# Patient Record
Sex: FEMALE | Race: WHITE | NOT HISPANIC OR LATINO | Employment: UNEMPLOYED | URBAN - METROPOLITAN AREA
[De-identification: names, ages, dates, MRNs, and addresses within clinical notes are randomized per-mention and may not be internally consistent; named-entity substitution may affect disease eponyms.]

---

## 2017-01-18 ENCOUNTER — MYC MEDICAL ADVICE (OUTPATIENT)
Dept: PEDIATRICS | Facility: CLINIC | Age: 52
End: 2017-01-18

## 2017-04-28 ENCOUNTER — OFFICE VISIT (OUTPATIENT)
Dept: PEDIATRICS | Facility: CLINIC | Age: 52
End: 2017-04-28
Payer: COMMERCIAL

## 2017-04-28 VITALS
HEART RATE: 90 BPM | BODY MASS INDEX: 40.84 KG/M2 | WEIGHT: 245.1 LBS | OXYGEN SATURATION: 98 % | SYSTOLIC BLOOD PRESSURE: 138 MMHG | HEIGHT: 65 IN | TEMPERATURE: 97.4 F | DIASTOLIC BLOOD PRESSURE: 80 MMHG

## 2017-04-28 DIAGNOSIS — M54.41 ACUTE RIGHT-SIDED LOW BACK PAIN WITH RIGHT-SIDED SCIATICA: Primary | ICD-10-CM

## 2017-04-28 PROCEDURE — 99213 OFFICE O/P EST LOW 20 MIN: CPT | Performed by: PHYSICIAN ASSISTANT

## 2017-04-28 RX ORDER — HYDROCODONE BITARTRATE AND ACETAMINOPHEN 5; 325 MG/1; MG/1
1 TABLET ORAL EVERY 6 HOURS PRN
Qty: 15 TABLET | Refills: 0 | Status: SHIPPED | OUTPATIENT
Start: 2017-04-28 | End: 2017-06-19

## 2017-04-28 NOTE — PATIENT INSTRUCTIONS
Low Back Pain            What is low back pain?   Low back pain is pain and stiffness in the lower back. It is one of the most common reasons people miss work.   How does it occur?   Your lower back is called your lumbar spine. It is made up of 5 bones called lumbar vertebrae. In between the vertebrae are shock absorbers called disks. Back pain can occur from an injury to the vertebrae or when a disk bulges or herniates.   Low back pain is usually caused when a ligament or muscle holding a vertebra in its proper position is strained. Vertebrae are bones that make up the spinal column through which the spinal cord passes. When these muscles or ligaments become weak or strained, the spine loses its stability, resulting in pain.   Low back pain can occur if your job involves lifting and carrying heavy objects, or if you spend a lot of time sitting or standing in one position or bending over. It can be caused by a fall or by unusually strenuous exercise. It can be brought on by the tension and stress that cause headaches in some people. It can even be brought on by violent sneezing or coughing.   People who are overweight may have low back pain because of the added stress on their back.   Back pain may occur when the muscles, joints, bones, and connective tissues of the back become inflamed as a result of an infection or an immune system problem. Arthritic disorders as well as some congenital and degenerative conditions may cause back pain.   Back pain accompanied by loss of bladder or bowel control, trouble moving your legs, or numbness or tingling in your arms or legs requires immediate medical treatment.   What are the symptoms?   Symptoms include:   pain in the back or legs   stiffness, spasm, or limited motion   The pain may be constant or may happen only in certain positions. It may get worse when you cough, sneeze, bend, twist, or strain during a bowel movement. The pain may be in only one spot or may  spread to other areas, most commonly down the buttocks and into the back of the thigh.   A low back strain typically does not produce pain past the knee into the calf or foot. Tingling or numbness in the calf or foot may indicate a herniated disk or pinched nerve.   Be sure to see your healthcare provider if:   You have weakness in your leg, especially if you cannot lift your foot, because this may be a sign of nerve damage.   You have new bowel or bladder problems as well as back pain, which may be a sign of severe injury to your spinal cord.   You have pain that gets worse despite treatment.   How is it diagnosed?   Your healthcare provider will review your medical history and examine you. You may have X-rays, an MRI, CT scan, or a bone scan.   How is it treated?   To treat this condition:   Put an ice pack, gel pack, or package of frozen vegetables, wrapped in a cloth on the area every 3 to 4 hours, for up to 20 minutes at a time for the first 2 or 3 days.   Use a heating pad or hot water bottle. Don't let the heating pad get too hot, and don't fall asleep with it. You could get a burn.   Rest in bed on a firm mattress. Often it helps to lie on your back with your knees raised on a pillow. However, some people prefer to lie on their side with their knees bent. It's best to try to stay active, so try not to rest in bed longer than 1 to 2 days.   Take muscle relaxants as recommended by your healthcare provider.   Take an anti-inflammatory such as ibuprofen, or other medicine as directed by your provider. Nonsteroidal anti-inflammatory medicines (NSAIDs) may cause stomach bleeding and other problems. These risks increase with age. Read the label and take as directed. Unless recommended by your healthcare provider, do not take for more than 10 days.   Get a back massage by a trained person.   Wear a belt or corset to support your back.   Do the exercises recommended by your provider. Your provider may also prescribe  physical therapy.   Talk with a counselor, if your back pain is related to tension caused by emotional problems.   When the pain is gone, ask your healthcare provider about starting an exercise program such as the following:   Exercise moderately every day, using stretching and warm-up exercises suggested by your provider or physical therapist.   Exercise vigorously for about 30 minutes 3 times a week by walking, swimming, using a stationary bicycle, or doing low-impact aerobics.   Exercising regularly will not only help your back, it will also help keep you healthier overall.   How long will the effects last?   The effects of back pain last as long as the cause exists or until your body recovers from the strain, usually a day or two but sometimes weeks.   How can I take care of myself?   In addition to the treatment described above, keep in mind these suggestions:   Practice good posture. Stand with your head up, shoulders straight, chest forward, weight balanced evenly on both feet, and pelvis tucked in.   Lose weight if you are overweight   Keep your core muscles strong. These are your abdominal and back muscles.   Sleep without a pillow under your head.   Pain is the best way to  the pace you should set in increasing your activity and exercise. Minor discomfort, stiffness, soreness, and mild aches need not interfere with activity. However, limit your activities temporarily if:   Your symptoms return.   The pain increases when you are more active.   The pain increases within 24 hours after a new or higher level of activity.   When can I return to my normal activities?   Everyone recovers from an injury at a different rate. Return to your activities depends on how soon your back recovers, not by how many days or weeks it has been since your injury has occurred. In general, the longer you have symptoms before you start treatment, the longer it will take to get better. The goal is to return to your normal  activities as soon as is safely possible. If you return too soon you may worsen your injury.   It is important that you have fully recovered from your low back pain before you return to any strenuous activity. You must be able to have the same range of motion that you had before your injury. You must be able to walk and twist without pain.   What can I do to help prevent low back pain?   You can reduce the strain on your back by doing the following:   Don't push with your arms when you move a heavy object. Turn around and push backwards so the strain is taken by your legs.   Whenever you sit, sit in a straight-backed chair and hold your spine against the back of the chair.   Bend your knees and hips and keep your back straight when you lift a heavy object.   Avoid lifting heavy objects higher than your waist.   Hold packages you carry close to your body, with your arms bent.   Use a footrest for one foot when you stand or sit in one spot for a long time. This keeps your back straight.   Bend your knees when you bend over.   Sit close to the pedals when you drive and use your seat belt and a hard backrest or pillow.   Lie on your side with your knees bent when you sleep or rest. It may help to put a pillow between your knees.   Put a pillow under your knees when you sleep on your back.   Raise the foot of the bed 8 inches to discourage sleeping on your stomach unless you have other problems that require that you keep your head elevated.   To rest your back, hold each of these positions for 5?minutes or longer:   Lie on your back, bend your knees, and put pillows under your knees.   Lie on your back on the floor with a pillow under your neck. Bend your knees to a 90-degree angle, and put your lower legs and feet on a chair.   Lie on your back, bend your knees, and bring one knee up to your chest and hold it there. Repeat with the other knee, then bring both knees to your chest. When holding your knee to your chest,  grab your thigh rather than your lower leg to avoid over flexing your knee.     Published by [employer].  This content is reviewed periodically and is subject to change as new health information becomes available. The information is intended to inform and educate and is not a replacement for medical evaluation, advice, diagnosis or treatment by a healthcare professional.   Developed by Mayra Conroy RN, [place], and [employer].   ? 2010 [facility] and/or its affiliates. All Rights Reserved.

## 2017-04-28 NOTE — MR AVS SNAPSHOT
After Visit Summary   4/28/2017    Racquel Nunn    MRN: [id number]           Patient Information     Date Of Birth          1965        Visit Information        Provider Department      4/28/2017 3:30 PM Jong Hilliard PA-C [facility]        Today's Diagnoses     Acute right-sided low back pain with right-sided sciatica    -  1      Care Instructions             Low Back Pain            What is low back pain?   Low back pain is pain and stiffness in the lower back. It is one of the most common reasons people miss work.   How does it occur?   Your lower back is called your lumbar spine. It is made up of 5 bones called lumbar vertebrae. In between the vertebrae are shock absorbers called disks. Back pain can occur from an injury to the vertebrae or when a disk bulges or herniates.   Low back pain is usually caused when a ligament or muscle holding a vertebra in its proper position is strained. Vertebrae are bones that make up the spinal column through which the spinal cord passes. When these muscles or ligaments become weak or strained, the spine loses its stability, resulting in pain.   Low back pain can occur if your job involves lifting and carrying heavy objects, or if you spend a lot of time sitting or standing in one position or bending over. It can be caused by a fall or by unusually strenuous exercise. It can be brought on by the tension and stress that cause headaches in some people. It can even be brought on by violent sneezing or coughing.   People who are overweight may have low back pain because of the added stress on their back.   Back pain may occur when the muscles, joints, bones, and connective tissues of the back become inflamed as a result of an infection or an immune system problem. Arthritic disorders as well as some congenital and degenerative conditions may cause back pain.   Back pain accompanied by loss of bladder or bowel control, trouble  moving your legs, or numbness or tingling in your arms or legs requires immediate medical treatment.   What are the symptoms?   Symptoms include:   pain in the back or legs   stiffness, spasm, or limited motion   The pain may be constant or may happen only in certain positions. It may get worse when you cough, sneeze, bend, twist, or strain during a bowel movement. The pain may be in only one spot or may spread to other areas, most commonly down the buttocks and into the back of the thigh.   A low back strain typically does not produce pain past the knee into the calf or foot. Tingling or numbness in the calf or foot may indicate a herniated disk or pinched nerve.   Be sure to see your healthcare provider if:   You have weakness in your leg, especially if you cannot lift your foot, because this may be a sign of nerve damage.   You have new bowel or bladder problems as well as back pain, which may be a sign of severe injury to your spinal cord.   You have pain that gets worse despite treatment.   How is it diagnosed?   Your healthcare provider will review your medical history and examine you. You may have X-rays, an MRI, CT scan, or a bone scan.   How is it treated?   To treat this condition:   Put an ice pack, gel pack, or package of frozen vegetables, wrapped in a cloth on the area every 3 to 4 hours, for up to 20 minutes at a time for the first 2 or 3 days.   Use a heating pad or hot water bottle. Don't let the heating pad get too hot, and don't fall asleep with it. You could get a burn.   Rest in bed on a firm mattress. Often it helps to lie on your back with your knees raised on a pillow. However, some people prefer to lie on their side with their knees bent. It's best to try to stay active, so try not to rest in bed longer than 1 to 2 days.   Take muscle relaxants as recommended by your healthcare provider.   Take an anti-inflammatory such as ibuprofen, or other medicine as directed by your provider.  Nonsteroidal anti-inflammatory medicines (NSAIDs) may cause stomach bleeding and other problems. These risks increase with age. Read the label and take as directed. Unless recommended by your healthcare provider, do not take for more than 10 days.   Get a back massage by a trained person.   Wear a belt or corset to support your back.   Do the exercises recommended by your provider. Your provider may also prescribe physical therapy.   Talk with a counselor, if your back pain is related to tension caused by emotional problems.   When the pain is gone, ask your healthcare provider about starting an exercise program such as the following:   Exercise moderately every day, using stretching and warm-up exercises suggested by your provider or physical therapist.   Exercise vigorously for about 30 minutes 3 times a week by walking, swimming, using a stationary bicycle, or doing low-impact aerobics.   Exercising regularly will not only help your back, it will also help keep you healthier overall.   How long will the effects last?   The effects of back pain last as long as the cause exists or until your body recovers from the strain, usually a day or two but sometimes weeks.   How can I take care of myself?   In addition to the treatment described above, keep in mind these suggestions:   Practice good posture. Stand with your head up, shoulders straight, chest forward, weight balanced evenly on both feet, and pelvis tucked in.   Lose weight if you are overweight   Keep your core muscles strong. These are your abdominal and back muscles.   Sleep without a pillow under your head.   Pain is the best way to  the pace you should set in increasing your activity and exercise. Minor discomfort, stiffness, soreness, and mild aches need not interfere with activity. However, limit your activities temporarily if:   Your symptoms return.   The pain increases when you are more active.   The pain increases within 24 hours after a new or  higher level of activity.   When can I return to my normal activities?   Everyone recovers from an injury at a different rate. Return to your activities depends on how soon your back recovers, not by how many days or weeks it has been since your injury has occurred. In general, the longer you have symptoms before you start treatment, the longer it will take to get better. The goal is to return to your normal activities as soon as is safely possible. If you return too soon you may worsen your injury.   It is important that you have fully recovered from your low back pain before you return to any strenuous activity. You must be able to have the same range of motion that you had before your injury. You must be able to walk and twist without pain.   What can I do to help prevent low back pain?   You can reduce the strain on your back by doing the following:   Don't push with your arms when you move a heavy object. Turn around and push backwards so the strain is taken by your legs.   Whenever you sit, sit in a straight-backed chair and hold your spine against the back of the chair.   Bend your knees and hips and keep your back straight when you lift a heavy object.   Avoid lifting heavy objects higher than your waist.   Hold packages you carry close to your body, with your arms bent.   Use a footrest for one foot when you stand or sit in one spot for a long time. This keeps your back straight.   Bend your knees when you bend over.   Sit close to the pedals when you drive and use your seat belt and a hard backrest or pillow.   Lie on your side with your knees bent when you sleep or rest. It may help to put a pillow between your knees.   Put a pillow under your knees when you sleep on your back.   Raise the foot of the bed 8 inches to discourage sleeping on your stomach unless you have other problems that require that you keep your head elevated.   To rest your back, hold each of these positions for 5?minutes or longer:    Lie on your back, bend your knees, and put pillows under your knees.   Lie on your back on the floor with a pillow under your neck. Bend your knees to a 90-degree angle, and put your lower legs and feet on a chair.   Lie on your back, bend your knees, and bring one knee up to your chest and hold it there. Repeat with the other knee, then bring both knees to your chest. When holding your knee to your chest, grab your thigh rather than your lower leg to avoid over flexing your knee.     Published by [employer].  This content is reviewed periodically and is subject to change as new health information becomes available. The information is intended to inform and educate and is not a replacement for medical evaluation, advice, diagnosis or treatment by a healthcare professional.   Developed by Mayra Conroy RN, [place], and [employer].   ? 2010 [facility] and/or its affiliates. All Rights Reserved.                 Follow-ups after your visit        Who to contact     If you have questions or need follow up information about today's clinic visit or your schedule please contact [facility] directly at [phone].  Normal or non-critical lab and imaging results will be communicated to you by [employer], letter or phone within 4 business days after the clinic has received the results. If you do not hear from us within 7 days, please contact the clinic through [employer] or phone. If you have a critical or abnormal lab result, we will notify you by phone as soon as possible.  Submit refill requests through [employer] or call your pharmacy and they will forward the refill request to us. Please allow 3 business days for your refill to be completed.          Additional Information About Your Visit        [employer] Information     [employer] gives you secure access to your electronic health record. If you see a primary care provider, you can also send messages to your care team and make appointments. If you have questions,  "please call your primary care clinic.  If you do not have a primary care provider, please call [phone] and they will assist you.        Care EveryWhere ID     This is your Care EveryWhere ID. This could be used by other organizations to access your [place] medical records  [id number]        Your Vitals Were     Pulse Temperature Height Pulse Oximetry BMI (Body Mass Index)       90 97.4  F (36.3  C) (Oral) 5' 4.5\" (1.638 m) 98% 41.42 kg/m2        Blood Pressure from Last 3 Encounters:   04/28/17 138/80   10/24/16 122/80   09/23/15 132/82    Weight from Last 3 Encounters:   04/28/17 245 lb 1.6 oz (111.2 kg)   10/24/16 232 lb (105.2 kg)   09/23/15 219 lb 12.8 oz (99.7 kg)              Today, you had the following     No orders found for display         Today's Medication Changes          These changes are accurate as of: 4/28/17  3:51 PM.  If you have any questions, ask your nurse or doctor.               Start taking these medicines.        Dose/Directions    HYDROcodone-acetaminophen 5-325 MG per tablet   Commonly known as:  NORCO   Used for:  Acute right-sided low back pain with right-sided sciatica   Started by:  Jong Hilliard PA-C        Dose:  1 tablet   Take 1 tablet by mouth every 6 hours as needed for moderate to severe pain   Quantity:  15 tablet   Refills:  0            Where to get your medicines      Some of these will need a paper prescription and others can be bought over the counter.  Ask your nurse if you have questions.     Bring a paper prescription for each of these medications     HYDROcodone-acetaminophen 5-325 MG per tablet                Primary Care Provider Office Phone # Fax #    Nancy Small [phone]       [facility] [address] [place] [address]        Thank you!     Thank you for choosing [facility]  for your care. Our goal is always to provide you with excellent care. Hearing back from our patients is " one way we can continue to improve our services. Please take a few minutes to complete the written survey that you may receive in the mail after your visit with us. Thank you!             Your Updated Medication List - Protect others around you: Learn how to safely use, store and throw away your medicines at www.disposemymeds.org.          This list is accurate as of: 4/28/17  3:51 PM.  Always use your most recent med list.                   Brand Name Dispense Instructions for use    blood glucose monitoring test strip    no brand specified    1 Box    by In Vitro route daily.       citalopram 20 MG tablet    celeXA    90 tablet    Take 1 tablet (20 mg) by mouth daily       HYDROcodone-acetaminophen 5-325 MG per tablet    NORCO    15 tablet    Take 1 tablet by mouth every 6 hours as needed for moderate to severe pain       lisinopril 40 MG tablet    PRINIVIL/ZESTRIL    90 tablet    Take 1 tablet (40 mg) by mouth daily       LORazepam 0.5 MG tablet    ATIVAN    30 tablet    Take 1-2 tablets (0.5-1 mg) by mouth every 8 hours as needed for anxiety       Multi-vitamin Tabs tablet   Generic drug:  multivitamin, therapeutic with minerals          norgestim-eth estrad triphasic 0.18/0.215/0.25 MG-35 MCG per tablet    ORTHO TRI-CYCLEN, TRI-SPRINTEC    84 tablet    Take 1 tablet by mouth daily

## 2017-04-28 NOTE — PROGRESS NOTES
"  SUBJECTIVE:                                                    Racquel Nunn is a 51 year old female who presents to clinic today for the following health issues:    Back Pain      Duration: this morning at 10:30        Specific cause: none    Description:   Location of pain: low back right  Character of pain: stabbing, cramping and constant  Pain radiation:radiates into the right buttocks, radiates into the right leg and radiates into the right foot  New numbness or weakness in legs, not attributed to pain:  no     Intensity: Currently 8/10, At its worst 10+/10    History:   Pain interferes with job: No  History of back problems: no prior back problems  Any previous MRI or X-rays: None  Sees a specialist for back pain:  No  Therapies tried without relief: cold, heat and rest    Alleviating factors:   Improved by: none      Precipitating factors:  Worsened by: Lying Flat    Functional and Psychosocial Screen ([place] [facility] Back):      Not performed today   Accompanying Signs & Symptoms:  Risk of Fracture:  None  Risk of Cauda Equina:  None  Risk of Infection:  None  Risk of Cancer:  None  Risk of Ankylosing Spondylitis:  Onset at age <35, male, AND morning back stiffness. no     Works at [employer] in [place]     ROS:  ROS otherwise negative    OBJECTIVE:                                                    /80  Pulse 90  Temp 97.4  F (36.3  C) (Oral)  Ht 5' 4.5\" (1.638 m)  Wt 245 lb 1.6 oz (111.2 kg)  SpO2 98%  BMI 41.42 kg/m2  Body mass index is 41.42 kg/(m^2).   GENERAL: alert, no distress  Lumber/Thoracic Spine Exam: Tender:  right para lumbar muscles and right mid buttock  Range of Motion:  lumbar flexion  decreased, lumbar extension  decreased, left lateral lumbar bending  full, right lateral lumbar bending  decreased, left lateral lumbar rotation  Decreased, bilateral lumbar rotation  full  Strength: full  Special tests:  negative straight leg raises    Diagnostic test results:  No " results found for this or any previous visit (from the past 24 hour(s)).     ASSESSMENT/PLAN:                                                    (M54.41) Acute right-sided low back pain with right-sided sciatica  (primary encounter diagnosis)  Comment: begin heat/ice, modification of activities, ibuprofen four times daily and norco PRN. Work excuse given. Follow up in 5 days.   Signs for emergent evaluation discussed with patient.  Plan: HYDROcodone-acetaminophen (NORCO) 5-325 MG per         tablet          See Patient Instructions    Jong Hilliard PA-C  [facility]

## 2017-04-28 NOTE — NURSING NOTE
"Chief Complaint   Patient presents with     Back Pain     this morning        Initial /80  Pulse 90  Temp 97.4  F (36.3  C) (Oral)  Ht 5' 4.5\" (1.638 m)  Wt 245 lb 1.6 oz (111.2 kg)  SpO2 98%  BMI 41.42 kg/m2 Estimated body mass index is 41.42 kg/(m^2) as calculated from the following:    Height as of this encounter: 5' 4.5\" (1.638 m).    Weight as of this encounter: 245 lb 1.6 oz (111.2 kg).  Medication Reconciliation: complete   Jessica Palomo MA// April 28, 2017 3:27 PM          "

## 2017-04-28 NOTE — LETTER
[address]  [address]  [facility] [address]  Phone: [phone]  Fax: [phone]    04/28/17    Racquel Nunn  [address]  [place] [facility] [address]      To whom it may concern:     Racquel Nunn was seen on 4/28/17.  Please excuse her through 5/2 due to injury.      Sincerely,        Jong Hilliard PA-C

## 2017-05-08 ENCOUNTER — MYC MEDICAL ADVICE (OUTPATIENT)
Dept: PEDIATRICS | Facility: CLINIC | Age: 52
End: 2017-05-08

## 2017-05-08 DIAGNOSIS — M54.41 ACUTE RIGHT-SIDED LOW BACK PAIN WITH RIGHT-SIDED SCIATICA: ICD-10-CM

## 2017-05-08 NOTE — TELEPHONE ENCOUNTER
See [employer] message below. PT next step? Ok for Hydrocodone refill?    Refill request for: HYDROCODONE-APAP 5/325 MG    Last rx written: 4/28/17 # 15    **[place]  reviewed- last filled: unable to view  Narc agreement: none  Last OV with [facility] 4/28/17  for acute right side back pain with sciatica    Unable to fill per standing order routed to Dr. Smlal for approval.    Unsure, sent [employer] message.     Elmira Bruno RN

## 2017-05-09 RX ORDER — METHYLPREDNISOLONE 4 MG
TABLET, DOSE PACK ORAL
Qty: 21 TABLET | Refills: 0 | Status: SHIPPED | OUTPATIENT
Start: 2017-05-09 | End: 2017-06-19

## 2017-05-09 RX ORDER — HYDROCODONE BITARTRATE AND ACETAMINOPHEN 5; 325 MG/1; MG/1
1 TABLET ORAL EVERY 6 HOURS PRN
Qty: 15 TABLET | Refills: 0 | Status: CANCELLED | OUTPATIENT
Start: 2017-05-09

## 2017-05-09 NOTE — TELEPHONE ENCOUNTER
I recommend a steroid burst and physical therapy - orders placed.   If these are not helpful, can refill a small supply of hydrocodone, but would try them first.

## 2017-06-19 ENCOUNTER — OFFICE VISIT (OUTPATIENT)
Dept: PEDIATRICS | Facility: CLINIC | Age: 52
End: 2017-06-19
Payer: COMMERCIAL

## 2017-06-19 VITALS
DIASTOLIC BLOOD PRESSURE: 78 MMHG | SYSTOLIC BLOOD PRESSURE: 124 MMHG | HEART RATE: 84 BPM | BODY MASS INDEX: 38.36 KG/M2 | TEMPERATURE: 98.7 F | OXYGEN SATURATION: 100 % | WEIGHT: 227 LBS

## 2017-06-19 DIAGNOSIS — J01.90 ACUTE SINUSITIS WITH SYMPTOMS > 10 DAYS: Primary | ICD-10-CM

## 2017-06-19 PROCEDURE — 99213 OFFICE O/P EST LOW 20 MIN: CPT | Performed by: PHYSICIAN ASSISTANT

## 2017-06-19 RX ORDER — AMOXICILLIN 875 MG
875 TABLET ORAL 2 TIMES DAILY
Qty: 20 TABLET | Refills: 0 | Status: SHIPPED | OUTPATIENT
Start: 2017-06-19 | End: 2018-01-10

## 2017-06-19 NOTE — MR AVS SNAPSHOT
After Visit Summary   6/19/2017    Racquel Nunn    MRN: [id number]           Patient Information     Date Of Birth          1965        Visit Information        Provider Department      6/19/2017 3:30 PM Jong Hilliard PA-C [facility]        Today's Diagnoses     Acute sinusitis with symptoms > 10 days    -  1       Follow-ups after your visit        Who to contact     If you have questions or need follow up information about today's clinic visit or your schedule please contact [facility] directly at [phone].  Normal or non-critical lab and imaging results will be communicated to you by [employer], letter or phone within 4 business days after the clinic has received the results. If you do not hear from us within 7 days, please contact the clinic through [employer] or phone. If you have a critical or abnormal lab result, we will notify you by phone as soon as possible.  Submit refill requests through [employer] or call your pharmacy and they will forward the refill request to us. Please allow 3 business days for your refill to be completed.          Additional Information About Your Visit        MyChart Information     [employer] gives you secure access to your electronic health record. If you see a primary care provider, you can also send messages to your care team and make appointments. If you have questions, please call your primary care clinic.  If you do not have a primary care provider, please call [phone] and they will assist you.        Care EveryWhere ID     This is your Care EveryWhere ID. This could be used by other organizations to access your [facility] medical records  [id number]        Your Vitals Were     Pulse Temperature Pulse Oximetry BMI (Body Mass Index)          84 98.7  F (37.1  C) (Oral) 100% 38.36 kg/m2         Blood Pressure from Last 3 Encounters:   06/19/17 124/78   04/28/17 138/80   10/24/16 122/80    Weight from Last 3  Encounters:   06/19/17 227 lb (103 kg)   04/28/17 245 lb 1.6 oz (111.2 kg)   10/24/16 232 lb (105.2 kg)              Today, you had the following     No orders found for display         Today's Medication Changes          These changes are accurate as of: 6/19/17  3:43 PM.  If you have any questions, ask your nurse or doctor.               Start taking these medicines.        Dose/Directions    amoxicillin 875 MG tablet   Commonly known as:  AMOXIL   Used for:  Acute sinusitis with symptoms > 10 days   Started by:  Jong Hilliard PA-C        Dose:  875 mg   Take 1 tablet (875 mg) by mouth 2 times daily   Quantity:  20 tablet   Refills:  0         Stop taking these medicines if you haven't already. Please contact your care team if you have questions.     HYDROcodone-acetaminophen 5-325 MG per tablet   Commonly known as:  NORCO   Stopped by:  Jong Hilliard PA-C           methylPREDNISolone 4 MG tablet   Commonly known as:  MEDROL DOSEPAK   Stopped by:  Jong Hilliard PA-C                Where to get your medicines      These medications were sent to [address] [address]    Hours:  Tech issues with their phone system Phone:  [phone]     amoxicillin 875 MG tablet                Primary Care Provider Office Phone # Fax #    Nancy Small [phone]       [address] [place] [address]        Thank you!     Thank you for choosing [facility]  for your care. Our goal is always to provide you with excellent care. Hearing back from our patients is one way we can continue to improve our services. Please take a few minutes to complete the written survey that you may receive in the mail after your visit with us. Thank you!             Your Updated Medication List - Protect others around you: Learn how to safely use, store and throw  away your medicines at www.disposemymeds.org.          This list is accurate as of: 6/19/17  3:43 PM.  Always use your most recent med list.                   Brand Name Dispense Instructions for use    amoxicillin 875 MG tablet    AMOXIL    20 tablet    Take 1 tablet (875 mg) by mouth 2 times daily       blood glucose monitoring test strip    no brand specified    1 Box    by In Vitro route daily.       citalopram 20 MG tablet    celeXA    90 tablet    Take 1 tablet (20 mg) by mouth daily       lisinopril 40 MG tablet    PRINIVIL/ZESTRIL    90 tablet    Take 1 tablet (40 mg) by mouth daily       LORazepam 0.5 MG tablet    ATIVAN    30 tablet    Take 1-2 tablets (0.5-1 mg) by mouth every 8 hours as needed for anxiety       Multi-vitamin Tabs tablet   Generic drug:  multivitamin, therapeutic with minerals          norgestim-eth estrad triphasic 0.18/0.215/0.25 MG-35 MCG per tablet    ORTHO TRI-CYCLEN, TRI-SPRINTEC    84 tablet    Take 1 tablet by mouth daily

## 2017-06-19 NOTE — PROGRESS NOTES
SUBJECTIVE:                                                    Racquel Nunn is a 52 year old female who presents to clinic today for the following health issues:    Acute Illness   Acute illness concerns: cough  Onset: 2 weeks    Fever: YES    Chills/Sweats: YES    Headache (location?): YES    Sinus Pressure:no    Conjunctivitis:  no    Ear Pain: no    Rhinorrhea: YES    Congestion: YES    Sore Throat: no     Cough: YES    Wheeze: YES    No sob    No history of asthma, allergies.     Nonsmoker    Decreased Appetite: no    Nausea: no    Vomiting: no    Diarrhea:  no    Dysuria/Freq.: no    Fatigue/Achiness: YES    Sick/Strep Exposure: no     Therapies Tried and outcome:   Work: [employer]  ROS:  ROS otherwise negative    OBJECTIVE:                                                    /78 (Cuff Size: Adult Large)  Pulse 84  Temp 98.7  F (37.1  C) (Oral)  Wt 227 lb (103 kg)  SpO2 100%  BMI 38.36 kg/m2  Body mass index is 38.36 kg/(m^2).   GENERAL: alert, no distress  HENT: ear canals- normal; TMs- normal; Nose- normal; Mouth- no ulcers, no lesions; max sinus tenderness  NECK: no tenderness, no adenopathy  RESP: lungs clear to auscultation - no rales, no rhonchi, no wheezes  CV: regular rates and rhythm, normal S1 S2, no S3 or S4 and no murmur, no click or rub    Diagnostic test results:  No results found for this or any previous visit (from the past 24 hour(s)).     ASSESSMENT/PLAN:                                                    (J01.90) Acute sinusitis with symptoms > 10 days  (primary encounter diagnosis)  Comment: begin antibiotics.   Plan: amoxicillin (AMOXIL) 875 MG tablet          See Patient Instructions    Jong Hilliard PA-C  [facility]

## 2017-09-09 ENCOUNTER — HEALTH MAINTENANCE LETTER (OUTPATIENT)
Age: 52
End: 2017-09-09

## 2017-11-01 ENCOUNTER — HOSPITAL ENCOUNTER (OUTPATIENT)
Dept: MAMMOGRAPHY | Facility: CLINIC | Age: 52
Discharge: HOME | End: 2017-11-01
Attending: PEDIATRICS | Admitting: PEDIATRICS
Payer: COMMERCIAL

## 2017-11-01 DIAGNOSIS — Z12.31 ENCOUNTER FOR SCREENING MAMMOGRAM FOR HIGH-RISK PATIENT: ICD-10-CM

## 2017-11-01 PROCEDURE — G0202 SCR MAMMO BI INCL CAD: HCPCS

## 2017-11-09 DIAGNOSIS — Z30.09 BIRTH CONTROL COUNSELING: ICD-10-CM

## 2017-11-10 RX ORDER — NORGESTIMATE AND ETHINYL ESTRADIOL 7DAYSX3 28
1 KIT ORAL DAILY
Qty: 84 TABLET | Refills: 0 | Status: SHIPPED | OUTPATIENT
Start: 2017-11-10 | End: 2018-01-11

## 2017-11-10 NOTE — TELEPHONE ENCOUNTER
Medication is being filled for 1 time refill only due to:  due for annual physical, pap, labs.   Please call her to help schedule this.     Requested Prescriptions   Pending Prescriptions Disp Refills     TRI-ESTARYLLA 0.18/0.215/0.25 MG-35 MCG per tablet [Pharmacy Med Name: TRI-ESTARYLLA TABLET] 84 tablet 3     Sig: TAKE 1 TABLET BY MOUTH DAILY    Contraceptives Protocol Passed    11/9/2017 12:57 AM       Passed - Patient is not a current smoker if age is 35 or older       Passed - Recent or future visit with authorizing provider's specialty    Patient had office visit in the last year or has a visit in the next 30 days with authorizing provider.  See chart review.          Passed - No active pregnancy on record       Passed - No positive pregnancy test in past 12 months        Crystal Crockett, RN  Triage Nurse

## 2017-11-11 DIAGNOSIS — I10 BENIGN ESSENTIAL HYPERTENSION: ICD-10-CM

## 2017-11-11 DIAGNOSIS — F32.5 MAJOR DEPRESSION IN COMPLETE REMISSION (H): ICD-10-CM

## 2017-11-11 DIAGNOSIS — F41.9 ANXIETY: ICD-10-CM

## 2017-11-11 NOTE — LETTER
[address]  [address]  [facility] [address]  Phone: [phone]  Fax: [phone]    11/14/17    Racquel ROLLINS [place]  [address] [facility] [address]      To whom it may concern:     We recently received a call from your pharmacy requesting a refill of your medication.    A review of your chart indicates that an appointment is required with your provider for an Annual Physical. Your last physical was on 10/24/2016. Please call the clinic at [phone] to schedule your appointment.    We have authorized one refill of your medication to allow time for you to schedule your appointment.    Taking care of your health is important to us, and ongoing visits with your provider are vital to your care.  We look forward to seeing you in the near future.          Sincerely,      Nancy Small MD/ Care Team

## 2017-11-14 RX ORDER — CITALOPRAM HYDROBROMIDE 20 MG/1
TABLET ORAL
Qty: 30 TABLET | Refills: 0 | Status: SHIPPED | OUTPATIENT
Start: 2017-11-14 | End: 2018-01-11

## 2017-11-14 RX ORDER — LISINOPRIL 40 MG/1
TABLET ORAL
Qty: 30 TABLET | Refills: 0 | Status: SHIPPED | OUTPATIENT
Start: 2017-11-14 | End: 2018-01-11

## 2017-11-14 NOTE — TELEPHONE ENCOUNTER
Medication is being filled for 1 time refill only due to:  Patient needs to be seen because due for annual physical.     Letter Sent.     Prescription approved per [facility] Refill Protocol.    Anjana TAVAREZ RN, BSN, PHN  [place] [facility] RN

## 2017-12-20 ENCOUNTER — TELEPHONE (OUTPATIENT)
Dept: PEDIATRICS | Facility: CLINIC | Age: 52
End: 2017-12-20

## 2017-12-20 NOTE — TELEPHONE ENCOUNTER
"12/20/2017      Patient Communication Preferences indicate  Do not contact  and/or communication by \"Phone\" is not preferred. Call not required per Outreach team.            Outreach ,  Ena Lockhart     "

## 2018-01-10 ENCOUNTER — OFFICE VISIT (OUTPATIENT)
Dept: PEDIATRICS | Facility: CLINIC | Age: 53
End: 2018-01-10
Payer: COMMERCIAL

## 2018-01-10 VITALS
BODY MASS INDEX: 40.35 KG/M2 | HEIGHT: 65 IN | SYSTOLIC BLOOD PRESSURE: 148 MMHG | TEMPERATURE: 98 F | DIASTOLIC BLOOD PRESSURE: 90 MMHG | OXYGEN SATURATION: 100 % | WEIGHT: 242.2 LBS | HEART RATE: 82 BPM

## 2018-01-10 DIAGNOSIS — J02.9 ACUTE PHARYNGITIS, UNSPECIFIED ETIOLOGY: ICD-10-CM

## 2018-01-10 DIAGNOSIS — H11.33 SUBCONJUNCTIVAL HEMORRHAGE, BILATERAL: Primary | ICD-10-CM

## 2018-01-10 LAB
DEPRECATED S PYO AG THROAT QL EIA: NORMAL
SPECIMEN SOURCE: NORMAL

## 2018-01-10 PROCEDURE — 87081 CULTURE SCREEN ONLY: CPT | Performed by: PHYSICIAN ASSISTANT

## 2018-01-10 PROCEDURE — 99213 OFFICE O/P EST LOW 20 MIN: CPT | Performed by: PHYSICIAN ASSISTANT

## 2018-01-10 PROCEDURE — 87880 STREP A ASSAY W/OPTIC: CPT | Performed by: PHYSICIAN ASSISTANT

## 2018-01-10 NOTE — MR AVS SNAPSHOT
After Visit Summary   1/10/2018    Racquel Nunn    MRN: [id number]           Patient Information     Date Of Birth          1965        Visit Information        Provider Department      1/10/2018 12:50 PM Jong Hilliard PA-C [facility]        Today's Diagnoses     Subconjunctival hemorrhage, bilateral    -  1    Acute pharyngitis, unspecified etiology           Follow-ups after your visit        Your next 10 appointments already scheduled     Jan 11, 2018  3:00 PM CST   PHYSICAL with Nancy Small MD   [facility] ([facility])    [address]  [address]  [facility] [id number] [phone]              Who to contact     If you have questions or need follow up information about today's clinic visit or your schedule please contact [facility] directly at [phone].  Normal or non-critical lab and imaging results will be communicated to you by MyChart, letter or phone within 4 business days after the clinic has received the results. If you do not hear from us within 7 days, please contact the clinic through MyChart or phone. If you have a critical or abnormal lab result, we will notify you by phone as soon as possible.  Submit refill requests through [employer] or call your pharmacy and they will forward the refill request to us. Please allow 3 business days for your refill to be completed.          Additional Information About Your Visit        MyChart Information     [employer] gives you secure access to your electronic health record. If you see a primary care provider, you can also send messages to your care team and make appointments. If you have questions, please call your primary care clinic.  If you do not have a primary care provider, please call [phone] and they will assist you.        Care EveryWhere ID     This is your Care EveryWhere ID. This could be used by other organizations to access your  "[facility] medical records  [id number]        Your Vitals Were     Pulse Temperature Height Pulse Oximetry BMI (Body Mass Index)       82 98  F (36.7  C) (Oral) 5' 4.5\" (1.638 m) 100% 40.93 kg/m2        Blood Pressure from Last 3 Encounters:   01/10/18 148/90   06/19/17 124/78   04/28/17 138/80    Weight from Last 3 Encounters:   01/10/18 242 lb 3.2 oz (109.9 kg)   06/19/17 227 lb (103 kg)   04/28/17 245 lb 1.6 oz (111.2 kg)              We Performed the Following     Beta strep group A culture     Strep, Rapid Screen        Primary Care Provider Office Phone # Fax #    Nancy Small [phone] [address] [place] [address]        Equal Access to Services     [facility]: Hadii aad guy hadasho Soolive, waaxda luqadaha, qaybta kaalmada adeegyada, porfirio ding haymora palmer . So [facility] [phone].    ATENCIÓN: Si habla español, tiene a saunders disposición servicios gratuitos de asistencia lingüística. Llame al [phone].    We comply with applicable federal civil rights laws and [place] laws. We do not discriminate on the basis of race, color, national origin, age, disability, sex, sexual orientation, or gender identity.            Thank you!     [facility] your care. Our goal is always to provide you with excellent care. Hearing back from our patients is one way we can continue to improve our services. Please take a few minutes to complete the written survey that you may receive in the mail after your visit with us. Thank you!             Your Updated Medication List - Protect others around you: Learn how to safely use, store and throw away your medicines at www.disposemymeds.org.          This list is accurate as of: 1/10/18  2:49 PM.  Always use your most recent med list.                   Brand Name Dispense Instructions for use Diagnosis    blood glucose monitoring test strip    no brand specified    1 Box    by " In Vitro route daily.    Impaired fasting glucose       citalopram 20 MG tablet    celeXA    30 tablet    TAKE 1 TABLET (20 MG) BY MOUTH DAILY    Major depression in complete remission (H), Anxiety       lisinopril 40 MG tablet    PRINIVIL/ZESTRIL    30 tablet    TAKE 1 TABLET BY MOUTH DAILY    Benign essential hypertension       LORazepam 0.5 MG tablet    ATIVAN    30 tablet    Take 1-2 tablets (0.5-1 mg) by mouth every 8 hours as needed for anxiety    Major depression in complete remission (H), Anxiety       Multi-vitamin Tabs tablet   Generic drug:  multivitamin, therapeutic with minerals       Obesity, unspecified       norgestim-eth estrad triphasic 0.18/0.215/0.25 MG-35 MCG per tablet    TRI-ESTARYLLA    84 tablet    Take 1 tablet by mouth daily Due for annual physical at this time. Please call to schedule.    Birth control counseling

## 2018-01-10 NOTE — NURSING NOTE
"Chief Complaint   Patient presents with     Cough       Initial /90 (BP Location: Right arm, Cuff Size: Adult Large)  Pulse 82  Temp 98  F (36.7  C) (Oral)  Ht 5' 4.5\" (1.638 m)  Wt 242 lb 3.2 oz (109.9 kg)  SpO2 100%  BMI 40.93 kg/m2 Estimated body mass index is 40.93 kg/(m^2) as calculated from the following:    Height as of this encounter: 5' 4.5\" (1.638 m).    Weight as of this encounter: 242 lb 3.2 oz (109.9 kg).  Medication Reconciliation: complete   Herbie Patel CMA    "

## 2018-01-10 NOTE — PROGRESS NOTES
"  SUBJECTIVE:   Racquel Nunn is a 52 year old female who presents to clinic today for the following health issues:    Acute Illness   Acute illness concerns: cough  Onset: 6 days    Fever: no    Chills/Sweats: no    Headache (location?): no    Sinus Pressure:YES- PND    Conjunctivitis:  Blood vessels    Ear Pain: no    Rhinorrhea: YES- clear    Congestion: YES- nasal    Sore Throat: no     Cough: YES-productive     Worse all the time    Wheeze: no    Decreased Appetite: no    Nausea: no    Vomiting: YES- on friday    Diarrhea:  no    Dysuria/Freq.: no    Fatigue/Achiness: YES- both    Sick/Strep Exposure: YES     Therapies Tried and outcome: tylenol, nyquil and dayquil  Flu shot.     ROS:  ROS otherwise negative    OBJECTIVE:                                                    /90 (BP Location: Right arm, Cuff Size: Adult Large)  Pulse 82  Temp 98  F (36.7  C) (Oral)  Ht 5' 4.5\" (1.638 m)  Wt 242 lb 3.2 oz (109.9 kg)  SpO2 100%  BMI 40.93 kg/m2  Body mass index is 40.93 kg/(m^2).   GENERAL: alert, no distress  Eyes:  conjunctiva/corneas- subconjunctival hemorrhage OU inferior  HENT: ear canals- normal; TMs- normal; Nose- normal; Mouth- erythema of the upper palate and posterior pharynx; no sinus tenderness   NECK: tonsillar LAD  RESP: lungs clear to auscultation - no rales, no rhonchi, no wheezes  CV: regular rates and rhythm, normal S1 S2, no S3 or S4 and no murmur, no click or rub    Diagnostic test results:  Results for orders placed or performed in visit on 01/10/18 (from the past 24 hour(s))   Strep, Rapid Screen   Result Value Ref Range    Specimen Description Throat     Rapid Strep A Screen       NEGATIVE: No Group A streptococcal antigen detected by immunoassay, await culture report.          ASSESSMENT/PLAN:                                                    (H11.33) Subconjunctival hemorrhage, bilateral  (primary encounter diagnosis)  Comment: will resorb on own.  Plan:     (J02.9) Acute " pharyngitis, unspecified etiology  Comment: TC pending.   Plan: Strep, Rapid Screen, Beta strep group A culture          See Patient Instructions    Jong Hilliard PA-C  [facility]

## 2018-01-11 ENCOUNTER — OFFICE VISIT (OUTPATIENT)
Dept: PEDIATRICS | Facility: CLINIC | Age: 53
End: 2018-01-11
Payer: COMMERCIAL

## 2018-01-11 VITALS
OXYGEN SATURATION: 97 % | WEIGHT: 241 LBS | TEMPERATURE: 99.6 F | DIASTOLIC BLOOD PRESSURE: 90 MMHG | SYSTOLIC BLOOD PRESSURE: 142 MMHG | HEIGHT: 64 IN | HEART RATE: 97 BPM | BODY MASS INDEX: 41.15 KG/M2

## 2018-01-11 DIAGNOSIS — I10 BENIGN ESSENTIAL HYPERTENSION: ICD-10-CM

## 2018-01-11 DIAGNOSIS — Z00.00 ROUTINE GENERAL MEDICAL EXAMINATION AT A HEALTH CARE FACILITY: Primary | ICD-10-CM

## 2018-01-11 DIAGNOSIS — D22.9 MULTIPLE PIGMENTED NEVI: ICD-10-CM

## 2018-01-11 DIAGNOSIS — F32.5 MAJOR DEPRESSION IN COMPLETE REMISSION (H): ICD-10-CM

## 2018-01-11 DIAGNOSIS — Z30.09 BIRTH CONTROL COUNSELING: ICD-10-CM

## 2018-01-11 DIAGNOSIS — E66.01 MORBID OBESITY (H): ICD-10-CM

## 2018-01-11 DIAGNOSIS — Z23 NEED FOR PROPHYLACTIC VACCINATION AGAINST STREPTOCOCCUS PNEUMONIAE (PNEUMOCOCCUS): ICD-10-CM

## 2018-01-11 DIAGNOSIS — F41.9 ANXIETY: ICD-10-CM

## 2018-01-11 DIAGNOSIS — J01.00 ACUTE NON-RECURRENT MAXILLARY SINUSITIS: ICD-10-CM

## 2018-01-11 DIAGNOSIS — Z12.11 SCREEN FOR COLON CANCER: ICD-10-CM

## 2018-01-11 DIAGNOSIS — Z12.4 SCREENING FOR MALIGNANT NEOPLASM OF CERVIX: ICD-10-CM

## 2018-01-11 LAB
BACTERIA SPEC CULT: NORMAL
SPECIMEN SOURCE: NORMAL

## 2018-01-11 PROCEDURE — 90471 IMMUNIZATION ADMIN: CPT | Performed by: PEDIATRICS

## 2018-01-11 PROCEDURE — 90732 PPSV23 VACC 2 YRS+ SUBQ/IM: CPT | Performed by: PEDIATRICS

## 2018-01-11 PROCEDURE — 99213 OFFICE O/P EST LOW 20 MIN: CPT | Mod: 25 | Performed by: PEDIATRICS

## 2018-01-11 PROCEDURE — 99396 PREV VISIT EST AGE 40-64: CPT | Mod: 25 | Performed by: PEDIATRICS

## 2018-01-11 RX ORDER — LISINOPRIL 40 MG/1
40 TABLET ORAL DAILY
Qty: 90 TABLET | Refills: 3 | Status: SHIPPED | OUTPATIENT
Start: 2018-01-11 | End: 2019-01-14

## 2018-01-11 RX ORDER — NORGESTIMATE AND ETHINYL ESTRADIOL 7DAYSX3 28
1 KIT ORAL DAILY
Qty: 84 TABLET | Refills: 3 | Status: SHIPPED | OUTPATIENT
Start: 2018-01-11 | End: 2018-12-17

## 2018-01-11 RX ORDER — CITALOPRAM HYDROBROMIDE 20 MG/1
20 TABLET ORAL DAILY
Qty: 90 TABLET | Refills: 3 | Status: SHIPPED | OUTPATIENT
Start: 2018-01-11 | End: 2019-01-14

## 2018-01-11 ASSESSMENT — ANXIETY QUESTIONNAIRES
2. NOT BEING ABLE TO STOP OR CONTROL WORRYING: SEVERAL DAYS
5. BEING SO RESTLESS THAT IT IS HARD TO SIT STILL: NOT AT ALL
GAD7 TOTAL SCORE: 2
IF YOU CHECKED OFF ANY PROBLEMS ON THIS QUESTIONNAIRE, HOW DIFFICULT HAVE THESE PROBLEMS MADE IT FOR YOU TO DO YOUR WORK, TAKE CARE OF THINGS AT HOME, OR GET ALONG WITH OTHER PEOPLE: SOMEWHAT DIFFICULT
7. FEELING AFRAID AS IF SOMETHING AWFUL MIGHT HAPPEN: NOT AT ALL
1. FEELING NERVOUS, ANXIOUS, OR ON EDGE: NOT AT ALL
6. BECOMING EASILY ANNOYED OR IRRITABLE: NOT AT ALL
3. WORRYING TOO MUCH ABOUT DIFFERENT THINGS: SEVERAL DAYS

## 2018-01-11 ASSESSMENT — PATIENT HEALTH QUESTIONNAIRE - PHQ9
5. POOR APPETITE OR OVEREATING: NOT AT ALL
SUM OF ALL RESPONSES TO PHQ QUESTIONS 1-9: 1

## 2018-01-11 NOTE — PROGRESS NOTES
SUBJECTIVE:   CC: Racquel Nunn is an 52 year old woman who presents for preventive health visit.     Physical   Annual:     Getting at least 3 servings of Calcium per day::  Yes    Bi-annual eye exam::  Yes    Dental care twice a year::  NO    Sleep apnea or symptoms of sleep apnea::  Daytime drowsiness    Diet::  Regular (no restrictions) and Gluten-free/reduced    Taking medications regularly::  Yes    Medication side effects::  None    Additional concerns today::  No              Today's PHQ-2 Score:   PHQ-2 ( 1999 [employer]) 1/11/2018   Q1: Little interest or pleasure in doing things 1   Q2: Feeling down, depressed or hopeless 1   PHQ-2 Score 2   Q1: Little interest or pleasure in doing things Several days   Q2: Feeling down, depressed or hopeless Several days   PHQ-2 Score 2       Abuse: Current or Past(Physical, Sexual or Emotional)- No  Do you feel safe in your environment - Yes    Social History   Substance Use Topics     Smoking status: Never Smoker     Smokeless tobacco: Never Used     Alcohol use Yes      Comment: 1 drink per week or less     Alcohol Use 1/11/2018   If you drink alcohol, do you typically have greater than 3 drinks per day OR greater than 7 drinks per week?   No   No flowsheet data found.      Reviewed orders with patient.  Reviewed health maintenance and updated orders accordingly - Yes    New concern - follow up acute care visit yesterday -   Constant pain and pressure in sinuses - just started with headaches today - symptoms worsening in the last 2 days and now present for 7 days.   Cough has been severe - has subconjunctival hemorrhages from coughing.  Not able to cough up phlegm, lots of PND.   Ears feel clogged.  No fevers.  OTC medications not helping.    Depression/anxiety - well controlled on current medication without side effects.      HTN - no new cardiac symptoms.  When blood pressure checked at home, is in the desired range - has been high the last 2 measurements here  "- patient attributes this to acute illness.      Obesity - working on healthy eating and exercise.    Doing well on current birth control, light and regular periods.  No side effects noted.  Bp usually controlled.  Nonsmoker.      Patient over age 50, mutual decision to screen reflected in health maintenance.      Pertinent mammograms are reviewed under the imaging tab.  History of abnormal Pap smear: NO - age 30- 65 PAP every 3 years recommended    Reviewed and updated as needed this visit by clinical staffTobacco  Allergies  Med Hx  Surg Hx  Fam Hx  Soc Hx        Reviewed and updated as needed this visit by Provider            Review of Systems   ROS: 10 point ROS neg other than the symptoms noted above in the HPI.       OBJECTIVE:   /90 (BP Location: Right arm, Patient Position: Right side, Cuff Size: Adult Large)  Pulse 97  Temp 99.6  F (37.6  C) (Tympanic)  Ht 5' 4.45\" (1.637 m)  Wt 241 lb (109.3 kg)  SpO2 97%  BMI 40.79 kg/m2  Physical Exam  GENERAL: healthy, alert and no distress  EYES: bilateral subconjunctival hemorrhages inferior to iris, PERRL and conjunctivae and sclerae normal  HENT: ear canals and TM's with clear effusion, nasal congestion present, maxillary sinuses mildly tender to palpation, erythematous posterior OP without exudate  NECK: no adenopathy, no asymmetry, masses, or scars and thyroid normal to palpation  RESP: lungs clear to auscultation - no rales, rhonchi or wheezes  BREAST: normal without masses, tenderness or nipple discharge and no palpable axillary masses or adenopathy  CV: regular rate and rhythm, normal S1 S2, no S3 or S4, no murmur, click or rub, no peripheral edema and peripheral pulses strong  ABDOMEN: soft, nontender, no hepatosplenomegaly, no masses and bowel sounds normal  MS: no gross musculoskeletal defects noted, no edema  SKIN: fair skin, sun exposed areas with numerous pigmented nevi, freckles  NEURO: Normal strength and tone, mentation intact and " speech normal  PSYCH: mentation appears normal, affect normal/bright    ASSESSMENT/PLAN:       ICD-10-CM    1. Routine general medical examination at a health care facility Z00.00 Lipid panel reflex to direct LDL Fasting     Albumin Random Urine Quantitative with Creat Ratio     Comprehensive metabolic panel     TSH with free T4 reflex   2. Major depression in complete remission (H) F32.5 citalopram (CELEXA) 20 MG tablet  Well controlled, continue current medications     3. Anxiety F41.9 citalopram (CELEXA) 20 MG tablet  Well controlled, continue current medications     4. Morbid obesity (H) E66.01 Lipid panel reflex to direct LDL Fasting     Comprehensive metabolic panel     TSH with free T4 reflex  Discussed lifestyle changes   5. Benign essential hypertension I10 Lipid panel reflex to direct LDL Fasting     Albumin Random Urine Quantitative with Creat Ratio     lisinopril (PRINIVIL/ZESTRIL) 40 MG tablet     Comprehensive metabolic panel    Not well controlled today - attributed to illness.  Patient to return for pharmacy or nurse only bp check in 3-4 weeks.  If still elevated, will add another antihypertensive agent.     6. Birth control counseling Z30.09 norgestim-eth estrad triphasic (TRI-ESTARYLLA) 0.18/0.215/0.25 MG-35 MCG per tablet  Discussed risks/benefits - continue this year   7. Acute non-recurrent maxillary sinusitis J01.00 amoxicillin-clavulanate (AUGMENTIN) 875-125 MG per tablet  Start treatment for sinusitis - worsening over last 2 days - to alert me if not improving   8. Multiple pigmented nevi D22.9 DERMATOLOGY REFERRAL - recommended full skin exam   9. Screen for colon cancer Z12.11 GASTROENTEROLOGY ADULT REF PROCEDURE ONLY   10. Screening for malignant neoplasm of cervix Z12.4 Deferred today as patient's menses is heavy - she will return for screening in 2 weeks   11. Need for prophylactic vaccination against Streptococcus pneumoniae (pneumococcus) Z23 Pneumococcal vaccine 23 valent PPSV23   "(Pneumovax) [83987]     ADMIN: Vaccine, Initial (24600)       COUNSELING:  Special attention given to:        Regular exercise       Healthy diet/nutrition       Vision screening       Immunizations    Vaccinated for: Pneumococcal           Contraception       Family planning       Colon cancer screening       (Saira)menopause management           reports that she has never smoked. She has never used smokeless tobacco.    Estimated body mass index is 40.79 kg/(m^2) as calculated from the following:    Height as of this encounter: 5' 4.45\" (1.637 m).    Weight as of this encounter: 241 lb (109.3 kg).   Weight management plan: Discussed healthy diet and exercise guidelines and patient will follow up in 12 months in clinic to re-evaluate.    Counseling Resources:  ATP IV Guidelines  Pooled Cohorts Equation Calculator  Breast Cancer Risk Calculator  FRAX Risk Assessment  ICSI Preventive Guidelines  Dietary Guidelines for Americans, 2010  USDA's MyPlate  ASA Prophylaxis  Lung CA Screening    Nancy Small MD  [facility] [place]  "

## 2018-01-11 NOTE — NURSING NOTE
"Chief Complaint   Patient presents with     Physical       Initial /90 (BP Location: Right arm, Patient Position: Right side, Cuff Size: Adult Large)  Pulse 97  Temp 99.6  F (37.6  C) (Tympanic)  Ht 5' 4.45\" (1.637 m)  Wt 241 lb (109.3 kg)  SpO2 97%  BMI 40.79 kg/m2 Estimated body mass index is 40.79 kg/(m^2) as calculated from the following:    Height as of this encounter: 5' 4.45\" (1.637 m).    Weight as of this encounter: 241 lb (109.3 kg).  Medication Reconciliation: complete  "

## 2018-01-11 NOTE — LETTER
My Depression Action Plan  Name: Racquel Nunn   Date of Birth 1965  Date: 1/11/2018    My doctor: Nancy Small   My clinic: [address]  [address]  [facility] [address] [phone]          GREEN    ZONE   Good Control    What it looks like:     Things are going generally well. You have normal up s and down s. You may even feel depressed from time to time, but bad moods usually last less than a day.   What you need to do:  1. Continue to care for yourself (see self care plan)  2. Check your depression survival kit and update it as needed  3. Follow your physician s recommendations including any medication.  4. Do not stop taking medication unless you consult with your physician first.           YELLOW         ZONE Getting Worse    What it looks like:     Depression is starting to interfere with your life.     It may be hard to get out of bed; you may be starting to isolate yourself from others.    Symptoms of depression are starting to last most all day and this has happened for several days.     You may have suicidal thoughts but they are not constant.   What you need to do:     1. Call your care team, your response to treatment will improve if you keep your care team informed of your progress. Yellow periods are signs an adjustment may need to be made.     2. Continue your self-care, even if you have to fake it!    3. Talk to someone in your support network    4. Open up your depression survival kit           RED    ZONE Medical Alert - Get Help    What it looks like:     Depression is seriously interfering with your life.     You may experience these or other symptoms: You can t get out of bed most days, can t work or engage in other necessary activities, you have trouble taking care of basic hygiene, or basic responsibilities, thoughts of suicide or death that will not go away, self-injurious behavior.     What you need to do:  1. Call  your care team and request a same-day appointment. If they are not available (weekends or after hours) call your local crisis line, emergency room or 911.      Electronically signed by: Monik Taylor, January 11, 2018    Depression Self Care Plan / Survival Kit    Self-Care for Depression  Here s the deal. Your body and mind are really not as separate as most people think.  What you do and think affects how you feel and how you feel influences what you do and think. This means if you do things that people who feel good do, it will help you feel better.  Sometimes this is all it takes.  There is also a place for medication and therapy depending on how severe your depression is, so be sure to consult with your medical provider and/ or Behavioral Health Consultant if your symptoms are worsening or not improving.     In order to better manage my stress, I will:    Exercise  Get some form of exercise, every day. This will help reduce pain and release endorphins, the  feel good  chemicals in your brain. This is almost as good as taking antidepressants!  This is not the same as joining a gym and then never going! (they count on that by the way ) It can be as simple as just going for a walk or doing some gardening, anything that will get you moving.      Hygiene   Maintain good hygiene (Get out of bed in the morning, Make your bed, Brush your teeth, Take a shower, and Get dressed like you were going to work, even if you are unemployed).  If your clothes don't fit try to get ones that do.    Diet  I will strive to eat foods that are good for me, drink plenty of water, and avoid excessive sugar, caffeine, alcohol, and other mood-altering substances.  Some foods that are helpful in depression are: complex carbohydrates, B vitamins, flaxseed, fish or fish oil, fresh fruits and vegetables.    Psychotherapy  I agree to participate in Individual Therapy (if recommended).    Medication  If prescribed medications, I  agree to take them.  Missing doses can result in serious side effects.  I understand that drinking alcohol, or other illicit drug use, may cause potential side effects.  I will not stop my medication abruptly without first discussing it with my provider.    Staying Connected With Others  I will stay in touch with my friends, family members, and my primary care provider/team.    Use your imagination  Be creative.  We all have a creative side; it doesn t matter if it s oil painting, sand castles, or mud pies! This will also kick up the endorphins.    Witness Beauty  (AKA stop and smell the roses) Take a look outside, even in mid-winter. Notice colors, textures. Watch the squirrels and birds.     Service to others  Be of service to others.  There is always someone else in need.  By helping others we can  get out of ourselves  and remember the really important things.  This also provides opportunities for practicing all the other parts of the program.    Humor  Laugh and be silly!  Adjust your TV habits for less news and crime-drama and more comedy.    Control your stress  Try breathing deep, massage therapy, biofeedback, and meditation. Find time to relax each day.     My support system    Clinic Contact:  Phone number:    Contact 1:  Phone number:    Contact 2:  Phone number:    Adventism/:  Phone number:    Therapist:  Phone number:    Local crisis center:    Phone number:    Other community support:  Phone number:

## 2018-01-11 NOTE — PATIENT INSTRUCTIONS
I love your plan to start using your elliptical machine!    Come back to see me on the 25th at 8:40 - we'll do your pap and plan for labs    Pneumonia shot today    Thanks for getting your flu shot!    Medications sent to pharmacy.    Start augmentin for your sinus infection - take 1 pill twice daily for 10 days - let me know if not getting better.  Keep up your neti pot!        Preventive Health Recommendations  Female Ages 50 - 64    Yearly exam: See your health care provider every year in order to  o Review health changes.   o Discuss preventive care.    o Review your medicines if your doctor has prescribed any.      Get a Pap test every three years (unless you have an abnormal result and your provider advises testing more often).    If you get Pap tests with HPV test, you only need to test every 5 years, unless you have an abnormal result.     You do not need a Pap test if your uterus was removed (hysterectomy) and you have not had cancer.    You should be tested each year for STDs (sexually transmitted diseases) if you're at risk.     Have a mammogram every 1 to 2 years.    Have a colonoscopy at age 50, or have a yearly FIT test (stool test). These exams screen for colon cancer.      Have a cholesterol test every 5 years, or more often if advised.    Have a diabetes test (fasting glucose) every three years. If you are at risk for diabetes, you should have this test more often.     If you are at risk for osteoporosis (brittle bone disease), think about having a bone density scan (DEXA).    Shots: Get a flu shot each year. Get a tetanus shot every 10 years.    Nutrition:     Eat at least 5 servings of fruits and vegetables each day.    Eat whole-grain bread, whole-wheat pasta and brown rice instead of white grains and rice.    Talk to your provider about Calcium and Vitamin D.     Lifestyle    Exercise at least 150 minutes a week (30 minutes a day, 5 days a week). This will help you control your weight and  prevent disease.    Limit alcohol to one drink per day.    No smoking.     Wear sunscreen to prevent skin cancer.     See your dentist every six months for an exam and cleaning.    See your eye doctor every 1 to 2 years.

## 2018-01-12 ASSESSMENT — ANXIETY QUESTIONNAIRES: GAD7 TOTAL SCORE: 2

## 2018-01-29 ENCOUNTER — RADIANT APPOINTMENT (OUTPATIENT)
Dept: GENERAL RADIOLOGY | Facility: CLINIC | Age: 53
End: 2018-01-29
Attending: PHYSICIAN ASSISTANT
Payer: COMMERCIAL

## 2018-01-29 ENCOUNTER — OFFICE VISIT (OUTPATIENT)
Dept: PEDIATRICS | Facility: CLINIC | Age: 53
End: 2018-01-29
Payer: COMMERCIAL

## 2018-01-29 VITALS
HEIGHT: 65 IN | BODY MASS INDEX: 40.37 KG/M2 | SYSTOLIC BLOOD PRESSURE: 156 MMHG | HEART RATE: 100 BPM | TEMPERATURE: 98.3 F | DIASTOLIC BLOOD PRESSURE: 96 MMHG | WEIGHT: 242.3 LBS | OXYGEN SATURATION: 100 %

## 2018-01-29 DIAGNOSIS — R05.9 COUGH: ICD-10-CM

## 2018-01-29 DIAGNOSIS — J01.90 ACUTE SINUSITIS WITH SYMPTOMS > 10 DAYS: Primary | ICD-10-CM

## 2018-01-29 PROCEDURE — 71046 X-RAY EXAM CHEST 2 VIEWS: CPT

## 2018-01-29 PROCEDURE — 99214 OFFICE O/P EST MOD 30 MIN: CPT | Performed by: PHYSICIAN ASSISTANT

## 2018-01-29 RX ORDER — BENZONATATE 100 MG/1
100 CAPSULE ORAL 3 TIMES DAILY PRN
Qty: 20 CAPSULE | Refills: 0 | Status: SHIPPED | OUTPATIENT
Start: 2018-01-29 | End: 2018-03-20

## 2018-01-29 RX ORDER — CEFDINIR 300 MG/1
300 CAPSULE ORAL 2 TIMES DAILY
Qty: 20 CAPSULE | Refills: 0 | Status: SHIPPED | OUTPATIENT
Start: 2018-01-29 | End: 2018-03-20

## 2018-01-29 NOTE — MR AVS SNAPSHOT
After Visit Summary   1/29/2018    Racquel Nunn    MRN: [id number]           Patient Information     Date Of Birth          1965        Visit Information        Provider Department      1/29/2018 3:10 PM Jong Hilliard PA-C [facility]        Today's Diagnoses     Acute sinusitis with symptoms > 10 days    -  1    Cough          Care Instructions    Begin antibiotics   Increase fluid intake  Resting            Follow-ups after your visit        Your next 10 appointments already scheduled     Feb 01, 2018  8:00 AM CST   SHORT with Nancy Small MD   [facility] ([facility])    [address]  [address]  [facility] [id number] [phone]              Who to contact     If you have questions or need follow up information about today's clinic visit or your schedule please contact [facility] directly at [phone].  Normal or non-critical lab and imaging results will be communicated to you by MyChart, letter or phone within 4 business days after the clinic has received the results. If you do not hear from us within 7 days, please contact the clinic through MyChart or phone. If you have a critical or abnormal lab result, we will notify you by phone as soon as possible.  Submit refill requests through [employer] or call your pharmacy and they will forward the refill request to us. Please allow 3 business days for your refill to be completed.          Additional Information About Your Visit        MyChart Information     [employer] gives you secure access to your electronic health record. If you see a primary care provider, you can also send messages to your care team and make appointments. If you have questions, please call your primary care clinic.  If you do not have a primary care provider, please call [phone] and they will assist you.        Care EveryWhere ID     This is your Care EveryWhere ID. This could be  "used by other organizations to access your [place] medical records  [id number]        Your Vitals Were     Pulse Temperature Height Pulse Oximetry BMI (Body Mass Index)       100 98.3  F (36.8  C) (Oral) 5' 4.5\" (1.638 m) 100% 40.95 kg/m2        Blood Pressure from Last 3 Encounters:   01/29/18 (!) 156/96   01/11/18 142/90   01/10/18 148/90    Weight from Last 3 Encounters:   01/29/18 242 lb 4.8 oz (109.9 kg)   01/11/18 241 lb (109.3 kg)   01/10/18 242 lb 3.2 oz (109.9 kg)                 Today's Medication Changes          These changes are accurate as of 1/29/18  4:31 PM.  If you have any questions, ask your nurse or doctor.               Start taking these medicines.        Dose/Directions    benzonatate 100 MG capsule   Commonly known as:  TESSALON PERLES   Used for:  Cough   Started by:  Jong Hilliard PA-C        Dose:  100 mg   Take 1 capsule (100 mg) by mouth 3 times daily as needed for cough   Quantity:  20 capsule   Refills:  0       cefdinir 300 MG capsule   Commonly known as:  OMNICEF   Used for:  Acute sinusitis with symptoms > 10 days   Started by:  Jong Hilliard PA-C        Dose:  300 mg   Take 1 capsule (300 mg) by mouth 2 times daily   Quantity:  20 capsule   Refills:  0            Where to get your medicines      These medications were sent to [address] [address] [place] [id number] [facility] [address]    Hours:  Tech issues with their phone system Phone:  [phone]     benzonatate 100 MG capsule    cefdinir 300 MG capsule                Primary Care Provider Office Phone # Fax #    Nancy Small [phone] [address] [place] [address]        Equal Access to Services     [facility] LEANNA AH: Jennifer mejia Soolive, waaxda luqadaha, qaybta kaaloliver lal, porfirio stanley. [facility] [phone].    ATENCIÓN: Si parminder funez, tiene a saunders disposición " servicios gratuitos de asistencia lingüística. Jose robles [phone].    We comply with applicable federal civil rights laws and [place] laws. We do not discriminate on the basis of race, color, national origin, age, disability, sex, sexual orientation, or gender identity.            Thank you!     Thank you for choosing [facility]  for your care. Our goal is always to provide you with excellent care. Hearing back from our patients is one way we can continue to improve our services. Please take a few minutes to complete the written survey that you may receive in the mail after your visit with us. Thank you!             Your Updated Medication List - Protect others around you: Learn how to safely use, store and throw away your medicines at www.disposemymeds.org.          This list is accurate as of 1/29/18  4:31 PM.  Always use your most recent med list.                   Brand Name Dispense Instructions for use Diagnosis    benzonatate 100 MG capsule    TESSALON PERLES    20 capsule    Take 1 capsule (100 mg) by mouth 3 times daily as needed for cough    Cough       blood glucose monitoring test strip    no brand specified    1 Box    by In Vitro route daily.    Impaired fasting glucose       cefdinir 300 MG capsule    OMNICEF    20 capsule    Take 1 capsule (300 mg) by mouth 2 times daily    Acute sinusitis with symptoms > 10 days       citalopram 20 MG tablet    celeXA    90 tablet    Take 1 tablet (20 mg) by mouth daily    Major depression in complete remission (H), Anxiety       lisinopril 40 MG tablet    PRINIVIL/ZESTRIL    90 tablet    Take 1 tablet (40 mg) by mouth daily    Benign essential hypertension       Multi-vitamin Tabs tablet   Generic drug:  multivitamin, therapeutic with minerals       Obesity, unspecified       norgestim-eth estrad triphasic 0.18/0.215/0.25 MG-35 MCG per tablet    TRI-ESTARYLLA    84 tablet    Take 1 tablet by mouth daily Due for annual physical at this time. Please  call to schedule.    Birth control counseling

## 2018-01-29 NOTE — NURSING NOTE
"Chief Complaint   Patient presents with     Cough       Initial BP (!) 156/96 (BP Location: Right arm, Cuff Size: Adult Large)  Pulse 100  Temp 98.3  F (36.8  C) (Oral)  Ht 5' 4.5\" (1.638 m)  Wt 242 lb 4.8 oz (109.9 kg)  SpO2 100%  BMI 40.95 kg/m2 Estimated body mass index is 40.95 kg/(m^2) as calculated from the following:    Height as of this encounter: 5' 4.5\" (1.638 m).    Weight as of this encounter: 242 lb 4.8 oz (109.9 kg).  Medication Reconciliation: complete   Herbie Patel CMA      "

## 2018-01-29 NOTE — PROGRESS NOTES
"  SUBJECTIVE:   Racquel Nunn is a 52 year old female who presents to clinic today for the following health issues:    No history of asthma, allergies.   Nonsmoker.   Acute Illness   Acute illness concerns: cough  Onset: 1 month    Fever: no    Chills/Sweats: YES- both    Headache (location?): YES- occ    Sinus Pressure:YES- post-nasal drainage    Conjunctivitis:  no    Ear Pain: no    Rhinorrhea: YES- clear    Congestion: YES- nasal    Sore Throat: no     Cough: YES-non-productive    Cough present at all times    Not waking patient up at night    Wheeze: YES- sometimes    No chest tightness or pain    Decreased Appetite: no    Nausea: no    Vomiting: no    Diarrhea:  YES- slight    Dysuria/Freq.: no    Fatigue/Achiness: YES- extreme    Sick/Strep Exposure: YES- work     Therapies Tried and outcome: mucinex, nyquil, dayquil, cough drops    Work: [employer]  Finished a course of augmentin with no improvement in symptoms     ROS:  ROS otherwise negative    OBJECTIVE:                                                    BP (!) 156/96 (BP Location: Right arm, Cuff Size: Adult Large)  Pulse 100  Temp 98.3  F (36.8  C) (Oral)  Ht 5' 4.5\" (1.638 m)  Wt 242 lb 4.8 oz (109.9 kg)  SpO2 100%  BMI 40.95 kg/m2  Body mass index is 40.95 kg/(m^2).   GENERAL: alert, no distress  HENT: ear canals- normal; TMs- normal; Nose- normal; Mouth- no ulcers, no lesions; max sinus tenderness  NECK: no tenderness, no adenopathy  RESP: lungs clear to auscultation - no rales, no rhonchi, no wheezes  CV: regular rates and rhythm, normal S1 S2, no S3 or S4 and no murmur, no click or rub    Diagnostic test results:  CXR appears NEGATIVE.   No results found for this or any previous visit (from the past 24 hour(s)).     ASSESSMENT/PLAN:                                                    (J01.90) Acute sinusitis with symptoms > 10 days  (primary encounter diagnosis)  Comment: begin antibiotics as directed. Rest with work excuse given. "   Plan: cefdinir (OMNICEF) 300 MG capsule            (R05) Cough  Comment: tessalon pearles as needed.   Plan: XR Chest 2 Views, benzonatate (TESSALON PERLES)        100 MG capsule            See Patient Instructions    Jong Hilliard PA-C  [facility]

## 2018-01-29 NOTE — LETTER
January 29, 2018      Racquel Nunn  [address]  [place] [facility] [address]        To Whom It May Concern:    Racquel Nunn  was seen on 1/29/18.  Please excuse her through 1/31/18 due to illness.        Sincerely,        Jong Hilliard PA-C

## 2018-02-01 ENCOUNTER — OFFICE VISIT (OUTPATIENT)
Dept: PEDIATRICS | Facility: CLINIC | Age: 53
End: 2018-02-01
Payer: COMMERCIAL

## 2018-02-01 VITALS
WEIGHT: 242 LBS | DIASTOLIC BLOOD PRESSURE: 80 MMHG | TEMPERATURE: 98.9 F | SYSTOLIC BLOOD PRESSURE: 118 MMHG | HEART RATE: 88 BPM | HEIGHT: 65 IN | BODY MASS INDEX: 40.32 KG/M2 | OXYGEN SATURATION: 98 %

## 2018-02-01 DIAGNOSIS — I10 BENIGN ESSENTIAL HYPERTENSION: ICD-10-CM

## 2018-02-01 DIAGNOSIS — Z12.4 SCREENING FOR MALIGNANT NEOPLASM OF CERVIX: ICD-10-CM

## 2018-02-01 DIAGNOSIS — I10 ESSENTIAL HYPERTENSION: Primary | ICD-10-CM

## 2018-02-01 DIAGNOSIS — E66.01 MORBID OBESITY (H): ICD-10-CM

## 2018-02-01 DIAGNOSIS — Z12.11 SPECIAL SCREENING FOR MALIGNANT NEOPLASMS, COLON: ICD-10-CM

## 2018-02-01 DIAGNOSIS — Z00.00 ROUTINE GENERAL MEDICAL EXAMINATION AT A HEALTH CARE FACILITY: ICD-10-CM

## 2018-02-01 DIAGNOSIS — J01.00 ACUTE NON-RECURRENT MAXILLARY SINUSITIS: ICD-10-CM

## 2018-02-01 LAB
ALBUMIN SERPL-MCNC: 3.3 G/DL (ref 3.4–5)
ALP SERPL-CCNC: 65 U/L (ref 40–150)
ALT SERPL W P-5'-P-CCNC: 22 U/L (ref 0–50)
ANION GAP SERPL CALCULATED.3IONS-SCNC: 7 MMOL/L (ref 3–14)
AST SERPL W P-5'-P-CCNC: 17 U/L (ref 0–45)
BILIRUB SERPL-MCNC: 0.2 MG/DL (ref 0.2–1.3)
BUN SERPL-MCNC: 12 MG/DL (ref 7–30)
CALCIUM SERPL-MCNC: 8.6 MG/DL (ref 8.5–10.1)
CHLORIDE SERPL-SCNC: 106 MMOL/L (ref 94–109)
CHOLEST SERPL-MCNC: 192 MG/DL
CO2 SERPL-SCNC: 27 MMOL/L (ref 20–32)
CREAT SERPL-MCNC: 0.91 MG/DL (ref 0.52–1.04)
CREAT UR-MCNC: 251 MG/DL
GFR SERPL CREATININE-BSD FRML MDRD: 65 ML/MIN/1.7M2
GLUCOSE SERPL-MCNC: 111 MG/DL (ref 70–99)
HDLC SERPL-MCNC: 61 MG/DL
LDLC SERPL CALC-MCNC: 86 MG/DL
MICROALBUMIN UR-MCNC: 16 MG/L
MICROALBUMIN/CREAT UR: 6.29 MG/G CR (ref 0–25)
NONHDLC SERPL-MCNC: 131 MG/DL
POTASSIUM SERPL-SCNC: 4.4 MMOL/L (ref 3.4–5.3)
PROT SERPL-MCNC: 7.1 G/DL (ref 6.8–8.8)
SODIUM SERPL-SCNC: 140 MMOL/L (ref 133–144)
TRIGL SERPL-MCNC: 224 MG/DL
TSH SERPL DL<=0.005 MIU/L-ACNC: 3.49 MU/L (ref 0.4–4)

## 2018-02-01 PROCEDURE — 82043 UR ALBUMIN QUANTITATIVE: CPT | Performed by: PEDIATRICS

## 2018-02-01 PROCEDURE — 80053 COMPREHEN METABOLIC PANEL: CPT | Performed by: PEDIATRICS

## 2018-02-01 PROCEDURE — 84443 ASSAY THYROID STIM HORMONE: CPT | Performed by: PEDIATRICS

## 2018-02-01 PROCEDURE — 80061 LIPID PANEL: CPT | Performed by: PEDIATRICS

## 2018-02-01 PROCEDURE — 99214 OFFICE O/P EST MOD 30 MIN: CPT | Performed by: PEDIATRICS

## 2018-02-01 PROCEDURE — 87624 HPV HI-RISK TYP POOLED RSLT: CPT | Performed by: PEDIATRICS

## 2018-02-01 PROCEDURE — 36415 COLL VENOUS BLD VENIPUNCTURE: CPT | Performed by: PEDIATRICS

## 2018-02-01 PROCEDURE — G0145 SCR C/V CYTO,THINLAYER,RESCR: HCPCS | Performed by: PEDIATRICS

## 2018-02-01 NOTE — LETTER
[address]  [address]  [facility] [address] [phone]          February 1, 2018    RE:  Racquel Nunn                                                                                                                                                       [address] [facility] [address]            To whom it may concern:    Racquel Nunn is under my professional care for an acute illness.   She cannot return to work until Saturday, 2/3/18 due to this illness.  She was evaluated in clinic again today.    Sincerely,        Nancy Small MD

## 2018-02-01 NOTE — PROGRESS NOTES
"  SUBJECTIVE:   Racquel Nunn is a 52 year old female who presents to clinic today for the following health issues:      Pap Smear-        HTN -  Blood pressures have been elevated the last few readings - due for recheck today.   Improvement noted.  Patient relates recent high blood pressures to her acute illness.    Sinusitis and cough - follow up from visit earlier this week - started on omnicef 1/29 - starting to feel better, still very run-down and fatigued.  Was home from work yesterday, doesn't feel that she can go today.   Sinus pain/pressure/cough still problematic, but better than 2 days ago.    Due for pap smear and labs    Problem list and histories reviewed & adjusted, as indicated.  Additional history: as documented      Reviewed and updated as needed this visit by clinical staff       Reviewed and updated as needed this visit by Provider         ROS:  Constitutional, HEENT, cardiovascular, pulmonary, gi and gu systems are negative, except as otherwise noted.    OBJECTIVE:     /80 (BP Location: Right arm, Patient Position: Right side, Cuff Size: Adult Large)  Pulse 88  Temp 98.9  F (37.2  C) (Tympanic)  Ht 5' 4.5\" (1.638 m)  Wt 242 lb (109.8 kg)  LMP 01/11/2018 (Approximate)  SpO2 98%  BMI 40.9 kg/m2  Body mass index is 40.9 kg/(m^2).  GENERAL: alert and fatigued  EYES: Eyes grossly normal to inspection, PERRL and conjunctivae and sclerae normal  HENT: normal cephalic/atraumatic, ear canals and TM's normal, nasal mucosa edematous , oropharynx clear, oral mucous membranes moist and sinuses: maxillary tenderness on both sides  NECK: no adenopathy, no asymmetry, masses, or scars and thyroid normal to palpation  RESP: lungs clear to auscultation - no rales, rhonchi or wheezes  CV: regular rate and rhythm, normal S1 S2, no S3 or S4, no murmur, click or rub, no peripheral edema and peripheral pulses strong   (female): normal female external genitalia, normal urethral meatus, vaginal " mucosa, normal cervix without masses or discharge    Diagnostic Test Results:  pending    ASSESSMENT/PLAN:         ICD-10-CM    1. Essential hypertension I10 Improved blood pressure today as she is recovering from illness - continue current medications and follow.  Repeat labs today.   2. Routine general medical examination at a health care facility Z00.00 Lipid panel reflex to direct LDL Fasting     Albumin Random Urine Quantitative with Creat Ratio     Comprehensive metabolic panel     TSH with free T4 reflex    Having fasting labs today - ordered at physical last week.   3. Morbid obesity (H) E66.01 Lipid panel reflex to direct LDL Fasting     Comprehensive metabolic panel     TSH with free T4 reflex   4. Benign essential hypertension I10 Lipid panel reflex to direct LDL Fasting     Albumin Random Urine Quantitative with Creat Ratio     Comprehensive metabolic panel   5. Special screening for malignant neoplasms, colon Z12.11 GASTROENTEROLOGY ADULT REF PROCEDURE ONLY   6. Screening for malignant neoplasm of cervix Z12.4 Pap imaged thin layer screen with HPV - recommended age 30 - 65 years (select HPV order below)     HPV High Risk Types DNA Cervical   7.      Acute non-recurrent maxillary sinusitis - J01.00 - continue antibiotics - note provided excusing patient from work today and tomorrow    See Patient Instructions    Nancy Small MD  [facility]

## 2018-02-01 NOTE — NURSING NOTE
"Chief Complaint   Patient presents with     Gyn Exam       Initial /80 (BP Location: Right arm, Patient Position: Right side, Cuff Size: Adult Large)  Pulse 88  Temp 98.9  F (37.2  C) (Tympanic)  Ht 5' 4.5\" (1.638 m)  Wt 242 lb (109.8 kg)  LMP 01/11/2018 (Approximate)  SpO2 98%  BMI 40.9 kg/m2 Estimated body mass index is 40.9 kg/(m^2) as calculated from the following:    Height as of this encounter: 5' 4.5\" (1.638 m).    Weight as of this encounter: 242 lb (109.8 kg).  Medication Reconciliation: complete  "

## 2018-02-01 NOTE — MR AVS SNAPSHOT
After Visit Summary   2/1/2018    Racquel Nunn    MRN: [id number]           Patient Information     Date Of Birth          1965        Visit Information        Provider Department      2/1/2018 8:00 AM Nancy Small MD [facility]        Today's Diagnoses     Screening for malignant neoplasm of cervix    -  1    Essential hypertension        Routine general medical examination at a health care facility        Morbid obesity (H)        Benign essential hypertension          Care Instructions    Labs today  Pap today    Stay out of work today and tomorrow          Follow-ups after your visit        Who to contact     If you have questions or need follow up information about today's clinic visit or your schedule please contact [facility] directly at [phone].  Normal or non-critical lab and imaging results will be communicated to you by MyChart, letter or phone within 4 business days after the clinic has received the results. If you do not hear from us within 7 days, please contact the clinic through [employer] or phone. If you have a critical or abnormal lab result, we will notify you by phone as soon as possible.  Submit refill requests through [employer] or call your pharmacy and they will forward the refill request to us. Please allow 3 business days for your refill to be completed.          Additional Information About Your Visit        MyChart Information     [employer] gives you secure access to your electronic health record. If you see a primary care provider, you can also send messages to your care team and make appointments. If you have questions, please call your primary care clinic.  If you do not have a primary care provider, please call [phone] and they will assist you.        Care EveryWhere ID     This is your Care EveryWhere ID. This could be used by other organizations to access your [facility] medical records  [id number]        Your Vitals Were  "    Pulse Temperature Height Last Period Pulse Oximetry BMI (Body Mass Index)    88 98.9  F (37.2  C) (Tympanic) 5' 4.5\" (1.638 m) 01/11/2018 (Approximate) 98% 40.9 kg/m2       Blood Pressure from Last 3 Encounters:   02/01/18 118/80   01/29/18 (!) 156/96   01/11/18 142/90    Weight from Last 3 Encounters:   02/01/18 242 lb (109.8 kg)   01/29/18 242 lb 4.8 oz (109.9 kg)   01/11/18 241 lb (109.3 kg)              We Performed the Following     Albumin Random Urine Quantitative with Creat Ratio     Comprehensive metabolic panel     HPV High Risk Types DNA Cervical     Lipid panel reflex to direct LDL Fasting     Pap imaged thin layer screen with HPV - recommended age 30 - 65 years (select HPV order below)     TSH with free T4 reflex        Primary Care Provider Office Phone # Fax #    Nancy Small [phone] [address] [place] [address]        Equal Access to Services     [facility]: Hadii chriss tovar hadasho Soomaali, waaxda luqadaha, qaybta kaalmada adecintiayablade, porfirio palmer . So [facility] [phone].    ATENCIÓN: Si habla español, tiene a saunders disposición servicios gratuitos de asistencia lingüística. Llame al [phone].    We comply with applicable federal civil rights laws and [place] laws. We do not discriminate on the basis of race, color, national origin, age, disability, sex, sexual orientation, or gender identity.            Thank you!     Thank you for choosing [facility]  for your care. Our goal is always to provide you with excellent care. Hearing back from our patients is one way we can continue to improve our services. Please take a few minutes to complete the written survey that you may receive in the mail after your visit with us. Thank you!             Your Updated Medication List - Protect others around you: Learn how to safely use, store and throw away your medicines at www.disposemymeds.org.          This list is " accurate as of 2/1/18  8:16 AM.  Always use your most recent med list.                   Brand Name Dispense Instructions for use Diagnosis    benzonatate 100 MG capsule    TESSALON PERLES    20 capsule    Take 1 capsule (100 mg) by mouth 3 times daily as needed for cough    Cough       blood glucose monitoring test strip    no brand specified    1 Box    by In Vitro route daily.    Impaired fasting glucose       cefdinir 300 MG capsule    OMNICEF    20 capsule    Take 1 capsule (300 mg) by mouth 2 times daily    Acute sinusitis with symptoms > 10 days       citalopram 20 MG tablet    celeXA    90 tablet    Take 1 tablet (20 mg) by mouth daily    Major depression in complete remission (H), Anxiety       lisinopril 40 MG tablet    PRINIVIL/ZESTRIL    90 tablet    Take 1 tablet (40 mg) by mouth daily    Benign essential hypertension       Multi-vitamin Tabs tablet   Generic drug:  multivitamin, therapeutic with minerals       Obesity, unspecified       norgestim-eth estrad triphasic 0.18/0.215/0.25 MG-35 MCG per tablet    TRI-ESTARYLLA    84 tablet    Take 1 tablet by mouth daily Due for annual physical at this time. Please call to schedule.    Birth control counseling

## 2018-02-04 LAB
COPATH REPORT: NORMAL
PAP: NORMAL

## 2018-02-06 LAB
FINAL DIAGNOSIS: NORMAL
HPV HR 12 DNA CVX QL NAA+PROBE: NEGATIVE
HPV16 DNA SPEC QL NAA+PROBE: NEGATIVE
HPV18 DNA SPEC QL NAA+PROBE: NEGATIVE
SPECIMEN DESCRIPTION: NORMAL
SPECIMEN SOURCE CVX/VAG CYTO: NORMAL

## 2018-02-28 ENCOUNTER — MYC MEDICAL ADVICE (OUTPATIENT)
Dept: PEDIATRICS | Facility: CLINIC | Age: 53
End: 2018-02-28

## 2018-02-28 DIAGNOSIS — E66.01 MORBID OBESITY, UNSPECIFIED OBESITY TYPE (H): Primary | ICD-10-CM

## 2018-02-28 NOTE — TELEPHONE ENCOUNTER
"Dr. Small, patient would like you thoughts on her weight and if she would need to start Contrave.     Wt Readings from Last 5 Encounters:   02/01/18 242 lb (109.8 kg)   01/29/18 242 lb 4.8 oz (109.9 kg)   01/11/18 241 lb (109.3 kg)   01/10/18 242 lb 3.2 oz (109.9 kg)   06/19/17 227 lb (103 kg)     Ht Readings from Last 2 Encounters:   02/01/18 5' 4.5\" (1.638 m)   01/29/18 5' 4.5\" (1.638 m)       "

## 2018-03-20 ENCOUNTER — OFFICE VISIT (OUTPATIENT)
Dept: ENDOCRINOLOGY | Facility: CLINIC | Age: 53
End: 2018-03-20
Payer: COMMERCIAL

## 2018-03-20 VITALS
OXYGEN SATURATION: 98 % | WEIGHT: 243.8 LBS | SYSTOLIC BLOOD PRESSURE: 126 MMHG | BODY MASS INDEX: 40.62 KG/M2 | DIASTOLIC BLOOD PRESSURE: 84 MMHG | TEMPERATURE: 97.8 F | HEIGHT: 65 IN | HEART RATE: 87 BPM

## 2018-03-20 DIAGNOSIS — I10 ESSENTIAL HYPERTENSION: ICD-10-CM

## 2018-03-20 DIAGNOSIS — E66.01 MORBID OBESITY, UNSPECIFIED OBESITY TYPE (H): Primary | ICD-10-CM

## 2018-03-20 LAB
HBA1C MFR BLD: 5.2 % (ref 4.3–6)
TSH SERPL DL<=0.005 MIU/L-ACNC: 1.94 MU/L (ref 0.4–4)

## 2018-03-20 PROCEDURE — 83036 HEMOGLOBIN GLYCOSYLATED A1C: CPT | Performed by: INTERNAL MEDICINE

## 2018-03-20 PROCEDURE — 36415 COLL VENOUS BLD VENIPUNCTURE: CPT | Performed by: INTERNAL MEDICINE

## 2018-03-20 PROCEDURE — 84443 ASSAY THYROID STIM HORMONE: CPT | Performed by: INTERNAL MEDICINE

## 2018-03-20 PROCEDURE — 99243 OFF/OP CNSLTJ NEW/EST LOW 30: CPT | Performed by: INTERNAL MEDICINE

## 2018-03-20 NOTE — LETTER
3/20/2018         RE: Racquel Nunn  [address]  [place] [facility] [address]        Dear Colleague,    [facility]. Please see a copy of my visit note below.    Name: Racquel Nunn  Seen in consultation with Nancy Small for obesity.  HPI:  Racquel Nunn is a 52 year old female who presents for the evaluation of obestiy.  Body mass index is 41.2 kg/(m^2).    Weight gain started: growing up normal wt. Wt gain started in teenage  Highest weight as an adult: 243 lbs  Lowest weight as an adult: 200 lbs ( 10 years back)- was more active    Diets tried:ww- lost 20 lbs and gained back    Gastric bypass:    had gastric bypass and gained some wt back.    Hypothyroidism: no    Use of Steroids:No  Family history of Obesity:No  Diet: currently pt is working on-- not much  Exercise:elliptical- once a week  Menses: regular- on OC pills  Diarrhea/Constipation:No  Changes in Hair or Skin:No  Diabetes:No  Sleep Apnea/Snores:+ snoring. no sleep study done  Hypertension: yes- no medication  Hyperlipidemia:No  PMH/PSH:  Past Medical History:   Diagnosis Date     HTN (hypertension)      Past Surgical History:   Procedure Laterality Date     NO HISTORY OF SURGERY       Family Hx:  Family History   Problem Relation Age of Onset     C.A.D. Father      CABG-age 60     CEREBROVASCULAR DISEASE Maternal Grandmother      Breast Cancer Paternal Aunt      age 40     CANCER Maternal Aunt      melanoma     DIABETES No family hx of      Hypertension No family hx of      Cancer - colorectal No family hx of      Thyroid Disease No family hx of               Social Hx:  Social History     Social History     Marital status:      Spouse name: N/A     Number of children: 0     Years of education: 12     Occupational History           [employer]     Social History Main Topics     Smoking status: Never Smoker     Smokeless  "tobacco: Never Used     Alcohol use Yes      Comment: 1 drink per week or less     Drug use: No     Sexual activity: No      Comment: never been sexually active, declining pap smears     Other Topics Concern     Parent/Sibling W/ Cabg, Mi Or Angioplasty Before 65f 55m? No     Social History Narrative    Wedding 7/18/15          MEDICATIONS:  has a current medication list which includes the following prescription(s): citalopram, lisinopril, norgestim-eth estrad triphasic, blood glucose monitoring, and multi-vitamin.    ROS     ROS: 10 point ROS neg other than the symptoms noted above in the HPI.    Physical Exam   VS: /84  Pulse 87  Temp 97.8  F (36.6  C) (Oral)  Ht 1.638 m (5' 4.5\")  Wt 110.6 kg (243 lb 12.8 oz)  SpO2 98%  BMI 41.2 kg/m2  GENERAL: AXOX3, NAD, well dressed, answering questions appropriately, appears stated age.  HEENT: No exopthalmous, no proptosis, EOMI, no lig lag, no retraction  NECK: Thyroid normal in size, non tender, no nodules were palpated.  CV: RRR, no rubs, gallops, no murmurs  LUNGS: CTAB, no wheezes, rales, or ronchi  ABDOMEN: +BS  EXTREMITIES: no edema, +pulses, no rashes, no lesions  NEUROLOGY: CN grossly intact, + DTR upper and lower extremity, no tremors  MSK: grossly intact  SKIN: no rashes, no lesions    LABS:  Last Basic Metabolic Panel:  Lab Results   Component Value Date     02/01/2018      Lab Results   Component Value Date    POTASSIUM 4.4 02/01/2018     Lab Results   Component Value Date    CHLORIDE 106 02/01/2018     Lab Results   Component Value Date    JEFFERSON 8.6 02/01/2018     Lab Results   Component Value Date    CO2 27 02/01/2018     Lab Results   Component Value Date    BUN 12 02/01/2018     Lab Results   Component Value Date    CR 0.91 02/01/2018     Lab Results   Component Value Date     02/01/2018       Lab Results   Component Value Date    TSH 3.49 02/01/2018       Lab Results   Component Value Date    A1C 5.2 10/24/2016    A1C 5.4 09/23/2015 "    A1C 5.5 07/15/2014    A1C 5.3 06/21/2011    A1C 5.5 04/23/2010         All pertinent notes, labs, and images personally reviewed by me.     A/P  Ms.Deborah AYO Nunn is a 52 year old here for the evaluation of obestiy:    1. Obesity-    Body mass index is 41.2 kg/(m^2).  Obesity is associated with a significant increase in mortality and risk of many disorders, including diabetes mellitus, hypertension, dyslipidemia, heart disease, stroke, sleep apnea, cancer, and many others. Conversely, weight loss is associated with a reduction in obesity-associated morbidity.  Endocrine evaluation of obesity includes Cushing's and thyroid dysfunction.  Will obtain TSH and freeT4 along with 24 hour urine collection.   Obesity complicated by hypertension.  She does snore but has not had sleep study done.  Will recommend sleep study  BMI more than 40.  I discussed bariatric surgery with her she will think about it.  Encouraged her to attend patient information seminar.  Plan to get labs as below  Follow-up in 4 weeks  The patient is advised to Make better food choices: reduce carbs, Reduce portion size, weight loss and exercise 3-4 times a week.    (Plan: Cortisol free urine, TSH with free T4 reflex,         HEALTH  REFERRAL, NUTRITION REFERRAL,         SLEEP EVALUATION & MANAGEMENT REFERRAL - ADULT        Hemoglobin A1c    Discussed:  I informed the pt that:  1.Weight loss medications can be taken to assist with weight reduction when combined with appropriate healthy lifestyle changes.  2.I discussed possible s/e, risks and benefits of weight loss medications.  3.All medications are FDA approved, however, some may be used ''off label'' for their weight loss benefitIs and some ''short term'' medications can be used for longer periods to achieve desired clinical outcomes.  4.All patients taking weight loss medications must be seen in clinic for refill authorization.  Risks of obestiy discussed. Encourage healthy diet. Limit  snacks, fluid calories, portions. Limit TV/computer time to one hour a day. Encourage physical activity. Recheck in six months or sooner with concerns.    Obesity is a biological preventable and treatable disease, which is associated with significantly increased risk of many acute and chronic health conditions. Obesity has now been recognized as a chronic disease by the American Medical Association.    A range of serious co-morbidities are associated with obesity including increased risk for hypertension, stroke, coronary artery disease, dyslipidemia, Type II diabetes, depression, sleep apnea, cancers of the colon, breast and endometrium, osteoarthritis and female infertility. Therefore, obesity is not just a problem; it s a disease that warrants serious evidence based treatements.    I explained to the patient relevant work up for the diagnosis and management of obesity and discussed treatment options. Body Mass Index (BMI) has been a standard means for calculating risk for overweight and obesity. The new American Association of Clinical Endocrinology (AACE) algorithm recommends lifestyle modifications as the initial phase of treatment for all patients with the BMI equal or greater than 25 kg/m2. Lifestyle modifications includes use of medical nutrition therapy, exercise, tobacco cessation, adequate quality and quantity of sleep, limited consumption of alcohol and reduced stress through implementation of a structured, multidisciplinary program.    In patients with complications associated with obesity, graded interventions are recommended including pharmacological therapy such as phentermine, orlistat, lorcaserin and phentermine/topiramate ER, contrave ( buproprion/naltreone) and the use of very low calorie meal replacement programs.    If medical intervention is insufficient, surgical therapy may be considered, especially in patients with BMI greater than or equal to 35 kg/m2 with multiple complications. Surgical  treatments include lap-band, gastric sleeve or gastric bypass surgery.      More than 50% of the time spent with Ms. Nunn on counseling / coordinating her care.  Total appointment time was 30 minutes.      Follow-up:  4 weeks    Vijaya Shepherd MD  Endocrinology   [facility]    CC: Nancy Small      Again, thank you for allowing me to participate in the care of your patient.        Sincerely,        Vijaya Shepherd MD

## 2018-03-20 NOTE — PROGRESS NOTES
Name: Racquel Nunn  Seen in consultation with Nancy Small for obesity.  HPI:  Racquel Nunn is a 52 year old female who presents for the evaluation of obestiy.  Body mass index is 41.2 kg/(m^2).    Weight gain started: growing up normal wt. Wt gain started in teenage  Highest weight as an adult: 243 lbs  Lowest weight as an adult: 200 lbs ( 10 years back)- was more active    Diets tried:ww- lost 20 lbs and gained back    Gastric bypass:    had gastric bypass and gained some wt back.    Hypothyroidism: no    Use of Steroids:No  Family history of Obesity:No  Diet: currently pt is working on-- not much  Exercise:elliptical- once a week  Menses: regular- on OC pills  Diarrhea/Constipation:No  Changes in Hair or Skin:No  Diabetes:No  Sleep Apnea/Snores:+ snoring. no sleep study done  Hypertension: yes- no medication  Hyperlipidemia:No  PMH/PSH:  Past Medical History:   Diagnosis Date     HTN (hypertension)      Past Surgical History:   Procedure Laterality Date     NO HISTORY OF SURGERY       Family Hx:  Family History   Problem Relation Age of Onset     C.A.D. Father      CABG-age 60     CEREBROVASCULAR DISEASE Maternal Grandmother      Breast Cancer Paternal Aunt      age 40     CANCER Maternal Aunt      melanoma     DIABETES No family hx of      Hypertension No family hx of      Cancer - colorectal No family hx of      Thyroid Disease No family hx of               Social Hx:  Social History     Social History     Marital status:      Spouse name: N/A     Number of children: 0     Years of education: 12     Occupational History           [employer]     Social History Main Topics     Smoking status: Never Smoker     Smokeless tobacco: Never Used     Alcohol use Yes      Comment: 1 drink per week or less     Drug use: No     Sexual activity: No      Comment: never been sexually active, declining pap smears     Other Topics Concern     Parent/Sibling W/ Cabg, Mi Or  "Angioplasty Before 65f 55m? No     Social History Narrative    Wedding 7/18/15          MEDICATIONS:  has a current medication list which includes the following prescription(s): citalopram, lisinopril, norgestim-eth estrad triphasic, blood glucose monitoring, and multi-vitamin.    ROS     ROS: 10 point ROS neg other than the symptoms noted above in the HPI.    Physical Exam   VS: /84  Pulse 87  Temp 97.8  F (36.6  C) (Oral)  Ht 1.638 m (5' 4.5\")  Wt 110.6 kg (243 lb 12.8 oz)  SpO2 98%  BMI 41.2 kg/m2  GENERAL: AXOX3, NAD, well dressed, answering questions appropriately, appears stated age.  HEENT: No exopthalmous, no proptosis, EOMI, no lig lag, no retraction  NECK: Thyroid normal in size, non tender, no nodules were palpated.  CV: RRR, no rubs, gallops, no murmurs  LUNGS: CTAB, no wheezes, rales, or ronchi  ABDOMEN: +BS  EXTREMITIES: no edema, +pulses, no rashes, no lesions  NEUROLOGY: CN grossly intact, + DTR upper and lower extremity, no tremors  MSK: grossly intact  SKIN: no rashes, no lesions    LABS:  Last Basic Metabolic Panel:  Lab Results   Component Value Date     02/01/2018      Lab Results   Component Value Date    POTASSIUM 4.4 02/01/2018     Lab Results   Component Value Date    CHLORIDE 106 02/01/2018     Lab Results   Component Value Date    JEFFERSON 8.6 02/01/2018     Lab Results   Component Value Date    CO2 27 02/01/2018     Lab Results   Component Value Date    BUN 12 02/01/2018     Lab Results   Component Value Date    CR 0.91 02/01/2018     Lab Results   Component Value Date     02/01/2018       Lab Results   Component Value Date    TSH 3.49 02/01/2018       Lab Results   Component Value Date    A1C 5.2 10/24/2016    A1C 5.4 09/23/2015    A1C 5.5 07/15/2014    A1C 5.3 06/21/2011    A1C 5.5 04/23/2010         All pertinent notes, labs, and images personally reviewed by me.     A/P  Ms.Deborah AYO Nunn is a 52 year old here for the evaluation of obestiy:    1. Obesity-  "   Body mass index is 41.2 kg/(m^2).  Obesity is associated with a significant increase in mortality and risk of many disorders, including diabetes mellitus, hypertension, dyslipidemia, heart disease, stroke, sleep apnea, cancer, and many others. Conversely, weight loss is associated with a reduction in obesity-associated morbidity.  Endocrine evaluation of obesity includes Cushing's and thyroid dysfunction.  Will obtain TSH and freeT4 along with 24 hour urine collection.   Obesity complicated by hypertension.  She does snore but has not had sleep study done.  Will recommend sleep study  BMI more than 40.  I discussed bariatric surgery with her she will think about it.  Encouraged her to attend patient information seminar.  Plan to get labs as below  Follow-up in 4 weeks  The patient is advised to Make better food choices: reduce carbs, Reduce portion size, weight loss and exercise 3-4 times a week.    (Plan: Cortisol free urine, TSH with free T4 reflex,         HEALTH  REFERRAL, NUTRITION REFERRAL,         SLEEP EVALUATION & MANAGEMENT REFERRAL - ADULT        Hemoglobin A1c    Discussed:  I informed the pt that:  1.Weight loss medications can be taken to assist with weight reduction when combined with appropriate healthy lifestyle changes.  2.I discussed possible s/e, risks and benefits of weight loss medications.  3.All medications are FDA approved, however, some may be used ''off label'' for their weight loss benefitIs and some ''short term'' medications can be used for longer periods to achieve desired clinical outcomes.  4.All patients taking weight loss medications must be seen in clinic for refill authorization.  Risks of obestiy discussed. Encourage healthy diet. Limit snacks, fluid calories, portions. Limit TV/computer time to one hour a day. Encourage physical activity. Recheck in six months or sooner with concerns.    Obesity is a biological preventable and treatable disease, which is associated with  significantly increased risk of many acute and chronic health conditions. Obesity has now been recognized as a chronic disease by the American Medical Association.    A range of serious co-morbidities are associated with obesity including increased risk for hypertension, stroke, coronary artery disease, dyslipidemia, Type II diabetes, depression, sleep apnea, cancers of the colon, breast and endometrium, osteoarthritis and female infertility. Therefore, obesity is not just a problem; it s a disease that warrants serious evidence based treatements.    I explained to the patient relevant work up for the diagnosis and management of obesity and discussed treatment options. Body Mass Index (BMI) has been a standard means for calculating risk for overweight and obesity. The new American Association of Clinical Endocrinology (AACE) algorithm recommends lifestyle modifications as the initial phase of treatment for all patients with the BMI equal or greater than 25 kg/m2. Lifestyle modifications includes use of medical nutrition therapy, exercise, tobacco cessation, adequate quality and quantity of sleep, limited consumption of alcohol and reduced stress through implementation of a structured, multidisciplinary program.    In patients with complications associated with obesity, graded interventions are recommended including pharmacological therapy such as phentermine, orlistat, lorcaserin and phentermine/topiramate ER, contrave ( buproprion/naltreone) and the use of very low calorie meal replacement programs.    If medical intervention is insufficient, surgical therapy may be considered, especially in patients with BMI greater than or equal to 35 kg/m2 with multiple complications. Surgical treatments include lap-band, gastric sleeve or gastric bypass surgery.      More than 50% of the time spent with Ms. Nunn on counseling / coordinating her care.  Total appointment time was 30 minutes.      Follow-up:  4  daljit Shepherd MD  Endocrinology   [facility]    CC: Nancy Small    Addendum to above note and clinic visit:    Labs reviewed.    See result note/telephone encounter.

## 2018-03-20 NOTE — PATIENT INSTRUCTIONS
[facility] & [place] locations   Dr Shepherd, Endocrinology Department      [facility]   [address] [address]  Appointment [phone]  Fax: [phone]   Monday and Tuesday         [facility]   [address] [place] [address]  Appointment [phone]  Fax: [phone]  Wednesday and Thursday           Follow up dietician, sleep study.  Labs today  Follow up in 4 weeks    24 Hour Urine Collection    - During a 24-hour urine collection, follow your usual diet and drink fluids as you ordinarily would.  - Avoid drinking alcohol before and during the urine collection.    - For a 24-hour urine collection, all of the urine that you pass over a 24-hour time period must be collected. You will receive a special container to collect the sample.    The following are directions for collecting a 24-hour urine sample while at home:    In the morning scheduled to begin the urine collection, urinate in the toilet and flush away the first urine you pass. Write down the date and time. That is the start date and time for the collection.    Collect all urine you pass, day and night, for 24 hours. Use the container given to you to collect the urine. Avoid using other containers. The urine sample must include the last urine that you pass 24 hours after starting the collection. Do not allow toilet paper, stool, or anything else to be added to the urine sample.    Write down the date and time that the last sample is collected.    Health :  A health  will reach out to you over the telephone within the next 3 business days to answer any questions you may have and set up your first coaching session. If they are not able to reach you please feel free to call back at the number left on your voicemail. Thank you and we are excited you are taking advantage of this wonderful opportunity to improve your health!    With this program, patients have an opportunity to work with a Health , over the phone at no cost for 6 coaching total sessions over a period of seven months.   [facility] use a non-judgmental collaborative approach when working with patients. Coaches support patients in gaining knowledge, skills, tools and confidence to become active participants in their health. Your  will work with you one-on-one to set small, achievable goals and provide accountability to help you meet your vision.   [facility] have had success supporting patients in the following areas:   * Chronic Disease Management   * Weight Management   * Nutrition   * Exercise   * Stress Management   * Other Health Related Goals

## 2018-03-20 NOTE — MR AVS SNAPSHOT
After Visit Summary   3/20/2018    Racquel Nunn    MRN: [id number]           Patient Information     Date Of Birth          1965        Visit Information        Provider Department      3/20/2018 1:00 PM Vijaya Shepherd MD [facility]        Today's Diagnoses     Morbid obesity, unspecified obesity type (H)    -  1    Essential hypertension          Care Instructions    [facility] [place] [facility] [place] [facility]   Dr Shepherd, Endocrinology Department      [facility]   [address] [address]  Appointment [phone]  Fax: [phone]   Monday and Tuesday         [place] [address] [place] [address]  Appointment [phone]  Fax: [phone]  Wednesday and Thursday           Follow up dietician, sleep study.  Labs today  Follow up in 4 weeks    24 Hour Urine Collection    - During a 24-hour urine collection, follow your usual diet and drink fluids as you ordinarily would.  - Avoid drinking alcohol before and during the urine collection.    - For a 24-hour urine collection, all of the urine that you pass over a 24-hour time period must be collected. You will receive a special container to collect the sample.    The following are directions for collecting a 24-hour urine sample while at home:    In the morning scheduled to begin the urine collection, urinate in the toilet and flush away the first urine you pass. Write down the date and time. That is the start date and time for the collection.    Collect all urine you pass, day and night, for 24 hours. Use the container given to you to collect the urine. Avoid using other containers. The urine sample must include the last urine that you pass 24 hours after starting the collection. Do not allow toilet paper, stool, or anything else to be added to the urine sample.    Write down the date and time that the last  sample is collected.    Health :  A health  will reach out to you over the telephone within the next 3 business days to answer any questions you may have and set up your first coaching session. If they are not able to reach you please feel free to call back at the number left on your voicemail. Thank you and we are excited you are taking advantage of this wonderful opportunity to improve your health!   With this program, patients have an opportunity to work with a Health , over the phone at no cost for 6 coaching total sessions over a period of seven months.   [facility] use a non-judgmental collaborative approach when working with patients. Coaches support patients in gaining knowledge, skills, tools and confidence to become active participants in their health. Your  will work with you one-on-one to set small, achievable goals and provide accountability to help you meet your vision.   [facility] have had success supporting patients in the following areas:   * Chronic Disease Management   * Weight Management   * Nutrition   * Exercise   * Stress Management   * Other Health Related Goals                Follow-ups after your visit        Additional Services     HEALTH  REFERRAL       Your provider has referred you to a Health .    A Health  will reach out to the patient within three days, if the following criteria has been met.     Clinic: [facility]    Reason for Referral: Obesity Body mass index is 41.2 kg/(m^2).    Patient does have knowledge of this service.    Patient Criteria: obesity Body mass index is 41.2 kg/(m^2).      Provider's Main Focus: Diet/Weight Loss            NUTRITION REFERRAL       Your provider has referred you to: [facility]: [facility] [phone]   [email or web address]    Please be aware that coverage of these services is subject to the  terms and limitations of your health insurance plan.  Call member services at your health plan with any benefit or coverage questions.      Please bring the following to your appointment:      >>   This referral request   >>   Any documents given to you for this referral  >>   Any specific questions you have about diet or food choices            SLEEP EVALUATION & MANAGEMENT REFERRAL - [facility]  [phone] (Age 18 and up)       Please be aware that coverage of these services is subject to the terms and limitations of your health insurance plan.  Call member services at your health plan with any benefit or coverage questions.      Please bring the following to your appointment:    >>   List of current medications   >>   This referral request   >>   Any documents/labs given to you for this referral                  Future tests that were ordered for you today     Open Future Orders        Priority Expected Expires Ordered    Cortisol free urine Routine  3/21/2019 3/20/2018    SLEEP EVALUATION & MANAGEMENT REFERRAL - [facility]  [phone] (Age 18 and up) Routine  3/20/2019 3/20/2018            Who to contact     If you have questions or need follow up information about today's clinic visit or your schedule please contact [facility] directly at [phone].  Normal or non-critical lab and imaging results will be communicated to you by MyChart, letter or phone within 4 business days after the clinic has received the results. If you do not hear from us within 7 days, please contact the clinic through MyChart or phone. If you have a critical or abnormal lab result, we will notify you by phone as soon as possible.  Submit refill requests through [employer] or call your pharmacy and they will forward the refill request to us. Please allow 3 business days for your refill to be completed.          Additional Information About Your Visit       "  MyChart Information     [employer] gives you secure access to your electronic health record. If you see a primary care provider, you can also send messages to your care team and make appointments. If you have questions, please call your primary care clinic.  If you do not have a primary care provider, please call [phone] and they will assist you.        Care EveryWhere ID     This is your Care EveryWhere ID. This could be used by other organizations to access your [place] medical records  [id number]        Your Vitals Were     Pulse Temperature Height Pulse Oximetry BMI (Body Mass Index)       87 97.8  F (36.6  C) (Oral) 1.638 m (5' 4.5\") 98% 41.2 kg/m2        Blood Pressure from Last 3 Encounters:   03/20/18 126/84   02/01/18 118/80   01/29/18 (!) 156/96    Weight from Last 3 Encounters:   03/20/18 110.6 kg (243 lb 12.8 oz)   02/01/18 109.8 kg (242 lb)   01/29/18 109.9 kg (242 lb 4.8 oz)              We Performed the Following     HEALTH  REFERRAL     Hemoglobin A1c     NUTRITION REFERRAL     TSH with free T4 reflex     WEIGHT MANAGEMENT INFO        Primary Care Provider Office Phone # Fax #    Nancy Small [phone] [address] [place] [address]        Equal Access to Services     [facility]: Hadii aad ku hadasho Soomaali, waaxda luqadaha, qaybta kaalmada adeegyada, porfirio stanley. So [facility] [phone].    ATENCIÓN: Si habla español, tiene a saunders disposición servicios gratuitos de asistencia lingüística. Llame al [phone].    We comply with applicable federal civil rights laws and [place] laws. We do not discriminate on the basis of race, color, national origin, age, disability, sex, sexual orientation, or gender identity.            Thank you!     Thank you for choosing [facility]  for your care. Our goal is always to provide you with excellent care. Hearing back from our patients is one way we can continue " to improve our services. Please take a few minutes to complete the written survey that you may receive in the mail after your visit with us. Thank you!             Your Updated Medication List - Protect others around you: Learn how to safely use, store and throw away your medicines at www.disposemymeds.org.          This list is accurate as of 3/20/18  1:30 PM.  Always use your most recent med list.                   Brand Name Dispense Instructions for use Diagnosis    blood glucose monitoring test strip    no brand specified    1 Box    by In Vitro route daily.    Impaired fasting glucose       citalopram 20 MG tablet    celeXA    90 tablet    Take 1 tablet (20 mg) by mouth daily    Major depression in complete remission (H), Anxiety       lisinopril 40 MG tablet    PRINIVIL/ZESTRIL    90 tablet    Take 1 tablet (40 mg) by mouth daily    Benign essential hypertension       Multi-vitamin Tabs tablet   Generic drug:  multivitamin, therapeutic with minerals       Obesity, unspecified       norgestim-eth estrad triphasic 0.18/0.215/0.25 MG-35 MCG per tablet    TRI-ESTARYLLA    84 tablet    Take 1 tablet by mouth daily Due for annual physical at this time. Please call to schedule.    Birth control counseling

## 2018-03-22 ENCOUNTER — TELEPHONE (OUTPATIENT)
Dept: NURSING | Facility: CLINIC | Age: 53
End: 2018-03-22

## 2018-03-22 NOTE — TELEPHONE ENCOUNTER
Received Health Coaching Referral-Outreached patient for first time and was able to connect. Unfortunately caught her at a bad time so she asked me to outreach again next Tuesday.  Will try her again then.    Darion Savage, Health    3/22/2018    1:43 PM

## 2018-03-27 ENCOUNTER — TELEPHONE (OUTPATIENT)
Dept: NURSING | Facility: CLINIC | Age: 53
End: 2018-03-27

## 2018-03-27 DIAGNOSIS — E66.01 MORBID OBESITY, UNSPECIFIED OBESITY TYPE (H): ICD-10-CM

## 2018-03-27 PROCEDURE — 99000 SPECIMEN HANDLING OFFICE-LAB: CPT | Performed by: INTERNAL MEDICINE

## 2018-03-27 PROCEDURE — 82530 CORTISOL FREE: CPT | Mod: 90 | Performed by: INTERNAL MEDICINE

## 2018-04-01 LAB
COLLECT DURATION TIME SPEC: 24 H
CORTIS F 24H UR HPLC-MCNC: 29.3 UG/L
CORTIS F 24H UR-MRATE: 17.6 UG/D
CORTIS F/CREAT 24H UR: 15.42 UG/G CRT
CREAT 24H UR-MCNC: 190 MG/DL
CREAT 24H UR-MRATE: 1140 MG/D (ref 700–1600)
IMP & REVIEW OF LAB RESULTS: NORMAL
SPECIMEN VOL ?TM UR: 600 ML

## 2018-04-04 ENCOUNTER — TELEPHONE (OUTPATIENT)
Dept: NURSING | Facility: CLINIC | Age: 53
End: 2018-04-04

## 2018-04-04 NOTE — TELEPHONE ENCOUNTER
Outreached patient for third and final time regarding Health Coaching, but wasn't able to connect. Left message for call back to reschedule.      Darion Savage, Health    4/4/2018    10:46 AM

## 2018-04-16 ENCOUNTER — OFFICE VISIT (OUTPATIENT)
Dept: SLEEP MEDICINE | Facility: CLINIC | Age: 53
End: 2018-04-16
Payer: COMMERCIAL

## 2018-04-16 VITALS
OXYGEN SATURATION: 99 % | HEART RATE: 96 BPM | DIASTOLIC BLOOD PRESSURE: 93 MMHG | SYSTOLIC BLOOD PRESSURE: 138 MMHG | WEIGHT: 242 LBS | HEIGHT: 65 IN | BODY MASS INDEX: 40.32 KG/M2

## 2018-04-16 DIAGNOSIS — E66.01 MORBID OBESITY WITH BMI OF 40.0-44.9, ADULT (H): ICD-10-CM

## 2018-04-16 DIAGNOSIS — G47.9 SLEEP DISTURBANCE: Primary | ICD-10-CM

## 2018-04-16 DIAGNOSIS — G25.81 RESTLESS LEGS SYNDROME (RLS): ICD-10-CM

## 2018-04-16 PROCEDURE — 99244 OFF/OP CNSLTJ NEW/EST MOD 40: CPT | Performed by: INTERNAL MEDICINE

## 2018-04-16 RX ORDER — ZOLPIDEM TARTRATE 5 MG/1
TABLET ORAL
Qty: 1 TABLET | Refills: 0 | Status: SHIPPED | OUTPATIENT
Start: 2018-04-16 | End: 2019-01-14

## 2018-04-16 NOTE — NURSING NOTE
"Chief Complaint   Patient presents with     Consult     Snores per , wakes self up, tired during the day,  No SS or cpap       Initial BP (!) 138/93  Pulse 96  Ht 1.651 m (5' 5\")  Wt 109.8 kg (242 lb)  SpO2 99%  BMI 40.27 kg/m2 Estimated body mass index is 40.27 kg/(m^2) as calculated from the following:    Height as of this encounter: 1.651 m (5' 5\").    Weight as of this encounter: 109.8 kg (242 lb).  Medication Reconciliation: complete       Neck 41cm  16in  Ess 13      Daria Bean LPN/LEXI  "

## 2018-04-16 NOTE — MR AVS SNAPSHOT
"              After Visit Summary   4/16/2018    Racquel Nunn    MRN: [id number]           Patient Information     Date Of Birth          1965        Visit Information        Provider Department      4/16/2018 3:00 PM Daniel Ingram MD [facility] [place]        Today's Diagnoses     Sleep disturbance    -  1    Morbid obesity with BMI of 40.0-44.9, adult (H)        Restless legs syndrome (RLS)          Care Instructions    MY TREATMENT INFORMATION FOR SLEEP DISTURBANCE-  Racquel Nunn    DOCTOR : Daniel Ingram MD  SLEEP CENTER :  [place]  MY CONTACT NUMBER:[phone]        If I haven't had a sleep study yet, what can I expect?  A personal story from [employer]  https://www.Re2you.com/watch?v=AxPLmlRpnCs      Suspected sleep apnea: Sleep study ordered.    Follow up in sleep clinic 1-2 weeks after sleep study to discuss results of sleep study and treatment options.    Patient was advised not to drive if drowsy or sleepy.    Frequently asked questions:  1. What is Obstructive Sleep Apnea (ERICA)? ERICA is the most common type of sleep apnea. Apnea literally means, \"without breath.\" It is characterized by repetitive pauses in breathing, despite continued effort to breathe, and is usually associated with a reduction in blood oxygen saturation. Apneas can last 10 to over 60 seconds. It is caused by narrowing or collapse of the upper airway as muscles relax during sleep. Severity of sleep apnea is determined by frequency of breathing events and their effect on your sleep and oxygen levels determined during sleep testing.   2. What are the consequences of ERICA? Symptoms include: daytime sleepiness- possibly increasing the risk of falling asleep while driving, unrefreshing/restless sleep, snoring, insomnia, waking frequently to urinate, waking with heartburn or reflux, reduced concentration and memory, and morning headaches. Other health consequences may include development of high " blood pressure and other cardiovascular disease in persons who are susceptible. Untreated ERICA  can contribute to heart disease, stroke and diabetes.   3. What are the treatment options? In most situations, sleep apnea is a lifelong disease that must be managed with daily therapy. Medications are not effective for sleep apnea and surgery is generally not performed until other therapies have been tried. Therapy is usually tailored to the individual patient based on many factors including your wishes as well as severity of sleep apnea and severity of obesity. Continuous Positive Airway (CPAP) is the most reliable treatment. An oral device to hold your jaw forward is usually the next most reliable option. Other options include postioning devices (to keep you off your back), weight loss, and surgery including a tongue pacing device. There is more detail about some of these options below.    Central sleep apnea is a disorder in which your breathing repeatedly stops and starts during sleep.  Central sleep apnea occurs because your brain doesn't send proper signals to the muscles that control your breathing. This condition is different from obstructive sleep apnea, in which you can't breathe normally because of upper airway obstruction. Central sleep apnea is less common than obstructive sleep apnea.  Central sleep apnea may occur as a result of other conditions, such as heart failure and stroke. Sleeping at a high altitude and pain medications also may cause central sleep apnea.        1. CPAP-  WHAT DOES IT DO AND HOW CAN I LEARN TO WEAR IT?                               BEFORE I START, CAN I WATCH A MOVIE TO GET A PLAN ON HOW TO USE CPAP?  https://www.DesRueda.com.com/watch?m=b1B80kp469Y      Continuous positive airway pressure, or CPAP, is the most effective treatment for obstructive sleep apnea. It works by blowing room air, through a mask, to hold your throat open. A decision to use CPAP is a major step forward in the  "pursuit of a healthier life. The successful use of CPAP will help you breathe easier, sleep better and live healthier. You can choose CPAP equipment from any durable medical equipment provider that meets your needs.  Using CPAP can be a positive experience if you keep these baltazar points in mind:  1. Commitment  CPAP is not a quick fix for your problem. It involves a long-term commitment to improve your sleep and your health.    2. Communication  Stay in close communication with both your sleep doctor and your CPAP supplier. Ask lots of questions and seek help when you need it.    3. Consistency  Use CPAP all night, every night and for every nap. You will receive the maximum health benefits from CPAP when you use it every time that you sleep. This will also make it easier for your body to adjust to the treatment.    4. Correction  The first machine and mask that you try may not be the best ones for you. Work with your sleep doctor and your CPAP supplier to make corrections to your equipment selection. Ask about trying a different type of machine or mask if you have ongoing problems. Make sure that your mask is a good fit and learn to use your equipment properly.    5. Challenge  Tell a family member or close friend to ask you each morning if you used your CPAP the previous night. Have someone to challenge you to give it your best effort.    6. Connection   Your adjustment to CPAP will be easier if you are able to connect with others who use the same treatment. Ask your sleep doctor if there is a support group in your area for people who have sleep apnea, or look for one on the Internet.  7. Comfort   Increase your level of comfort by using a saline spray, decongestant or heated humidifier if CPAP irritates your nose, mouth or throat. Use your unit's \"ramp\" setting to slowly get used to the air pressure level. There may be soft pads you can buy that will fit over your mask straps. Look on www.CPAP.com for accessories " that can help make CPAP use more comfortable.  8. Cleaning   Clean your mask, tubing and headgear on a regular basis. Put this time in your schedule so that you don't forget to do it. Check and replace the filters for your CPAP unit and humidifier.    9. Completion   Although you are never finished with CPAP therapy, you should reward yourself by celebrating the completion of your first month of treatment. Expect this first month to be your hardest period of adjustment. It will involve some trial and error as you find the machine, mask and pressure settings that are right for you.    10. Continuation  After your first month of treatment, continue to make a daily commitment to use your CPAP all night, every night and for every nap.    CPAP-Tips to starting with success:  Begin using your CPAP for short periods of time during the day while you watch TV or read.    Use CPAP every night and for every nap. Using it less often reduces the health benefits and makes it harder for your body to get used to it.    Make small adjustments to your mask, tubing, straps and headgear until you get the right fit. Tightening the mask may actually worsen the leak.  If it leaves significant marks on your face or irritates the bridge of your nose, it may not be the best mask for you.  Speak with the person who supplied the mask and consider trying other masks. Insurances will allow you to try different masks during the first month of starting CPAP.  Insurance also covers a new mask, hose and filter about every 6 months.    Use a saline nasal spray to ease mild nasal congestion. Neti-Pot or saline nasal rinses may also help. Nasal gel sprays can help reduce nasal dryness.  Biotene mouthwash can be helpful to protect your teeth if you experience frequent dry mouth.  Dry mouth may be a sign of air escaping out of your mouth or out of the mask in the case of a full face mask.  Speak with your provider if you expect that is the case.     Take  a nasal decongestant to relieve more severe nasal or sinus congestion.  Do not use Afrin (oxymetazoline) nasal spray more than 3 days in a row.  Speak with your sleep doctor if your nasal congestion is chronic.    Use a heated humidifier that fits your CPAP model to enhance your breathing comfort. Adjust the heat setting up if you get a dry nose or throat, down if you get condensation in the hose or mask.  Position the CPAP lower than you so that any condensation in the hose drains back into the machine rather than towards the mask.    Try a system that uses nasal pillows if traditional masks give you problems.    Clean your mask, tubing and headgear once a week. Make sure the equipment dries fully.    Regularly check and replace the filters for your CPAP unit and humidifier.    Work closely with your sleep provider and your CPAP supplier to make sure that you have the machine, mask and air pressure setting that works best for you. It is better to stop using it and call your provider to solve problems than to lay awake all night frustrated with the device.    BESIDES CPAP, WHAT OTHER THERAPIES ARE THERE?      Positioning Device  Positioning devices are generally used when sleep apnea is mild and only occurs on your back.This example shows a pillow that straps around the waist. It may be appropriate for those whose sleep study shows milder sleep apnea that occurs primarily when lying flat on one's back. Preliminary studies have shown benefit but effectiveness at home may need to be verified by a home sleep test. These devices are generally not covered by medical insurance.                      Oral Appliance  What is oral appliance therapy?  An oral appliance is a small acrylic device that fits over the upper and lower teeth or tongue (similar to an orthodontic retainer or a mouth guard). This device slightly advances the lower jaw or tongue, which moves the base of the tongue forward, opens the airway, improves  breathing and can effectively treat snoring and obstructive sleep apnea sleep apnea. The appliance is fabricated and customized by a qualified dentist with experience in treating snoring and sleep apnea. Oral appliances are usually well tolerated and have relatively high compliance by patients1, 2, 3.  When is an oral appliance indicated?  Oral appliance therapy is recommended as a first-line treatment for patients with primary snoring, mild sleep apnea, and for patients with moderate sleep apnea who prefer appliance therapy to use of CPAP4, 5. Severity of sleep apnea is determined by sleep testing and is based on the number of respiratory events per hour of sleep.   How successful is oral appliance therapy?  The success rate of oral appliance therapy in patients with mild sleep apnea is 75-80% while in patients with moderate sleep apnea it is 50-70%. The chance of success in patients with severe sleep apnea is 40-50%. The research also shows that oral appliances have a beneficial effect on the cardiovascular health of ERICA patients at the same magnitude as CPAP therapy7.  Oral appliances should be a second-line treatment in cases of severe sleep apnea, but if not completely successful then a combination therapy utilizing CPAP plus oral appliance therapy may be effective. Oral appliances tend to be effective in a broad range of patients although studies show that the patients who have the highest success are females, younger patients, those with milder disease, and less severe obesity. 3, 6.   The chances of success are lower in patients who have more severe ERICA, are older, and those who are morbidly obese.     Example of an oral appliance   Finding a dentist that practices dental sleep medicine  Specific training is available through the American Academy of Dental Sleep Medicine for dentists interested in working in the field of sleep. To find a dentist who is educated in the field of sleep and the use of oral  appliances, near you, visit the Web site of the American Academy of Dental Sleep Medicine; also see   http://www.accpstorage.org/newOrganization/patients/oralAppliances.pdf  To search for a dentist certified in these practices:  Http://aadsm.org/FindADentist.aspx?1  1. Sudarshan, et al. Objectively measured vs self-reported compliance during oral appliance therapy for sleep-disordered breathing. Chest 2013; 144(5): 8309-6870.  2. Margie et al. Objective measurement of compliance during oral appliance therapy for sleep-disordered breathing. Thorax 2013; 68(1): 91-96.  3. Geovany et al. Mandibular advancement devices in 620 men and women with ERICA and snoring: tolerability and predictors of treatment success. Chest 2004; 125: 9475-6118.  4. Rashmi et al. Oral appliances for snoring and ERICA: a review. Sleep 2006; 29: 244-262.  5. Wesly et al. Oral appliance treatment for ERICA: an update. J Clin Sleep Med 2014; 10(2): 215-227.  6. Cheri, et al. Predictors of OSAH treatment outcome. J Dent Res 2007; 86: 2514-1045.    Nasal Valves                 Nasal valves may not be effective if you have frequent nasal congestion or have difficulty breathing through your nose. They may be an option for mild apnea if other options are not well tolerated. The efficacy of these devices is generally less than CPAP or oral appliances.      Weight Loss:    Weight management is a personal decision.  If you are interested in exploring weight loss strategies, the following discussion covers the impact on weight loss on sleep apnea and the approaches that may be successful.    Weight loss decreases severity of sleep apnea in most people with obesity. For those with mild obesity who have developed snoring with weight gain, even 15-30 pound weight loss can improve and occasionally eliminate sleep apnea.  Structured and life-long dietary and health habits are necessary to lose weight and keep healthier weight levels.     Though  there may be significant health benefits from weight loss, long-term weight loss is very difficult to achieve- studies show success with dietary management in less than 10% of people. In addition, substantial weight loss may require years of dietary control and may be difficult if patients have severe obesity. In these cases, surgical management may be considered.  Finally, older individuals who have tolerated obesity without health complications may be less likely to benefit from weight loss strategies.    Your BMI is Body mass index is 40.27 kg/(m^2).  Body mass index (BMI) is one way to tell whether you are at a healthy weight, overweight, or obese. It measures your weight in relation to your height.  A BMI of 18.5 to 24.9 is in the healthy range. A person with a BMI of 25 to 29.9 is considered overweight, and someone with a BMI of 30 or greater is considered obese. More than two-thirds of American adults are considered overweight or obese.  Being overweight or obese increases the risk for further weight gain. Excess weight may lead to heart disease and diabetes.  Creating and following plans for healthy eating and physical activity may help you improve your health.  Weight control is part of healthy lifestyle and includes exercise, emotional health, and healthy eating habits. Careful eating habits lifelong are the mainstay of weight control. Though there are significant health benefits from weight loss, long-term weight loss with diet alone may be very difficult to achieve- studies show long-term success with dietary management in less than 10% of people. Attaining a healthy weight may be especially difficult to achieve in those with severe obesity. In some cases, medications, devices and surgical management might be considered.  What can you do?  If you are overweight or obese and are interested in methods for weight loss, you should discuss this with your provider.     Consider reducing daily calorie intake by  500 calories.     Keep a food journal.     Avoiding skipping meals, consider cutting portions instead.    Diet combined with exercise helps maintain muscle while optimizing fat loss. Strength training is particularly important for building and maintaining muscle mass. Exercise helps reduce stress, increase energy, and improves fitness. Increasing exercise without diet control, however, may not burn enough calories to loose weight.       Start walking three days a week 10-20 minutes at a time    Work towards walking thirty minutes five days a week     Eventually, increase the speed of your walking for 1-2 minutes at time    In addition, we recommend that you review healthy lifestyles and methods for weight loss available through the National Institutes of Health patient information sites:  http://win.niddk.nih.gov/publications/index.htm    And look into health and wellness programs that may be available through your health insurance provider, employer, local community center, or gabe club.    Weight management plan: Patient was referred to their PCP to discuss a diet and exercise plan.    Surgery:    Upper Airway Surgery for ERICA  Surgery for ERICA is a second-line treatment option in the management of sleep apnea.  Surgery should be considered for patients who are having a difficult time tolerating CPAP.    Surgery for ERICA is directed at areas that are responsible for narrowing or complete obstruction of the airway during sleep.  There are a wide range of procedures available to enlarge and/or stabilize the airway to prevent blockage of breathing in the three major areas where it can occur: the palate, tongue, and nasal regions.  Successful surgical treatment depends on the accurate identification of the factors responsible for obstructive sleep apnea in each person.  A personalized approach is required because there is no single treatment that works well for everyone.  Because of anatomic variation, consultation with  an examination by a sleep surgeon is a critical first step in determining what surgical options are best for each patient.  In some cases, examination during sedation may be recommended in order to guide the selection of procedures.  Patients will be counseled about risks and benefits as well as the typical recovery course after surgery. Surgery is typically not a cure for a person s ERICA.  However, surgery will often significantly improve one s ERICA severity (termed  success rate ).  Even in the absence of a cure, surgery will decrease the cardiovascular risk associated with OSA7; improve overall quality of life8 (sleepiness, functionality, sleep quality, etc).          Palate Procedures:  Patients with REICA often have narrowing of their airway in the region of their tonsils and uvula.  The goals of palate procedures are to widen the airway in this region as well as to help the tissues resist collapse.  Modern palate procedure techniques focus on tissue conservation and soft tissue rearrangement, rather than tissue removal.  Often the uvula is preserved in this procedure. Residual sleep apnea is common in patient after pharyngoplasty with an average reduction in sleep apnea events of 33%2.      Tongue Procedures:  While patients are awake, the muscles that surround the throat are active and keep this region open for breathing. These muscles relax during sleep, allowing the tongue and other structures to collapse and block breathing.  There are several different tongue procedures available.  Selection of a tongue base procedure depends on characteristics seen on physical exam.  Generally, procedures are aimed at removing bulky tissues in this area or preventing the back of the tongue from falling back during sleep.  Success rates for tongue surgery range from 50-62%3.    Hypoglossal Nerve Stimulation:  Hypoglossal nerve stimulation has recently received approval from the [place] Food and Drug Administration for  the treatment of obstructive sleep apnea.  This is based on research showing that the system was safe and effective in treating sleep apnea6.  Results showed that the median AHI score decreased 68%, from 29.3 to 9.0. This therapy uses an implant system that senses breathing patterns and delivers mild stimulation to airway muscles, which keeps the airway open during sleep.  The system consists of three fully implanted components: a small generator (similar in size to a pacemaker), a breathing sensor, and a stimulation lead.  Using a small handheld remote, a patient turns the therapy on before bed and off upon awakening.    Candidates for this device must be greater than 22 years of age, have moderate to severe ERICA (AHI between 20-65), BMI less than 32, have tried CPAP/oral appliance without success, and have appropriate upper airway anatomy (determined by a sleep endoscopy performed by Dr. Henry).    Hypoglossal Nerve Stimulation Pathway:    The sleep surgeon s office will work with the patient through the insurance prior-authorization process (including communications and appeals).    Nasal Procedures:  Nasal obstruction can interfere with nasal breathing during the day and night.  Studies have shown that relief of nasal obstruction can improve the ability of some patients to tolerate positive airway pressure therapy for obstructive sleep apnea1.  Treatment options include medications such as nasal saline, topical corticosteroid and antihistamine sprays, and oral medications such as antihistamines or decongestants. Non-surgical treatments can include external nasal dilators for selected patients. If these are not successful by themselves, surgery can improve the nasal airway either alone or in combination with these other options.      Combination Procedures:  Combination of surgical procedures and other treatments may be recommended, particularly if patients have more than one area of narrowing or persistent  positional disease.  The success rate of combination surgery ranges from 66-80%2,3.      1. Xochitl COYNE. The Role of the Nose in Snoring and Obstructive Sleep Apnoea: An Update.  Eur Arch Otorhinolaryngol. 2011; 268: 1365-73.  2.  Artemio SM; Adele JA; Domo JR; Pallanch JF; Christy MB; Juan SG; Roderick HENAO. Surgical modifications of the upper airway for obstructive sleep apnea in adults: a systematic review and meta-analysis. SLEEP 2010;33(10):2420-6855. Mino CRESPO. Hypopharyngeal surgery in obstructive sleep apnea: an evidence-based medicine review.  Arch Otolaryngol Head Neck Surg. 2006 Feb;132(2):206-13.  3. Burt YH1, Charlene Y, Dav LIZZIE. The efficacy of anatomically based multilevel surgery for obstructive sleep apnea. Otolaryngol Head Neck Surg. 2003 Oct;129(4):327-35.  4. Mino CRESPO, Goldberg A. Hypopharyngeal Surgery in Obstructive Sleep Apnea: An Evidence-Based Medicine Review. Arch Otolaryngol Head Neck Surg. 2006 Feb;132(2):206-13.  5. Cristobal PJ et al. Upper-Airway Stimulation for Obstructive Sleep Apnea.  N Engl J Med. 2014 Jan 9;370(2):139-49.  6. Anitha Y et al. Increased Incidence of Cardiovascular Disease in Middle-aged Men with Obstructive Sleep Apnea. Am J Respir Crit Care Med; 2002 166: 159-165  7. Aguilar EM et al. Studying Life Effects and Effectiveness of Palatopharyngoplasty (SLEEP) study: Subjective Outcomes of Isolated Uvulopalatopharyngoplasty. Otolaryngol Head Neck Surg. 2011; 144: 623-631.  8.   Your blood pressure was checked while you were in clinic today.  Please read the guidelines below about what these numbers mean and what you should do about them.  Your systolic blood pressure is the top number.  This is the pressure when the heart is pumping.  Your diastolic blood pressure is the bottom number.  This is the pressure in between beats.  If your systolic blood pressure is less than 120 and your diastolic blood pressure is less than 80, then your blood pressure is normal. There is  nothing more that you need to do about it  If your systolic blood pressure is 120-139 or your diastolic blood pressure is 80-89, your blood pressure may be higher than it should be.  You should have your blood pressure re-checked within a year by a primary care provider.  If your systolic blood pressure is 140 or greater or your diastolic blood pressure is 90 or greater, you may have high blood pressure.  High blood pressure is treatable, but if left untreated over time it can put you at risk for heart attack, stroke, or kidney failure.  You should have your blood pressure re-checked by a primary care provider within the next four weeks.      Restless Leg Syndrome:    Based on the information that you provided today you are likely to have restless leg syndrome that may be interfering with your sleep.  Treatment of restless leg syndrome usually depends on how much it is bothering you.  If it is a major problem then you might want to do something about it.    Here are some treatment options that you might want to consider:   Behavior Changes:   Reduce or eliminate caffeine and alcohol products   Increase the amount of stretching that you do, particularly before bedtime   Sometimes a leg message can be helpful   Some people find that a warm bath or shower in the evening is helpful    We recommend:  Ferritin level ordered    Low iron (below 50 ng/ml) can cause motor restlessness. Your iron [ferritin] level is in a low range that may be causing restlessness.     If ferritin is low, we recommend to follow up your primary care doctor to investigate the source of iron loss or bleeding, including the need for colonoscopy/upper endoscopy.                      Follow-ups after your visit        Future tests that were ordered for you today     Open Future Orders        Priority Expected Expires Ordered    Ferritin Routine  6/15/2018 4/16/2018    Comprehensive Sleep Study Routine  10/13/2018 4/16/2018            Who to contact   "   If you have questions or need follow up information about today's clinic visit or your schedule please contact [facility] directly at [phone].  Normal or non-critical lab and imaging results will be communicated to you by Papertonhart, letter or phone within 4 business days after the clinic has received the results. If you do not hear from us within 7 days, please contact the clinic through St[employer] or phone. If you have a critical or abnormal lab result, we will notify you by phone as soon as possible.  Submit refill requests through [employer] or call your pharmacy and they will forward the refill request to us. Please allow 3 business days for your refill to be completed.          Additional Information About Your Visit        [employer] Information     [employer] gives you secure access to your electronic health record. If you see a primary care provider, you can also send messages to your care team and make appointments. If you have questions, please call your primary care clinic.  If you do not have a primary care provider, please call [phone] and they will assist you.        Care EveryWhere ID     This is your Care EveryWhere ID. This could be used by other organizations to access your [facility] medical records  [id number]        Your Vitals Were     Pulse Height Pulse Oximetry BMI (Body Mass Index)          96 1.651 m (5' 5\") 99% 40.27 kg/m2         Blood Pressure from Last 3 Encounters:   04/16/18 (!) 138/93   03/20/18 126/84   02/01/18 118/80    Weight from Last 3 Encounters:   04/16/18 109.8 kg (242 lb)   03/20/18 110.6 kg (243 lb 12.8 oz)   02/01/18 109.8 kg (242 lb)                 Today's Medication Changes          These changes are accurate as of 4/16/18  3:47 PM.  If you have any questions, ask your nurse or doctor.               Start taking these medicines.        Dose/Directions    zolpidem 5 MG tablet   Commonly known as:  AMBIEN   Used for:  Sleep disturbance        Take " tablet by mouth 15 minutes prior to sleep, for Sleep Study   Quantity:  1 tablet   Refills:  0            Where to get your medicines      Some of these will need a paper prescription and others can be bought over the counter.  Ask your nurse if you have questions.     Bring a paper prescription for each of these medications     zolpidem 5 MG tablet                Primary Care Provider Office Phone # Fax #    Nancy Small [phone] [address] [place] [address]        Equal Access to Services     [facility] : Hadii aad ku hadasho Soomaali, waaxda luqadaha, qaybta kaalmada adeegyada, waxay idiin hayaan adeeg kharash lamanuela ah. So [facility] [phone].    ATENCIÓN: Si habla español, tiene a saunders disposición servicios gratuitos de asistencia lingüística. [facility] [phone].    We comply with applicable federal civil rights laws and [place] laws. We do not discriminate on the basis of race, color, national origin, age, disability, sex, sexual orientation, or gender identity.            Thank you!     Thank you for choosing [facility]  for your care. Our goal is always to provide you with excellent care. Hearing back from our patients is one way we can continue to improve our services. Please take a few minutes to complete the written survey that you may receive in the mail after your visit with us. Thank you!             Your Updated Medication List - Protect others around you: Learn how to safely use, store and throw away your medicines at www.disposemymeds.org.          This list is accurate as of 4/16/18  3:47 PM.  Always use your most recent med list.                   Brand Name Dispense Instructions for use Diagnosis    blood glucose monitoring test strip    no brand specified    1 Box    by In Vitro route daily.    Impaired fasting glucose       citalopram 20 MG tablet    celeXA    90 tablet    Take 1 tablet (20 mg) by mouth daily    Major  depression in complete remission (H), Anxiety       lisinopril 40 MG tablet    PRINIVIL/ZESTRIL    90 tablet    Take 1 tablet (40 mg) by mouth daily    Benign essential hypertension       Multi-vitamin Tabs tablet   Generic drug:  multivitamin, therapeutic with minerals       Obesity, unspecified       norgestim-eth estrad triphasic 0.18/0.215/0.25 MG-35 MCG per tablet    TRI-ESTARYLLA    84 tablet    Take 1 tablet by mouth daily Due for annual physical at this time. Please call to schedule.    Birth control counseling       zolpidem 5 MG tablet    AMBIEN    1 tablet    Take tablet by mouth 15 minutes prior to sleep, for Sleep Study    Sleep disturbance

## 2018-04-16 NOTE — PROGRESS NOTES
[facility]  Outpatient Sleep Medicine Consultation  April 16, 2018      Name: Racquel Nunn MRN# [id number]   Age: 52 year old YOB: 1965     Date of Consultation: April 16, 2018  Consultation is requested by: Vijaya Shepherd MD  [address] [place] [address]  Primary care provider: Nancy Small  Home clinic: [facility]        Reason for Sleep Consult:     Racquel Nunn is a 52 year old female nightly snoring, poor quality of sleep and excessive daytime sleepiness and tiredness and restless legs.         Assessment and Plan:     Summary Sleep Diagnoses/Recommendations:    1. Sleep Disturbance:  High suspicion of sleep disordered breathing based on patient's symptoms (snoring, excessive daytime sleepiness and tiredness), high BMI, neck circumference and oropharyngeal examination in setting of restless legs, possible PLM's and morbid obesity. Will schedule PSG with PAP titration in second half if patient meets criteria for ERICA in first half of PSG with TCM CO2. We also discussed the pathophysiology of sleep disordered breathing and the importance of treating it if S/he should have it. Patient is advised not to drive if he/she feels drowsy or sleepy. Ambien 5 mg x1 ordered.  Follow up after sleep study to discuss the result of sleep study and treatment options.    2. Obesity:  Counseled regarding weight loss through diet modification and increased physical activity. Patient was given instuctions of weight loss and advised to follow up her PCP and endocrinologist for further weight loss interventions.    3.  Restless Leg Syndrome:  Symptom severity: moderate  Serum ferritin level: ordered  If ferritin is low, we recommend to follow up your primary care doctor to investigate the source of iron loss or bleeding, including the need for colonoscopy/upper endoscopy.    Recommended behavior Changes:   Reduce or eliminate caffeine and alcohol  products   Increase the amount of stretching that you do, particularly before bedtime   Sometimes a leg message can be helpful   Some people find that a warm bath or shower in the evening is helpful      Orders Placed This Encounter   Procedures     HST-Home Sleep Apnea Test     Ferritin    Ambien 5 mg x1    Addendum: patient's insurance refused in-lab sleep study, will order HST order.    Summary Counseling:  See instructions    Counseling included a comprehensive review of diagnostic and therapeutic strategies as well as risks of inadequate therapy.  Educational materials provided in instructions.    All questions were answered.  The patient indicates understanding of the above issues and agrees with the plan set forth.           History of Present Illness:     Racquel Nunn is a 52 year old female with history of hypertension, depression, anxiety and obesity who presents to the [facility] in [place] with complains of sleep disturbance. I was asked to see Ms. Nunn regarding sleep apnea by Dr. Shepherd.   Patient complains loud snoring, excessive daytime sleepiness and tiredness, morning headache, acid reflux. She denies witnessed apnea and waking up gasping air. She denies difficulty falling and staying asleep. Patient has restless legs, leg kicking through the night,    Please see below for sleep ROS details.    PREVIOUS IN- LAB or HOME SLEEP STUDIES:  None     SLEEP-WAKE SCHEDULE:     Racquel Nunn     -Describes themself as a morning person;      -ON WEEKDAYS, goes to sleep at 8:30 AM during the week; awakens  4:30 AM with an alarm; falls asleep in 30 minutes; denies difficulty falling asleep.     -ON WEEKENDS, goes to sleep at 10:00 PM and wakes up at 10:00 AM without an alarm; falls asleep in 30 minutes.       -Awakens 1 times a night for 5 minutes before falling back to sleep; awakens to go to the bathroom and uncertain reasons.      -Total sleep time: 7-9 hours per  night.    -Naps 4 times/days per week for 120 minutes, feels refreshed after naps; takes some inadvertant naps.       BEDTIME ACTIVITIES AND SHIFT WORK:    Racquel Nunn    -does use electronics in bed and watch TV in bed and does not read in bed.     -does not do shift work.  She works day shifts.      Occupation: [employer]       SLEEP APNEA: [employer] score: 6       INSOMNIA:  Insomnia severity score: N/A       SLEEPINESS: [place] sleepiness scale (ESS):  13   Drowsy driving/near accidents: No          PHQ9: N/A    SLEEP COMPLAINTS:   Snoring- 7 days/week  Witness apnea: No  Gasping/Choking: No  Excessive daytime sleep: Yes  Toss/turn: Yes  Excessive tiredness/fatigue:  Yes  Morning headaches: Yes  Dry mouth/throat: Yes  Dyspnea: No  Coexisting Lung disease: No    Coexisting Heart disease: No    Does patient have a bed partner: Yes  Has bed partner been sleeping separately because of snoring:  No            RLS Screen: When you try to relax in the evening or sleep at  night, do you ever have unpleasant, restless feelings in your  legs that can be relieved by walking or movement? Yes    Periodic limb movement: Yes    Narcolepsy:       denies sudden urges of sleep attacks     denies cataplexy     denies sleep paralysis      denies hallucinations     Sleep Behaviors:     denies leg symptoms/movements     denies motor restlessness     denies night terrors     denies bruxism     denies automatic behaviors    Other subjective complaints:     denies anxiety or rumination      denies pain and discomfort at  night     denies waking up with heart pounding or racing     Yes GERD/heartburn         Parasomnia:   NREM - denies recurrent persistent confusional arousal, night eating, sleep walking or sleep terrors   REM  - denies dream enactment; no injuries.     Safety: None             Medications:     Current Outpatient Prescriptions   Medication Sig     citalopram (CELEXA) 20 MG tablet Take 1 tablet (20 mg) by  mouth daily     lisinopril (PRINIVIL/ZESTRIL) 40 MG tablet Take 1 tablet (40 mg) by mouth daily     norgestim-eth estrad triphasic (TRI-ESTARYLLA) 0.18/0.215/0.25 MG-35 MCG per tablet Take 1 tablet by mouth daily Due for annual physical at this time. Please call to schedule.     glucose blood VI test strips strip by In Vitro route daily.     MULTI-VITAMIN OR TABS      No current facility-administered medications for this visit.         Medication that can affect sleep: Celexa    Allergies   Allergen Reactions     No Known Allergies             Past Medical History:     Does not need 02 supplement at night     Past Medical History:   Diagnosis Date     HTN (hypertension)                Past Surgical History:    No previous upper airway surgery     Past Surgical History:   Procedure Laterality Date     NO HISTORY OF SURGERY              Social History:     Social History   Substance Use Topics     Smoking status: Never Smoker     Smokeless tobacco: Never Used     Alcohol use Yes      Comment: 1 drink per week or less         Chemical History:     Tobacco: No     Uses 1-3 sodas/week. Last caffeine intake is usually before 5 pm    EtOH: Yes  Recreational Drugs: No    Psych Hx:   Depression and anxiety    Current dangers to self or others: None           Family History:     Family History   Problem Relation Age of Onset     C.A.D. Father      CABG-age 60     CEREBROVASCULAR DISEASE Maternal Grandmother      Breast Cancer Paternal Aunt      age 40     CANCER Maternal Aunt      melanoma     DIABETES No family hx of      Hypertension No family hx of      Cancer - colorectal No family hx of      Thyroid Disease No family hx of         Sleep Family Hx:        RLS- No  ERICA - father  Insomnia - No  Parasomnia - No         Review of Systems:     A complete 10 point review of systems was negative other than HPI or as commented below:   Patient denies chest pain, dyspnea with activity and or rest,  abdominal pain, n&v, fever,  "chills, dysuria, leg pain or swelling. Patient is also denies ear pain, sore throat, postnasal drip, running nose, dry cough.  Patient has weight gain, night sweats, wheezing, headaches, weakness and numbness of ext, muscle pains.    Racquel Nunn has gained 10-15 pounds in the last 5 year.            Physical Examination:   BP (!) 138/93  Pulse 96  Ht 1.651 m (5' 5\")  Wt 109.8 kg (242 lb)  SpO2 99%  BMI 40.27 kg/m2     Neck Circumference: 41 cm   Constitutional: . Awake, alert, cooperative, in no apparent distress  Mood: euthymic; affect congruent with full range and intensity.  Attention/Concentration:  Normal   Eyes: Pupils round and reactive. No icterus.  ENT: Mallampati Class: IV.   Tonsillar Stage: 2  visible at pillars  Clear nasal passages. Enlarged inferior turbinates. No deviated septum.  Oropharynx: No high arched palate. No pharyngeal erythema or exudates, elongated uvula. No lateral narrowing  Tongue: relative macroglossia   Dentition: Good.  Dentures: None  Neck: Supple, no thyroid enlargement.   Cardiovascular: Regular S1 and S2, no murmurs or gallops.    Pulmonary:  Chest symmetric, lungs clear bilaterally and no crackles, wheezes or rales.  Abdomen: Soft, obese, non tender.  Extremities:  No pedal edema.  Muscle/joint: Strength and tone normal   Skin:  No rash or significant lesions examined areas of skin.   Neurologic: Alert, oriented x3, no focal neurological deficit.           Data: All pertinent previous laboratory data reviewed     No results found for: PH, PHARTERIAL, PO2, PE1AHPONZLC, SAT, PCO2, HCO3, BASEEXCESS, LAURA, BEB  Lab Results   Component Value Date    TSH 1.94 03/20/2018    TSH 3.49 02/01/2018     Lab Results   Component Value Date     (H) 02/01/2018     (H) 10/24/2016     Lab Results   Component Value Date    HGB 13.3 10/24/2016    HGB 13.4 09/23/2015     Lab Results   Component Value Date    BUN 12 02/01/2018    BUN 13 10/24/2016    CR 0.91 02/01/2018    CR " 0.94 10/24/2016     Lab Results   Component Value Date    CO2 27 02/01/2018    CO2 25 10/24/2016     No results found for: MAAME      Echocardiography: No    Chest x-ray: 1/29/2018 3:20 PM     FINDINGS:  The heart size is normal. No mediastinal pathology is seen.  The lungs are clear. The pulmonary vasculature is normal. No  pneumothorax or pleural effusion is seen.          IMPRESSION:  Unremarkable chest. I see no definite change since the  previous examination.     PFT: 8/11/2010  FVC  83%  FEV1 81%  FEV/FVC  96%            Copy to: Nancy Small  Copy to: Vijaya Ingram MD 4/16/2018   [facility]  [address], [place], [place] [id number] [phone] Clinic    Total time spent with patient: 44 minutes with this patient today in which 25 minutes was spent in counseling/coordination of care and going over planned testing and recommendations.

## 2018-04-16 NOTE — PATIENT INSTRUCTIONS
"MY TREATMENT INFORMATION FOR SLEEP DISTURBANCE-  Racquel Nunn    DOCTOR : Daniel Ingram MD  SLEEP CENTER :  [facility]  MY CONTACT NUMBER:[phone]        If I haven't had a sleep study yet, what can I expect?  A personal story from Zach  [email or web address]/watch?v=AxPLmlRpnCs      Suspected sleep apnea: Sleep study ordered.    Follow up in sleep clinic 1-2 weeks after sleep study to discuss results of sleep study and treatment options.    Patient was advised not to drive if drowsy or sleepy.    Frequently asked questions:  1. What is Obstructive Sleep Apnea (ERICA)? ERICA is the most common type of sleep apnea. Apnea literally means, \"without breath.\" It is characterized by repetitive pauses in breathing, despite continued effort to breathe, and is usually associated with a reduction in blood oxygen saturation. Apneas can last 10 to over 60 seconds. It is caused by narrowing or collapse of the upper airway as muscles relax during sleep. Severity of sleep apnea is determined by frequency of breathing events and their effect on your sleep and oxygen levels determined during sleep testing.   2. What are the consequences of ERICA? Symptoms include: daytime sleepiness- possibly increasing the risk of falling asleep while driving, unrefreshing/restless sleep, snoring, insomnia, waking frequently to urinate, waking with heartburn or reflux, reduced concentration and memory, and morning headaches. Other health consequences may include development of high blood pressure and other cardiovascular disease in persons who are susceptible. Untreated ERICA  can contribute to heart disease, stroke and diabetes.   3. What are the treatment options? In most situations, sleep apnea is a lifelong disease that must be managed with daily therapy. Medications are not effective for sleep apnea and surgery is generally not performed until other therapies have been tried. Therapy is usually tailored to the individual patient " based on many factors including your wishes as well as severity of sleep apnea and severity of obesity. Continuous Positive Airway (CPAP) is the most reliable treatment. An oral device to hold your jaw forward is usually the next most reliable option. Other options include postioning devices (to keep you off your back), weight loss, and surgery including a tongue pacing device. There is more detail about some of these options below.    Central sleep apnea is a disorder in which your breathing repeatedly stops and starts during sleep.  Central sleep apnea occurs because your brain doesn't send proper signals to the muscles that control your breathing. This condition is different from obstructive sleep apnea, in which you can't breathe normally because of upper airway obstruction. Central sleep apnea is less common than obstructive sleep apnea.  Central sleep apnea may occur as a result of other conditions, such as heart failure and stroke. Sleeping at a high altitude and pain medications also may cause central sleep apnea.        1. CPAP-  WHAT DOES IT DO AND HOW CAN I LEARN TO WEAR IT?                               BEFORE I START, CAN I WATCH A MOVIE TO GET A PLAN ON HOW TO USE CPAP?  https://www.[email or web address]/watch?z=c3B10nl401X      Continuous positive airway pressure, or CPAP, is the most effective treatment for obstructive sleep apnea. It works by blowing room air, through a mask, to hold your throat open. A decision to use CPAP is a major step forward in the pursuit of a healthier life. The successful use of CPAP will help you breathe easier, sleep better and live healthier. You can choose CPAP equipment from any durable medical equipment provider that meets your needs.  Using CPAP can be a positive experience if you keep these baltazar points in mind:  1. Commitment  CPAP is not a quick fix for your problem. It involves a long-term commitment to improve your sleep and your health.    2. Communication  Stay in close  "communication with both your sleep doctor and your CPAP supplier. Ask lots of questions and seek help when you need it.    3. Consistency  Use CPAP all night, every night and for every nap. You will receive the maximum health benefits from CPAP when you use it every time that you sleep. This will also make it easier for your body to adjust to the treatment.    4. Correction  The first machine and mask that you try may not be the best ones for you. Work with your sleep doctor and your CPAP supplier to make corrections to your equipment selection. Ask about trying a different type of machine or mask if you have ongoing problems. Make sure that your mask is a good fit and learn to use your equipment properly.    5. Challenge  Tell a family member or close friend to ask you each morning if you used your CPAP the previous night. Have someone to challenge you to give it your best effort.    6. Connection   Your adjustment to CPAP will be easier if you are able to connect with others who use the same treatment. Ask your sleep doctor if there is a support group in your area for people who have sleep apnea, or look for one on the Internet.  7. Comfort   Increase your level of comfort by using a saline spray, decongestant or heated humidifier if CPAP irritates your nose, mouth or throat. Use your unit's \"ramp\" setting to slowly get used to the air pressure level. There may be soft pads you can buy that will fit over your mask straps. Look on www.CPAP.com for accessories that can help make CPAP use more comfortable.  8. Cleaning   Clean your mask, tubing and headgear on a regular basis. Put this time in your schedule so that you don't forget to do it. Check and replace the filters for your CPAP unit and humidifier.    9. Completion   Although you are never finished with CPAP therapy, you should reward yourself by celebrating the completion of your first month of treatment. Expect this first month to be your hardest period of " adjustment. It will involve some trial and error as you find the machine, mask and pressure settings that are right for you.    10. Continuation  After your first month of treatment, continue to make a daily commitment to use your CPAP all night, every night and for every nap.    CPAP-Tips to starting with success:  Begin using your CPAP for short periods of time during the day while you watch TV or read.    Use CPAP every night and for every nap. Using it less often reduces the health benefits and makes it harder for your body to get used to it.    Make small adjustments to your mask, tubing, straps and headgear until you get the right fit. Tightening the mask may actually worsen the leak.  If it leaves significant marks on your face or irritates the bridge of your nose, it may not be the best mask for you.  Speak with the person who supplied the mask and consider trying other masks. Insurances will allow you to try different masks during the first month of starting CPAP.  Insurance also covers a new mask, hose and filter about every 6 months.    Use a saline nasal spray to ease mild nasal congestion. Neti-Pot or saline nasal rinses may also help. Nasal gel sprays can help reduce nasal dryness.  Biotene mouthwash can be helpful to protect your teeth if you experience frequent dry mouth.  Dry mouth may be a sign of air escaping out of your mouth or out of the mask in the case of a full face mask.  Speak with your provider if you expect that is the case.     Take a nasal decongestant to relieve more severe nasal or sinus congestion.  Do not use Afrin (oxymetazoline) nasal spray more than 3 days in a row.  Speak with your sleep doctor if your nasal congestion is chronic.    Use a heated humidifier that fits your CPAP model to enhance your breathing comfort. Adjust the heat setting up if you get a dry nose or throat, down if you get condensation in the hose or mask.  Position the CPAP lower than you so that any  condensation in the hose drains back into the machine rather than towards the mask.    Try a system that uses nasal pillows if traditional masks give you problems.    Clean your mask, tubing and headgear once a week. Make sure the equipment dries fully.    Regularly check and replace the filters for your CPAP unit and humidifier.    Work closely with your sleep provider and your CPAP supplier to make sure that you have the machine, mask and air pressure setting that works best for you. It is better to stop using it and call your provider to solve problems than to lay awake all night frustrated with the device.    BESIDES CPAP, WHAT OTHER THERAPIES ARE THERE?      Positioning Device  Positioning devices are generally used when sleep apnea is mild and only occurs on your back.This example shows a pillow that straps around the waist. It may be appropriate for those whose sleep study shows milder sleep apnea that occurs primarily when lying flat on one's back. Preliminary studies have shown benefit but effectiveness at home may need to be verified by a home sleep test. These devices are generally not covered by medical insurance.                      Oral Appliance  What is oral appliance therapy?  An oral appliance is a small acrylic device that fits over the upper and lower teeth or tongue (similar to an orthodontic retainer or a mouth guard). This device slightly advances the lower jaw or tongue, which moves the base of the tongue forward, opens the airway, improves breathing and can effectively treat snoring and obstructive sleep apnea sleep apnea. The appliance is fabricated and customized by a qualified dentist with experience in treating snoring and sleep apnea. Oral appliances are usually well tolerated and have relatively high compliance by patients1, 2, 3.  When is an oral appliance indicated?  Oral appliance therapy is recommended as a first-line treatment for patients with primary snoring, mild sleep apnea,  and for patients with moderate sleep apnea who prefer appliance therapy to use of CPAP4, 5. Severity of sleep apnea is determined by sleep testing and is based on the number of respiratory events per hour of sleep.   How successful is oral appliance therapy?  The success rate of oral appliance therapy in patients with mild sleep apnea is 75-80% while in patients with moderate sleep apnea it is 50-70%. The chance of success in patients with severe sleep apnea is 40-50%. The research also shows that oral appliances have a beneficial effect on the cardiovascular health of ERICA patients at the same magnitude as CPAP therapy7.  Oral appliances should be a second-line treatment in cases of severe sleep apnea, but if not completely successful then a combination therapy utilizing CPAP plus oral appliance therapy may be effective. Oral appliances tend to be effective in a broad range of patients although studies show that the patients who have the highest success are females, younger patients, those with milder disease, and less severe obesity. 3, 6.   The chances of success are lower in patients who have more severe ERICA, are older, and those who are morbidly obese.     Example of an oral appliance   Finding a dentist that practices dental sleep medicine  Specific training is available through the American Academy of Dental Sleep Medicine for dentists interested in working in the field of sleep. To find a dentist who is educated in the field of sleep and the use of oral appliances, near you, visit the Web site of the American Academy of Dental Sleep Medicine; also see   http://www.accpstorage.org/newOrganization/patients/oralAppliances.pdf  To search for a dentist certified in these practices:  Http://aadsm.org/FindADentist.aspx?1  1. Sudarshan et al. Objectively measured vs self-reported compliance during oral appliance therapy for sleep-disordered breathing. Chest 2013; 144(5): 3537-0152.  2. Margie et al. Objective  measurement of compliance during oral appliance therapy for sleep-disordered breathing. Thorax 2013; 68(1): 91-96.  3. Geovany et al. Mandibular advancement devices in 620 men and women with ERICA and snoring: tolerability and predictors of treatment success. Chest 2004; 125: 5636-8169.  4. Rashmi et al. Oral appliances for snoring and ERICA: a review. Sleep 2006; 29: 244-262.  5. Wesly et al. Oral appliance treatment for ERICA: an update. J Clin Sleep Med 2014; 10(2): 215-227.  6. Cheri et al. Predictors of OSAH treatment outcome. J Dent Res 2007; 86: 9713-1186.    Nasal Valves                 Nasal valves may not be effective if you have frequent nasal congestion or have difficulty breathing through your nose. They may be an option for mild apnea if other options are not well tolerated. The efficacy of these devices is generally less than CPAP or oral appliances.      Weight Loss:    Weight management is a personal decision.  If you are interested in exploring weight loss strategies, the following discussion covers the impact on weight loss on sleep apnea and the approaches that may be successful.    Weight loss decreases severity of sleep apnea in most people with obesity. For those with mild obesity who have developed snoring with weight gain, even 15-30 pound weight loss can improve and occasionally eliminate sleep apnea.  Structured and life-long dietary and health habits are necessary to lose weight and keep healthier weight levels.     Though there may be significant health benefits from weight loss, long-term weight loss is very difficult to achieve- studies show success with dietary management in less than 10% of people. In addition, substantial weight loss may require years of dietary control and may be difficult if patients have severe obesity. In these cases, surgical management may be considered.  Finally, older individuals who have tolerated obesity without health complications may be less  likely to benefit from weight loss strategies.    Your BMI is Body mass index is 40.27 kg/(m^2).  Body mass index (BMI) is one way to tell whether you are at a healthy weight, overweight, or obese. It measures your weight in relation to your height.  A BMI of 18.5 to 24.9 is in the healthy range. A person with a BMI of 25 to 29.9 is considered overweight, and someone with a BMI of 30 or greater is considered obese. More than two-thirds of American adults are considered overweight or obese.  Being overweight or obese increases the risk for further weight gain. Excess weight may lead to heart disease and diabetes.  Creating and following plans for healthy eating and physical activity may help you improve your health.  Weight control is part of healthy lifestyle and includes exercise, emotional health, and healthy eating habits. Careful eating habits lifelong are the mainstay of weight control. Though there are significant health benefits from weight loss, long-term weight loss with diet alone may be very difficult to achieve- studies show long-term success with dietary management in less than 10% of people. Attaining a healthy weight may be especially difficult to achieve in those with severe obesity. In some cases, medications, devices and surgical management might be considered.  What can you do?  If you are overweight or obese and are interested in methods for weight loss, you should discuss this with your provider.     Consider reducing daily calorie intake by 500 calories.     Keep a food journal.     Avoiding skipping meals, consider cutting portions instead.    Diet combined with exercise helps maintain muscle while optimizing fat loss. Strength training is particularly important for building and maintaining muscle mass. Exercise helps reduce stress, increase energy, and improves fitness. Increasing exercise without diet control, however, may not burn enough calories to loose weight.       Start walking three  days a week 10-20 minutes at a time    Work towards walking thirty minutes five days a week     Eventually, increase the speed of your walking for 1-2 minutes at time    In addition, we recommend that you review healthy lifestyles and methods for weight loss available through the National Institutes of Health patient information sites:  http://win.niddk.nih.gov/publications/index.htm    And look into health and wellness programs that may be available through your health insurance provider, employer, local community center, or gabe club.    Weight management plan: Patient was referred to their PCP to discuss a diet and exercise plan.    Surgery:    Upper Airway Surgery for ERICA  Surgery for ERICA is a second-line treatment option in the management of sleep apnea.  Surgery should be considered for patients who are having a difficult time tolerating CPAP.    Surgery for ERICA is directed at areas that are responsible for narrowing or complete obstruction of the airway during sleep.  There are a wide range of procedures available to enlarge and/or stabilize the airway to prevent blockage of breathing in the three major areas where it can occur: the palate, tongue, and nasal regions.  Successful surgical treatment depends on the accurate identification of the factors responsible for obstructive sleep apnea in each person.  A personalized approach is required because there is no single treatment that works well for everyone.  Because of anatomic variation, consultation with an examination by a sleep surgeon is a critical first step in determining what surgical options are best for each patient.  In some cases, examination during sedation may be recommended in order to guide the selection of procedures.  Patients will be counseled about risks and benefits as well as the typical recovery course after surgery. Surgery is typically not a cure for a person s ERICA.  However, surgery will often significantly improve one s ERICA  severity (termed  success rate ).  Even in the absence of a cure, surgery will decrease the cardiovascular risk associated with OSA7; improve overall quality of life8 (sleepiness, functionality, sleep quality, etc).          Palate Procedures:  Patients with ERICA often have narrowing of their airway in the region of their tonsils and uvula.  The goals of palate procedures are to widen the airway in this region as well as to help the tissues resist collapse.  Modern palate procedure techniques focus on tissue conservation and soft tissue rearrangement, rather than tissue removal.  Often the uvula is preserved in this procedure. Residual sleep apnea is common in patient after pharyngoplasty with an average reduction in sleep apnea events of 33%2.      Tongue Procedures:  While patients are awake, the muscles that surround the throat are active and keep this region open for breathing. These muscles relax during sleep, allowing the tongue and other structures to collapse and block breathing.  There are several different tongue procedures available.  Selection of a tongue base procedure depends on characteristics seen on physical exam.  Generally, procedures are aimed at removing bulky tissues in this area or preventing the back of the tongue from falling back during sleep.  Success rates for tongue surgery range from 50-62%3.    Hypoglossal Nerve Stimulation:  Hypoglossal nerve stimulation has recently received approval from the [place] Food and Drug Administration for the treatment of obstructive sleep apnea.  This is based on research showing that the system was safe and effective in treating sleep apnea6.  Results showed that the median AHI score decreased 68%, from 29.3 to 9.0. This therapy uses an implant system that senses breathing patterns and delivers mild stimulation to airway muscles, which keeps the airway open during sleep.  The system consists of three fully implanted components: a small generator  (similar in size to a pacemaker), a breathing sensor, and a stimulation lead.  Using a small handheld remote, a patient turns the therapy on before bed and off upon awakening.    Candidates for this device must be greater than 22 years of age, have moderate to severe ERICA (AHI between 20-65), BMI less than 32, have tried CPAP/oral appliance without success, and have appropriate upper airway anatomy (determined by a sleep endoscopy performed by Dr. Henry).    Hypoglossal Nerve Stimulation Pathway:    The sleep surgeon s office will work with the patient through the insurance prior-authorization process (including communications and appeals).    Nasal Procedures:  Nasal obstruction can interfere with nasal breathing during the day and night.  Studies have shown that relief of nasal obstruction can improve the ability of some patients to tolerate positive airway pressure therapy for obstructive sleep apnea1.  Treatment options include medications such as nasal saline, topical corticosteroid and antihistamine sprays, and oral medications such as antihistamines or decongestants. Non-surgical treatments can include external nasal dilators for selected patients. If these are not successful by themselves, surgery can improve the nasal airway either alone or in combination with these other options.      Combination Procedures:  Combination of surgical procedures and other treatments may be recommended, particularly if patients have more than one area of narrowing or persistent positional disease.  The success rate of combination surgery ranges from 66-80%2,3.      1. Xochitl COYNE. The Role of the Nose in Snoring and Obstructive Sleep Apnoea: An Update.  Eur Arch Otorhinolaryngol. 2011; 268: 1365-73.  2.  Artemio SM; Adele JA; Domo JR; Pallanch JF; Christy GARCIA; Juan MARRERO; Roderick HENAO. Surgical modifications of the upper airway for obstructive sleep apnea in adults: a systematic review and meta-analysis. SLEEP  2010;33(10):6493-9904. Mino CRESPO. Hypopharyngeal surgery in obstructive sleep apnea: an evidence-based medicine review.  Arch Otolaryngol Head Neck Surg. 2006 Feb;132(2):206-13.  3. Burt CROWLEY, Charlene Y, Dav LIZZIE. The efficacy of anatomically based multilevel surgery for obstructive sleep apnea. Otolaryngol Head Neck Surg. 2003 Oct;129(4):327-35.  4. Kezirian E, Goldberg A. Hypopharyngeal Surgery in Obstructive Sleep Apnea: An Evidence-Based Medicine Review. Arch Otolaryngol Head Neck Surg. 2006 Feb;132(2):206-13.  5. Cristobal RUIZ et al. Upper-Airway Stimulation for Obstructive Sleep Apnea.  N Engl J Med. 2014 Jan 9;370(2):139-49.  6. Anitha Y et al. Increased Incidence of Cardiovascular Disease in Middle-aged Men with Obstructive Sleep Apnea. Am J Respir Crit Care Med; 2002 166: 159-165  7. Jeff EM et al. Studying Life Effects and Effectiveness of Palatopharyngoplasty (SLEEP) study: Subjective Outcomes of Isolated Uvulopalatopharyngoplasty. Otolaryngol Head Neck Surg. 2011; 144: 623-631.  8.   Your blood pressure was checked while you were in clinic today.  Please read the guidelines below about what these numbers mean and what you should do about them.  Your systolic blood pressure is the top number.  This is the pressure when the heart is pumping.  Your diastolic blood pressure is the bottom number.  This is the pressure in between beats.  If your systolic blood pressure is less than 120 and your diastolic blood pressure is less than 80, then your blood pressure is normal. There is nothing more that you need to do about it  If your systolic blood pressure is 120-139 or your diastolic blood pressure is 80-89, your blood pressure may be higher than it should be.  You should have your blood pressure re-checked within a year by a primary care provider.  If your systolic blood pressure is 140 or greater or your diastolic blood pressure is 90 or greater, you may have high blood pressure.  High blood pressure is treatable,  but if left untreated over time it can put you at risk for heart attack, stroke, or kidney failure.  You should have your blood pressure re-checked by a primary care provider within the next four weeks.      Restless Leg Syndrome:    Based on the information that you provided today you are likely to have restless leg syndrome that may be interfering with your sleep.  Treatment of restless leg syndrome usually depends on how much it is bothering you.  If it is a major problem then you might want to do something about it.    Here are some treatment options that you might want to consider:   Behavior Changes:   Reduce or eliminate caffeine and alcohol products   Increase the amount of stretching that you do, particularly before bedtime   Sometimes a leg message can be helpful   Some people find that a warm bath or shower in the evening is helpful    We recommend:  Ferritin level ordered    Low iron (below 50 ng/ml) can cause motor restlessness. Your iron [ferritin] level is in a low range that may be causing restlessness.     If ferritin is low, we recommend to follow up your primary care doctor to investigate the source of iron loss or bleeding, including the need for colonoscopy/upper endoscopy.

## 2018-05-08 ENCOUNTER — MYC MEDICAL ADVICE (OUTPATIENT)
Dept: PEDIATRICS | Facility: CLINIC | Age: 53
End: 2018-05-08

## 2018-05-08 NOTE — TELEPHONE ENCOUNTER
This message was copied into preceding message.  Appointment recommended to evaluate symptoms.  ALDEN Corbin RN

## 2018-05-10 ENCOUNTER — OFFICE VISIT (OUTPATIENT)
Dept: PEDIATRICS | Facility: CLINIC | Age: 53
End: 2018-05-10
Payer: COMMERCIAL

## 2018-05-10 VITALS
DIASTOLIC BLOOD PRESSURE: 92 MMHG | OXYGEN SATURATION: 99 % | HEART RATE: 98 BPM | TEMPERATURE: 98 F | SYSTOLIC BLOOD PRESSURE: 148 MMHG | WEIGHT: 239 LBS | BODY MASS INDEX: 39.82 KG/M2 | HEIGHT: 65 IN

## 2018-05-10 DIAGNOSIS — J34.89 RHINORRHEA: ICD-10-CM

## 2018-05-10 DIAGNOSIS — R05.9 COUGH: Primary | ICD-10-CM

## 2018-05-10 PROCEDURE — 99213 OFFICE O/P EST LOW 20 MIN: CPT | Performed by: PEDIATRICS

## 2018-05-10 RX ORDER — ALBUTEROL SULFATE 90 UG/1
2 AEROSOL, METERED RESPIRATORY (INHALATION) EVERY 6 HOURS PRN
Qty: 1 INHALER | Refills: 1 | Status: SHIPPED | OUTPATIENT
Start: 2018-05-10 | End: 2019-01-14

## 2018-05-10 NOTE — PATIENT INSTRUCTIONS
Continue claritin. Continue netti pot.    Add over the counter flonase nasal spray - 2 puffs in each nostril every morning.    May use albuterol as needed for cough.    If blood pressures remain greater than 140/90 at home we need to see you back.    Follow up if not improving next week.

## 2018-05-10 NOTE — PROGRESS NOTES
"  SUBJECTIVE:   Racquel Nunn is a 52 year old female who presents to clinic today for the following health issues:      ALLERGIES      Duration: 1 week    Description:   Nasal congestion: YES Cough  Sneezing: YES  Red, itchy eyes: no     Accompanying signs and symptoms: ? Cold symptoms    History (similar episodes/allergy testing): Has had allergy testing done many years ago. Has cat allergy    Precipitating or alleviating factors: None    Therapies tried and outcome: Claritin - just starting to help.  Netti pot.    Sleeping well - cough does not keep her up at night    No fever/chills.     Problem list and histories reviewed & adjusted, as indicated.  Additional history: as documented    Reviewed and updated as needed this visit by clinical staff       Reviewed and updated as needed this visit by Provider         ROS:  Constitutional, HEENT, cardiovascular, pulmonary, gi and gu systems are negative, except as otherwise noted.    OBJECTIVE:     BP (!) 148/92 (BP Location: Right arm, Cuff Size: Adult Large)  Pulse 98  Temp 98  F (36.7  C) (Oral)  Ht 5' 5\" (1.651 m)  Wt 239 lb (108.4 kg)  SpO2 99%  BMI 39.77 kg/m2  Body mass index is 39.77 kg/(m^2).  GENERAL APPEARANCE: healthy, alert and no distress  EYES: Eyes grossly normal to inspection, PERRL and conjunctivae and sclerae normal  HENT: ear canals and TM's normal and nose and mouth without ulcers or lesions  NECK: no adenopathy, no asymmetry, masses, or scars and thyroid normal to palpation  RESP: lungs clear to auscultation - no rales, rhonchi or wheezes  CV: regular rates and rhythm, normal S1 S2, no S3 or S4 and no murmur, click or rub    Diagnostic Test Results:  none     ASSESSMENT/PLAN:       1. Cough  2/2 allergies - no wheezing on exam, but patient states albuterol has helped in the past - ok to try again.  Also recommend adding flonase nasal spray for cough 2/2 post nasal drip.  - albuterol (PROAIR HFA/PROVENTIL HFA/VENTOLIN HFA) 108 (90 Base) " MCG/ACT Inhaler; Inhale 2 puffs into the lungs every 6 hours as needed for shortness of breath / dyspnea or wheezing  Dispense: 1 Inhaler; Refill: 1    2. Rhinorrhea  As above, see PI    Hypertension - likely from acute illness - patient lives far away - will check at home - if >140/90 when well will make follow up with Dr. Small.       Patient Instructions   Continue claritin. Continue netti pot.    Add over the counter flonase nasal spray - 2 puffs in each nostril every morning.    May use albuterol as needed for cough.    If blood pressures remain greater than 140/90 at home we need to see you back.    Follow up if not improving next week.      Chloé Caraballo MD  [facility]

## 2018-05-10 NOTE — MR AVS SNAPSHOT
After Visit Summary   5/10/2018    Racquel Nunn    MRN: [id number]           Patient Information     Date Of Birth          1965        Visit Information        Provider Department      5/10/2018 11:40 AM Chloé [facility]        Today's Diagnoses     Cough    -  1    Rhinorrhea          Care Instructions    Continue claritin. Continue netti pot.    Add over the counter flonase nasal spray - 2 puffs in each nostril every morning.    May use albuterol as needed for cough.    If blood pressures remain greater than 140/90 at home we need to see you back.    Follow up if not improving next week.          Follow-ups after your visit        Your next 10 appointments already scheduled     Jun 04, 2018  8:30 PM CDT   Psg Split W/Tcm with BED 3 SH SLEEP   [facility] ([facility])    [address]  [facility] [id number] [phone]            Jun 12, 2018  2:00 PM CDT   Return Sleep Patient with Daniel Ingram MD   [facility] [place] [place] [facility]    [address]  [facility] [address] [phone]              Who to contact     If you have questions or need follow up information about today's clinic visit or your schedule please contact [facility] directly at [phone].  Normal or non-critical lab and imaging results will be communicated to you by MyChart, letter or phone within 4 business days after the clinic has received the results. If you do not hear from us within 7 days, please contact the clinic through MyChart or phone. If you have a critical or abnormal lab result, we will notify you by phone as soon as possible.  Submit refill requests through [employer] or call your pharmacy and they will forward the refill request to us. Please allow 3 business days for your refill to be completed.          Additional Information About  "Your Visit        MyChart Information     [employer] gives you secure access to your electronic health record. If you see a primary care provider, you can also send messages to your care team and make appointments. If you have questions, please call your primary care clinic.  If you do not have a primary care provider, please call [phone] and they will assist you.        Care EveryWhere ID     This is your Care EveryWhere ID. This could be used by other organizations to access your [place] medical records  [id number]        Your Vitals Were     Pulse Temperature Height Pulse Oximetry BMI (Body Mass Index)       98 98  F (36.7  C) (Oral) 5' 5\" (1.651 m) 99% 39.77 kg/m2        Blood Pressure from Last 3 Encounters:   05/10/18 (!) 148/92   04/16/18 (!) 138/93   03/20/18 126/84    Weight from Last 3 Encounters:   05/10/18 239 lb (108.4 kg)   04/16/18 242 lb (109.8 kg)   03/20/18 243 lb 12.8 oz (110.6 kg)              Today, you had the following     No orders found for display         Today's Medication Changes          These changes are accurate as of 5/10/18 12:16 PM.  If you have any questions, ask your nurse or doctor.               Start taking these medicines.        Dose/Directions    albuterol 108 (90 Base) MCG/ACT Inhaler   Commonly known as:  PROAIR HFA/PROVENTIL HFA/VENTOLIN HFA   Used for:  Cough   Started by:  Chloé Caraballo MD        Dose:  2 puff   Inhale 2 puffs into the lungs every 6 hours as needed for shortness of breath / dyspnea or wheezing   Quantity:  1 Inhaler   Refills:  1            Where to get your medicines      These medications were sent to [address] [address] [place] [id number] [facility] [address]    Hours:  Tech issues with their phone system Phone:  [phone]     albuterol 108 (90 Base) MCG/ACT Inhaler                Primary Care Provider Office Phone # Fax #    Nancy Small [phone] [address] [place] [address]        Equal Access to Services     [facility]: Hadii chriss ku haderico Sosaraali, waaxda luqadaha, qaybta kaalmablade lal, porfirio gonsalvesperistephanie stanley. So [facility] [phone].    ATENCIÓN: Si habla español, tiene a saunders disposición servicios gratuitos de asistencia lingüística. [facility] [place] [phone].    We comply with applicable federal civil rights laws and [place] laws. We do not discriminate on the basis of race, color, national origin, age, disability, sex, sexual orientation, or gender identity.            Thank you!     Thank you for choosing [facility] your care. Our goal is always to provide you with excellent care. Hearing back from our patients is one way we can continue to improve our services. Please take a few minutes to complete the written survey that you may receive in the mail after your visit with us. Thank you!             Your Updated Medication List - Protect others around you: Learn how to safely use, store and throw away your medicines at www.disposemymeds.org.          This list is accurate as of 5/10/18 12:16 PM.  Always use your most recent med list.                   Brand Name Dispense Instructions for use Diagnosis    albuterol 108 (90 Base) MCG/ACT Inhaler    PROAIR HFA/PROVENTIL HFA/VENTOLIN HFA    1 Inhaler    Inhale 2 puffs into the lungs every 6 hours as needed for shortness of breath / dyspnea or wheezing    Cough       blood glucose monitoring test strip    no brand specified    1 Box    by In Vitro route daily.    Impaired fasting glucose       citalopram 20 MG tablet    celeXA    90 tablet    Take 1 tablet (20 mg) by mouth daily    Major depression in complete remission (H), Anxiety       lisinopril 40 MG tablet    PRINIVIL/ZESTRIL    90 tablet    Take 1 tablet (40 mg) by mouth daily    Benign essential hypertension       Multi-vitamin Tabs tablet   Generic drug:  multivitamin, therapeutic with minerals        Obesity, unspecified       norgestim-eth estrad triphasic 0.18/0.215/0.25 MG-35 MCG per tablet    TRI-ESTARYLLA    84 tablet    Take 1 tablet by mouth daily Due for annual physical at this time. Please call to schedule.    Birth control counseling       zolpidem 5 MG tablet    AMBIEN    1 tablet    Take tablet by mouth 15 minutes prior to sleep, for Sleep Study    Sleep disturbance

## 2018-07-09 ENCOUNTER — OFFICE VISIT (OUTPATIENT)
Dept: SLEEP MEDICINE | Facility: CLINIC | Age: 53
End: 2018-07-09
Payer: COMMERCIAL

## 2018-07-09 DIAGNOSIS — G47.9 SLEEP DISTURBANCE: Primary | ICD-10-CM

## 2018-07-09 DIAGNOSIS — E66.01 MORBID OBESITY WITH BMI OF 40.0-44.9, ADULT (H): ICD-10-CM

## 2018-07-09 PROCEDURE — G0399 HOME SLEEP TEST/TYPE 3 PORTA: HCPCS | Performed by: INTERNAL MEDICINE

## 2018-07-09 NOTE — PROGRESS NOTES
Pt is completing a home sleep test. Pt was instructed on how to put on the Noxturnal T3 device and associated equipment before going to bed and given the opportunity to practice putting it on before leaving the sleep center. Pt was reminded to bring the home sleep test kit back to the center tomorrow, at agreed upon time for download and reporting. +

## 2018-07-09 NOTE — MR AVS SNAPSHOT
After Visit Summary   7/9/2018    Racquel Nunn    MRN: [id number]           Patient Information     Date Of Birth          1965        Visit Information        Provider Department      7/9/2018 3:30 PM [facility]        Today's Diagnoses     Sleep disturbance    -  1    Morbid obesity with BMI of 40.0-44.9, adult (H)           Follow-ups after your visit        Your next 10 appointments already scheduled     Jul 09, 2018  3:30 PM CDT   HST  with  SLEEP LAB   [facility] ([facility])    [address] [place] [address]   [phone]            Jul 10, 2018  9:30 AM CDT   HST Drop Off with  SLEEP DME   [facility] ([facility])    [address] [place] [address]   [phone]            Jul 17, 2018  2:30 PM CDT   Return Sleep Patient with Daniel Ingram MD   [facility] ([facility])    [address] [place] [address]   [phone]              Who to contact     If you have questions or need follow up information about today's clinic visit or your schedule please contact [facility] directly at [phone].  Normal or non-critical lab and imaging results will be communicated to you by MyChart, letter or phone within 4 business days after the clinic has received the results. If you do not hear from us within 7 days, please contact the clinic through [employer] or phone. If you have a critical or abnormal lab result, we will notify you by phone as soon as possible.  Submit refill requests through [employer] or call your pharmacy and they will forward the refill request to us. Please allow 3 business days for your refill to be completed.          Additional Information About Your Visit        [facility]  Information     [employer] gives you secure access to your electronic health record. If you see a primary care provider, you can also send messages to your care team and make appointments. If you have questions, please call your primary care clinic.  If you do not have a primary care provider, please call [phone] and they will assist you.        Care EveryWhere ID     This is your Care EveryWhere ID. This could be used by other organizations to access your [place] medical records  [id number]         Blood Pressure from Last 3 Encounters:   05/10/18 (!) 148/92   04/16/18 (!) 138/93   03/20/18 126/84    Weight from Last 3 Encounters:   05/10/18 108.4 kg (239 lb)   04/16/18 109.8 kg (242 lb)   03/20/18 110.6 kg (243 lb 12.8 oz)              Today, you had the following     No orders found for display       Primary Care Provider Office Phone # Fax #    Nancy Small [phone] [address] [place] [address]        Equal Access to Services     [facility]: Hadii aad ku hadasho Soomaali, waaxda luqadaha, qaybta kaalmada adeegyada, porfirio palmer . So [facility] [phone].    ATENCIÓN: Si habla español, tiene a saunders disposición servicios gratuitos de asistencia lingüística. Llame al [phone].    We comply with applicable federal civil rights laws and [place] laws. We do not discriminate on the basis of race, color, national origin, age, disability, sex, sexual orientation, or gender identity.            Thank you!     Thank you for choosing [facility]  for your care. Our goal is always to provide you with excellent care. Hearing back from our patients is one way we can continue to improve our services. Please take a few minutes to complete the written survey that you may receive in the mail after your visit with us. Thank you!             Your Updated Medication List - Protect others around you: Learn how to safely use,  store and throw away your medicines at www.disposemymeds.org.          This list is accurate as of 7/9/18  2:01 PM.  Always use your most recent med list.                   Brand Name Dispense Instructions for use Diagnosis    albuterol 108 (90 Base) MCG/ACT Inhaler    PROAIR HFA/PROVENTIL HFA/VENTOLIN HFA    1 Inhaler    Inhale 2 puffs into the lungs every 6 hours as needed for shortness of breath / dyspnea or wheezing    Cough       blood glucose monitoring test strip    no brand specified    1 Box    by In Vitro route daily.    Impaired fasting glucose       citalopram 20 MG tablet    celeXA    90 tablet    Take 1 tablet (20 mg) by mouth daily    Major depression in complete remission (H), Anxiety       lisinopril 40 MG tablet    PRINIVIL/ZESTRIL    90 tablet    Take 1 tablet (40 mg) by mouth daily    Benign essential hypertension       Multi-vitamin Tabs tablet   Generic drug:  multivitamin, therapeutic with minerals       Obesity, unspecified       norgestim-eth estrad triphasic 0.18/0.215/0.25 MG-35 MCG per tablet    TRI-ESTARYLLA    84 tablet    Take 1 tablet by mouth daily Due for annual physical at this time. Please call to schedule.    Birth control counseling       zolpidem 5 MG tablet    AMBIEN    1 tablet    Take tablet by mouth 15 minutes prior to sleep, for Sleep Study    Sleep disturbance

## 2018-07-10 ENCOUNTER — DOCUMENTATION ONLY (OUTPATIENT)
Dept: SLEEP MEDICINE | Facility: CLINIC | Age: 53
End: 2018-07-10
Payer: COMMERCIAL

## 2018-07-10 NOTE — PROCEDURES
"  [facility] Report  ===========================    Patient Information:  --------------------  Racquel Nunn  Patient ID:  [id number]   :  1965  Recording date:  2018       Indication of the sleep study: Racquel Nunn is a 52 year old female with history of hypertension, depression, anxiety and obesity who was seen at the [facility] in [place] with complains of loud snoring, excessive daytime sleepiness and tiredness, morning headache, acid reflux. She denies witnessed apnea and waking up gasping air. Ht 1.651 m (5' 5\")  Wt 109.8 kg (242 lb)  SpO2 99%  BMI 40.27 kg/m2.    Recording Information:  ----------------------  This was a Type 3 unattended sleep study (measuring flow, effort, heart rate and pulse oximetry) performed at home. Please refer to EPIC procedure for detailed scoring report.   This study was considered adequate based on > 4 hours of quality oximetry and respiratory recording. As specified by the AASM Manual for the Scoring of Sleep and Associated events, version 2.3, Rule VIII.D 1B, 4% oxygen desaturation scoring for hypopneas is used as a standard of care on all home sleep apnea testing.  The severity of sleep disordered breathing can be underestimated during portable testing because the apnea/hypopnea index is calculated using total recording time rather than total sleep time.  Recording date:  2018   Recording duration:  599.9 minutes  Time in bed:  488.8 minutes  Estimated sleep efficiency was 97.1 %.   Time spent in supine position:  70.3 % of total bed time  The test quality was: adequate for interpretation  The test duration was: adequate for interpretation  Respiratory Analysis:  ---------------------  AHI: 17.7 /hour  AHI (supine):  21.0 /hour  AHI (non-supine):  9.9 /hour  BRIAN: 13.6 /hour (Number of oxygen desaturations per hour)  Snore index: 0.4 (percentage of time spent snoring versus the total time spent in bed)  Central apnea " index:  0.1 / hour    The sleep study demonstrated moderate sleep disordered breathing which was characterized predominantly by obstructive apneas and hypopneas. The sleep-disordered breathing was predominantly in supine body position.     Oximetry Analysis:  ------------------  Baseline oxygen saturation during sleep was normal.   Lowest oxygen saturation:  83.0 %  Majority of the sleep time spent with oxygen saturation greater than 90%.   1.2% of the total recording time was spent with oxygen saturation less than 90%.   Time Spent oxygen saturation below 88% was 2.4 minutes.    Cardiac Analysis:  -----------------  Maximum pulse rate was 95.0 /minute and minimal pulse rate was 62.0/minute. Time spent above 100 bpm was 0.0 minutes and time spent below 40 bpm was 0.0 minutes.    Diagnosis:  ==========  Moderate obstructive sleep apnea G47.33    Recommendations:   ================    1. Based on the presence of the obstructive sleep apnea, treatment could be empirically initiated with a trial of Auto-titrating PAP therapy with a range of 8-16 cmH2O. Recommend clinical follow up with sleep management team. Recommend clinical follow up with sleep management team after using PAP for 4-6 weeks for coaching and effectiveness and measures.  2. Patient may be a candidate for dental appliance through referral to Sleep Dentistry for the treatment of obstructive sleep apnea and/or socially disruptive snoring.    3. If devices are not acceptable or effective, patient may benefit from evaluation of possible surgical options.  If he/she is interested, would recommend referral to specialized ENT-Sleep provider.  4. Recommend optimizing regular wake-sleep schedule and avoiding sleep deprivation.    Other Recommendations:  ======================  1. Start weight loss program if BMI > 25.    2. Avoid sedating medications, including narcotics and alcohol, as these may exacerbate sleep apnea and/or if underlying respiratory disorders.      3. Positional therapy by avoiding sleep in supine position.    4. Avoid driving when drowsy    5. Follow up primary care doctor and/or referring physician as scheduled.      Electronically signed by:      Daniel Ingram MD  Sleep Medicine Physician  [facility], Sleep Medicine and Internal Medicine  [facility].

## 2018-07-10 NOTE — PROGRESS NOTES
This HSAT was performed using a Noxturnal T3 device which recorded snore, sound, movement activity, body position, nasal pressure, oronasal thermal airflow, pulse, oximetry and both chest and abdominal respiratory effort. HSAT data was restricted to the time patient states they were in bed.     HSAT was scored using 1B 4% hypopnea rule.     AHI: 17.7. Snoring was reported as moderate.  Time with SpO2 below 89% was 2.4 minutes.   Overall signal quality was fair     Pt will follow up with sleep provider to determine appropriate therapy.     Ordering Juan Jose TERRY Abdullahi, MD Charles O. BA, [facility], RST System Clinical Specialist 7/10/2018

## 2018-07-17 ENCOUNTER — OFFICE VISIT (OUTPATIENT)
Dept: SLEEP MEDICINE | Facility: CLINIC | Age: 53
End: 2018-07-17
Payer: COMMERCIAL

## 2018-07-17 VITALS
SYSTOLIC BLOOD PRESSURE: 126 MMHG | DIASTOLIC BLOOD PRESSURE: 80 MMHG | BODY MASS INDEX: 38.49 KG/M2 | HEIGHT: 65 IN | HEART RATE: 77 BPM | WEIGHT: 231 LBS | OXYGEN SATURATION: 100 % | RESPIRATION RATE: 12 BRPM

## 2018-07-17 DIAGNOSIS — E66.9 OBESITY (BMI 30-39.9): Primary | ICD-10-CM

## 2018-07-17 DIAGNOSIS — G47.33 OSA (OBSTRUCTIVE SLEEP APNEA): ICD-10-CM

## 2018-07-17 PROCEDURE — 99213 OFFICE O/P EST LOW 20 MIN: CPT | Performed by: INTERNAL MEDICINE

## 2018-07-17 NOTE — NURSING NOTE
"Chief Complaint   Patient presents with     RECHECK     f/u hst       Initial /80  Pulse 77  Resp 12  Ht 1.651 m (5' 5\")  Wt 104.8 kg (231 lb)  SpO2 100%  BMI 38.44 kg/m2 Estimated body mass index is 38.44 kg/(m^2) as calculated from the following:    Height as of this encounter: 1.651 m (5' 5\").    Weight as of this encounter: 104.8 kg (231 lb).    Medication Reconciliation: complete        America Ferrer, Sleep clinic specialist  "

## 2018-07-17 NOTE — PATIENT INSTRUCTIONS
MY TREATMENT INFORMATION FOR SLEEP APNEA-  Racquel Nunn    MY CONTACT NUMBERS ARE:  [phone]  DOCTOR : Daniel Ingram MD  SLEEP CENTER :  [place]  CPAP EQUIPMENT     You have moderate sleep apnea, auto-CPAP is prescribed for you.   AHI 0-5/hr normal  AHI 5-15 evens of hour is a mild sleep apnea  AHI 15-30/hr is moderate sleep apnea  AHI over 30/hr is severe sleep apnea)    CPAP therapy includes adaptation to a mask interface and a delivery of air pressure.    You will be provided with an auto-titrating CPAP with a pressure range of 8-16 cmH2O with heated humidity to limit nasal congestion. Adjust the heat level on humidifier to find a setting that prevents dry nose but does not cause condensation in the hose or mask. Use distilled water in the humidifier.    The CPAP has a ramp function that starts the pressure lower than your prescribed pressure and gradually increases it over a number of minutes.  This may make it easier to fall asleep.                  Try to use the CPAP every-night, all night, at least 7-8 hours each night.  Daytime naps are not advised, but use CPAP if taking naps. Many insurances require that we prove you are using the CPAP at least 4 hours on at least 70% of nights over a 30 day period. We have 90 days to meet those criteria.    Objective measure goal  Compliance  Goal >70% (preferrably 100%)  Leak   Goal < 10% (less than 24 L/min)  AHI  Goal < 5 events per hour   Usage  Goal 7-8 hours.         Patient was advised not to drive if drowsy or sleepy.                Discussed weight management and the impact of weight gain on sleep apnea.  Let me know if you snore or feel the pressure is too high.    - Try mask desensitization as below and do not remove mask headgear when you go to bathroom at night, but just unplug the hose.    You can get new supplies (mask, hose and filter) for your CPAP every 3-6 months, covered by insurance. You do not need to get supplies that often, but  "they are available if you would like them.  You may exchange the mask once within the first month if you feel the initial mask does not fit well.  Contact your medical equipment provider for equipment issues.  Please let me know if you have any return of snoring, daytime sleepiness or poor sleep quality. We will want to make sure your CPAP is adequately treating your apnea.  There is a website called CPAP.com that has accessories that may make CPAP use easier. Please visit it at your convenience.    Our phone number is [phone]    Follow-up 4-6 weeks after PAP usage.  Bring your CPAP machine with you to the follow up appointment.    Frequently asked questions:  1. What is Obstructive Sleep Apnea (ERICA)? ERICA is the most common type of sleep apnea. Apnea literally means, \"without breath.\" It is characterized by repetitive pauses in breathing, despite continued effort to breathe, and is usually associated with a reduction in blood oxygen saturation. Apneas can last 10 to over 60 seconds. It is caused by narrowing or collapse of the upper airway as muscles relax during sleep. Severity of sleep apnea is determined by frequency of breathing events and their effect on your sleep and oxygen levels determined during sleep testing.   2. What are the consequences of ERICA? Symptoms include: daytime sleepiness- possibly increasing the risk of falling asleep while driving, unrefreshing/restless sleep, snoring, insomnia, waking frequently to urinate, waking with heartburn or reflux, reduced concentration and memory, and morning headaches. Other health consequences may include development of high blood pressure and other cardiovascular disease in persons who are susceptible. Untreated ERICA  can contribute to heart disease, stroke and diabetes.   3. What are the treatment options? In most situations, sleep apnea is a lifelong disease that must be managed with daily therapy. Medications are not effective for sleep apnea and surgery " is generally not performed until other therapies have been tried. Therapy is usually tailored to the individual patient based on many factors including your wishes as well as severity of sleep apnea and severity of obesity. Continuous Positive Airway (CPAP) is the most reliable treatment. An oral device to hold your jaw forward is usually      IF I HAVE SLEEP APNEA.....  WHERE CAN I FIND MORE INFORMATION?    American Academy of Sleep Medicine Patient information on sleep disorders:  http://yoursleep.aasmnet.org    THINGS I SHOULD REMEMBER  In most situations, sleep apnea is a lifelong disease that must be managed with daily therapy. Untreated disease, when severe, may result in an increased risk for an array of problems from heart disease to mood changes, car accidents and shorter lifespan.    CPAP-  WHY AND HOW?                                    Continuous positive airway pressure, or CPAP, is the most effective treatment for obstructive sleep apnea. A decision to use CPAP is a major step forward in the pursuit of a healthier life. The successful use of CPAP will help you breathe easier, sleep better and live healthier. Using CPAP can be a positive experience if you keep these baltazar points in mind:  1. Commitment  CPAP is not a quick fix for your problem. It involves a long-term commitment to improve your sleep and your health.    2. Communication  Stay in close communication with both your sleep doctor and your CPAP supplier. Ask lots of questions and seek help when you need it.    3. Consistency  Use CPAP all night, every night and for every nap. You will receive the maximum health benefits from CPAP when you use it every time that you sleep. This will also make it easier for your body to adjust to the treatment.    4. Correction  The first machine and mask that you try may not be the best ones for you. Work with your sleep doctor and your CPAP supplier to make corrections to your equipment selection. Ask about  "trying a different type of machine or mask if you have ongoing problems. Make sure that your mask is a good fit and learn to use your equipment properly.    5. Challenge  Tell a family member or close friend to ask you each morning if you used your CPAP the previous night. Have someone to challenge you to give it your best effort.    6. Connection   Your adjustment to CPAP will be easier if you are able to connect with others who use the same treatment. Ask your sleep doctor if there is a support group in your area for people who have sleep apnea, or look for one on the Internet.    7. Comfort   Increase your level of comfort by using a saline spray, decongestant or heated humidifier if CPAP irritates your nose, mouth or throat. Use your unit's \"ramp\" setting to slowly get used to the air pressure level. There may be soft pads you can buy that will fit over your mask straps. Look on www.CPAP.com for accessories such as these straps, a pillow contoured for side-sleeping with CPAP, longer hoses, hose covers to reduce condensation, or stands to keep the hose out of your way.                                                              8. Cleaning   Clean your mask, tubing and headgear on a regular basis. Put this time in your schedule so that you don't forget to do it. Check and replace the filters for your CPAP unit and humidifier.    9. Completion   Although you are never finished with CPAP therapy, you should reward yourself by celebrating the completion of your first month of treatment. Expect this first month to be your hardest period of adjustment. It will involve some trial and error as you find the machine, mask and pressure settings that are right for you.    10. Continuation  After your first month of treatment, continue to make a daily commitment to use your CPAP all night, every night and for every nap.    CPAP-Tips to starting with success:  Begin using your CPAP for short periods of time during the day " while you watch TV or read.    Use CPAP every night and for every nap. Using it less often reduces the health benefits and makes it harder for your body to get used to it.    Newer CPAP models are virtually silent; however, if you find the sound of your CPAP machine to be bothersome, place the unit under your bed to dampen the sound.     Make small adjustments to your mask, tubing, straps and headgear until you get the right fit. Tightening the mask may actually worsen the leak.  If it leaves significant marks on your face or irritates the bridge of your nose, it may not be the best mask for you.  Speak with the person who supplied the mask and consider trying other masks.    Use a saline nasal spray to ease mild nasal congestion. Neti-Pot or saline nasal rinses may also help. Nasal gel sprays can help reduce nasal dryness.  Biotene mouthwash can be helpful to protect your teeth if you experience frequent dry mouth.  Dry mouth may be a sign of air escaping out of your mouth or out of the mask in the case of a full face mask.  Speak with your provider if you expect that is the case.     Take a nasal decongestant to relieve more severe nasal or sinus congestion.  Do not use Afrin (oxymetazoline) nasal spray more than 3 days in a row.  Speak with your sleep doctor if your nasal congestion is chronic.    Use a heated humidifier that fits your CPAP model to enhance your breathing comfort. Adjust the heat setting up if you get a dry nose or throat, down if you get condensation in the hose or mask.  Position the CPAP lower than you so that any condensation in the hose drains back into the machine rather than towards the mask.    Try a system that uses nasal pillows if traditional masks give you problems.    Clean your mask, tubing and headgear once a week. Make sure the equipment dries fully.    Regularly check and replace the filters for your CPAP unit and humidifier.    Work closely with your sleep doctor and your CPAP  "supplier to make sure that you have the machine, mask and air pressure setting that works best for you.        MASK DESENSITIZATION:    If you are experiencing some anxiety about trying a PAP mask or breathing with pressurized air try the following steps in sequence to get used to PAP. This is called \"desensitization\".    1.  Wear mask (disconnected from the device) for 60 minutes daily awake and use a distraction: Sit and watch TV, read, or listen to music with the mask on. Take the mask off and put it back on, as needed. This can be in a chair in the living room, for example.    2.  When you can use the mask without taking it off for 60 minutes and without anxiety, then proceed to step 3.    3.  Attach the mask to the PAP device, and switch the unit  on  and practice breathing through the mask for one hour while watching television, reading or performing some other sedentary, distracting activity.     4.  Once you are comfortable wearing PAP for 30-60 minutes without anxiety while distracted with an activity, try to use it in bed. You can continue distraction in bed by watching TV, reading, listening to music or an audio book, etc.  The main goal is to  not think about using PAP  but pay attention to relaxing.    5. Use the PAP during scheduled one-hour naps at home.    6. Use PAP during initial 3-4 hours of nocturnal sleep.    7. Use PAP through an entire night of sleep.      IF YOU FAIL CPAP THERAPY, WE HAPPY DISCUSS WITH YOU THE OTHER TREATMENT OPTIONS FOR SLEEP APNEA INCLUDING ORAL APPLIANCE etc.      Weight Loss:    Weight management is a personal decision.  If you are interested in exploring weight loss strategies, the following discussion covers the impact on weight loss on sleep apnea and the approaches that may be successful.    Weight loss decreases severity of sleep apnea in most people with obesity. For those with mild obesity who have developed snoring with weight gain, even 15-30 pound weight loss " can improve and occasionally eliminate sleep apnea.  Structured and life-long dietary and health habits are necessary to lose weight and keep healthier weight levels.     Though there may be significant health benefits from weight loss, long-term weight loss is very difficult to achieve- studies show success with dietary management in less than 10% of people. In addition, substantial weight loss may require years of dietary control and may be difficult if patients have severe obesity. In these cases, surgical management may be considered.  Finally, older individuals who have tolerated obesity without health complications may be less likely to benefit from weight loss strategies.    Your BMI is Body mass index is 38.44 kg/(m^2).  Body mass index (BMI) is one way to tell whether you are at a healthy weight, overweight, or obese. It measures your weight in relation to your height.  A BMI of 18.5 to 24.9 is in the healthy range. A person with a BMI of 25 to 29.9 is considered overweight, and someone with a BMI of 30 or greater is considered obese. More than two-thirds of American adults are considered overweight or obese.  Being overweight or obese increases the risk for further weight gain. Excess weight may lead to heart disease and diabetes.  Creating and following plans for healthy eating and physical activity may help you improve your health.  Weight control is part of healthy lifestyle and includes exercise, emotional health, and healthy eating habits. Careful eating habits lifelong are the mainstay of weight control. Though there are significant health benefits from weight loss, long-term weight loss with diet alone may be very difficult to achieve- studies show long-term success with dietary management in less than 10% of people. Attaining a healthy weight may be especially difficult to achieve in those with severe obesity. In some cases, medications, devices and surgical management might be considered.  What  can you do?  If you are overweight or obese and are interested in methods for weight loss, you should discuss this with your provider.     Consider reducing daily calorie intake by 500 calories.     Keep a food journal.     Avoiding skipping meals, consider cutting portions instead.    Diet combined with exercise helps maintain muscle while optimizing fat loss. Strength training is particularly important for building and maintaining muscle mass. Exercise helps reduce stress, increase energy, and improves fitness. Increasing exercise without diet control, however, may not burn enough calories to loose weight.       Start walking three days a week 10-20 minutes at a time    Work towards walking thirty minutes five days a week     Eventually, increase the speed of your walking for 1-2 minutes at time    In addition, we recommend that you review healthy lifestyles and methods for weight loss available through the National Institutes of Health patient information sites:  http://win.niddk.nih.gov/publications/index.htm    And look into health and wellness programs that may be available through your health insurance provider, employer, local community center, or gabe club.    Weight management plan: Patient was referred to their PCP to discuss a diet and exercise plan.    Your blood pressure was checked while you were in clinic today.  Please read the guidelines below about what these numbers mean and what you should do about them.  Your systolic blood pressure is the top number.  This is the pressure when the heart is pumping.  Your diastolic blood pressure is the bottom number.  This is the pressure in between beats.  If your systolic blood pressure is less than 120 and your diastolic blood pressure is less than 80, then your blood pressure is normal. There is nothing more that you need to do about it  If your systolic blood pressure is 120-139 or your diastolic blood pressure is 80-89, your blood pressure may be higher  than it should be.  You should have your blood pressure re-checked within a year by a primary care provider.  If your systolic blood pressure is 140 or greater or your diastolic blood pressure is 90 or greater, you may have high blood pressure.  High blood pressure is treatable, but if left untreated over time it can put you at risk for heart attack, stroke, or kidney failure.  You should have your blood pressure re-checked by a primary care provider within the next four weeks.

## 2018-07-17 NOTE — PROGRESS NOTES
Sleep Study Follow-Up Visit:    Date on this visit: 7/17/2018    ASSESSMENT / PLAN:       ERICA (obstructive sleep apnea)  Obesity (BMI 30-39.9)    Discussed with patient the recent sleep study and treatment options, we recommend Auto-CPAP 8-16 cmH2O in addition to weight loss and avoiding sleeping supine position. Patient is recommended to improve his sleep-wake schedule and good sleep hygiene. Patient was advised to use PAP therapy for at least 7-8 hours and during naps (naps are not recommended).  Instructions given. Follow up 4-6 weeks after PAP use. Her  and father use CPAP.  Counseled regarding weight loss through diet modification and increased physical activity. Patient was given instuctions of weight loss and advised to follow up her PCP for further weight loss interventions.      All questions were answered.  The patient indicates understanding of the above issues and agrees with the plan set forth.      No orders of the defined types were placed in this encounter.      She will follow up with me in about 2 month(s).       BRIEF SUMMARY:    Racquel Nunn is a 53 year old female with history of hypertension, depression, anxiety and obesity comes in today for follow-up of her sleep study done on 7/9/18 at the [facility] for result of sleep study. Please see H&P for his presenting sleep symptoms for additional details.    Home sleep study: 7/9/18   AHI 17.7/hr (supine 21/hr)  BRIAN 13.6/hr  Snore index 0.4%  Lowest O 2 saturation is 83%  Time spent O 2 saturation below 88% was 1.2 minutes    These findings were reviewed with the patient and copy of the sleep study result was given.    Past medical/surgical history, family history, social history, medications and allergies were reviewed.      Problem List:  Patient Active Problem List    Diagnosis Date Noted     Morbid obesity, unspecified obesity type (H) 10/24/2016     Priority: Medium     Tendinopathy of right rotator cuff 02/15/2015  "    Priority: Medium     Anxiety 07/07/2014     Priority: Medium     Major depression in complete remission (H) 01/08/2013     Priority: Medium     CKD (chronic kidney disease) stage 3, GFR 30-59 ml/min 06/21/2011     Priority: Medium     CARDIOVASCULAR SCREENING; LDL GOAL LESS THAN 160 02/10/2010     Priority: Medium     HTN (Hypertension) BP goal <140/90 02/03/2010     Priority: Medium     Routine medical exam 02/03/2010     Priority: Medium     Family history of malignant neoplasm of breast 02/11/2005     Priority: Medium     Aunt with breast cancer          Obesity 01/28/2005     Priority: Medium     Problem list name updated by automated process. Provider to review               Medications:     Current Outpatient Prescriptions   Medication Sig     albuterol (PROAIR HFA/PROVENTIL HFA/VENTOLIN HFA) 108 (90 Base) MCG/ACT Inhaler Inhale 2 puffs into the lungs every 6 hours as needed for shortness of breath / dyspnea or wheezing     citalopram (CELEXA) 20 MG tablet Take 1 tablet (20 mg) by mouth daily     glucose blood VI test strips strip by In Vitro route daily.     lisinopril (PRINIVIL/ZESTRIL) 40 MG tablet Take 1 tablet (40 mg) by mouth daily     MULTI-VITAMIN OR TABS      norgestim-eth estrad triphasic (TRI-ESTARYLLA) 0.18/0.215/0.25 MG-35 MCG per tablet Take 1 tablet by mouth daily Due for annual physical at this time. Please call to schedule.     zolpidem (AMBIEN) 5 MG tablet Take tablet by mouth 15 minutes prior to sleep, for Sleep Study     No current facility-administered medications for this visit.          PHYSICAL EXAMINATION:  /80  Pulse 77  Resp 12  Ht 1.651 m (5' 5\")  Wt 104.8 kg (231 lb)  SpO2 100%  BMI 38.44 kg/m2          Data: All pertinent previous laboratory data reviewed     No results found for: PH, PHARTERIAL, PO2, [id number], SAT, PCO2, HCO3, BASEEXCESS, LAURA, BEB  Lab Results   Component Value Date    TSH 1.94 03/20/2018    TSH 3.49 02/01/2018     Lab Results   Component Value " Date     (H) 02/01/2018     (H) 10/24/2016     Lab Results   Component Value Date    HGB 13.3 10/24/2016    HGB 13.4 09/23/2015     Lab Results   Component Value Date    BUN 12 02/01/2018    BUN 13 10/24/2016    CR 0.91 02/01/2018    CR 0.94 10/24/2016     No results found for: MAAME     Fifteen minutes spent with patient, all of which were spent face-to-face counseling, consulting, coordinating plan of care, going over sleep test results and chart review.          Daniel Ingram MD 7/17/2018   [facility]  [address], [place], [place] [id number] [phone] Clinic      CC: No ref. provider found  Copy to: Nancy Small

## 2018-07-17 NOTE — MR AVS SNAPSHOT
After Visit Summary   7/17/2018    Racquel Nunn    MRN: [id number]           Patient Information     Date Of Birth          1965        Visit Information        Provider Department      7/17/2018 8:30 AM Daniel Ingram MD [facility] [place]        Today's Diagnoses     Obesity (BMI 30-39.9)    -  1    ERICA (obstructive sleep apnea)          Care Instructions    MY TREATMENT INFORMATION FOR SLEEP APNEA-  Racquel Nunn    MY CONTACT NUMBERS ARE:  [phone]  DOCTOR : Daniel Ingram MD  SLEEP CENTER :  [place]  CPAP EQUIPMENT     You have moderate sleep apnea, auto-CPAP is prescribed for you.   AHI 0-5/hr normal  AHI 5-15 evens of hour is a mild sleep apnea  AHI 15-30/hr is moderate sleep apnea  AHI over 30/hr is severe sleep apnea)    CPAP therapy includes adaptation to a mask interface and a delivery of air pressure.    You will be provided with an auto-titrating CPAP with a pressure range of 8-16 cmH2O with heated humidity to limit nasal congestion. Adjust the heat level on humidifier to find a setting that prevents dry nose but does not cause condensation in the hose or mask. Use distilled water in the humidifier.    The CPAP has a ramp function that starts the pressure lower than your prescribed pressure and gradually increases it over a number of minutes.  This may make it easier to fall asleep.                  Try to use the CPAP every-night, all night, at least 7-8 hours each night.  Daytime naps are not advised, but use CPAP if taking naps. Many insurances require that we prove you are using the CPAP at least 4 hours on at least 70% of nights over a 30 day period. We have 90 days to meet those criteria.    Objective measure goal  Compliance  Goal >70% (preferrably 100%)  Leak   Goal < 10% (less than 24 L/min)  AHI  Goal < 5 events per hour   Usage  Goal 7-8 hours.         Patient was advised not to drive if drowsy or sleepy.              "   Discussed weight management and the impact of weight gain on sleep apnea.  Let me know if you snore or feel the pressure is too high.    - Try mask desensitization as below and do not remove mask headgear when you go to bathroom at night, but just unplug the hose.    You can get new supplies (mask, hose and filter) for your CPAP every 3-6 months, covered by insurance. You do not need to get supplies that often, but they are available if you would like them.  You may exchange the mask once within the first month if you feel the initial mask does not fit well.  Contact your medical equipment provider for equipment issues.  Please let me know if you have any return of snoring, daytime sleepiness or poor sleep quality. We will want to make sure your CPAP is adequately treating your apnea.  There is a website called CPAP.com that has accessories that may make CPAP use easier. Please visit it at your convenience.    Our phone number is [phone]    Follow-up 4-6 weeks after PAP usage.  Bring your CPAP machine with you to the follow up appointment.    Frequently asked questions:  1. What is Obstructive Sleep Apnea (ERICA)? ERICA is the most common type of sleep apnea. Apnea literally means, \"without breath.\" It is characterized by repetitive pauses in breathing, despite continued effort to breathe, and is usually associated with a reduction in blood oxygen saturation. Apneas can last 10 to over 60 seconds. It is caused by narrowing or collapse of the upper airway as muscles relax during sleep. Severity of sleep apnea is determined by frequency of breathing events and their effect on your sleep and oxygen levels determined during sleep testing.   2. What are the consequences of ERICA? Symptoms include: daytime sleepiness- possibly increasing the risk of falling asleep while driving, unrefreshing/restless sleep, snoring, insomnia, waking frequently to urinate, waking with heartburn or reflux, reduced concentration and " memory, and morning headaches. Other health consequences may include development of high blood pressure and other cardiovascular disease in persons who are susceptible. Untreated ERICA  can contribute to heart disease, stroke and diabetes.   3. What are the treatment options? In most situations, sleep apnea is a lifelong disease that must be managed with daily therapy. Medications are not effective for sleep apnea and surgery is generally not performed until other therapies have been tried. Therapy is usually tailored to the individual patient based on many factors including your wishes as well as severity of sleep apnea and severity of obesity. Continuous Positive Airway (CPAP) is the most reliable treatment. An oral device to hold your jaw forward is usually      IF I HAVE SLEEP APNEA.....  WHERE CAN I FIND MORE INFORMATION?    American Academy of Sleep Medicine Patient information on sleep disorders:  http://yoursleep.aasmnet.org    THINGS I SHOULD REMEMBER  In most situations, sleep apnea is a lifelong disease that must be managed with daily therapy. Untreated disease, when severe, may result in an increased risk for an array of problems from heart disease to mood changes, car accidents and shorter lifespan.    CPAP-  WHY AND HOW?                                    Continuous positive airway pressure, or CPAP, is the most effective treatment for obstructive sleep apnea. A decision to use CPAP is a major step forward in the pursuit of a healthier life. The successful use of CPAP will help you breathe easier, sleep better and live healthier. Using CPAP can be a positive experience if you keep these baltazar points in mind:  1. Commitment  CPAP is not a quick fix for your problem. It involves a long-term commitment to improve your sleep and your health.    2. Communication  Stay in close communication with both your sleep doctor and your CPAP supplier. Ask lots of questions and seek help when you need  "it.    3. Consistency  Use CPAP all night, every night and for every nap. You will receive the maximum health benefits from CPAP when you use it every time that you sleep. This will also make it easier for your body to adjust to the treatment.    4. Correction  The first machine and mask that you try may not be the best ones for you. Work with your sleep doctor and your CPAP supplier to make corrections to your equipment selection. Ask about trying a different type of machine or mask if you have ongoing problems. Make sure that your mask is a good fit and learn to use your equipment properly.    5. Challenge  Tell a family member or close friend to ask you each morning if you used your CPAP the previous night. Have someone to challenge you to give it your best effort.    6. Connection   Your adjustment to CPAP will be easier if you are able to connect with others who use the same treatment. Ask your sleep doctor if there is a support group in your area for people who have sleep apnea, or look for one on the Internet.    7. Comfort   Increase your level of comfort by using a saline spray, decongestant or heated humidifier if CPAP irritates your nose, mouth or throat. Use your unit's \"ramp\" setting to slowly get used to the air pressure level. There may be soft pads you can buy that will fit over your mask straps. Look on www.CPAP.com for accessories such as these straps, a pillow contoured for side-sleeping with CPAP, longer hoses, hose covers to reduce condensation, or stands to keep the hose out of your way.                                                              8. Cleaning   Clean your mask, tubing and headgear on a regular basis. Put this time in your schedule so that you don't forget to do it. Check and replace the filters for your CPAP unit and humidifier.    9. Completion   Although you are never finished with CPAP therapy, you should reward yourself by celebrating the completion of your first month of " treatment. Expect this first month to be your hardest period of adjustment. It will involve some trial and error as you find the machine, mask and pressure settings that are right for you.    10. Continuation  After your first month of treatment, continue to make a daily commitment to use your CPAP all night, every night and for every nap.    CPAP-Tips to starting with success:  Begin using your CPAP for short periods of time during the day while you watch TV or read.    Use CPAP every night and for every nap. Using it less often reduces the health benefits and makes it harder for your body to get used to it.    Newer CPAP models are virtually silent; however, if you find the sound of your CPAP machine to be bothersome, place the unit under your bed to dampen the sound.     Make small adjustments to your mask, tubing, straps and headgear until you get the right fit. Tightening the mask may actually worsen the leak.  If it leaves significant marks on your face or irritates the bridge of your nose, it may not be the best mask for you.  Speak with the person who supplied the mask and consider trying other masks.    Use a saline nasal spray to ease mild nasal congestion. Neti-Pot or saline nasal rinses may also help. Nasal gel sprays can help reduce nasal dryness.  Biotene mouthwash can be helpful to protect your teeth if you experience frequent dry mouth.  Dry mouth may be a sign of air escaping out of your mouth or out of the mask in the case of a full face mask.  Speak with your provider if you expect that is the case.     Take a nasal decongestant to relieve more severe nasal or sinus congestion.  Do not use Afrin (oxymetazoline) nasal spray more than 3 days in a row.  Speak with your sleep doctor if your nasal congestion is chronic.    Use a heated humidifier that fits your CPAP model to enhance your breathing comfort. Adjust the heat setting up if you get a dry nose or throat, down if you get condensation in the  "hose or mask.  Position the CPAP lower than you so that any condensation in the hose drains back into the machine rather than towards the mask.    Try a system that uses nasal pillows if traditional masks give you problems.    Clean your mask, tubing and headgear once a week. Make sure the equipment dries fully.    Regularly check and replace the filters for your CPAP unit and humidifier.    Work closely with your sleep doctor and your CPAP supplier to make sure that you have the machine, mask and air pressure setting that works best for you.        MASK DESENSITIZATION:    If you are experiencing some anxiety about trying a PAP mask or breathing with pressurized air try the following steps in sequence to get used to PAP. This is called \"desensitization\".    1.  Wear mask (disconnected from the device) for 60 minutes daily awake and use a distraction: Sit and watch TV, read, or listen to music with the mask on. Take the mask off and put it back on, as needed. This can be in a chair in the living room, for example.    2.  When you can use the mask without taking it off for 60 minutes and without anxiety, then proceed to step 3.    3.  Attach the mask to the PAP device, and switch the unit  on  and practice breathing through the mask for one hour while watching television, reading or performing some other sedentary, distracting activity.     4.  Once you are comfortable wearing PAP for 30-60 minutes without anxiety while distracted with an activity, try to use it in bed. You can continue distraction in bed by watching TV, reading, listening to music or an audio book, etc.  The main goal is to  not think about using PAP  but pay attention to relaxing.    5. Use the PAP during scheduled one-hour naps at home.    6. Use PAP during initial 3-4 hours of nocturnal sleep.    7. Use PAP through an entire night of sleep.      IF YOU FAIL CPAP THERAPY, WE HAPPY DISCUSS WITH YOU THE OTHER TREATMENT OPTIONS FOR SLEEP APNEA " INCLUDING ORAL APPLIANCE etc.      Weight Loss:    Weight management is a personal decision.  If you are interested in exploring weight loss strategies, the following discussion covers the impact on weight loss on sleep apnea and the approaches that may be successful.    Weight loss decreases severity of sleep apnea in most people with obesity. For those with mild obesity who have developed snoring with weight gain, even 15-30 pound weight loss can improve and occasionally eliminate sleep apnea.  Structured and life-long dietary and health habits are necessary to lose weight and keep healthier weight levels.     Though there may be significant health benefits from weight loss, long-term weight loss is very difficult to achieve- studies show success with dietary management in less than 10% of people. In addition, substantial weight loss may require years of dietary control and may be difficult if patients have severe obesity. In these cases, surgical management may be considered.  Finally, older individuals who have tolerated obesity without health complications may be less likely to benefit from weight loss strategies.    Your BMI is Body mass index is 38.44 kg/(m^2).  Body mass index (BMI) is one way to tell whether you are at a healthy weight, overweight, or obese. It measures your weight in relation to your height.  A BMI of 18.5 to 24.9 is in the healthy range. A person with a BMI of 25 to 29.9 is considered overweight, and someone with a BMI of 30 or greater is considered obese. More than two-thirds of American adults are considered overweight or obese.  Being overweight or obese increases the risk for further weight gain. Excess weight may lead to heart disease and diabetes.  Creating and following plans for healthy eating and physical activity may help you improve your health.  Weight control is part of healthy lifestyle and includes exercise, emotional health, and healthy eating habits. Careful eating habits  lifelong are the mainstay of weight control. Though there are significant health benefits from weight loss, long-term weight loss with diet alone may be very difficult to achieve- studies show long-term success with dietary management in less than 10% of people. Attaining a healthy weight may be especially difficult to achieve in those with severe obesity. In some cases, medications, devices and surgical management might be considered.  What can you do?  If you are overweight or obese and are interested in methods for weight loss, you should discuss this with your provider.     Consider reducing daily calorie intake by 500 calories.     Keep a food journal.     Avoiding skipping meals, consider cutting portions instead.    Diet combined with exercise helps maintain muscle while optimizing fat loss. Strength training is particularly important for building and maintaining muscle mass. Exercise helps reduce stress, increase energy, and improves fitness. Increasing exercise without diet control, however, may not burn enough calories to loose weight.       Start walking three days a week 10-20 minutes at a time    Work towards walking thirty minutes five days a week     Eventually, increase the speed of your walking for 1-2 minutes at time    In addition, we recommend that you review healthy lifestyles and methods for weight loss available through the National Institutes of Health patient information sites:  http://win.niddk.nih.gov/publications/index.htm    And look into health and wellness programs that may be available through your health insurance provider, employer, local community center, or gabe club.    Weight management plan: Patient was referred to their PCP to discuss a diet and exercise plan.    Your blood pressure was checked while you were in clinic today.  Please read the guidelines below about what these numbers mean and what you should do about them.  Your systolic blood pressure is the top number.   This is the pressure when the heart is pumping.  Your diastolic blood pressure is the bottom number.  This is the pressure in between beats.  If your systolic blood pressure is less than 120 and your diastolic blood pressure is less than 80, then your blood pressure is normal. There is nothing more that you need to do about it  If your systolic blood pressure is 120-139 or your diastolic blood pressure is 80-89, your blood pressure may be higher than it should be.  You should have your blood pressure re-checked within a year by a primary care provider.  If your systolic blood pressure is 140 or greater or your diastolic blood pressure is 90 or greater, you may have high blood pressure.  High blood pressure is treatable, but if left untreated over time it can put you at risk for heart attack, stroke, or kidney failure.  You should have your blood pressure re-checked by a primary care provider within the next four weeks.            Follow-ups after your visit        Who to contact     If you have questions or need follow up information about today's clinic visit or your schedule please contact [facility] directly at [phone].  Normal or non-critical lab and imaging results will be communicated to you by [employer], letter or phone within 4 business days after the clinic has received the results. If you do not hear from us within 7 days, please contact the clinic through [employer] or phone. If you have a critical or abnormal lab result, we will notify you by phone as soon as possible.  Submit refill requests through [employer] or call your pharmacy and they will forward the refill request to us. Please allow 3 business days for your refill to be completed.          Additional Information About Your Visit        [employer] Information     [employer] gives you secure access to your electronic health record. If you see a primary care provider, you can also send messages to your care team and make  "appointments. If you have questions, please call your primary care clinic.  If you do not have a primary care provider, please call [phone] and they will assist you.        Care EveryWhere ID     This is your Care EveryWhere ID. This could be used by other organizations to access your [facility] medical records  [id number]        Your Vitals Were     Pulse Respirations Height Pulse Oximetry BMI (Body Mass Index)       77 12 1.651 m (5' 5\") 100% 38.44 kg/m2        Blood Pressure from Last 3 Encounters:   07/17/18 126/80   05/10/18 (!) 148/92   04/16/18 (!) 138/93    Weight from Last 3 Encounters:   07/17/18 104.8 kg (231 lb)   05/10/18 108.4 kg (239 lb)   04/16/18 109.8 kg (242 lb)              We Performed the Following     Comprehensive DME        Primary Care Provider Office Phone # Fax #    Nancy Small [phone] [address] [place] [address]        Equal Access to Services     [facility]: Hadii aad ku hadasho Soomaali, waaxda luqadaha, qaybta kaalmada adeegyada, waxmahin palmer . So [facility] [phone].    ATENCIÓN: Si habla español, tiene a saunders disposición servicios gratuitos de asistencia lingüística. Llame al [phone].    We comply with applicable federal civil rights laws and [place] laws. We do not discriminate on the basis of race, color, national origin, age, disability, sex, sexual orientation, or gender identity.            Thank you!     Thank you for choosing [facility]  for your care. Our goal is always to provide you with excellent care. Hearing back from our patients is one way we can continue to improve our services. Please take a few minutes to complete the written survey that you may receive in the mail after your visit with us. Thank you!             Your Updated Medication List - Protect others around you: Learn how to safely use, store and throw away your medicines at " www.disposemymeds.org.          This list is accurate as of 7/17/18  8:53 AM.  Always use your most recent med list.                   Brand Name Dispense Instructions for use Diagnosis    albuterol 108 (90 Base) MCG/ACT Inhaler    PROAIR HFA/PROVENTIL HFA/VENTOLIN HFA    1 Inhaler    Inhale 2 puffs into the lungs every 6 hours as needed for shortness of breath / dyspnea or wheezing    Cough       blood glucose monitoring test strip    no brand specified    1 Box    by In Vitro route daily.    Impaired fasting glucose       citalopram 20 MG tablet    celeXA    90 tablet    Take 1 tablet (20 mg) by mouth daily    Major depression in complete remission (H), Anxiety       lisinopril 40 MG tablet    PRINIVIL/ZESTRIL    90 tablet    Take 1 tablet (40 mg) by mouth daily    Benign essential hypertension       Multi-vitamin Tabs tablet   Generic drug:  multivitamin, therapeutic with minerals       Obesity, unspecified       norgestim-eth estrad triphasic 0.18/0.215/0.25 MG-35 MCG per tablet    TRI-ESTARYLLA    84 tablet    Take 1 tablet by mouth daily Due for annual physical at this time. Please call to schedule.    Birth control counseling       zolpidem 5 MG tablet    AMBIEN    1 tablet    Take tablet by mouth 15 minutes prior to sleep, for Sleep Study    Sleep disturbance

## 2018-07-26 ENCOUNTER — DOCUMENTATION ONLY (OUTPATIENT)
Dept: SLEEP MEDICINE | Facility: CLINIC | Age: 53
End: 2018-07-26
Payer: COMMERCIAL

## 2018-07-26 NOTE — PROGRESS NOTES
Patient was offered choice of vendor and chose [facility].  Patient Racquel Nunn was set up at [place] [facility] on July 26, 2018. Patient received a Resmed AirSense 10 Auto. Pressures were set at 8-16 cm H2O.   Patient s ramp is 5 cm H2O for Auto and FLEX/EPR is 2.  Patient received a Resmed Airfit F20   Full Face mask Size Medium, heated tubing and heated humidifier.  Patient is enrolled in the STM Program and does not need to meet compliance. Patient has a follow up on to be determined.   SKYLAR SAWANT

## 2018-07-30 ENCOUNTER — DOCUMENTATION ONLY (OUTPATIENT)
Dept: SLEEP MEDICINE | Facility: CLINIC | Age: 53
End: 2018-07-30

## 2018-07-30 NOTE — PROGRESS NOTES
3 DAY STM VISIT    Diagnostic AHI:  17.7    Patient contacted for 3 day STM visit  Message left for patient to return call     Device type: Auto-CPAP  PAP settings from order::  CPAP min 8 cm  H20       CPAP max 16 cm  H20  Mask type:    Nasal Mask     Device settings from machine      Min CPAP 8.0            Max CPAP 16.0      Assessment: Nightly usage over four hours.  Action plan: Pt to have f/u 14 day STM visit.  Patient has a follow up visit scheduled:   No, needs to schedule

## 2018-07-31 ENCOUNTER — MYC MEDICAL ADVICE (OUTPATIENT)
Dept: ENDOCRINOLOGY | Facility: CLINIC | Age: 53
End: 2018-07-31

## 2018-08-01 NOTE — PROGRESS NOTES
Patient returned call.     Subjective measures:  Pt left message stating that things are going really well and that she is benefiting from therapy. Patient meeting subjective benchmarks.     Action plan:pt to have 14 day STM visit.

## 2018-08-10 ENCOUNTER — DOCUMENTATION ONLY (OUTPATIENT)
Dept: SLEEP MEDICINE | Facility: CLINIC | Age: 53
End: 2018-08-10
Payer: COMMERCIAL

## 2018-08-10 NOTE — PROGRESS NOTES
14 DAY [facility] VISIT    Diagnostic AHI:  17.7HST    Message left for patient to return call.    Assessment: Pt meeting objective benchmarks.     Action plan: Waiting for patient to return call.     Device type: Auto-CPAP  PAP settings: CPAP min 8.0 cm  H20     CPAP max 16.0 cm  H20    95th% pressure 14.9 cm   Mask type:  Nasal Mask  Objective measures: 14 day rolling measures      Compliance  100 %      Leak  0.69 lpm  last  upload      AHI 0.96   last  upload      Average number of minutes 528     Average hours of usage 8.8          Objective measure goal  Compliance   Goal >70%  Leak   Goal < 24 lpm  AHI  Goal < 5  Usage  Goal >240

## 2018-08-28 ENCOUNTER — DOCUMENTATION ONLY (OUTPATIENT)
Dept: SLEEP MEDICINE | Facility: CLINIC | Age: 53
End: 2018-08-28

## 2018-08-28 NOTE — PROGRESS NOTES
30 DAY STM VISIT    Diagnostic AHI:   17.7  HST    Message left for patient to return call     Assessment: Pt meeting objective benchmarks.     Action plan: waiting for patient to return call.   Patient does not require face to face follow up for her insurance.   Device type: Auto-CPAP  PAP settings: CPAP min 8.0 cm  H20     CPAP max 16.0 cm  H20    95th% pressure 12.1 cm  H20   Mask type:  Nasal Mask  Objective measures: 14 day rolling measures      Compliance  100 %      Leak  1.52 lpm  last  upload      AHI 0.59   last  upload      Average number of minutes 492      Objective measure goal  Compliance   Goal >70%  Leak   Goal < 24 lpm  AHI  Goal < 5  Usage  Goal >240

## 2018-11-05 ENCOUNTER — HOSPITAL ENCOUNTER (OUTPATIENT)
Dept: MAMMOGRAPHY | Facility: CLINIC | Age: 53
Discharge: HOME | End: 2018-11-05
Attending: PEDIATRICS | Admitting: PEDIATRICS
Payer: COMMERCIAL

## 2018-11-05 DIAGNOSIS — Z12.31 VISIT FOR SCREENING MAMMOGRAM: ICD-10-CM

## 2018-11-05 PROCEDURE — 77067 SCR MAMMO BI INCL CAD: CPT

## 2018-11-20 ENCOUNTER — MYC MEDICAL ADVICE (OUTPATIENT)
Dept: PEDIATRICS | Facility: CLINIC | Age: 53
End: 2018-11-20

## 2018-12-17 ENCOUNTER — MYC REFILL (OUTPATIENT)
Dept: PEDIATRICS | Facility: CLINIC | Age: 53
End: 2018-12-17

## 2018-12-17 DIAGNOSIS — Z30.09 BIRTH CONTROL COUNSELING: ICD-10-CM

## 2018-12-17 NOTE — TELEPHONE ENCOUNTER
"Requested Prescriptions   Pending Prescriptions Disp Refills     TRI FEMYNOR 0.18/0.215/0.25 MG-35 MCG tablet [Pharmacy Med Name: TRI FEMYNOR 28 TABLET]    Last Written Prescription Date:  1/11/2018  Last Fill Quantity: 84,  # refills: 3   Last office visit: 5/10/2018 with prescribing provider:  Nancy Small     Future Office Visit:   Next 5 appointments (look out 90 days)    Jan 14, 2019  8:00 AM CST  PHYSICAL with Nancy Small MD  [facility] [address] [place] [address]  [address]  [facility] [address]  [phone]          84 tablet 3     Sig: TAKE 1 TABLET BY MOUTH DAILY DUE FOR ANNUAL PHYSICAL AT THIS TIME. PLEASE CALL TO SCHEDULE.    Contraceptives Protocol Passed - 12/17/2018  2:15 AM       Passed - Patient is not a current smoker if age is 35 or older       Passed - Recent (12 mo) or future (30 days) visit within the authorizing provider's specialty    Patient had office visit in the last 12 months or has a visit in the next 30 days with authorizing provider or within the authorizing provider's specialty.  See \"Patient Info\" tab in inbasket, or \"Choose Columns\" in Meds & Orders section of the refill encounter.             Passed - No active pregnancy on record       Passed - No positive pregnancy test in past 12 months          "

## 2018-12-19 RX ORDER — NORGESTIMATE AND ETHINYL ESTRADIOL 7DAYSX3 28
KIT ORAL
Qty: 28 TABLET | Refills: 0 | Status: SHIPPED | OUTPATIENT
Start: 2018-12-19 | End: 2019-01-14

## 2018-12-19 RX ORDER — NORGESTIMATE AND ETHINYL ESTRADIOL 7DAYSX3 28
1 KIT ORAL DAILY
Qty: 84 TABLET | Refills: 0 | Status: SHIPPED | OUTPATIENT
Start: 2018-12-19 | End: 2019-01-14

## 2018-12-19 NOTE — TELEPHONE ENCOUNTER
Medication is being filled for 1 time refill only due to:  Patient needs to be seen because due for annual physical.   Next 5 appointments (look out 90 days)    Jan 14, 2019  8:00 AM CST  PHYSICAL with Nancy Small MD  [facility] [address] [place] [id number] [phone]        Devika BANSAL, Triage RN

## 2018-12-19 NOTE — TELEPHONE ENCOUNTER
"  Last seen PCP in 2/2018 for physical.  Devika RUANO RN - Triage  [facility]        Requested Prescriptions   Pending Prescriptions Disp Refills     norgestim-eth estrad triphasic (TRI-ESTARYLLA) 0.18/0.215/0.25 MG-35 MCG tablet 84 tablet 3    Last Written Prescription Date:  12/19/2018  Last Fill Quantity: 28,  # refills: 0       Last office visit: 5/10/2018 with prescribing provider:  Rashmi   Future Office Visit:   Next 5 appointments (look out 90 days)    Jan 14, 2019  8:00 AM CST  PHYSICAL with Nancy Small MD  [facility] ([facility]) [address]  [address]  [employer] [address] [phone]          Sig: Take 1 tablet by mouth daily Due for annual physical at this time. Please call to schedule.    Contraceptives Protocol Passed - 12/18/2018  2:08 PM       Passed - Patient is not a current smoker if age is 35 or older       Passed - Recent (12 mo) or future (30 days) visit within the authorizing provider's specialty    Patient had office visit in the last 12 months or has a visit in the next 30 days with authorizing provider or within the authorizing provider's specialty.  See \"Patient Info\" tab in inbasket, or \"Choose Columns\" in Meds & Orders section of the refill encounter.             Passed - No active pregnancy on record       Passed - No positive pregnancy test in past 12 months          "

## 2019-01-14 ENCOUNTER — OFFICE VISIT (OUTPATIENT)
Dept: PEDIATRICS | Facility: CLINIC | Age: 54
End: 2019-01-14
Payer: COMMERCIAL

## 2019-01-14 VITALS
HEIGHT: 65 IN | WEIGHT: 248 LBS | DIASTOLIC BLOOD PRESSURE: 80 MMHG | SYSTOLIC BLOOD PRESSURE: 122 MMHG | OXYGEN SATURATION: 100 % | BODY MASS INDEX: 41.32 KG/M2 | TEMPERATURE: 98.3 F | HEART RATE: 83 BPM

## 2019-01-14 DIAGNOSIS — R53.83 FATIGUE, UNSPECIFIED TYPE: ICD-10-CM

## 2019-01-14 DIAGNOSIS — Z12.11 SCREEN FOR COLON CANCER: ICD-10-CM

## 2019-01-14 DIAGNOSIS — E66.01 MORBID OBESITY, UNSPECIFIED OBESITY TYPE (H): ICD-10-CM

## 2019-01-14 DIAGNOSIS — F41.9 ANXIETY: ICD-10-CM

## 2019-01-14 DIAGNOSIS — J45.20 MILD INTERMITTENT ASTHMA, UNSPECIFIED WHETHER COMPLICATED: ICD-10-CM

## 2019-01-14 DIAGNOSIS — I10 BENIGN ESSENTIAL HYPERTENSION: ICD-10-CM

## 2019-01-14 DIAGNOSIS — R79.89 ABNORMAL THYROID BLOOD TEST: ICD-10-CM

## 2019-01-14 DIAGNOSIS — F33.9 RECURRENT MAJOR DEPRESSIVE DISORDER, REMISSION STATUS UNSPECIFIED (H): ICD-10-CM

## 2019-01-14 DIAGNOSIS — Z00.00 ROUTINE GENERAL MEDICAL EXAMINATION AT A HEALTH CARE FACILITY: Primary | ICD-10-CM

## 2019-01-14 DIAGNOSIS — Z30.09 BIRTH CONTROL COUNSELING: ICD-10-CM

## 2019-01-14 DIAGNOSIS — D22.9 MULTIPLE PIGMENTED NEVI: ICD-10-CM

## 2019-01-14 DIAGNOSIS — G47.33 OSA (OBSTRUCTIVE SLEEP APNEA): ICD-10-CM

## 2019-01-14 LAB
ALBUMIN SERPL-MCNC: 3.2 G/DL (ref 3.4–5)
ALP SERPL-CCNC: 61 U/L (ref 40–150)
ALT SERPL W P-5'-P-CCNC: 23 U/L (ref 0–50)
ANION GAP SERPL CALCULATED.3IONS-SCNC: 6 MMOL/L (ref 3–14)
AST SERPL W P-5'-P-CCNC: 20 U/L (ref 0–45)
BILIRUB SERPL-MCNC: 0.4 MG/DL (ref 0.2–1.3)
BUN SERPL-MCNC: 14 MG/DL (ref 7–30)
CALCIUM SERPL-MCNC: 9 MG/DL (ref 8.5–10.1)
CHLORIDE SERPL-SCNC: 106 MMOL/L (ref 94–109)
CHOLEST SERPL-MCNC: 205 MG/DL
CO2 SERPL-SCNC: 26 MMOL/L (ref 20–32)
CREAT SERPL-MCNC: 0.94 MG/DL (ref 0.52–1.04)
CREAT UR-MCNC: 305 MG/DL
DEPRECATED CALCIDIOL+CALCIFEROL SERPL-MC: 49 UG/L (ref 20–75)
ERYTHROCYTE [DISTWIDTH] IN BLOOD BY AUTOMATED COUNT: 13.8 % (ref 10–15)
GFR SERPL CREATININE-BSD FRML MDRD: 70 ML/MIN/{1.73_M2}
GLUCOSE SERPL-MCNC: 107 MG/DL (ref 70–99)
HCT VFR BLD AUTO: 36.8 % (ref 35–47)
HDLC SERPL-MCNC: 67 MG/DL
HGB BLD-MCNC: 11.8 G/DL (ref 11.7–15.7)
LDLC SERPL CALC-MCNC: 102 MG/DL
MCH RBC QN AUTO: 29.5 PG (ref 26.5–33)
MCHC RBC AUTO-ENTMCNC: 32.1 G/DL (ref 31.5–36.5)
MCV RBC AUTO: 92 FL (ref 78–100)
MICROALBUMIN UR-MCNC: 20 MG/L
MICROALBUMIN/CREAT UR: 6.49 MG/G CR (ref 0–25)
NONHDLC SERPL-MCNC: 138 MG/DL
PLATELET # BLD AUTO: 244 10E9/L (ref 150–450)
POTASSIUM SERPL-SCNC: 4 MMOL/L (ref 3.4–5.3)
PROT SERPL-MCNC: 7 G/DL (ref 6.8–8.8)
RBC # BLD AUTO: 4 10E12/L (ref 3.8–5.2)
SODIUM SERPL-SCNC: 138 MMOL/L (ref 133–144)
T4 FREE SERPL-MCNC: 0.85 NG/DL (ref 0.76–1.46)
TRIGL SERPL-MCNC: 179 MG/DL
TSH SERPL DL<=0.005 MIU/L-ACNC: 4.13 MU/L (ref 0.4–4)
WBC # BLD AUTO: 7.5 10E9/L (ref 4–11)

## 2019-01-14 PROCEDURE — 82043 UR ALBUMIN QUANTITATIVE: CPT | Performed by: PEDIATRICS

## 2019-01-14 PROCEDURE — 99396 PREV VISIT EST AGE 40-64: CPT | Performed by: PEDIATRICS

## 2019-01-14 PROCEDURE — 36415 COLL VENOUS BLD VENIPUNCTURE: CPT | Performed by: PEDIATRICS

## 2019-01-14 PROCEDURE — 80053 COMPREHEN METABOLIC PANEL: CPT | Performed by: PEDIATRICS

## 2019-01-14 PROCEDURE — 82306 VITAMIN D 25 HYDROXY: CPT | Performed by: PEDIATRICS

## 2019-01-14 PROCEDURE — 99213 OFFICE O/P EST LOW 20 MIN: CPT | Mod: 25 | Performed by: PEDIATRICS

## 2019-01-14 PROCEDURE — 84439 ASSAY OF FREE THYROXINE: CPT | Performed by: PEDIATRICS

## 2019-01-14 PROCEDURE — 84443 ASSAY THYROID STIM HORMONE: CPT | Performed by: PEDIATRICS

## 2019-01-14 PROCEDURE — 85027 COMPLETE CBC AUTOMATED: CPT | Performed by: PEDIATRICS

## 2019-01-14 PROCEDURE — 80061 LIPID PANEL: CPT | Performed by: PEDIATRICS

## 2019-01-14 RX ORDER — ALBUTEROL SULFATE 90 UG/1
2 AEROSOL, METERED RESPIRATORY (INHALATION) EVERY 6 HOURS PRN
Qty: 1 INHALER | Refills: 1 | Status: SHIPPED | OUTPATIENT
Start: 2019-01-14 | End: 2019-09-05

## 2019-01-14 RX ORDER — NORGESTIMATE AND ETHINYL ESTRADIOL 7DAYSX3 28
1 KIT ORAL DAILY
Qty: 84 TABLET | Refills: 3 | Status: ON HOLD | OUTPATIENT
Start: 2019-01-14 | End: 2019-10-01

## 2019-01-14 RX ORDER — CITALOPRAM HYDROBROMIDE 20 MG/1
30 TABLET ORAL DAILY
Qty: 135 TABLET | Refills: 3 | Status: SHIPPED | OUTPATIENT
Start: 2019-01-14 | End: 2019-05-30

## 2019-01-14 RX ORDER — LISINOPRIL 40 MG/1
40 TABLET ORAL DAILY
Qty: 90 TABLET | Refills: 3 | Status: SHIPPED | OUTPATIENT
Start: 2019-01-14 | End: 2020-01-08

## 2019-01-14 ASSESSMENT — ENCOUNTER SYMPTOMS
CONSTIPATION: 0
DIZZINESS: 0
ABDOMINAL PAIN: 0
CHILLS: 0
DIARRHEA: 0
HEMATOCHEZIA: 0
COUGH: 1
HEMATURIA: 0
EYE PAIN: 0

## 2019-01-14 ASSESSMENT — PATIENT HEALTH QUESTIONNAIRE - PHQ9
SUM OF ALL RESPONSES TO PHQ QUESTIONS 1-9: 9
10. IF YOU CHECKED OFF ANY PROBLEMS, HOW DIFFICULT HAVE THESE PROBLEMS MADE IT FOR YOU TO DO YOUR WORK, TAKE CARE OF THINGS AT HOME, OR GET ALONG WITH OTHER PEOPLE: SOMEWHAT DIFFICULT
5. POOR APPETITE OR OVEREATING: NOT AT ALL
SUM OF ALL RESPONSES TO PHQ QUESTIONS 1-9: 9

## 2019-01-14 ASSESSMENT — ANXIETY QUESTIONNAIRES
GAD7 TOTAL SCORE: 2
7. FEELING AFRAID AS IF SOMETHING AWFUL MIGHT HAPPEN: NOT AT ALL
3. WORRYING TOO MUCH ABOUT DIFFERENT THINGS: SEVERAL DAYS
5. BEING SO RESTLESS THAT IT IS HARD TO SIT STILL: NOT AT ALL
2. NOT BEING ABLE TO STOP OR CONTROL WORRYING: SEVERAL DAYS
1. FEELING NERVOUS, ANXIOUS, OR ON EDGE: NOT AT ALL
6. BECOMING EASILY ANNOYED OR IRRITABLE: NOT AT ALL
IF YOU CHECKED OFF ANY PROBLEMS ON THIS QUESTIONNAIRE, HOW DIFFICULT HAVE THESE PROBLEMS MADE IT FOR YOU TO DO YOUR WORK, TAKE CARE OF THINGS AT HOME, OR GET ALONG WITH OTHER PEOPLE: SOMEWHAT DIFFICULT

## 2019-01-14 ASSESSMENT — MIFFLIN-ST. JEOR: SCORE: 1736.83

## 2019-01-14 NOTE — PATIENT INSTRUCTIONS
Labs today - first floor     FIT test for colon cancer screening and mail back to me    Keep up your birth control for this year    Increase your dose of citalopram - send me an update with how you are doing in 4 weeks    Ideas for weight: Noom and WW      Think about Shingrix - ask your insurance about this    Call for visit with [facility]        Preventive Health Recommendations  Female Ages 50 - 64    Yearly exam: See your health care provider every year in order to  o Review health changes.   o Discuss preventive care.    o Review your medicines if your doctor has prescribed any.      Get a Pap test every three years (unless you have an abnormal result and your provider advises testing more often).    If you get Pap tests with HPV test, you only need to test every 5 years, unless you have an abnormal result.     You do not need a Pap test if your uterus was removed (hysterectomy) and you have not had cancer.    You should be tested each year for STDs (sexually transmitted diseases) if you're at risk.     Have a mammogram every 1 to 2 years.    Have a colonoscopy at age 50, or have a yearly FIT test (stool test). These exams screen for colon cancer.      Have a cholesterol test every 5 years, or more often if advised.    Have a diabetes test (fasting glucose) every three years. If you are at risk for diabetes, you should have this test more often.     If you are at risk for osteoporosis (brittle bone disease), think about having a bone density scan (DEXA).    Shots: Get a flu shot each year. Get a tetanus shot every 10 years.    Nutrition:     Eat at least 5 servings of fruits and vegetables each day.    Eat whole-grain bread, whole-wheat pasta and brown rice instead of white grains and rice.    Get adequate Calcium and Vitamin D.     Lifestyle    Exercise at least 150 minutes a week (30 minutes a day, 5 days a week). This will help you control your weight and prevent disease.    Limit  alcohol to one drink per day.    No smoking.     Wear sunscreen to prevent skin cancer.     See your dentist every six months for an exam and cleaning.    See your eye doctor every 1 to 2 years.

## 2019-01-14 NOTE — PROGRESS NOTES
SUBJECTIVE:   CC: Racquel Nunn is an 53 year old woman who presents for preventive health visit.     Physical   Annual:     Getting at least 3 servings of Calcium per day:  Yes    Bi-annual eye exam:  NO    Dental care twice a year:  NO    Sleep apnea or symptoms of sleep apnea:  Sleep apnea    Diet:  Regular (no restrictions)    Frequency of exercise:  1 day/week    Duration of exercise:  Less than 15 minutes    Taking medications regularly:  Yes    Medication side effects:  None    Additional concerns today:  Yes    PHQ-2 Total Score: 3        Today's PHQ-2 Score:   PHQ-2 ( 1999 [employer]) 1/14/2019   Q1: Little interest or pleasure in doing things 2   Q2: Feeling down, depressed or hopeless 1   PHQ-2 Score 3   Q1: Little interest or pleasure in doing things More than half the days   Q2: Feeling down, depressed or hopeless Several days   PHQ-2 Score 3       Abuse: Current or Past(Physical, Sexual or Emotional)- No  Do you feel safe in your environment? Yes    Social History     Tobacco Use     Smoking status: Never Smoker     Smokeless tobacco: Never Used   Substance Use Topics     Alcohol use: Yes     Comment: 1 drink per week or less     Alcohol Use 1/14/2019   If you drink alcohol do you typically have greater than 3 drinks per day OR greater than 7 drinks per week? No   No flowsheet data found.    Reviewed orders with patient.  Reviewed health maintenance and updated orders accordingly - Yes    Mammogram Screening: Patient over age 50, mutual decision to screen reflected in health maintenance.    Pertinent mammograms are reviewed under the imaging tab.  History of abnormal Pap smear: NO - age 30- 65 PAP every 3 years recommended  PAP / HPV Latest Ref Rng & Units 2/1/2018 7/7/2014   PAP - NIL NIL   HPV 16 DNA NEG:Negative Negative -   HPV 18 DNA NEG:Negative Negative -   OTHER HR HPV NEG:Negative Negative -     Reviewed and updated as needed this visit by clinical staff  Tobacco  Allergies  Meds  Med  "Hx  Surg Hx  Fam Hx  Soc Hx        Reviewed and updated as needed this visit by Provider            HTN - well controlled, no side effects from medications, no new cardiac symptoms    Fatigued - working more and stressed about her mother's memory.        Anxiety/depression - have been worsening a bit lately.  No thoughts of self harm, but mood down a bit and anxiety worsening.   No side effects from medications.  PHQ-9 SCORE 10/24/2016 1/11/2018 1/14/2019   PHQ-9 Total Score - - -   PHQ-9 Total Score MyChart - - 9 (Mild depression)   PHQ-9 Total Score 1 1 9     LUIS FELIPE-7 SCORE 9/23/2015 1/11/2018 1/14/2019   Total Score 0 2 2       ERICA - CPAP - using every night.    Bronchospasm - cough with viral URI - discussed nature of her cough/wheeze - always with viral infection and how this likely represents asthma    High risk skin type with multiple moles    Frustrated by weight gain.   Interested in healthy weight loss options.    Wt Readings from Last 4 Encounters:   01/14/19 112.5 kg (248 lb)   07/17/18 104.8 kg (231 lb)   05/10/18 108.4 kg (239 lb)   04/16/18 109.8 kg (242 lb)     Remains on combined OCP and doing well.       Review of Systems   Constitutional: Negative for chills.   HENT: Positive for congestion. Negative for ear pain.    Eyes: Negative for pain.   Respiratory: Positive for cough.    Cardiovascular: Negative for chest pain.   Gastrointestinal: Negative for abdominal pain, constipation, diarrhea and hematochezia.   Genitourinary: Negative for hematuria.   Neurological: Negative for dizziness.      ROS: 10 point ROS neg other than the symptoms noted above in the HPI.    OBJECTIVE:   /80 (BP Location: Right arm, Patient Position: Right side, Cuff Size: Adult Large)   Pulse 83   Temp 98.3  F (36.8  C) (Tympanic)   Ht 1.645 m (5' 4.75\")   Wt 112.5 kg (248 lb)   SpO2 100%   BMI 41.59 kg/m    Physical Exam  GENERAL: healthy, alert and no distress  EYES: Eyes grossly normal to inspection, PERRL and " conjunctivae and sclerae normal  HENT: ear canals and TM's normal, nose and mouth without ulcers or lesions  NECK: no adenopathy, no asymmetry, masses, or scars and thyroid normal to palpation  RESP: lungs clear to auscultation - no rales, rhonchi or wheezes  BREAST: normal without masses, tenderness or nipple discharge and no palpable axillary masses or adenopathy  CV: regular rate and rhythm, normal S1 S2, no S3 or S4, no murmur, click or rub, no peripheral edema and peripheral pulses strong  ABDOMEN: soft, nontender, no hepatosplenomegaly, no masses and bowel sounds normal  MS: no gross musculoskeletal defects noted, no edema  SKIN: multiple pigmented nevi and freckling over her upper back/chest, extremities and face  NEURO: Normal strength and tone, mentation intact and speech normal  PSYCH: mentation appears normal, affect normal/bright    Diagnostic Test Results:  pending    ASSESSMENT/PLAN:       ICD-10-CM    1. Routine general medical examination at a health care facility Z00.00 Lipid panel reflex to direct LDL Fasting     Albumin Random Urine Quantitative with Creat Ratio     Comprehensive metabolic panel     CBC with platelets   2. Morbid obesity, unspecified obesity type (H) E66.01 Lipid panel reflex to direct LDL Fasting     Comprehensive metabolic panel     TSH with free T4 reflex    Discussed Noom anatoliy or starting WW - patient onboard with starting exercise too - has elliptical machine in her home   3. Recurrent major depressive disorder, remission status unspecified (H) F33.9 citalopram (CELEXA) 20 MG tablet  Increase dose to 30mg daily and update me via [employer] with how this is going in 4 weeks - sooner with worsening   4. Anxiety F41.9 citalopram (CELEXA) 20 MG tablet   5. Benign essential hypertension I10 lisinopril (PRINIVIL/ZESTRIL) 40 MG tablet  Well controlled, continue current medications     6. Birth control counseling Z30.09 norgestim-eth estrad triphasic (TRI-ESTARYLLA) 0.18/0.215/0.25 MG-35  "MCG tablet  Continue for now, discuss stopping next year   7. ERICA (obstructive sleep apnea) G47.33 Continue CPAP   8. Fatigue, unspecified type R53.83 TSH with free T4 reflex     Vitamin D Deficiency     CBC with platelets  Lab work today   9. Mild intermittent asthma, unspecified whether complicated J45.20 albuterol (PROAIR HFA/PROVENTIL HFA/VENTOLIN HFA) 108 (90 Base) MCG/ACT inhaler   10. Screen for colon cancer Z12.11 Fecal colorectal cancer screen (FIT)   11. Multiple pigmented nevi D22.9 DERMATOLOGY REFERRAL  Recommended full skin exam       COUNSELING:  Special attention given to:        Regular exercise       Healthy diet/nutrition       Vision screening       Contraception       Colon cancer screening    BP Readings from Last 1 Encounters:   01/14/19 122/80     Estimated body mass index is 41.59 kg/m  as calculated from the following:    Height as of this encounter: 1.645 m (5' 4.75\").    Weight as of this encounter: 112.5 kg (248 lb).      Weight management plan: Discussed healthy diet and exercise guidelines     reports that  has never smoked. she has never used smokeless tobacco.      Counseling Resources:  ATP IV Guidelines  Pooled Cohorts Equation Calculator  Breast Cancer Risk Calculator  FRAX Risk Assessment  ICSI Preventive Guidelines  Dietary Guidelines for Americans, 2010  USDA's MyPlate  ASA Prophylaxis  Lung CA Screening    Nancy Small MD  [facility] [place]  "

## 2019-01-14 NOTE — LETTER
My Asthma Action Plan  Name: Racquel Nunn   YOB: 1965  Date: 1/14/2019   My doctor: Nancy Small MD   [facility]        My Control Medicine: None  My Rescue Medicine: Albuterol (Proair/Ventolin/Proventil) inhaler 2 puffs   My Asthma Severity: intermittent  Avoid your asthma triggers: upper respiratory infections               GREEN ZONE   Good Control    I feel good    No cough or wheeze    Can work, sleep and play without asthma symptoms       Take your asthma control medicine every day.     1. If exercise triggers your asthma, take your rescue medication    15 minutes before exercise or sports, and    During exercise if you have asthma symptoms  2. Spacer to use with inhaler: If you have a spacer, make sure to use it with your inhaler             YELLOW ZONE Getting Worse  I have ANY of these:    I do not feel good    Cough or wheeze    Chest feels tight    Wake up at night   1. Keep taking your Green Zone medications  2. Start taking your rescue medicine:    every 20 minutes for up to 1 hour. Then every 4 hours for 24-48 hours.  3. If you stay in the Yellow Zone for more than 12-24 hours, contact your doctor.  4. If you do not return to the Green Zone in 12-24 hours or you get worse, start taking your oral steroid medicine if prescribed by your provider.           RED ZONE Medical Alert - Get Help  I have ANY of these:    I feel awful    Medicine is not helping    Breathing getting harder    Trouble walking or talking    Nose opens wide to breathe       1. Take your rescue medicine NOW  2. If your provider has prescribed an oral steroid medicine, start taking it NOW  3. Call your doctor NOW  4. If you are still in the Red Zone after 20 minutes and you have not reached your doctor:    Take your rescue medicine again and    Call 911 or go to the emergency room right away    See your regular doctor within 2 weeks of an Emergency Room or Urgent Care visit for  follow-up treatment.          Annual Reminders:  Meet with Asthma Educator,  Flu Shot in the Fall, consider Pneumonia Vaccination for patients with asthma (aged 19 and older).    Pharmacy:    [facility] [place] - [address]  [place] [address] [place] [facility] [address] [facility]                      Asthma Triggers  How To Control Things That Make Your Asthma Worse    Triggers are things that make your asthma worse.  Look at the list below to help you find your triggers and what you can do about them.  You can help prevent asthma flare-ups by staying away from your triggers.      Trigger                                                          What you can do   Cigarette Smoke  Tobacco smoke can make asthma worse. Do not allow smoking in your home, car or around you.  Be sure no one smokes at a child s day care or school.  If you smoke, ask your health care provider for ways to help you quit.  Ask family members to quit too.  Ask your health care provider for a referral to Quit Plan to help you quit smoking, or call 7-610-222-PLAN.     Colds, Flu, Bronchitis  These are common triggers of asthma. Wash your hands often.  Don t touch your eyes, nose or mouth.  Get a flu shot every year.     Dust Mites  These are tiny bugs that live in cloth or carpet. They are too small to see. Wash sheets and blankets in hot water every week.   Encase pillows and mattress in dust mite proof covers.  Avoid having carpet if you can. If you have carpet, vacuum weekly.   Use a dust mask and HEPA vacuum.   Pollen and Outdoor Mold  Some people are allergic to trees, grass, or weed pollen, or molds. Try to keep your windows closed.  Limit time out doors when pollen count is high.   Ask you health care provider about taking medicine during allergy season.     Animal Dander  Some people are allergic to skin flakes, urine or saliva from pets with fur or feathers. Keep pets with fur or feathers out of your  home.    If you can t keep the pet outdoors, then keep the pet out of your bedroom.  Keep the bedroom door closed.  Keep pets off cloth furniture and away from stuffed toys.     Mice, Rats, and Cockroaches  Some people are allergic to the waste from these pests.   Cover food and garbage.  Clean up spills and food crumbs.  Store grease in the refrigerator.   Keep food out of the bedroom.   Indoor Mold  This can be a trigger if your home has high moisture. Fix leaking faucets, pipes, or other sources of water.   Clean moldy surfaces.  Dehumidify basement if it is damp and smelly.   Smoke, Strong Odors, and Sprays  These can reduce air quality. Stay away from strong odors and sprays, such as perfume, powder, hair spray, paints, smoke incense, paint, cleaning products, candles and new carpet.   Exercise or Sports  Some people with asthma have this trigger. Be active!  Ask your doctor about taking medicine before sports or exercise to prevent symptoms.    Warm up for 5-10 minutes before and after sports or exercise.     Other Triggers of Asthma  Cold air:  Cover your nose and mouth with a scarf.  Sometimes laughing or crying can be a trigger.  Some medicines and food can trigger asthma.

## 2019-01-15 ASSESSMENT — PATIENT HEALTH QUESTIONNAIRE - PHQ9: SUM OF ALL RESPONSES TO PHQ QUESTIONS 1-9: 9

## 2019-01-15 ASSESSMENT — ANXIETY QUESTIONNAIRES: GAD7 TOTAL SCORE: 2

## 2019-01-16 DIAGNOSIS — F32.5 MAJOR DEPRESSION IN COMPLETE REMISSION (H): ICD-10-CM

## 2019-01-16 DIAGNOSIS — F41.9 ANXIETY: ICD-10-CM

## 2019-01-16 NOTE — TELEPHONE ENCOUNTER
Not due for a refill.     citalopram (CELEXA) 20 MG tablet was filled on 1/14/2019, qty 135 with 3 refills.

## 2019-01-17 RX ORDER — CITALOPRAM HYDROBROMIDE 20 MG/1
20 TABLET ORAL DAILY
Qty: 90 TABLET | Refills: 3 | OUTPATIENT
Start: 2019-01-17

## 2019-01-17 NOTE — TELEPHONE ENCOUNTER
Patient has refills remaining with requesting pharmacy.  Devika RUANO RN - Triage  [facility]

## 2019-01-24 ENCOUNTER — DOCUMENTATION ONLY (OUTPATIENT)
Dept: SLEEP MEDICINE | Facility: CLINIC | Age: 54
End: 2019-01-24

## 2019-01-24 NOTE — PROGRESS NOTES
6 month STM    STM Recheck Visit     Diagnostic AHI:   17.7  HST    Data only recheck     Assessment: Pt meeting objective benchmarks.     Action plan:   pt to follow up per provider request       Device type: Auto-CPAP  PAP settings: CPAP min 8.0 cm  H20     CPAP max 16.0 cm  H20        95th% pressure 12.6 cm  H20   Objective measures: 14 day rolling measures      Compliance  92 %      Leak  3.36 lpm  last  upload      AHI 0.65   last  upload      Average number of minutes 509      Objective measure goal  Compliance   Goal >70%  Leak   Goal < 24 lpm  AHI  Goal < 5  Usage  Goal >240

## 2019-01-31 NOTE — MR AVS SNAPSHOT
After Visit Summary   1/11/2018    Racquel Nunn    MRN: [id number]           Patient Information     Date Of Birth          1965        Visit Information        Provider Department      1/11/2018 3:00 PM Nancy Small MD [facility]        Today's Diagnoses     Routine general medical examination at a health care facility    -  1    Screen for colon cancer        Screening for malignant neoplasm of cervix        Need for prophylactic vaccination against Streptococcus pneumoniae (pneumococcus)        Major depression in complete remission (H)        Anxiety        Benign essential hypertension        Birth control counseling        Acute non-recurrent maxillary sinusitis          Care Instructions    I love your plan to start using your elliptical machine!    Come back to see me on the 25th at 8:40 - we'll do your pap and plan for labs    Pneumonia shot today    Thanks for getting your flu shot!    Medications sent to pharmacy.    Start augmentin for your sinus infection - take 1 pill twice daily for 10 days - let me know if not getting better.  Keep up your neti pot!        Preventive Health Recommendations  Female Ages 50 - 64    Yearly exam: See your health care provider every year in order to  o Review health changes.   o Discuss preventive care.    o Review your medicines if your doctor has prescribed any.      Get a Pap test every three years (unless you have an abnormal result and your provider advises testing more often).    If you get Pap tests with HPV test, you only need to test every 5 years, unless you have an abnormal result.     You do not need a Pap test if your uterus was removed (hysterectomy) and you have not had cancer.    You should be tested each year for STDs (sexually transmitted diseases) if you're at risk.     Have a mammogram every 1 to 2 years.    Have a colonoscopy at age 50, or have a yearly FIT test (stool test). These exams screen for colon  cancer.      Have a cholesterol test every 5 years, or more often if advised.    Have a diabetes test (fasting glucose) every three years. If you are at risk for diabetes, you should have this test more often.     If you are at risk for osteoporosis (brittle bone disease), think about having a bone density scan (DEXA).    Shots: Get a flu shot each year. Get a tetanus shot every 10 years.    Nutrition:     Eat at least 5 servings of fruits and vegetables each day.    Eat whole-grain bread, whole-wheat pasta and brown rice instead of white grains and rice.    Talk to your provider about Calcium and Vitamin D.     Lifestyle    Exercise at least 150 minutes a week (30 minutes a day, 5 days a week). This will help you control your weight and prevent disease.    Limit alcohol to one drink per day.    No smoking.     Wear sunscreen to prevent skin cancer.     See your dentist every six months for an exam and cleaning.    See your eye doctor every 1 to 2 years.            Follow-ups after your visit        Additional Services     GASTROENTEROLOGY ADULT REF PROCEDURE ONLY       Last Lab Result: Creatinine (mg/dL)       Date                     Value                 10/24/2016               0.94             ----------  Body mass index is 40.79 kg/(m^2).      Patient will be contacted to schedule procedure.     Please be aware that coverage of these services is subject to the terms and limitations of your health insurance plan.  Call member services at your health plan with any benefit or coverage questions.  Any procedures must be performed at a [facility] facility OR coordinated by your clinic's referral office.    Please bring the following with you to your appointment:    (1) Any X-Rays, CTs or MRIs which have been performed.  Contact the facility where they were done to arrange for  prior to your scheduled appointment.    (2) List of current medications   (3) This referral request   (4) Any documents/labs given to  you for this referral                  Your next 10 appointments already scheduled     Jan 25, 2018  8:40 AM CST   SHORT with Nancy Small MD   [facility]    [address]  [address] [place] [address] [phone]              Future tests that were ordered for you today     Open Future Orders        Priority Expected Expires Ordered    Lipid panel reflex to direct LDL Fasting Routine  3/11/2018 1/11/2018    Albumin Random Urine Quantitative with Creat Ratio Routine  3/11/2018 1/11/2018    Comprehensive metabolic panel Routine  3/11/2018 1/11/2018    TSH with free T4 reflex Routine  3/11/2018 1/11/2018            Who to contact     If you have questions or need follow up information about today's clinic visit or your schedule please contact [facility] directly at [phone].  Normal or non-critical lab and imaging results will be communicated to you by MyChart, letter or phone within 4 business days after the clinic has received the results. If you do not hear from us within 7 days, please contact the clinic through Hittahemhart or phone. If you have a critical or abnormal lab result, we will notify you by phone as soon as possible.  Submit refill requests through R[employer]ort or call your pharmacy and they will forward the refill request to us. Please allow 3 business days for your refill to be completed.          Additional Information About Your Visit        MyChart Information     [employer]ort gives you secure access to your electronic health record. If you see a primary care provider, you can also send messages to your care team and make appointments. If you have questions, please call your primary care clinic.  If you do not have a primary care provider, please call [phone] and they will assist you.        Care EveryWhere ID     This is your Care EveryWhere ID. This could be used by other organizations to access your [facility]  "records  [id number]        Your Vitals Were     Pulse Temperature Height Pulse Oximetry BMI (Body Mass Index)       97 99.6  F (37.6  C) (Tympanic) 5' 4.45\" (1.637 m) 97% 40.79 kg/m2        Blood Pressure from Last 3 Encounters:   01/11/18 142/90   01/10/18 148/90   06/19/17 124/78    Weight from Last 3 Encounters:   01/11/18 241 lb (109.3 kg)   01/10/18 242 lb 3.2 oz (109.9 kg)   06/19/17 227 lb (103 kg)              We Performed the Following     ADMIN: Vaccine, Initial (48051)     DEPRESSION ACTION PLAN (DAP)     GASTROENTEROLOGY ADULT REF PROCEDURE ONLY     Pneumococcal vaccine 23 valent PPSV23  (Pneumovax) [01144]          Today's Medication Changes          These changes are accurate as of: 1/11/18  3:35 PM.  If you have any questions, ask your nurse or doctor.               Start taking these medicines.        Dose/Directions    amoxicillin-clavulanate 875-125 MG per tablet   Commonly known as:  AUGMENTIN   Used for:  Acute non-recurrent maxillary sinusitis   Started by:  Nancy Small MD        Dose:  1 tablet   Take 1 tablet by mouth 2 times daily   Quantity:  20 tablet   Refills:  0         These medicines have changed or have updated prescriptions.        Dose/Directions    citalopram 20 MG tablet   Commonly known as:  celeXA   This may have changed:  See the new instructions.   Used for:  Major depression in complete remission (H), Anxiety   Changed by:  Nancy Small MD        Dose:  20 mg   Take 1 tablet (20 mg) by mouth daily   Quantity:  90 tablet   Refills:  3       lisinopril 40 MG tablet   Commonly known as:  PRINIVIL/ZESTRIL   This may have changed:  See the new instructions.   Used for:  Benign essential hypertension   Changed by:  Nancy Small MD        Dose:  40 mg   Take 1 tablet (40 mg) by mouth daily   Quantity:  90 tablet   Refills:  3            Where to get your medicines      These medications were sent to [facility] [address] [place] [facility] [address]  [address] [facility] [address]    Hours:  Tech issues with their phone system Phone:  [phone]     amoxicillin-clavulanate 875-125 MG per tablet    citalopram 20 MG tablet    lisinopril 40 MG tablet    norgestim-eth estrad triphasic 0.18/0.215/0.25 MG-35 MCG per tablet                Primary Care Provider Office Phone # Fax #    Nancy Small [phone]       [facility] [place] [address]        Equal Access to Services     [facility]: Hadii aad ku hadasho Soomaali, waaxda luqadaha, qaybta kaalmada adeegyada, waxay roseyin hayaan adecintia palmer . So [facility] [phone].    ATENCIÓN: Si habla español, tiene a saunders disposición servicios gratuitos de asistencia lingüística. [facility] [phone].    We comply with applicable federal civil rights laws and [place] laws. We do not discriminate on the basis of race, color, national origin, age, disability, sex, sexual orientation, or gender identity.            Thank you!     Thank you for choosing [facility]  for your care. Our goal is always to provide you with excellent care. Hearing back from our patients is one way we can continue to improve our services. Please take a few minutes to complete the written survey that you may receive in the mail after your visit with us. Thank you!             Your Updated Medication List - Protect others around you: Learn how to safely use, store and throw away your medicines at www.disposemymeds.org.          This list is accurate as of: 1/11/18  3:35 PM.  Always use your most recent med list.                   Brand Name Dispense Instructions for use Diagnosis    amoxicillin-clavulanate 875-125 MG per tablet    AUGMENTIN    20 tablet    Take 1 tablet by mouth 2 times daily    Acute non-recurrent maxillary sinusitis       blood glucose monitoring test strip    no brand specified    1 Box    by In Vitro route daily.    Impaired fasting glucose        citalopram 20 MG tablet    celeXA    90 tablet    Take 1 tablet (20 mg) by mouth daily    Major depression in complete remission (H), Anxiety       lisinopril 40 MG tablet    PRINIVIL/ZESTRIL    90 tablet    Take 1 tablet (40 mg) by mouth daily    Benign essential hypertension       LORazepam 0.5 MG tablet    ATIVAN    30 tablet    Take 1-2 tablets (0.5-1 mg) by mouth every 8 hours as needed for anxiety    Major depression in complete remission (H), Anxiety       Multi-vitamin Tabs tablet   Generic drug:  multivitamin, therapeutic with minerals       Obesity, unspecified       norgestim-eth estrad triphasic 0.18/0.215/0.25 MG-35 MCG per tablet    TRI-ESTARYLLA    84 tablet    Take 1 tablet by mouth daily Due for annual physical at this time. Please call to schedule.    Birth control counseling          76.4

## 2019-05-17 ENCOUNTER — OFFICE VISIT (OUTPATIENT)
Dept: URGENT CARE | Facility: URGENT CARE | Age: 54
End: 2019-05-17
Payer: COMMERCIAL

## 2019-05-17 VITALS
OXYGEN SATURATION: 98 % | RESPIRATION RATE: 18 BRPM | DIASTOLIC BLOOD PRESSURE: 78 MMHG | WEIGHT: 248 LBS | TEMPERATURE: 98.2 F | SYSTOLIC BLOOD PRESSURE: 124 MMHG | BODY MASS INDEX: 41.59 KG/M2 | HEART RATE: 80 BPM

## 2019-05-17 DIAGNOSIS — M54.41 ACUTE RIGHT-SIDED LOW BACK PAIN WITH RIGHT-SIDED SCIATICA: Primary | ICD-10-CM

## 2019-05-17 PROCEDURE — 99214 OFFICE O/P EST MOD 30 MIN: CPT | Performed by: FAMILY MEDICINE

## 2019-05-17 RX ORDER — PREDNISONE 20 MG/1
TABLET ORAL
Qty: 12 TABLET | Refills: 0 | Status: SHIPPED | OUTPATIENT
Start: 2019-05-17 | End: 2019-05-30

## 2019-05-17 RX ORDER — CYCLOBENZAPRINE HCL 10 MG
5-10 TABLET ORAL 3 TIMES DAILY PRN
Qty: 21 TABLET | Refills: 1 | Status: SHIPPED | OUTPATIENT
Start: 2019-05-17 | End: 2019-05-30

## 2019-05-17 NOTE — PATIENT INSTRUCTIONS
Prednisone 40mg a day for 4 days then 20mg a day for the next 4 days    Flexeril as needed every 8 hours for muscle tightness/cramps    Apply heat to the area intermittently to help loosen up muscles    Try to stay up and active - do not stay sedentary or laying  -- avoid heavy lifting, squatting, over head lifting until the pain gets better    Call in a week if symptoms not improved, we can put in an order for physical therapy    Naproxen 220-440mg every 12 hours as needed  -- tylenol every 4-6 hours as needed for additional pain relief

## 2019-05-20 ENCOUNTER — TELEPHONE (OUTPATIENT)
Dept: PEDIATRICS | Facility: CLINIC | Age: 54
End: 2019-05-20

## 2019-05-20 ENCOUNTER — MYC MEDICAL ADVICE (OUTPATIENT)
Dept: PEDIATRICS | Facility: CLINIC | Age: 54
End: 2019-05-20

## 2019-05-20 ENCOUNTER — NURSE TRIAGE (OUTPATIENT)
Dept: NURSING | Facility: CLINIC | Age: 54
End: 2019-05-20

## 2019-05-20 NOTE — TELEPHONE ENCOUNTER
Patient was seen at urgent care on Friday, 5-17-19. She is now in need of an MD note about her work restrictions related to back pain and sciatica. She'd like it emailed to her. Please call her today.  Fouzia Ma RN-[facility]

## 2019-05-20 NOTE — TELEPHONE ENCOUNTER
Patient was seen at urgent care on Friday, 5-17-19. She is now in need of an MD note about her work restrictions. She'd like it emailed to her. Please call her today.  Epic encounter sent to the patient's clinic.    Fouzia Ma RN-[facility]

## 2019-05-20 NOTE — LETTER
May 20, 2019      Racquel Nunn  [address]  [place] [address]        To Whom It May Concern:    Racquel Nunn was seen in our clinic on 5/17/2019. She may return to work with the following: limited to light duty - lifting no greater than 10 pounds for about 3- 4 weeks or until symptoms resolve.      Sincerely,        Yanet Lane PA-C

## 2019-05-20 NOTE — TELEPHONE ENCOUNTER
Letter done by Leena. Faxed letter to email given. Will hang onto note for about 1 week in case any issues receiving it.    Romulo Garcia MA 1:17 PM 5/20/2019

## 2019-05-20 NOTE — TELEPHONE ENCOUNTER
Called and spoke with patient who needs a work restrictions letter stating lift no more than 10 pounds for 3-4 weeks until sciatica pain settles down. . Pt works at [employer] and would like letter emailed to workplace. Attn: [email or web address]  [email or web address]    T'd up a letter, routed to all providers who are here today.    Romulo Garcia MA 10:32 AM 5/20/2019

## 2019-05-21 NOTE — TELEPHONE ENCOUNTER
[facility] and [facility]:    Dr Small, I know you can t tell me anything about my moms MRI results but she needs some help her short term memory seems to be getting worse still getting lost when she goes out and drives by her self so we try to go with her at all times    Lots of little mistakes during normal life   I just want my mom to be around for a long time and that she knows who I am  Thanks   Abida Samuel RN Flex

## 2019-05-29 ENCOUNTER — MYC MEDICAL ADVICE (OUTPATIENT)
Dept: PEDIATRICS | Facility: CLINIC | Age: 54
End: 2019-05-29

## 2019-05-29 NOTE — TELEPHONE ENCOUNTER
Pt sent a [employer] message requesting to go back on a medication, that she previously used for dizziness.  See Mar[employer] message.    Radha Holbrook RN - Triage  [facility]

## 2019-05-30 ENCOUNTER — OFFICE VISIT (OUTPATIENT)
Dept: PEDIATRICS | Facility: CLINIC | Age: 54
End: 2019-05-30
Payer: COMMERCIAL

## 2019-05-30 VITALS
SYSTOLIC BLOOD PRESSURE: 132 MMHG | DIASTOLIC BLOOD PRESSURE: 80 MMHG | OXYGEN SATURATION: 100 % | HEART RATE: 74 BPM | TEMPERATURE: 98 F | HEIGHT: 65 IN | WEIGHT: 254 LBS | BODY MASS INDEX: 42.32 KG/M2

## 2019-05-30 DIAGNOSIS — F33.9 RECURRENT MAJOR DEPRESSIVE DISORDER, REMISSION STATUS UNSPECIFIED (H): ICD-10-CM

## 2019-05-30 DIAGNOSIS — H65.91 FLUID LEVEL BEHIND TYMPANIC MEMBRANE OF RIGHT EAR: ICD-10-CM

## 2019-05-30 DIAGNOSIS — R79.89 ABNORMAL THYROID BLOOD TEST: ICD-10-CM

## 2019-05-30 DIAGNOSIS — H93.12 TINNITUS, LEFT: ICD-10-CM

## 2019-05-30 DIAGNOSIS — R42 DIZZINESS: Primary | ICD-10-CM

## 2019-05-30 DIAGNOSIS — F41.9 ANXIETY: ICD-10-CM

## 2019-05-30 DIAGNOSIS — Z12.11 SPECIAL SCREENING FOR MALIGNANT NEOPLASMS, COLON: ICD-10-CM

## 2019-05-30 LAB
ERYTHROCYTE [DISTWIDTH] IN BLOOD BY AUTOMATED COUNT: 13.7 % (ref 10–15)
HCT VFR BLD AUTO: 38.5 % (ref 35–47)
HGB BLD-MCNC: 12.4 G/DL (ref 11.7–15.7)
MCH RBC QN AUTO: 30 PG (ref 26.5–33)
MCHC RBC AUTO-ENTMCNC: 32.2 G/DL (ref 31.5–36.5)
MCV RBC AUTO: 93 FL (ref 78–100)
PLATELET # BLD AUTO: 235 10E9/L (ref 150–450)
RBC # BLD AUTO: 4.14 10E12/L (ref 3.8–5.2)
WBC # BLD AUTO: 10.2 10E9/L (ref 4–11)

## 2019-05-30 PROCEDURE — 86376 MICROSOMAL ANTIBODY EACH: CPT | Performed by: PEDIATRICS

## 2019-05-30 PROCEDURE — 36415 COLL VENOUS BLD VENIPUNCTURE: CPT | Performed by: PEDIATRICS

## 2019-05-30 PROCEDURE — 80048 BASIC METABOLIC PNL TOTAL CA: CPT | Performed by: PEDIATRICS

## 2019-05-30 PROCEDURE — 99214 OFFICE O/P EST MOD 30 MIN: CPT | Performed by: PEDIATRICS

## 2019-05-30 PROCEDURE — 84443 ASSAY THYROID STIM HORMONE: CPT | Performed by: PEDIATRICS

## 2019-05-30 PROCEDURE — 85027 COMPLETE CBC AUTOMATED: CPT | Performed by: PEDIATRICS

## 2019-05-30 RX ORDER — CITALOPRAM HYDROBROMIDE 20 MG/1
40 TABLET ORAL DAILY
Qty: 180 TABLET | Refills: 3 | Status: SHIPPED | OUTPATIENT
Start: 2019-05-30 | End: 2020-01-08

## 2019-05-30 RX ORDER — FLUTICASONE PROPIONATE 50 MCG
2 SPRAY, SUSPENSION (ML) NASAL DAILY
Qty: 16 G | Refills: 3 | Status: SHIPPED | OUTPATIENT
Start: 2019-05-30 | End: 2019-09-30

## 2019-05-30 ASSESSMENT — MIFFLIN-ST. JEOR: SCORE: 1764.05

## 2019-05-30 NOTE — PROGRESS NOTES
Subjective     Racquel Nunn is a 53 year old female who presents to clinic today for the following health issues:    Dizziness      Duration: COUPLE WEEKS     Description   Feeling faint:  no   Feeling like the surroundings are moving: no   Loss of consciousness or falls: no     Intensity:  8-9/10    Accompanying signs and symptoms:   Nausea/vomitting: no   Palpitations: no   Weakness in arms or legs: no   Vision or speech changes: no   Ringing in ears (Tinnitus): YES, has been going on for years   Hearing loss related to dizziness: no   Other (fevers/chills/sweating/dyspnea): YES- sweating, hot and cold flashes     History (similar episodes/head trauma/previous evaluation/recent bleeding): None    Precipitating or alleviating factors (new meds/chemicals): None  Worse with activity/head movement: YES- moving head     Therapies tried and outcome: None       Dizziness started out of the blue a couple weeks ago.  Sometimes feels like she is spinning and unbalanced.  If moves head in the wrong way, gets dizzy for a few minutes.  No nausea.  If stands too fast or bends up, gets dizzy - feels like stars impairing vision or in a tunnel.  No recent infections or fevers.  Feels like getting worse - dizzy most of the day.   No falls.  Chronic tinnitus on the left - getting worse over time.  Doesn't perceive hearing loss.  No ear pain or fullness.    ANxious and worried all the time - her mother is struggling with memory problems and this is weighing heavily on her. Wondering about increase in anxiety medication.  No severe depressive symptoms or SI.      Started muscle relaxer a couple weeks ago - made her dizzy at first, so stopped about 1.5 weeks ago.  Doesn't think that this is currently contributing.  No other new medications that she is currently taking.    Seasonal allergies - has been feeling symptoms last 2 weeks.  Using claritin and flonase.   Rare albuterol use for asthma.      Recently joined [employer].  Increased  "appetite last 2 weeks - stress eater - working on healthy habits.    HTN - checks blood pressure at home and has been normal.            Reviewed and updated as needed this visit by Provider         Review of Systems   ROS COMP: Constitutional, HEENT, cardiovascular, pulmonary, gi and neuro, psych systems are negative, except as otherwise noted.      Objective    /80 (BP Location: Right arm, Patient Position: Chair, Cuff Size: Adult Large)   Pulse 74   Temp 98  F (36.7  C) (Oral)   Ht 1.645 m (5' 4.75\")   Wt 115.2 kg (254 lb)   SpO2 100%   BMI 42.59 kg/m    Body mass index is 42.59 kg/m .  Physical Exam   GENERAL: healthy, alert and no distress  EYES: Eyes grossly normal to inspection, PERRL and conjunctivae and sclerae normal  HENT: normal cephalic/atraumatic, both ears: clear effusion, nose and mouth without ulcers or lesions, oropharynx clear and oral mucous membranes moist  NECK: no adenopathy, no asymmetry, masses, or scars and thyroid normal to palpation  RESP: lungs clear to auscultation - no rales, rhonchi or wheezes  CV: regular rate and rhythm, normal S1 S2, no S3 or S4, no murmur, click or rub, no peripheral edema and peripheral pulses strong  ABDOMEN: soft, nontender, no hepatosplenomegaly, no masses and bowel sounds normal  NEURO: Normal strength and tone, mentation intact and speech normal, negative [place]-Hallpike testing.  No nystagmus.  PSYCH: mentation appears normal, anxious, judgement and insight intact and appearance well groomed    Diagnostic Test Results:  pending        Assessment & Plan       ICD-10-CM    1. Dizziness R42 CBC with platelets     Basic metabolic panel     OTOLARYNGOLOGY REFERRAL    LIkely multifactorial - currently implicating ETD with middle ear effusion, possible allergy component, and exacerbation by anxiety.  Plan flonase and claritin, labs as above to evaluate for other etiologies as history did suggest a possible orthostatic component, will evaluate for anemia.  " " Increase anxiety medication. ENT eval for tinnitus + dizziness that does not resolve with these interventions.     2. Recurrent major depressive disorder, remission status unspecified (H) F33.9 citalopram (CELEXA) 20 MG tablet    Increase dosing of citalopram to 40mg and patient to update me with how she is doing via MyChart in 3-4 weeks, sooner with worsening.   3. Anxiety F41.9 citalopram (CELEXA) 20 MG tablet  See above   4. Tinnitus, left H93.12 OTOLARYNGOLOGY REFERRAL   5. Abnormal thyroid blood test R79.89 Thyroid peroxidase antibody     **TSH with free T4 reflex FUTURE anytime  Due for recheck   6. Special screening for malignant neoplasms, colon Z12.11 Fecal colorectal cancer screen (FIT)   7. Fluid level behind tympanic membrane of right ear H65.91 fluticasone (FLONASE) 50 MCG/ACT nasal spray        Patient Instructions   Increase dose of citalopram to 40mg (2 tabs) daily.    [facility] in 3-4 weeks - sooner if worsening.    Labs today - stop at the lab on your way out     a new FIT test and mail back    Increase your flonase to 2 sprays each nostril daily    Keep up your claritin    Call for visit with ENT - # below            BMI:   Estimated body mass index is 42.59 kg/m  as calculated from the following:    Height as of this encounter: 1.645 m (5' 4.75\").    Weight as of this encounter: 115.2 kg (254 lb).   Weight management plan: Specific weight management program called [employer] discussed        Nancy Small MD  [facility]        "

## 2019-05-30 NOTE — PATIENT INSTRUCTIONS
Increase dose of citalopram to 40mg (2 tabs) daily.    [facility] in 3-4 weeks - sooner if worsening.    Labs today - stop at the lab on your way out     a new FIT test and mail back    Increase your flonase to 2 sprays each nostril daily    Keep up your claritin    Call for visit with ENT - # below

## 2019-05-31 LAB
ANION GAP SERPL CALCULATED.3IONS-SCNC: 12 MMOL/L (ref 3–14)
BUN SERPL-MCNC: 14 MG/DL (ref 7–30)
CALCIUM SERPL-MCNC: 8.9 MG/DL (ref 8.5–10.1)
CHLORIDE SERPL-SCNC: 107 MMOL/L (ref 94–109)
CO2 SERPL-SCNC: 22 MMOL/L (ref 20–32)
CREAT SERPL-MCNC: 0.91 MG/DL (ref 0.52–1.04)
GFR SERPL CREATININE-BSD FRML MDRD: 73 ML/MIN/{1.73_M2}
GLUCOSE SERPL-MCNC: 101 MG/DL (ref 70–99)
POTASSIUM SERPL-SCNC: 4.1 MMOL/L (ref 3.4–5.3)
SODIUM SERPL-SCNC: 141 MMOL/L (ref 133–144)
THYROPEROXIDASE AB SERPL-ACNC: 77 IU/ML
TSH SERPL DL<=0.005 MIU/L-ACNC: 3.29 MU/L (ref 0.4–4)

## 2019-05-31 ASSESSMENT — ASTHMA QUESTIONNAIRES: ACT_TOTALSCORE: 17

## 2019-06-03 ENCOUNTER — MYC MEDICAL ADVICE (OUTPATIENT)
Dept: PEDIATRICS | Facility: CLINIC | Age: 54
End: 2019-06-03

## 2019-06-03 DIAGNOSIS — R42 VERTIGO: Primary | ICD-10-CM

## 2019-06-03 DIAGNOSIS — H83.09 LABYRINTHITIS: ICD-10-CM

## 2019-06-03 RX ORDER — MECLIZINE HYDROCHLORIDE 25 MG/1
25 TABLET ORAL 3 TIMES DAILY PRN
Qty: 30 TABLET | Refills: 11 | Status: SHIPPED | OUTPATIENT
Start: 2019-06-03 | End: 2024-04-29

## 2019-06-03 NOTE — TELEPHONE ENCOUNTER
Pt sent a [employer] message requesting an rx for meclizine. Pt seen on 5/30/19 for dizziness.   See ClickTale[employer] message.    Please advise-    Radha Holbrook RN - Triage  [facility]

## 2019-06-05 ENCOUNTER — TRANSFERRED RECORDS (OUTPATIENT)
Dept: HEALTH INFORMATION MANAGEMENT | Facility: CLINIC | Age: 54
End: 2019-06-05

## 2019-06-05 PROCEDURE — 82274 ASSAY TEST FOR BLOOD FECAL: CPT | Performed by: PEDIATRICS

## 2019-06-08 LAB — HEMOCCULT STL QL IA: NEGATIVE

## 2019-06-10 DIAGNOSIS — Z12.11 SPECIAL SCREENING FOR MALIGNANT NEOPLASMS, COLON: ICD-10-CM

## 2019-06-13 ENCOUNTER — MYC MEDICAL ADVICE (OUTPATIENT)
Dept: PEDIATRICS | Facility: CLINIC | Age: 54
End: 2019-06-13

## 2019-06-13 DIAGNOSIS — R42 DIZZINESS: Primary | ICD-10-CM

## 2019-06-13 NOTE — TELEPHONE ENCOUNTER
Pt sent a [employer] message update regarding her dizziness, stating that it is not resolving. Per [employer] message dated 6/7/19, pt was seen by ENT and was instructed to keep doing her nasal spray, allergy medication, and jean-pierre pot.    Please advise-    Radha Holbrook RN - Triage  [facility]

## 2019-06-13 NOTE — TELEPHONE ENCOUNTER
Patient can try vestibular rehab for her dizziness.  I'll put orders in and they will call her to schedule.  Please let her know.

## 2019-06-14 ENCOUNTER — OFFICE VISIT (OUTPATIENT)
Dept: URGENT CARE | Facility: URGENT CARE | Age: 54
End: 2019-06-14
Payer: COMMERCIAL

## 2019-06-14 VITALS
HEART RATE: 82 BPM | BODY MASS INDEX: 41.92 KG/M2 | SYSTOLIC BLOOD PRESSURE: 132 MMHG | TEMPERATURE: 98.1 F | DIASTOLIC BLOOD PRESSURE: 78 MMHG | WEIGHT: 250 LBS | OXYGEN SATURATION: 99 %

## 2019-06-14 DIAGNOSIS — R42 DIZZINESS: Primary | ICD-10-CM

## 2019-06-14 PROCEDURE — 99213 OFFICE O/P EST LOW 20 MIN: CPT | Performed by: PHYSICIAN ASSISTANT

## 2019-06-14 ASSESSMENT — PAIN SCALES - GENERAL: PAINLEVEL: SEVERE PAIN (6)

## 2019-06-14 NOTE — LETTER
[facility]  [address]  [address]  [place] [address]  Phone: [phone]  Fax: [phone]    June 14, 2019        Racquel Nunn  [address]  [place] [address]          To whom it may concern:    RE: Racquel Nunn    Patient was seen and treated today at our clinic. Please excuse patients missed work days over the last several weeks, including 6/14/2019.     Please contact me for questions or concerns.      Sincerely,        Yanet Lane PA-C

## 2019-06-14 NOTE — PROGRESS NOTES
CHIEF COMPLAINT:   Chief Complaint   Patient presents with     Urgent Care     Dizziness     dizziness, headache x 1.5 months, ringing in ears x several years (possible meneres disease)        HPI: Racquel Nunn is a 53 year old female who presents to clinic today for evaluation of dizziness. Patient reports that for the past 5-6 weeks she has been dealing with dizziness and tinnitus in the left ear. She notes that she has been seen by her PCP and is currently taken Meclizine and flonase for her symptoms, she does feel improvement with these medications. Dizziness is described like she is spinning and unbalanced. She does not endorse having hearing loss. Increased stress at home with her mother and memory loss problems, has increased her anxiety meds to help cope with this. Denies fever, chills, weakness or severe headaches.     Past Medical History:   Diagnosis Date     HTN (hypertension)      Past Surgical History:   Procedure Laterality Date     NO HISTORY OF SURGERY       Social History     Tobacco Use     Smoking status: Never Smoker     Smokeless tobacco: Never Used   Substance Use Topics     Alcohol use: Yes     Comment: 1 drink per week or less     Current Outpatient Medications   Medication     albuterol (PROAIR HFA/PROVENTIL HFA/VENTOLIN HFA) 108 (90 Base) MCG/ACT inhaler     citalopram (CELEXA) 20 MG tablet     fluticasone (FLONASE) 50 MCG/ACT nasal spray     glucose blood VI test strips strip     lisinopril (PRINIVIL/ZESTRIL) 40 MG tablet     meclizine (ANTIVERT) 25 MG tablet     MULTI-VITAMIN OR TABS     norgestim-eth estrad triphasic (TRI-ESTARYLLA) 0.18/0.215/0.25 MG-35 MCG tablet     No current facility-administered medications for this visit.      Allergies   Allergen Reactions     No Known Allergies        10 point ROS of systems including Constitutional, Eyes, Respiratory, Cardiovascular, Gastroenterology, Genitourinary, Integumentary, Muscularskeletal, Psychiatric were all negative except  for pertinent positives noted in my HPI.        Exam:  /78   Pulse 82   Temp 98.1  F (36.7  C) (Tympanic)   Wt 113.4 kg (250 lb)   SpO2 99%   BMI 41.92 kg/m     Constitutional: healthy, alert and no distress  Head: Normocephalic, atraumatic.  Eyes: conjunctiva clear, no drainage  ENT: TMs clear and shiny jeff, nasal mucosa pink and moist, throat without tonsillar hypertrophy or erythema  Neck: neck is supple, no cervical lymphadenopathy or nuchal rigidity  Cardiovascular: RRR  Respiratory: CTA bilaterally, no rhonchi or rales  Gastrointestinal: soft and nontender  Skin: no rashes  Neurologic: CN 2-12 intact. Neg pronator drift. NO nystagmus. Speech clear, gait normal. Moves all extremities.      ASSESSMENT/PLAN:  1. Dizziness  Unclear etiology of dizziness, but appears to be related to inner ear  Encouraged her to continue with current med regimen and make appt for vestibular rehab  If after 2-3 sessions of rehab have not noted improvement, or if symptoms worsen follow-up with neurology.   - PHYSICAL THERAPY REFERRAL; Future    I have discussed the patient's diagnosis and my plan of treatment with the patient. We went over any labs or imaging.  All questions were addressed and answered.   Yanet Lane PA-C

## 2019-06-14 NOTE — TELEPHONE ENCOUNTER
"Called the patient to relay recommendations and # to call for appointment at the vestibular rehab center.  She states that she is currently at the [facility] \"because  I have to get this taken care of\".      Declined # for center; will talk with  staff about this referral. Miriam Connolly RN June 14, 2019 9:13 AM    "

## 2019-06-18 ENCOUNTER — HOSPITAL ENCOUNTER (OUTPATIENT)
Dept: PHYSICAL THERAPY | Facility: CLINIC | Age: 54
Setting detail: THERAPIES SERIES
End: 2019-06-18
Attending: PEDIATRICS
Payer: COMMERCIAL

## 2019-06-18 DIAGNOSIS — R42 DIZZINESS: ICD-10-CM

## 2019-06-18 PROCEDURE — 97112 NEUROMUSCULAR REEDUCATION: CPT | Mod: GP | Performed by: PHYSICAL THERAPIST

## 2019-06-18 PROCEDURE — 97161 PT EVAL LOW COMPLEX 20 MIN: CPT | Mod: GP | Performed by: PHYSICAL THERAPIST

## 2019-06-18 ASSESSMENT — 6 MINUTE WALK TEST (6MWT): TOTAL DISTANCE WALKED (FT): 1530

## 2019-06-19 ENCOUNTER — MYC MEDICAL ADVICE (OUTPATIENT)
Dept: PEDIATRICS | Facility: CLINIC | Age: 54
End: 2019-06-19

## 2019-06-19 NOTE — TELEPHONE ENCOUNTER
Please review the MC message from daughter & advise. Not sure whether her mom is Nora Amin(daughter's name listed in Nora's chart has different last name).    Malik HODGE, RN  Triage Nurse

## 2019-06-20 ENCOUNTER — MYC MEDICAL ADVICE (OUTPATIENT)
Dept: PEDIATRICS | Facility: CLINIC | Age: 54
End: 2019-06-20

## 2019-06-24 DIAGNOSIS — E66.01 MORBID OBESITY, UNSPECIFIED OBESITY TYPE (H): Primary | ICD-10-CM

## 2019-06-24 NOTE — ADDENDUM NOTE
Encounter addended by: Susan Sims, PT on: 6/24/2019 7:55 AM   Actions taken: Sign clinical note, Flowsheet accepted

## 2019-06-24 NOTE — TELEPHONE ENCOUNTER
Reason for Call:  Medication or medication refill:    Do you use a [facility]?  Name of the pharmacy and phone number for the current request:         [facility] [id number] [place] [address]      Name of the medication requested: Medtformin    Other request: pt took her BS this morning and is was 123 and now again it was 208. She wants to start taking the medication again.    Can we leave a detailed message on this number? YES    Phone number patient can be reached at: Home number on file [phone] (home)    Best Time: any    Call taken on 6/24/2019 at 1:14 PM by Bing Sales

## 2019-06-24 NOTE — TELEPHONE ENCOUNTER
New script for metformin sent - ok to restart.    Nancy Small MD  Internal Medicine - Pediatrics

## 2019-06-24 NOTE — TELEPHONE ENCOUNTER
Detailed message left with information noted below from provider along with contact number to clinic for additional questions.  Devika RUANO RN - Triage  [facility]

## 2019-06-24 NOTE — TELEPHONE ENCOUNTER
Patient reports some lightheadedness and headache over the past few days. No increased urination, notes more thirst.    BGM: 123 this AM and prior lunch was 208  When asked about more blood sugar measurements, she states she does not routinely take her BGM, she took it today because she had been symptomatic (see above) over the past couple of days.    Patient states she would like to be seen, unsure if she can see PCP within the next few weeks. She is requesting to be put back on Metformin.    Pharmacy: [facility]    Will route to PCP for review.    Johanne Moraes RN BSN   [facility]

## 2019-06-24 NOTE — PROGRESS NOTES
06/18/19 1000   Quick Adds   Quick Adds Vestibular Eval   Type of Visit Initial OP PT Evaluation   General Information   Start of Care Date 06/18/19   Referring Physician Yanet Lane PA-C    Orders Evaluate and Treat as Indicated   Order Date 06/14/19   Medical Diagnosis Dizziness R42  - Primary    Onset of illness/injury or Date of Surgery 05/01/19  (onset of dizziness approx 2 months ago)   Precautions/Limitations no known precautions/limitations   Surgical/Medical history reviewed Yes   Pertinent history of current problem (include personal factors and/or comorbidities that impact the POC) Racquel Nunn is a 53-year-old female referred for vestibular PT evaluation of dizziness following ED visit on 6/14/2019.  Per chart review: Patient is also dealing with seasonal allergies and her MD referred to ENT for evaluation of tinnitus and dizziness that did not resolve with Flonase and Claritin. Also worked up for anemia and possible orthostatic component to the dizziness.  MD also increased patient's anxiety medication. Per ENT visit on 6/5/19: Hearing test reveals mild to moderate symmetric high-frequency sensorineural hearing loss and pt was reassured that her ears look fairly normal. Advised to continue with nasal spray, allergy medication, [place] pot.  Patient followed up in the ED on 6/14/2019 due to ongoing symptoms of dizziness. Patient reports 5 to 6 weeks of dizziness and tinnitus in the left ear.  She is taking Meclizine and Flonase for her symptoms and she does not feel improvement with these medications.  Denies hearing loss.  Increased stress at home with her mother s memory loss problems and has increased the anxiety medications to help cope with this.  She was advised to follow-up for vestibular rehab (see GENERAL INFORMATION below for pt s report during today s evaluation).   Prior level of function comment independent mobility, participates in daily outdoor walks for exercise, working  "FT as    Previous/Current Treatment Medication(s)   Improvement after medication No   Patient role/Employment history Employed  (has not been working x2 weeks)   Living environment House/[facility]   Home/Community Accessibility Comments Drives. Navigaites home/community without AD   Patient/Family Goals Statement Resove dizziness and return to PLOF   General Information Comments Patient reports onset of dizziness approx 2 months ago.  Describes her dizziness as constant lightheadedness and associated with a headache.  Also reports onset of tinnitus in her left ear. The tinnitus is constant.  Her headache is constant.  Symptoms are without change of the last 2 months.   She denies hearing loss in the left ear. Reports that ENT visit revealed fluid in her right ear, and she feels she comparatively hears better out of her left ear. She is taking Aleve for her headache 4 times a day.  She is taking meclizine daily for 2 weeks without change in symptoms.  She reports taking meclizine 10 to 15 years ago for brief spinning dizziness that would occur only once in a while and last for 1 to 1-1/2 hours, estimates once every 2 to 3 months in frequency.  Patient reports current sxs of lightheaded dizziness  increases with hot weather, and has been unable to go on outdoor walks as usual, feeling \"wobbly\" due to lightheadedness.  She is taking blood pressure medications without any recent changes and feels her blood pressure has been fine. She has a blood pressure cuff at home but has not been taking it. Sometimes getting up from a chair she will also get briefly lightheaded.  She denies any prior symptoms of lightheadedness beyond the last few months.  Also increased lightheadedness bending over and reaching up.  She is concerned because her father had heart attack and another family member has had Ménière's disease.  She has had no prior follow-ups with cardiology or neurology.  Denies any history of " migraines.  Denies neck strain or injury.  No falls.   No vision changes. She has not been working as a  for the past 2 weeks because of her headaches and lightheadedness.  She has been doing minimal household work.   Fall Risk Screen   Fall screen completed by PT   Have you fallen 2 or more times in the past year? No   Have you fallen and had an injury in the past year? No   Timed Up and Go score (seconds) n/t (see 4 item DGI)   Is patient a fall risk? No   Abuse Screen (yes response referral indicated)   Feels Unsafe at Home or Work/School no   Feels Threatened by Someone no   Does Anyone Try to Keep You From Having Contact with Others or Doing Things Outside Your Home? no   Physical Signs of Abuse Present no   System Outcome Measures   Outcome Measures   (DHI: 70/100)   Pain   Pain comments headache, none otherwise   Vitals Signs   Heart Rate 100   SpO2 97   Blood Pressure   (below)   Vital Signs Comments Reports she didn't take BP medications this moring as usual, in sitting rest: 130/90,  120/80   Cognitive Status Examination   Orientation orientation to person, place and time   Level of Consciousness alert   Follows Commands and Answers Questions 100% of the time   Personal Safety and Judgment intact   Observation   Observation Symptoms reported in sitting prior to evaluation.  Headache 5/10. Lightheadedness 4/10.      Bed Mobility   Bed Mobility Comments Independent   Transfer Skills   Transfer Comments Transition to standing:  /80, 105/70 denies inc lighthededness in standing    Gait   Gait Comments WNL   Gait Special Tests   Gait Special Tests DYNAMIC GAIT INDEX;SIX MINUTE WALK TEST   Gait Special Tests Dynamic Gait Index   Score out of 24 4 item DGI: 12/12   Comments no provoked sxs with head turns/pitches   Gait Special Tests Six Minute Walk Test   Feet 1530 Feet   Comments baseline (10 pt scale) headache 5/10, lighthededness 4/10 - without change throughout, /80,   bpm, O2 sat 95%, slower speed at 1 min and 4 min with fatigue, inc RR reports asthma but doenst need her inhailer    Balance Special Tests   Balance Special Tests Modified CTSIB Conditions   Balance Special Tests Modified CTSIB Conditions   Condition 1, seconds 30 Seconds   Condition 2, seconds 30 Seconds   Condition 4, seconds 30 Seconds   Condition 5, seconds 30 Seconds   Sensory Examination   Sensory Perception Comments denies n/t   Oculomotor Exam   Smooth Pursuit Normal   Saccades Normal   Rapid Head Thrust Corrective Saccade L head thrust  (questionable)   Infrared Goggle Exam or Frenzel Lense Exam   Vestibular Suppressant in Last 24 Hours? Yes  (yesterday morning)   Exam completed with Infrared Goggles   Spontaneous Nystagmus Negative   Gaze Evoked Nystagmus Negative   Head Shake Horizontal Nystagmus Horizontal L   Head Shake Horizontal Nystagmus comments increased lightheaded dizziness following    [place]-Hallpike (right) Negative   Biju-Hallpike (right) comments a littled lightheded return to sitting no nystagmus   Biju-Hallpike (Left) Negative   [place]-Hallpike (left) comments inc lighthededness return to sitting (not as bad as R side)   [facility] Supine Roll Test (Right) Negative   [facility] Supine Roll Test (Left) Negative   Dynamic Visual Acuity (DVA)   Horizontal Head Movement at 2 Hz (LogMar) 20/50   DVA Comments 5 line difference, provokes dizziness    Planned Therapy Interventions   Planned Therapy Interventions neuromuscular re-education   Clinical Impression   Criteria for Skilled Therapeutic Interventions Met yes, treatment indicated   PT Diagnosis (+) s/s R unilateral vestibular hypofunction, questionable orthostatic hypotension   Influenced by the following impairments reported L ear tinnitus, anxiety, constant dizziness described as lightheadedness and HA, increased sxs of lightheadedness in heat, transitions from sitting to standing, body positional changes bending over/reaching up, (+) headshake nystagmus and  dizziness, VOR impaired per DVA assessment   Functional limitations due to impairments Unble to participate in daily outdoor walks for exercise, working FT as    Clinical Presentation Stable/Uncomplicated   Clinical Decision Making (Complexity) Low complexity   Therapy Frequency 1 time/week   Predicted Duration of Therapy Intervention (days/wks) 4 weeks   Risk & Benefits of therapy have been explained Yes   Patient, Family & other staff in agreement with plan of care Yes   Clinical Impression Comments Recommending participation in vestibular therapy. Plans for referral to audiologist for further testing as needed should patient's symptoms of dizziness not fully resolve with participation in vestibular therapy. Recommending patient monitor her blood pressure with symptoms of lightheaded dizziness with heat and quick transitions from chairs, follow-up with MD to address any recommendations for possible medication changes as needed.     Education Assessment   Barriers to Learning No barriers   GOALS   PT Eval Goals 1;2;3   Goal 1   Goal Identifier DHI (baseline: 70/100)   Goal Description Pt to report significant decline in dizziness per DHI scoring </= 39/100 to be considered low perception of handicap in order to return to work   Target Date 07/15/19   Goal 2   Goal Identifier VOR (baseline: DVA 5 line difference, provokes dizziness)   Goal Description Pt to demonstrate improved VOR per DVA assessment to WNL with 1-2 line difference towards decreased sxs of dizziness with head movements   Target Date 07/15/19   Total Evaluation Time   PT Chad, Low Complexity Minutes (02209) 52

## 2019-06-24 NOTE — TELEPHONE ENCOUNTER
Spoke with patient. She is okay with waiting until 7/19 for appointment with Chléo Downing that they have already scheduled.     Aviva Diaz CMA on 4/23/2019 at 9:32 AM

## 2019-06-24 NOTE — TELEPHONE ENCOUNTER
Could we use some same day slots to fit patient in? Or huddle times? Unsure of who mother is. Routing to PCP for review.     Aviva Diaz CMA on 4/23/2019 at 9:32 AM

## 2019-06-24 NOTE — TELEPHONE ENCOUNTER
Mother is Nora Amin.  Ok to use a same day spot.    Nancy Small MD  Internal Medicine - Pediatrics

## 2019-07-09 ENCOUNTER — OFFICE VISIT (OUTPATIENT)
Dept: URGENT CARE | Facility: URGENT CARE | Age: 54
End: 2019-07-09
Payer: COMMERCIAL

## 2019-07-09 ENCOUNTER — TELEPHONE (OUTPATIENT)
Dept: PEDIATRICS | Facility: CLINIC | Age: 54
End: 2019-07-09

## 2019-07-09 VITALS
DIASTOLIC BLOOD PRESSURE: 74 MMHG | TEMPERATURE: 97.4 F | RESPIRATION RATE: 16 BRPM | OXYGEN SATURATION: 99 % | WEIGHT: 250 LBS | HEART RATE: 97 BPM | BODY MASS INDEX: 41.92 KG/M2 | SYSTOLIC BLOOD PRESSURE: 148 MMHG

## 2019-07-09 DIAGNOSIS — R42 LIGHTHEADEDNESS: Primary | ICD-10-CM

## 2019-07-09 DIAGNOSIS — R51.9 CHRONIC DAILY HEADACHE: ICD-10-CM

## 2019-07-09 LAB
ANION GAP SERPL CALCULATED.3IONS-SCNC: 7 MMOL/L (ref 3–14)
BUN SERPL-MCNC: 11 MG/DL (ref 7–30)
CALCIUM SERPL-MCNC: 9.7 MG/DL (ref 8.5–10.1)
CHLORIDE SERPL-SCNC: 104 MMOL/L (ref 94–109)
CO2 SERPL-SCNC: 30 MMOL/L (ref 20–32)
CREAT SERPL-MCNC: 1 MG/DL (ref 0.52–1.04)
GFR SERPL CREATININE-BSD FRML MDRD: 64 ML/MIN/{1.73_M2}
GLUCOSE SERPL-MCNC: 115 MG/DL (ref 70–99)
POTASSIUM SERPL-SCNC: 4.3 MMOL/L (ref 3.4–5.3)
SODIUM SERPL-SCNC: 141 MMOL/L (ref 133–144)

## 2019-07-09 PROCEDURE — 80048 BASIC METABOLIC PNL TOTAL CA: CPT | Performed by: FAMILY MEDICINE

## 2019-07-09 PROCEDURE — 99214 OFFICE O/P EST MOD 30 MIN: CPT | Performed by: FAMILY MEDICINE

## 2019-07-09 PROCEDURE — 36415 COLL VENOUS BLD VENIPUNCTURE: CPT | Performed by: FAMILY MEDICINE

## 2019-07-09 ASSESSMENT — ENCOUNTER SYMPTOMS
AGITATION: 0
DYSURIA: 0
NECK PAIN: 0
SORE THROAT: 0
NAUSEA: 0
SHORTNESS OF BREATH: 0
CHEST TIGHTNESS: 0
ABDOMINAL PAIN: 0
PALPITATIONS: 0
WEAKNESS: 0
TROUBLE SWALLOWING: 0
SPEECH DIFFICULTY: 0
VOMITING: 0
NUMBNESS: 0
PHOTOPHOBIA: 0

## 2019-07-09 NOTE — TELEPHONE ENCOUNTER
Reason for call:  Patient reporting a symptom    Symptom or request: Headaches       Additional comments: Pt states that she has had headaches for a couple weeks now. When she has a headache she takes Aleve and the symptoms worsen instead of getting better. Pt states that she doesn't take this for any other reason than her headaches. She is wondering if the Aleve could be the reason they are worsening. When asked if she has tried any other pain relievers she stated no. Pt would like a call back to discuss further.      Phone Number patient can be reached at:  Home number on file [phone] (home)    Best Time:  any    Can we leave a detailed message on this number:  YES    Call taken on 7/9/2019 at 10:20 AM by Daria Oquendo

## 2019-07-09 NOTE — TELEPHONE ENCOUNTER
"Called pt. Pt states that they are in UC now.    Maury \"Candido\" LORETTA [facility]    "

## 2019-07-09 NOTE — PATIENT INSTRUCTIONS
Do your best to avoid any OTC medications for headaches.      If [facility] is not in network, please contact Dr. Small for another referral.    If any symptoms are significantly worsening, please seek care in ER for acute changes.

## 2019-07-09 NOTE — PROGRESS NOTES
"SUBJECTIVE:   Racquel Nunn is a 54 year old female presenting with a chief complaint of   Chief Complaint   Patient presents with     Urgent Care     Dizziness     headache/dizzy X6 wks       She is an established patient of [facility].    Onset of lightheadedness was 8+ week(s) ago.  Course of illness is waxing and waning  Severity moderate  Worse with position changes, from bending over to standing especially.  Hasn't had any vertigo when the room spins around her.  No LOC.    Also having daily headaches:    Current and associated symptoms: no nausea, no vision change, no focal neuro symptoms  Location of pain: generalized  Prodromal sx?:  No  History of Migranes: No  Is headache similar to previous: Yes  Predisposing factors: using OTC pain meds almost every day.  Treatment and measures tried: Tylenol, Ibuprofen and Aleve.    [place]  She is wondering if she is sensitive to the Aleve to the point where it is making headaches worse.  She is also wondering if she is sensitive to something in her house that is causing her symptoms.  She has been under stress with her aging mother lately, but her mom came with her when [place] the [place] so the patient doesn't think this stress is causing her symptoms.    Chart review shows that this patient was seen for \"dizziness\" on 6/14/19.  She has done a session of PT for dizziness, but she hasn't found it helpful.  She states that she has seen ENT in the past and that they have only diagnosed hearing loss in the L ear, nothing related to dizziness.    Pt states that blood sugars have been good lately.  She was restarted on metformin by primary MD.  Fasting glucose has been around 90.    Review of Systems   HENT: Positive for tinnitus. Negative for sore throat and trouble swallowing.    Eyes: Negative for photophobia and visual disturbance.   Respiratory: Negative for chest tightness and " shortness of breath.    Cardiovascular: Negative for chest pain and palpitations.   Gastrointestinal: Negative for abdominal pain, nausea and vomiting.   Genitourinary: Negative for dysuria.   Musculoskeletal: Negative for neck pain.   Skin: Negative for rash.   Neurological: Negative for syncope, speech difficulty, weakness and numbness.   Psychiatric/Behavioral: Negative for agitation.       Past Medical History:   Diagnosis Date     HTN (hypertension)      Family History   Problem Relation Age of Onset     C.A.D. Father         CABG-age 60     Cerebrovascular Disease Maternal Grandmother      Breast Cancer Paternal Aunt         age 40     Cancer Maternal Aunt         melanoma     Diabetes No family hx of      Hypertension No family hx of      Cancer - colorectal No family hx of      Thyroid Disease No family hx of      Current Outpatient Medications   Medication Sig Dispense Refill     albuterol (PROAIR HFA/PROVENTIL HFA/VENTOLIN HFA) 108 (90 Base) MCG/ACT inhaler Inhale 2 puffs into the lungs every 6 hours as needed for shortness of breath / dyspnea or wheezing 1 Inhaler 1     citalopram (CELEXA) 20 MG tablet Take 2 tablets (40 mg) by mouth daily 180 tablet 3     fluticasone (FLONASE) 50 MCG/ACT nasal spray Spray 2 sprays into both nostrils daily 16 g 3     glucose blood VI test strips strip by In Vitro route daily. 1 Box 12     lisinopril (PRINIVIL/ZESTRIL) 40 MG tablet Take 1 tablet (40 mg) by mouth daily 90 tablet 3     meclizine (ANTIVERT) 25 MG tablet Take 1 tablet (25 mg) by mouth 3 times daily as needed for dizziness 30 tablet 11     MULTI-VITAMIN OR TABS   0     norgestim-eth estrad triphasic (TRI-ESTARYLLA) 0.18/0.215/0.25 MG-35 MCG tablet Take 1 tablet by mouth daily Due for annual physical at this time. Please call to schedule. 84 tablet 3     metFORMIN (GLUCOPHAGE) 500 MG tablet Take 1 tablet (500 mg) by mouth daily (with breakfast) 90 tablet 3     Social History     Tobacco Use     Smoking status:  Never Smoker     Smokeless tobacco: Never Used   Substance Use Topics     Alcohol use: Yes     Comment: 1 drink per week or less       OBJECTIVE  /74   Pulse 97   Temp 97.4  F (36.3  C)   Resp 16   Wt 113.4 kg (250 lb)   SpO2 99%   BMI 41.92 kg/m      Physical Exam   Constitutional: She is oriented to person, place, and time. She appears well-developed and well-nourished. No distress.   HENT:   Head: Normocephalic and atraumatic.   Right Ear: Tympanic membrane, external ear and ear canal normal.   Left Ear: Tympanic membrane, external ear and ear canal normal.   Mouth/Throat: Oropharynx is clear and moist. No oropharyngeal exudate.   Eyes: Pupils are equal, round, and reactive to light. Conjunctivae and EOM are normal. Right eye exhibits no discharge. Left eye exhibits no discharge. No scleral icterus.   Fundoscopic exam:       The right eye shows no papilledema. The right eye shows red reflex.        The left eye shows no papilledema. The left eye shows red reflex.   Neck: Normal range of motion. Neck supple.   Cardiovascular: Normal rate, regular rhythm and normal heart sounds. Exam reveals no gallop.   No murmur heard.  Pulmonary/Chest: Effort normal and breath sounds normal. No respiratory distress.   Musculoskeletal: Normal range of motion. She exhibits no edema or deformity.   Lymphadenopathy:     She has no cervical adenopathy.   Neurological: She is alert and oriented to person, place, and time. She has normal strength. No cranial nerve deficit or sensory deficit. She exhibits normal muscle tone. She displays a negative Romberg sign. Coordination and gait normal.   Skin: She is not diaphoretic.       Labs:  Results for orders placed or performed in visit on 07/09/19 (from the past 24 hour(s))   Basic metabolic panel  (Ca, Cl, CO2, Creat, Gluc, K, Na, BUN)   Result Value Ref Range    Sodium 141 133 - 144 mmol/L    Potassium 4.3 3.4 - 5.3 mmol/L    Chloride 104 94 - 109 mmol/L    Carbon Dioxide 30  20 - 32 mmol/L    Anion Gap 7 3 - 14 mmol/L    Glucose 115 (H) 70 - 99 mg/dL    Urea Nitrogen 11 7 - 30 mg/dL    Creatinine 1.00 0.52 - 1.04 mg/dL    GFR Estimate 64 >60 mL/min/[1.73_m2]    GFR Estimate If Black 74 >60 mL/min/[1.73_m2]    Calcium 9.7 8.5 - 10.1 mg/dL       ASSESSMENT:      ICD-10-CM    1. Lightheadedness R42 Basic metabolic panel  (Ca, Cl, CO2, Creat, Gluc, K, Na, BUN)     NEUROLOGY ADULT REFERRAL   2. Chronic daily headache R51 NEUROLOGY ADULT REFERRAL        Medical Decision Making:    Differential Diagnosis:  Headache:  Symptoms not typical for migraine and no family history of migraine headaches.    Lightheadedness:  No evidence of electrolyte abnormalities or significant orthostatic changes today.  Good glycemic control right now.    Serious Comorbid Conditions:  Adult:  Diabetes    PLAN:  Will refer patient to neurology for additional evaluation of this lightheadedness/dizziness and headaches.  Pt to find out if [facility] is covered under her plan.  If it is not, she is to contact her primary care provider for another referral.    Discussed medication-free options to help with headaches, including inhaling lavender and/or peppermint essential oil.    Followup:    Patient Instructions   Do your best to avoid any OTC medications for headaches.      If [facility] is not in network, please contact Dr. Small for another referral.    If any symptoms are significantly worsening, please seek care in ER for acute changes.

## 2019-07-23 ENCOUNTER — TELEPHONE (OUTPATIENT)
Dept: PEDIATRICS | Facility: CLINIC | Age: 54
End: 2019-07-23

## 2019-07-23 NOTE — TELEPHONE ENCOUNTER
Reason for Call:  Other call back    Detailed comments: Patient calling requesting two letters for her work. One letter is for her work restrictions. She has back issues and she can not lift over 15 or 20 lbs. She also needs a letter for missing work for her vertigo and headaches which was caused by taking Aleve. She has missed about a month and 1/2 of work. She needs both of these letters so that she can return to work. Please call her back regarding this.     Phone Number Patient can be reached at: Home number on file [phone] (home)    Best Time: any    Can we leave a detailed message on this number? YES    Call taken on 7/23/2019 at 3:14 PM by Joan Donaldson

## 2019-07-23 NOTE — LETTER
[facility]  [address] [place] [address]  [phone]           July 24, 2019      RE:Racquel Nunn                                                                                                                                         [address]  [place] [facility] [address]      To: Whom it may concern    Abida Nunn is under my professional medical care. She can return to work with restrictions of lifting no more than 10-15lbs until reassessed.         Sincerely,  Nancy Small MD

## 2019-07-23 NOTE — LETTER
[facility]  [address] [place] [address]  [phone]           July 24, 2019      Racquel Nunn                                                                                                                                          [address]  [place] [address]      To: Whom it may concern    Abida Nunn is under my professional medical care. Abida was absent from work due to medical reasons for the dates of June 1st thru July 24th of 2019. Please excuse Abida from work for those days.        Sincerely,  Nancy Small MD

## 2019-07-24 NOTE — TELEPHONE ENCOUNTER
OK to print letters as written and stamp my signature.    Nancy Small MD  Internal Medicine - Pediatrics

## 2019-07-24 NOTE — TELEPHONE ENCOUNTER
I'm happy to write the letters, but will need more specifics on the dates so that they can be included in the letter.    Please contact Racquel to see exactly what the 2 letters need to say.    Thanks!    Nancy Small MD  Internal Medicine - Pediatrics

## 2019-07-24 NOTE — TELEPHONE ENCOUNTER
Letters done and placed at FD called pt and left VM informing    Maya Wilson MA 3:31 PM 7/24/2019

## 2019-07-24 NOTE — TELEPHONE ENCOUNTER
Please review both letters, revise PRN. Print and sign and I will hand deliver to pt in lobby.    Daria Oquendo on 7/24/2019 at 12:18 PM

## 2019-07-26 NOTE — TELEPHONE ENCOUNTER
Pt received both letters in person as she waited in waiting room for them on 7/25/19.     Daria Oquendo on 7/26/2019 at 2:25 PM

## 2019-08-06 ENCOUNTER — TELEPHONE (OUTPATIENT)
Dept: PEDIATRICS | Facility: CLINIC | Age: 54
End: 2019-08-06

## 2019-08-06 NOTE — TELEPHONE ENCOUNTER
Reason for Call:  Other letter    Detailed comments: pt is calling and needs another letter to go back to work. She needs it right away. Please call when it is done. Would like a weight restriction of 5 lbs.    Phone Number Patient can be reached at: Home number on file [phone] (home)    Best Time: any    Can we leave a detailed message on this number? YES    Call taken on 8/6/2019 at 11:51 AM by Bing Sales

## 2019-08-06 NOTE — TELEPHONE ENCOUNTER
Patient is at the  inquiring about letter. Spoke with Dr. Koehler who states that we can give her a copy of the letter that Dr. Small already wrote but we cannot write a new one for her stating that she can go back to work. She would need to wait for Dr. Small to come back. We can write a letter saying that Dr. Small is out of the office for the next two weeks and she will need to advise upon her return.     Aviva Diaz, CMA

## 2019-09-05 DIAGNOSIS — J45.20 MILD INTERMITTENT ASTHMA, UNSPECIFIED WHETHER COMPLICATED: ICD-10-CM

## 2019-09-06 RX ORDER — ALBUTEROL SULFATE 90 UG/1
AEROSOL, METERED RESPIRATORY (INHALATION)
Qty: 8.5 INHALER | Refills: 1 | Status: SHIPPED | OUTPATIENT
Start: 2019-09-06 | End: 2021-02-11

## 2019-09-10 ASSESSMENT — ASTHMA QUESTIONNAIRES: ACT_TOTALSCORE: 19

## 2019-09-23 ENCOUNTER — OFFICE VISIT (OUTPATIENT)
Dept: PODIATRY | Facility: CLINIC | Age: 54
End: 2019-09-23
Payer: COMMERCIAL

## 2019-09-23 VITALS
SYSTOLIC BLOOD PRESSURE: 126 MMHG | WEIGHT: 261 LBS | HEIGHT: 65 IN | DIASTOLIC BLOOD PRESSURE: 74 MMHG | BODY MASS INDEX: 43.49 KG/M2

## 2019-09-23 DIAGNOSIS — M54.31 SCIATICA OF RIGHT SIDE: ICD-10-CM

## 2019-09-23 DIAGNOSIS — M72.2 PLANTAR FASCIITIS: Primary | ICD-10-CM

## 2019-09-23 PROCEDURE — 99243 OFF/OP CNSLTJ NEW/EST LOW 30: CPT | Performed by: PODIATRIST

## 2019-09-23 RX ORDER — PREDNISONE 5 MG/1
TABLET ORAL
Qty: 21 TABLET | Refills: 0 | Status: SHIPPED | OUTPATIENT
Start: 2019-09-23 | End: 2019-09-30

## 2019-09-23 ASSESSMENT — MIFFLIN-ST. JEOR: SCORE: 1790.8

## 2019-09-23 NOTE — PROGRESS NOTES
"Foot & Ankle Surgery  September 23, 2019    CC: \"foot pain in heel\"    I was asked to see Racquel Nunn regarding the chief complaint by:  Dr. TAYLER Small    HPI:  Pt is a 54 year old female who presents with above complaint.  Right heel pain x 2 weeks.  [place] pain.  Had heel/foot 20 years ago, had old orthotics but doesn't use them/have them.  Deep ache, shooting pain, 10/10 \"all the time\", wrose with \"putting pressure on heel\".  Has tried Advil with some improvement.  \"Oh yeah\" with respect to back pain, specifically right-sided sciatica with radiation down leg.  Has never seen a specialist for this     ROS:   Pos for CC.  The patient denies current nausea, vomiting, chills, fevers, belly pain, calf pain, chest pain or SOB.  Complete remainder of ROS is otherwise neg.    VITALS:    Vitals:    09/23/19 1413   BP: 126/74   Weight: 118.4 kg (261 lb)   Height: 1.645 m (5' 4.75\")       PMH:    Past Medical History:   Diagnosis Date     HTN (hypertension)        SXHX:    Past Surgical History:   Procedure Laterality Date     NO HISTORY OF SURGERY          MEDS:    Current Outpatient Medications   Medication     citalopram (CELEXA) 20 MG tablet     fluticasone (FLONASE) 50 MCG/ACT nasal spray     glucose blood VI test strips strip     lisinopril (PRINIVIL/ZESTRIL) 40 MG tablet     meclizine (ANTIVERT) 25 MG tablet     metFORMIN (GLUCOPHAGE) 500 MG tablet     MULTI-VITAMIN OR TABS     norgestim-eth estrad triphasic (TRI-ESTARYLLA) 0.18/0.215/0.25 MG-35 MCG tablet     predniSONE (DELTASONE) 5 MG tablet     PROAIR  (90 Base) MCG/ACT inhaler     No current facility-administered medications for this visit.        ALL:     Allergies   Allergen Reactions     No Known Allergies        FMH:    Family History   Problem Relation Age of Onset     C.A.D. Father         CABG-age 60     Cerebrovascular Disease Maternal Grandmother      Breast Cancer Paternal Aunt         age 40     " Cancer Maternal Aunt         melanoma     Diabetes No family hx of      Hypertension No family hx of      Cancer - colorectal No family hx of      Thyroid Disease No family hx of        SocHx:    Social History     Socioeconomic History     Marital status:      Spouse name: Not on file     Number of children: 0     Years of education: 12     Highest education level: Not on file   Occupational History     Occupation:      Comment: [employer]   Social Needs     Financial resource strain: Not on file     Food insecurity:     Worry: Not on file     Inability: Not on file     Transportation needs:     Medical: Not on file     Non-medical: Not on file   Tobacco Use     Smoking status: Never Smoker     Smokeless tobacco: Never Used   Substance and Sexual Activity     Alcohol use: Yes     Comment: 1 drink per week or less     Drug use: No     Sexual activity: Never     Comment: never been sexually active, declining pap smears   Lifestyle     Physical activity:     Days per week: Not on file     Minutes per session: Not on file     Stress: Not on file   Relationships     Social connections:     Talks on phone: Not on file     Gets together: Not on file     Attends Protestant service: Not on file     Active member of club or organization: Not on file     Attends meetings of clubs or organizations: Not on file     Relationship status: Not on file     Intimate partner violence:     Fear of current or ex partner: Not on file     Emotionally abused: Not on file     Physically abused: Not on file     Forced sexual activity: Not on file   Other Topics Concern     Parent/sibling w/ CABG, MI or angioplasty before 65F 55M? No   Social History Narrative    Wedding 7/18/15           EXAMINATION:  Gen:   No apparent distress  Neuro:   A&Ox3, no deficits  Psych:    Answering questions appropriately for age and situation with normal affect  Head:    NCAT  Eye:    Visual scanning without deficit  Ear:    Response to  auditory stimuli wnl  Lung:    Non-labored breathing on RA noted  Abd:    NTND per patient report  Lymph:    Neg for pitting/non-pitting edema BLE  Vasc:    Pulses palpable, CFT minimally delayed  Neuro:    Light touch sensation intact to all sensory nerve distributions without paresthesias  Derm:    Neg for nodules, lesions or ulcerations  MSK:    right lower extremity - minimal pain today, but points to plantar heel as area of pain. n o calcaneus, Achilles, tibial nerve or Guidry's nerve pain  Calf:    Neg for redness, swelling or tenderness    Assessment:  54 year old female with right heel pain in setting of sciatica      Plan:  Discussed etiologies, anatomy and options  1.  Right heel pain in setting of sciatica   -Regarding the heel pain, the Plantar Fasciitis handout was dispensed and discussed.  We talked about stretching, resting/activity modification, icing, NSAID/tylenol use as tolerated, inserts, supportive/comfortable shoes and minimizing shoeless walking.    -discussed Achilles, plantar fascial and hamstring stretches  -OTC insert information dispensed and discussed  -Rx for tapered prednisone dose  -discussed role lower back can play in lower extremity pain.  Consider sports med eval if fails to respond.    Follow up:  4 weeks or sooner with acute issues      Patient's medical history was reviewed today    Body mass index is 43.77 kg/m .  Weight management plan: Patient was referred to their PCP to discuss a diet and exercise plan.        David Chacon DPM FACFAS FACFAOM  Podiatric Foot & Ankle Surgeon  [facility]  [phone]

## 2019-09-23 NOTE — LETTER
"    9/23/2019         RE: Racquel Nunn  [address]  [place] [address]        Dear Colleague,    Thank you for referring your patient, Racquel Nunn, to the [facility]. Please see a copy of my visit note below.    Foot & Ankle Surgery  September 23, 2019    CC: \"foot pain in heel\"    I was asked to see Racquel Nunn regarding the chief complaint by:  Dr. TAYLER Small    HPI:  Pt is a 54 year old female who presents with above complaint.  Right heel pain x 2 weeks.  [place].  Had heel/foot 20 years ago, had old orthotics but doesn't use them/have them.  Deep ache, shooting pain, 10/10 \"all the time\", wrose with \"putting pressure on heel\".  Has tried Advil with some improvement.  \"Oh yeah\" with respect to back pain, specifically right-sided sciatica with radiation down leg.  Has never seen a specialist for this     ROS:   Pos for CC.  The patient denies current nausea, vomiting, chills, fevers, belly pain, calf pain, chest pain or SOB.  Complete remainder of ROS is otherwise neg.    VITALS:    Vitals:    09/23/19 1413   BP: 126/74   Weight: 118.4 kg (261 lb)   Height: 1.645 m (5' 4.75\")       PMH:    Past Medical History:   Diagnosis Date     HTN (hypertension)        SXHX:    Past Surgical History:   Procedure Laterality Date     NO HISTORY OF SURGERY          MEDS:    Current Outpatient Medications   Medication     citalopram (CELEXA) 20 MG tablet     fluticasone (FLONASE) 50 MCG/ACT nasal spray     glucose blood VI test strips strip     lisinopril (PRINIVIL/ZESTRIL) 40 MG tablet     meclizine (ANTIVERT) 25 MG tablet     metFORMIN (GLUCOPHAGE) 500 MG tablet     MULTI-VITAMIN OR TABS     norgestim-eth estrad triphasic (TRI-ESTARYLLA) 0.18/0.215/0.25 MG-35 MCG tablet     predniSONE (DELTASONE) 5 MG tablet     PROAIR  (90 Base) MCG/ACT inhaler     No current facility-administered medications for this visit.        ALL:     Allergies "   Allergen Reactions     No Known Allergies        FMH:    Family History   Problem Relation Age of Onset     C.A.D. Father         CABG-age 60     Cerebrovascular Disease Maternal Grandmother      Breast Cancer Paternal Aunt         age 40     Cancer Maternal Aunt         melanoma     Diabetes No family hx of      Hypertension No family hx of      Cancer - colorectal No family hx of      Thyroid Disease No family hx of        SocHx:    Social History     Socioeconomic History     Marital status:      Spouse name: Not on file     Number of children: 0     Years of education: 12     Highest education level: Not on file   Occupational History     Occupation:      Comment: [employer]   Social Needs     Financial resource strain: Not on file     Food insecurity:     Worry: Not on file     Inability: Not on file     Transportation needs:     Medical: Not on file     Non-medical: Not on file   Tobacco Use     Smoking status: Never Smoker     Smokeless tobacco: Never Used   Substance and Sexual Activity     Alcohol use: Yes     Comment: 1 drink per week or less     Drug use: No     Sexual activity: Never     Comment: never been sexually active, declining pap smears   Lifestyle     Physical activity:     Days per week: Not on file     Minutes per session: Not on file     Stress: Not on file   Relationships     Social connections:     Talks on phone: Not on file     Gets together: Not on file     Attends Sikh service: Not on file     Active member of club or organization: Not on file     Attends meetings of clubs or organizations: Not on file     Relationship status: Not on file     Intimate partner violence:     Fear of current or ex partner: Not on file     Emotionally abused: Not on file     Physically abused: Not on file     Forced sexual activity: Not on file   Other Topics Concern     Parent/sibling w/ CABG, MI or angioplasty before 65F 55M? No   Social History Narrative    Wedding 7/18/15            EXAMINATION:  Gen:   No apparent distress  Neuro:   A&Ox3, no deficits  Psych:    Answering questions appropriately for age and situation with normal affect  Head:    NCAT  Eye:    Visual scanning without deficit  Ear:    Response to auditory stimuli wnl  Lung:    Non-labored breathing on RA noted  Abd:    NTND per patient report  Lymph:    Neg for pitting/non-pitting edema BLE  Vasc:    Pulses palpable, CFT minimally delayed  Neuro:    Light touch sensation intact to all sensory nerve distributions without paresthesias  Derm:    Neg for nodules, lesions or ulcerations  MSK:    right lower extremity - minimal pain today, but points to plantar heel as area of pain. n o calcaneus, Achilles, tibial nerve or Guidry's nerve pain  Calf:    Neg for redness, swelling or tenderness    Assessment:  54 year old female with right heel pain in setting of sciatica      Plan:  Discussed etiologies, anatomy and options  1.  Right heel pain in setting of sciatica   -Regarding the heel pain, the Plantar Fasciitis handout was dispensed and discussed.  We talked about stretching, resting/activity modification, icing, NSAID/tylenol use as tolerated, inserts, supportive/comfortable shoes and minimizing shoeless walking.    -discussed Achilles, plantar fascial and hamstring stretches  -OTC insert information dispensed and discussed  -Rx for tapered prednisone dose  -discussed role lower back can play in lower extremity pain.  Consider sports med eval if fails to respond.    Follow up:  4 weeks or sooner with acute issues      Patient's medical history was reviewed today    Body mass index is 43.77 kg/m .  Weight management plan: Patient was referred to their PCP to discuss a diet and exercise plan.        David Chacon, MOE FACFAS FACFAOM  Podiatric Foot & Ankle Surgeon  [facility]  [phone]        Again, thank you for allowing me to participate in the care of your patient.        Sincerely,        David  Oswaldo, RADHAM, DPNAVEEN

## 2019-09-23 NOTE — PATIENT INSTRUCTIONS
Thank you for choosing [facility]!    DR. DONALD'S CLINIC LOCATIONS:   MONDAY - [place] TUESDAY - [place]   [address] [facility]  [address]   [place] [address] [place] [address]   [phone]       THURSDAY AM - [place] THURSDAY PM - [place]   [address]   [place] [address] [place] [address]   [phone]       FRIDAY AM - [place] SET UP SURGERY: [phone] [address] APPOINTMENTS: [phone]   [place] [address] BILLING QUESTIONS: [phone] FAX NUMBER: [phone]     Follow Up: 4 weeks    PLANTAR FASCIITIS  Plantar fasciitis is often referred to as heel spurs or heel pain. Plantar fasciitis is a very common problem that affects people of all foot shapes, age, weight and activity level. Pain may be in the arch or on the weight-bearing surface of the heel. The pain may come on without injury or identifiable cause. Pain is generally present when first getting out of bed in the morning or up from a seated break.     CAUSES  The plantar fascia is a dense fibrous band of tissue that stretches across the bottom surface of the foot. The fascia helps support the foot muscles and arch. Plantar fasciitis is thought to be caused by mechanical strain or overload. Frequent walking without shoes or wearing unsupportive shoes is thought to cause structural overload and ultimately inflammation of the plantar fascia. Some people have heel spurs that can be seen on x-ray. The heel spur is actually a minor component of plantar fascitis and is largely ignored.       SELF TREATMENT   The easiest solution is to stop walking around your home without shoes. Plantar fasciitis is largely a shoe problem. Shoes are either not being worn often enough or your current shoes are inadequate for your weight, foot structure or activity level. The majority of shoes on the market today are not  sufficient to resist development of plantar fasciitis or to promote healing. Assume that your current shoes are inadequate and will need to be replaced. Even high quality shoes wear out with 6 months to one year of frequent use. Weight loss is another option. Losing ten pounds in the next two months may be enough to resolve the problem. Ice applied to the area of pain two to three times per day for ten minutes each session can be very helpful. Warm foot soaks in epsom salts can also relieve pain. This should continue until the problem resolves. Achilles tendon stretching is essential. Stretch multiple times daily to promote healing and to prevent recurrence in the future. Over all stretching of the body is helpful as well such as the calves, thighs and lower back. Normally when one area of the body is tight, other areas are too. Gentle Yoga can be good for this.     Over the counter topical anti inflammatories can be helpful such as biofreeze, bengay, salon pas, ect...  Oral ibuprofen or aleve is recommended as well to try to calm down inflammation.     Night splints can be helpful to gradually stretch the foot at night as a lot of pain is when you get up in the morning. Taking a towel or thera band and stretching the foot back multiple times before you get ou of bed can be beneficial as well.     MEDICAL TREATMENT  Medical treatments often include custom arch supports, cortisone injections, physical therapy, splints to be worn in bed, prescription medications and surgery. The home treatments listed above will be necessary regardless of these advanced medical treatments. Surgery is rarely needed but is very helpful in selected cases.     PROGNOSIS  Plantar fasciitis can last from one day to a lifetime. Some people get intermittent fascitis that is very short-lived. Others suffer daily for years. Excessive body weight, frequent bare foot walking, long hours on the feet, inadequate shoes, predisposing foot structures  and excessive activity such as running are all potential issues that lead to chronic and/or recurring plantar fascitis. Having plantar fasciitis means that you are forever prone to this problem and will require modification of some of the above factors. Most people seek treatment within one to four months. Healing usually requires a similar one to four month time frame. Healing time is relative to the amount of effort spent treating the problem.   Plantar fasciitis is highly recurrent. Risk factors often continue, including return to bare foot walking, inadequate shoes, excessive body weight, excessive activities, etc. Your life style and foot structure may predispose you to recurrent plantar fasciitis. A daily prevention regimen can be very helpful. Ongoing use of shoe inserts, careful attention to appropriate shoes, daily Achilles stretching, etc. may prevent recurrence. Prompt attention at the earliest warning signs of heel pain can resolve the problem in as short as a few days.     EXERCISES  Stair Exercise: Step on the stairs with the ball of your foot and hold your position for at least 15 seconds, then slowly step down with the heels of your foot. You can do this daily and as often as you want.   Picking the Towel: Sit comfortably and then pick the towel up with your toes. You can use any object other than a towel as long as the material can be soft and you can pick it up with your toes.  Rolling the Bottle: Use a small ball or frozen water bottle and then roll it around with your foot.   Flex the Toes: Sit comfortably and then flex your toes by pointing it towards the floor or towards your body. This will relax and flex your foot and exercise your plantar fascia, the calf, and the Achilles tendon. The inability of the foot to stretch often causes the bunching up of the plantar fascia area leading to the pain.  Calf/Achilles Stretching: Lay on you back and raise one foot, then point your toes towards the  floor. See photo below:               Hold each stretch for 10 seconds. Stretch 10 times per set, three sets per day. Morning, afternoon and evening. If your heel pain is very severe in the morning, consider doing the first set of stretches before you get out of bed.      OVER THE COUNTER INSERT RECOMMENDATIONS  SuperFeet   Sofsole Fit Spenco   Power Step   Walk-Fit Arch Cradles     Most of these can be found at your local [employer], [employer] stores, or online.  **A good high quality over the counter insert should cost around $40-$50      [facility] LOCATIONS  [place]  [address] [facility]  [phone]   [address]  [phone] [place]  [address] [facility]  [phone]   [place]  [address]  [phone]   [place]  [address]  [phone] [place]  [address] [place] [address] [place]  [phone]   [place]  [address]  [phone]   [place]  [address] [place] [address]  [phone] [place]  [address]  [phone]             FYI: The following information is included in the after visit summary for all patients:  Body weight can be a sensitive issue to discuss in clinic, but we think the following information is very important. Although we focus on the feet and ankles, we do support the overall health of our patients. Many things can cause foot and ankle problems. Foot structure, activity level, foot mechanics and injuries are common causes of pain. One very important issue that often goes unmentioned, is body weight. Extra weight can cause increased stress on muscles, ligaments, bones and tendons. Sometimes just a few extra pounds is all it takes to put one over her/his threshold. Without reducing that stress, it can be difficult to alleviate pain. As Foot & Ankle specialists, our job is addressing the lower extremity problem and possible causes. Regarding extra body weight, we encourage patients to discuss diet and weight management  plans with their primary care doctors. It is this team approach that gives you the best opportunity for pain relief and getting you back on your feet. [place] has a Comprehensive Weight Management Program. This program includes counseling, education, non-surgical and surgical approaches to weight loss. If you are interested in learning more either talk to you primary care provider or call [phone].

## 2019-09-30 ENCOUNTER — APPOINTMENT (OUTPATIENT)
Dept: CARDIOLOGY | Facility: CLINIC | Age: 54
End: 2019-09-30
Attending: PHYSICIAN ASSISTANT
Payer: COMMERCIAL

## 2019-09-30 ENCOUNTER — NURSE TRIAGE (OUTPATIENT)
Dept: PEDIATRICS | Facility: CLINIC | Age: 54
End: 2019-09-30

## 2019-09-30 ENCOUNTER — APPOINTMENT (OUTPATIENT)
Dept: ULTRASOUND IMAGING | Facility: CLINIC | Age: 54
End: 2019-09-30
Attending: EMERGENCY MEDICINE
Payer: COMMERCIAL

## 2019-09-30 ENCOUNTER — APPOINTMENT (OUTPATIENT)
Dept: GENERAL RADIOLOGY | Facility: CLINIC | Age: 54
End: 2019-09-30
Attending: EMERGENCY MEDICINE
Payer: COMMERCIAL

## 2019-09-30 ENCOUNTER — HOSPITAL ENCOUNTER (INPATIENT)
Facility: CLINIC | Age: 54
LOS: 1 days | Discharge: HOME | End: 2019-10-01
Attending: EMERGENCY MEDICINE | Admitting: INTERNAL MEDICINE
Payer: COMMERCIAL

## 2019-09-30 ENCOUNTER — APPOINTMENT (OUTPATIENT)
Dept: CT IMAGING | Facility: CLINIC | Age: 54
End: 2019-09-30
Attending: EMERGENCY MEDICINE
Payer: COMMERCIAL

## 2019-09-30 DIAGNOSIS — I82.4Z2 DEEP VEIN THROMBOSIS (DVT) OF DISTAL VEIN OF LEFT LOWER EXTREMITY, UNSPECIFIED CHRONICITY (H): ICD-10-CM

## 2019-09-30 DIAGNOSIS — R79.89 ELEVATED TROPONIN: ICD-10-CM

## 2019-09-30 DIAGNOSIS — I26.99 BILATERAL PULMONARY EMBOLISM (H): ICD-10-CM

## 2019-09-30 LAB
ALBUMIN SERPL-MCNC: 3.2 G/DL (ref 3.4–5)
ALP SERPL-CCNC: 84 U/L (ref 40–150)
ALT SERPL W P-5'-P-CCNC: 31 U/L (ref 0–50)
ANION GAP SERPL CALCULATED.3IONS-SCNC: 5 MMOL/L (ref 3–14)
AST SERPL W P-5'-P-CCNC: 19 U/L (ref 0–45)
BASOPHILS # BLD AUTO: 0.1 10E9/L (ref 0–0.2)
BASOPHILS NFR BLD AUTO: 0.5 %
BILIRUB SERPL-MCNC: 0.3 MG/DL (ref 0.2–1.3)
BUN SERPL-MCNC: 12 MG/DL (ref 7–30)
CALCIUM SERPL-MCNC: 9 MG/DL (ref 8.5–10.1)
CHLORIDE SERPL-SCNC: 105 MMOL/L (ref 94–109)
CO2 SERPL-SCNC: 28 MMOL/L (ref 20–32)
CREAT SERPL-MCNC: 0.95 MG/DL (ref 0.52–1.04)
D DIMER PPP FEU-MCNC: 2.7 UG/ML FEU (ref 0–0.5)
DIFFERENTIAL METHOD BLD: NORMAL
EOSINOPHIL # BLD AUTO: 0.2 10E9/L (ref 0–0.7)
EOSINOPHIL NFR BLD AUTO: 1.4 %
ERYTHROCYTE [DISTWIDTH] IN BLOOD BY AUTOMATED COUNT: 13.3 % (ref 10–15)
GFR SERPL CREATININE-BSD FRML MDRD: 69 ML/MIN/{1.73_M2}
GLUCOSE BLDC GLUCOMTR-MCNC: 120 MG/DL (ref 70–99)
GLUCOSE BLDC GLUCOMTR-MCNC: 128 MG/DL (ref 70–99)
GLUCOSE BLDC GLUCOMTR-MCNC: 98 MG/DL (ref 70–99)
GLUCOSE SERPL-MCNC: 116 MG/DL (ref 70–99)
HCT VFR BLD AUTO: 40.6 % (ref 35–47)
HGB BLD-MCNC: 13.2 G/DL (ref 11.7–15.7)
IMM GRANULOCYTES # BLD: 0.1 10E9/L (ref 0–0.4)
IMM GRANULOCYTES NFR BLD: 0.9 %
INTERPRETATION ECG - MUSE: NORMAL
LMWH PPP CHRO-ACNC: 0.57 IU/ML
LYMPHOCYTES # BLD AUTO: 1.5 10E9/L (ref 0.8–5.3)
LYMPHOCYTES NFR BLD AUTO: 13.9 %
MCH RBC QN AUTO: 30.6 PG (ref 26.5–33)
MCHC RBC AUTO-ENTMCNC: 32.5 G/DL (ref 31.5–36.5)
MCV RBC AUTO: 94 FL (ref 78–100)
MONOCYTES # BLD AUTO: 0.7 10E9/L (ref 0–1.3)
MONOCYTES NFR BLD AUTO: 6.8 %
NEUTROPHILS # BLD AUTO: 8.3 10E9/L (ref 1.6–8.3)
NEUTROPHILS NFR BLD AUTO: 76.5 %
NRBC # BLD AUTO: 0 10*3/UL
NRBC BLD AUTO-RTO: 0 /100
NT-PROBNP SERPL-MCNC: 413 PG/ML (ref 0–900)
PLATELET # BLD AUTO: 211 10E9/L (ref 150–450)
POTASSIUM SERPL-SCNC: 3.5 MMOL/L (ref 3.4–5.3)
PROT SERPL-MCNC: 7.2 G/DL (ref 6.8–8.8)
RADIOLOGIST FLAGS: ABNORMAL
RBC # BLD AUTO: 4.31 10E12/L (ref 3.8–5.2)
SODIUM SERPL-SCNC: 138 MMOL/L (ref 133–144)
TROPONIN I SERPL-MCNC: 0.18 UG/L (ref 0–0.04)
TROPONIN I SERPL-MCNC: 0.28 UG/L (ref 0–0.04)
TSH SERPL DL<=0.005 MIU/L-ACNC: 2.43 MU/L (ref 0.4–4)
WBC # BLD AUTO: 10.8 10E9/L (ref 4–11)

## 2019-09-30 PROCEDURE — 85025 COMPLETE CBC W/AUTO DIFF WBC: CPT | Performed by: EMERGENCY MEDICINE

## 2019-09-30 PROCEDURE — 84443 ASSAY THYROID STIM HORMONE: CPT | Performed by: EMERGENCY MEDICINE

## 2019-09-30 PROCEDURE — 25000128 H RX IP 250 OP 636: Performed by: INTERNAL MEDICINE

## 2019-09-30 PROCEDURE — 93005 ELECTROCARDIOGRAM TRACING: CPT

## 2019-09-30 PROCEDURE — 96366 THER/PROPH/DIAG IV INF ADDON: CPT

## 2019-09-30 PROCEDURE — 99207 ZZC APP CREDIT; MD BILLING SHARED VISIT: CPT | Performed by: PHYSICIAN ASSISTANT

## 2019-09-30 PROCEDURE — 36415 COLL VENOUS BLD VENIPUNCTURE: CPT | Performed by: PHYSICIAN ASSISTANT

## 2019-09-30 PROCEDURE — 96376 TX/PRO/DX INJ SAME DRUG ADON: CPT

## 2019-09-30 PROCEDURE — 93306 TTE W/DOPPLER COMPLETE: CPT | Mod: 26 | Performed by: INTERNAL MEDICINE

## 2019-09-30 PROCEDURE — 36415 COLL VENOUS BLD VENIPUNCTURE: CPT | Performed by: INTERNAL MEDICINE

## 2019-09-30 PROCEDURE — 84484 ASSAY OF TROPONIN QUANT: CPT | Performed by: EMERGENCY MEDICINE

## 2019-09-30 PROCEDURE — 25000132 ZZH RX MED GY IP 250 OP 250 PS 637: Performed by: EMERGENCY MEDICINE

## 2019-09-30 PROCEDURE — 71275 CT ANGIOGRAPHY CHEST: CPT

## 2019-09-30 PROCEDURE — 96365 THER/PROPH/DIAG IV INF INIT: CPT | Mod: 59

## 2019-09-30 PROCEDURE — 99223 1ST HOSP IP/OBS HIGH 75: CPT | Mod: AI | Performed by: INTERNAL MEDICINE

## 2019-09-30 PROCEDURE — 80053 COMPREHEN METABOLIC PANEL: CPT | Performed by: EMERGENCY MEDICINE

## 2019-09-30 PROCEDURE — 83880 ASSAY OF NATRIURETIC PEPTIDE: CPT | Performed by: EMERGENCY MEDICINE

## 2019-09-30 PROCEDURE — 71046 X-RAY EXAM CHEST 2 VIEWS: CPT

## 2019-09-30 PROCEDURE — 85379 FIBRIN DEGRADATION QUANT: CPT | Performed by: EMERGENCY MEDICINE

## 2019-09-30 PROCEDURE — 00000146 ZZHCL STATISTIC GLUCOSE BY METER IP

## 2019-09-30 PROCEDURE — 84484 ASSAY OF TROPONIN QUANT: CPT | Performed by: PHYSICIAN ASSISTANT

## 2019-09-30 PROCEDURE — 99285 EMERGENCY DEPT VISIT HI MDM: CPT | Mod: 25

## 2019-09-30 PROCEDURE — 93970 EXTREMITY STUDY: CPT

## 2019-09-30 PROCEDURE — 12000000 ZZH R&B MED SURG/OB

## 2019-09-30 PROCEDURE — 25000128 H RX IP 250 OP 636: Performed by: EMERGENCY MEDICINE

## 2019-09-30 PROCEDURE — 25500064 ZZH RX 255 OP 636: Performed by: INTERNAL MEDICINE

## 2019-09-30 PROCEDURE — 99207 ZZC CDG-MDM COMPONENT: MEETS MODERATE - UP CODED: CPT | Performed by: INTERNAL MEDICINE

## 2019-09-30 PROCEDURE — 40000264 ECHOCARDIOGRAM COMPLETE

## 2019-09-30 PROCEDURE — 85520 HEPARIN ASSAY: CPT | Performed by: INTERNAL MEDICINE

## 2019-09-30 RX ORDER — FLUTICASONE PROPIONATE 50 MCG
1 SPRAY, SUSPENSION (ML) NASAL DAILY PRN
COMMUNITY
End: 2020-01-08

## 2019-09-30 RX ORDER — AMOXICILLIN 250 MG
2 CAPSULE ORAL 2 TIMES DAILY PRN
Status: DISCONTINUED | OUTPATIENT
Start: 2019-09-30 | End: 2019-10-01 | Stop reason: HOSPADM

## 2019-09-30 RX ORDER — NALOXONE HYDROCHLORIDE 0.4 MG/ML
.1-.4 INJECTION, SOLUTION INTRAMUSCULAR; INTRAVENOUS; SUBCUTANEOUS
Status: DISCONTINUED | OUTPATIENT
Start: 2019-09-30 | End: 2019-10-01 | Stop reason: HOSPADM

## 2019-09-30 RX ORDER — LIDOCAINE 40 MG/G
CREAM TOPICAL
Status: DISCONTINUED | OUTPATIENT
Start: 2019-09-30 | End: 2019-10-01 | Stop reason: HOSPADM

## 2019-09-30 RX ORDER — CITALOPRAM HYDROBROMIDE 20 MG/1
40 TABLET ORAL DAILY
Status: DISCONTINUED | OUTPATIENT
Start: 2019-10-01 | End: 2019-10-01 | Stop reason: HOSPADM

## 2019-09-30 RX ORDER — ACETAMINOPHEN 325 MG/1
650 TABLET ORAL EVERY 4 HOURS PRN
Status: DISCONTINUED | OUTPATIENT
Start: 2019-09-30 | End: 2019-10-01 | Stop reason: HOSPADM

## 2019-09-30 RX ORDER — ONDANSETRON 2 MG/ML
4 INJECTION INTRAMUSCULAR; INTRAVENOUS EVERY 6 HOURS PRN
Status: DISCONTINUED | OUTPATIENT
Start: 2019-09-30 | End: 2019-10-01 | Stop reason: HOSPADM

## 2019-09-30 RX ORDER — LISINOPRIL 40 MG/1
40 TABLET ORAL DAILY
Status: DISCONTINUED | OUTPATIENT
Start: 2019-10-01 | End: 2019-10-01 | Stop reason: HOSPADM

## 2019-09-30 RX ORDER — ASPIRIN 81 MG/1
324 TABLET, CHEWABLE ORAL ONCE
Status: COMPLETED | OUTPATIENT
Start: 2019-09-30 | End: 2019-09-30

## 2019-09-30 RX ORDER — POLYETHYLENE GLYCOL 3350 17 G/17G
17 POWDER, FOR SOLUTION ORAL DAILY PRN
Status: DISCONTINUED | OUTPATIENT
Start: 2019-09-30 | End: 2019-10-01 | Stop reason: HOSPADM

## 2019-09-30 RX ORDER — AMOXICILLIN 250 MG
1 CAPSULE ORAL 2 TIMES DAILY PRN
Status: DISCONTINUED | OUTPATIENT
Start: 2019-09-30 | End: 2019-10-01 | Stop reason: HOSPADM

## 2019-09-30 RX ORDER — ONDANSETRON 4 MG/1
4 TABLET, ORALLY DISINTEGRATING ORAL EVERY 6 HOURS PRN
Status: DISCONTINUED | OUTPATIENT
Start: 2019-09-30 | End: 2019-10-01 | Stop reason: HOSPADM

## 2019-09-30 RX ORDER — MECLIZINE HYDROCHLORIDE 25 MG/1
25 TABLET ORAL 3 TIMES DAILY PRN
Status: DISCONTINUED | OUTPATIENT
Start: 2019-09-30 | End: 2019-10-01 | Stop reason: HOSPADM

## 2019-09-30 RX ORDER — MULTIVITAMIN,THERAPEUTIC
1 TABLET ORAL DAILY
COMMUNITY

## 2019-09-30 RX ORDER — IOPAMIDOL 755 MG/ML
80 INJECTION, SOLUTION INTRAVASCULAR ONCE
Status: COMPLETED | OUTPATIENT
Start: 2019-09-30 | End: 2019-09-30

## 2019-09-30 RX ADMIN — HEPARIN SODIUM 1500 UNITS/HR: 10000 INJECTION, SOLUTION INTRAVENOUS at 10:04

## 2019-09-30 RX ADMIN — ASPIRIN 81 MG 324 MG: 81 TABLET ORAL at 09:29

## 2019-09-30 RX ADMIN — IOPAMIDOL 80 ML: 755 INJECTION, SOLUTION INTRAVENOUS at 09:41

## 2019-09-30 RX ADMIN — Medication 6000 UNITS: at 10:04

## 2019-09-30 RX ADMIN — HUMAN ALBUMIN MICROSPHERES AND PERFLUTREN 7 ML: 10; .22 INJECTION, SOLUTION INTRAVENOUS at 13:15

## 2019-09-30 RX ADMIN — HEPARIN SODIUM 1500 UNITS/HR: 10000 INJECTION, SOLUTION INTRAVENOUS at 22:42

## 2019-09-30 ASSESSMENT — MIFFLIN-ST. JEOR
SCORE: 1825.61
SCORE: 1789.78

## 2019-09-30 ASSESSMENT — ENCOUNTER SYMPTOMS
SHORTNESS OF BREATH: 1
FATIGUE: 1
FEVER: 0
COUGH: 0

## 2019-09-30 ASSESSMENT — ACTIVITIES OF DAILY LIVING (ADL)
ADLS_ACUITY_SCORE: 11
ADLS_ACUITY_SCORE: 11

## 2019-09-30 NOTE — PLAN OF CARE
Patient alert and oriented  Up independently in room  Heparin gtt continued at 1500 units/hr via peripheral IV  Lungs clear  Positive bowel sounds, last BM today  No issues with urination  Tolerating diet  BG checks BID and at bedtime  Tmax 99.1, down to 97.8 on recheck  BPs elevated in 150s, other VSS, on room air  Tele: SR   Left leg, no redness, no swelling, warm temp  Dyspnea on exertion  Continue with plan of care

## 2019-09-30 NOTE — TELEPHONE ENCOUNTER
"Patient advised to have someone take her to the Emergency Department immediately. Patient is in agreement with plan of care, reports that she will have her  take her to the Emergency Department immediately. Patient advised to call back if further assistance is needed or with any questions.    Reason for Disposition    MODERATE difficulty breathing (e.g., speaks in phrases, SOB even at rest, pulse 100-120) of new onset or worse than normal    Answer Assessment - Initial Assessment Questions  1. RESPIRATORY STATUS: \"Describe your breathing?\" (e.g., wheezing, shortness of breath, unable to speak, severe coughing)       Feels extremely out of breath. Feels unable to catch breath at rest. Denies feeling like throat is closing.   2. ONSET: \"When did this breathing problem begin?\"       Started on Saturday, has been slowly getting worse since Saturday. Is now wheezing, has had constant since Saturday. Feels unable to walk without taking a breath. Difficult to get up the stairs.   3. PATTERN \"Does the difficult breathing come and go, or has it been constant since it started?\"       Denies anything making it better or worse. Says that sitting down and focusing on breathing slow makes it slightly better.    4. SEVERITY: \"How bad is your breathing?\" (e.g., mild, moderate, severe)     - MILD: No SOB at rest, mild SOB with walking, speaks normally in sentences, can lay down, no retractions, pulse < 100.     - MODERATE: SOB at rest, SOB with minimal exertion and prefers to sit, cannot lie down flat, speaks in phrases, mild retractions, audible wheezing, pulse 100-120.     - SEVERE: Very SOB at rest, speaks in single words, struggling to breathe, sitting hunched forward, retractions, pulse > 120   Difficulty catching breath at rest, but able to continue breathing.       Has never had this happen in the past. Denies having contact with abnormal substances.   5. RECURRENT SYMPTOM: \"Have you had difficulty breathing before?\" " "If so, ask: \"When was the last time?\" and \"What happened that time?\"       Has had seasonal allergies in the past, but nothing has been this bad.   6. CARDIAC HISTORY: \"Do you have any history of heart disease?\" (e.g., heart attack, angina, bypass surgery, angioplasty)       Family history of heart disease, but denies personal history of lung disease.   7. LUNG HISTORY: \"Do you have any history of lung disease?\"  (e.g., pulmonary embolus, asthma, emphysema)      Denies.   8. CAUSE: \"What do you think is causing the breathing problem?\"       Denies history of asthma.   9. OTHER SYMPTOMS: \"Do you have any other symptoms? (e.g., dizziness, runny nose, cough, chest pain, fever)      Has a history of vertigo. Denies chest pain, denies fever.   10. PREGNANCY: \"Is there any chance you are pregnant?\" \"When was your last menstrual period?\"        *No Answer*  11. TRAVEL: \"Have you traveled out of the country in the last month?\" (e.g., travel history, exposures)        *No Answer*    Protocols used: BREATHING DIFFICULTY-A-OH    Kash Wilkins RN on 9/30/2019 at 7:33 AM    "

## 2019-09-30 NOTE — H&P
History and Physical     Racquel Nunn MRN# [id number]   YOB: 1965 Age: 54 year old      Date of Admission:  9/30/2019    Primary care provider: Nancy Small          Assessment and Plan:   Racquel Nunn is a 54 year old female with a PMH significant for obesity, DM II, hypertension, and asthma who presents with acute worsening of shortness of breath.     Patient was discussed with Dr. Porter, who was provider in ED. Chart review of ED work up was reviewed as well as chart review of Care Everywhere, previous visits and admissions.     #Shortness of breath due to bilateral PE with left lower extremity DVT  Patient presents with acute worsening of shortness of breath without cough or chest discomfort this weekend.  Shortness of breath is worse with exertion and she denies orthopnea.  On arrival to the ED she is afebrile, heart rate of 113, blood pressure 174/101, respirations 26 and is not requiring oxygen.  Lab work remarkable for troponin of 0.279, , hemoglobin 13.2, creatinine 0.95 and d-dimer elevated at 2.7.  Chest x-ray is clear but CT of her chest shows small bilateral pulmonary emboli in the mid to distal branches of the pulmonary arteries predominantly in the lower lobes.  EKG shows a sinus tachycardia.  Bilateral lower extremity ultrasound shows an occlusive thrombus within the left peroneal veins.  She uses birth control to prevent pregnancy and denies other risk factors including family history for blood clots.  Heparin drip was started in the ED.  -Continue heparin drip  -Monitor on telemetry  -Obtain echocardiogram  -Trend troponin  -Pharmacy liaison consult for warfarin versus newer anticoagulant pricing  -Likely blood clot related to birth control us  -Discontinue birth control pill    #Type 2 diabetes  Glucose on admission is 116 with no recent A1c on record  -Twice daily/at bedtime glucose checks  -Continue metformin  -Add A1c    #Hypertension  Continue PTA  lisinopril    #Asthma  Lungs sound clear with no current exacerbation    #Depression  Continue PTA Celexa            Social: No concerns  Code: Discussed with patient and they have chosen Full code  VTE prophylaxis: On heparin drip  Disposition: Inpatient                    Chief Complaint:   Shortness of breath         History of Present Illness:   Racquel Nunn is a 54 year old female who presents with acute worsening of shortness of breath.  She states for the past 2 to 3 months she has had intermittent shortness of breath but this acutely worsened this weekend.  She denies cough, chest discomfort and orthopnea but feels her symptoms are worse with exertion.  In the ED she did have some left calf pain that she described as a charley horse that is now gone.  She has never had symptoms like this before and denies both a personal and family history of blood clots.  She has not traveled by car plane recently, denies smoking, denies cancer history with up-to-date colon and breast screening, and denies recent surgery/trauma.  She does use birth control prescribed by her primary care provider.  She denies recent illness but has been struggling with dizziness and vertigo-like symptoms she attributes to Aleve use.  She has not started any other medications and is taking her medications as prescribed.             Past Medical History:     Past Medical History:   Diagnosis Date     HTN (hypertension)                Past Surgical History:     Past Surgical History:   Procedure Laterality Date     NO HISTORY OF SURGERY                 Social History:     Social History     Socioeconomic History     Marital status:      Spouse name: Not on file     Number of children: 0     Years of education: 12     Highest education level: Not on file   Occupational History     Occupation:      Comment: [employer]   Social Needs     Financial resource strain: Not on file     Food insecurity:     Worry: Not on file      Inability: Not on file     Transportation needs:     Medical: Not on file     Non-medical: Not on file   Tobacco Use     Smoking status: Never Smoker     Smokeless tobacco: Never Used   Substance and Sexual Activity     Alcohol use: Yes     Comment: 1 drink per week or less     Drug use: No     Sexual activity: Never     Comment: never been sexually active, declining pap smears   Lifestyle     Physical activity:     Days per week: Not on file     Minutes per session: Not on file     Stress: Not on file   Relationships     Social connections:     Talks on phone: Not on file     Gets together: Not on file     Attends Tenriism service: Not on file     Active member of club or organization: Not on file     Attends meetings of clubs or organizations: Not on file     Relationship status: Not on file     Intimate partner violence:     Fear of current or ex partner: Not on file     Emotionally abused: Not on file     Physically abused: Not on file     Forced sexual activity: Not on file   Other Topics Concern     Parent/sibling w/ CABG, MI or angioplasty before 65F 55M? No   Social History Narrative    Wedding 7/18/15               Family History:     Family History   Problem Relation Age of Onset     C.A.D. Father         CABG-age 60     Cerebrovascular Disease Maternal Grandmother      Breast Cancer Paternal Aunt         age 40     Cancer Maternal Aunt         melanoma     Diabetes No family hx of      Hypertension No family hx of      Cancer - colorectal No family hx of      Thyroid Disease No family hx of               Allergies:      Allergies   Allergen Reactions     No Known Allergies                Medications:     Prior to Admission medications    Medication Sig Last Dose Taking? Auth Provider   citalopram (CELEXA) 20 MG tablet Take 2 tablets (40 mg) by mouth daily 9/30/2019 at am Yes Nancy Small MD   lisinopril (PRINIVIL/ZESTRIL) 40 MG tablet Take 1 tablet (40 mg) by mouth daily 9/30/2019 at am Yes  "Nancy Small MD   meclizine (ANTIVERT) 25 MG tablet Take 1 tablet (25 mg) by mouth 3 times daily as needed for dizziness Past Week at Unknown time Yes Nancy Small MD   metFORMIN (GLUCOPHAGE) 500 MG tablet Take 1 tablet (500 mg) by mouth daily (with breakfast) 9/29/2019 at am Yes Nancy Small MD   multivitamin, therapeutic (THERA-VIT) TABS tablet Take 1 tablet by mouth daily 9/30/2019 at am Yes Unknown, Entered By History   norgestim-eth estrad triphasic (TRI-ESTARYLLA) 0.18/0.215/0.25 MG-35 MCG tablet Take 1 tablet by mouth daily Due for annual physical at this time. Please call to schedule. 9/30/2019 at am Yes Nancy Small MD   PROAIR  (90 Base) MCG/ACT inhaler INHALE 2 PUFFS BY MOUTH EVERY 6 HOURS AS NEEDED FOR WHEEZE OR FOR SHORTNESS OF BREATH Past Week at 0700 Yes Nancy Small MD   fluticasone (FLONASE) 50 MCG/ACT nasal spray Spray 1 spray into both nostrils daily as needed for rhinitis or allergies More than a month at Unknown time  Unknown, Entered By History   glucose blood VI test strips strip by In Vitro route daily.   Irene Beal MD              Review of Systems:   A Comprehensive greater than 10 system review of systems was carried out.  Pertinent positives and negatives are noted above.  Otherwise negative for contributory information.            Physical Exam:   Blood pressure (!) 146/106, pulse 128, temperature 98.5  F (36.9  C), resp. rate 11, height 1.651 m (5' 5\"), weight 121.5 kg (267 lb 12.8 oz), SpO2 98 %, not currently breastfeeding.  Exam:  GENERAL:  Comfortable.  PSYCH: pleasant, oriented, No acute distress.  HEENT:  PERRLA. Normal conjunctiva, normal hearing, nasal mucosa and Oropharynx are normal.  NECK:  Supple, no neck vein distention, adenopathy or bruits, normal thyroid.  HEART:  Normal S1, S2 with no murmur, no pericardial rub, gallops or S3 or S4.  LUNGS:  Clear to auscultation, normal Respiratory effort. No wheezing, " rales or ronchi.  ABDOMEN:  Soft, no hepatosplenomegaly, normal bowel sounds. Non-tender, non distended.   EXTREMITIES:  No pedal edema, +2 pulses bilateral and equal.  SKIN:  Dry to touch, No rash, wound or ulcerations.  NEUROLOGIC:  CN 2-12 grossly intact,  sensation is intact with no focal deficits.               Data:     Recent Labs   Lab 09/30/19  0836   WBC 10.8   HGB 13.2   HCT 40.6   MCV 94        Recent Labs   Lab 09/30/19  0836      POTASSIUM 3.5   CHLORIDE 105   CO2 28   ANIONGAP 5   *   BUN 12   CR 0.95   GFRESTIMATED 69   GFRESTBLACK 80   JEFFERSON 9.0   PROTTOTAL 7.2   ALBUMIN 3.2*   BILITOTAL 0.3   ALKPHOS 84   AST 19   ALT 31     Recent Labs   Lab 09/30/19  0836   DD 2.7*     Recent Labs   Lab 09/30/19  0836   TROPI 0.279*         Recent Results (from the past 24 hour(s))   XR Chest 2 Views    Narrative    CHEST TWO VIEWS  9/30/2019 9:04 AM     HISTORY: 54-year-old woman with dyspnea.       Impression    IMPRESSION: Since January 29, 2018, heart size is normal. No pleural  effusion, pneumothorax, or abnormal area of consolidation. No acute  osseous abnormality.    AJ GOINS MD   CT Chest Pulmonary Embolism w Contrast   Result Value    Radiologist flags Pulmonary emboli. (AA)    Narrative    CT CHEST WITH CONTRAST  9/30/2019 9:45 AM    HISTORY: Dyspnea and shortness of breath. Evaluate for pulmonary  embolism.    COMPARISON: Radiographs earlier today.    TECHNIQUE: Routine transverse CT imaging of the chest was performed  following the uneventful intravenous administration of 80 mL  Isovue-370 contrast. A pulmonary embolism protocol was utilized.  Radiation dose for this scan was reduced using automated exposure  control, adjustment of the mA and/or kV according to patient size, or  iterative reconstruction technique.    FINDINGS: The heart size is normal.No enlarged lymph node or other  abnormal mediastinal mass is seen. The thoracic aorta is unremarkable.  There is very good  opacification of the pulmonary arteries with  contrast. There are several small pulmonary emboli in the mid to  distal arterial branches of the pulmonary arteries in both lungs, the  lungs are clear. No pneumothorax is demonstrated. No pleural effusion  is identified. There are mild degenerative changes in the spine. No  other osseous abnormality is seen. No chest wall pathology is seen.  The visualized upper abdomen is unremarkable.      Impression    IMPRESSION: There are small pulmonary emboli in the mid to distal  branches of the pulmonary arteries bilaterally, predominantly in the  lower lobes. There is no additional evidence of heart failure or  vascular congestion.     [Critical Result: Pulmonary emboli.]    Finding was identified on 9/30/2019 9:53 AM.     Dr. Porter in the emergency department was contacted by me on  9/30/2019 9:57 AM and verbalized understanding of the critical result.      LEONARDO SKINNER MD   US Lower Extremity Venous Duplex Bilateral    Narrative    BILATERAL LOWER EXTREMITY VENOUS DOPPLER ULTRASOUND  9/30/2019 10:46  AM    HISTORY: Pulmonary embolism diagnosed today. Shortness of breath. The  concern is for deep venous thrombosis.    COMPARISON: None.    FINDINGS: Color flow and Doppler spectral waveform analysis was  performed of the common femoral, femoral, popliteal, posterior tibial,  and greater saphenous veins of the lower extremities bilaterally.  There is occlusive thrombus within the left peroneal veins. The  remaining deep veins of both lower extremities are widely patent.      Impression    IMPRESSION: Occlusive thrombus of the peroneal veins of the left calf.         Dinora Ferrer PA-C    This patient was seen and discussed with Dr. Telles who agrees with the current plans as outlined above.

## 2019-09-30 NOTE — LETTER
[place] [address] [facility]  [address]  [facility] [address]  [phone]      October 1, 2019      RE: Racquel Nunn  [address]  [facility] [address]       To whom it may concern:    Racquel Nunn was hospitalized at our facility from 9/30/19 to 10/1/2019 and may return back to work on 10/9/2019 without restriction or as otherwise directed by her primary MD.  Please feel free to contact us for any further questions.    Sincerely,        Andrea Telles MD

## 2019-09-30 NOTE — PHARMACY
Anticoagulation coverage check.  Patient has BCBS through an employer.    Xarelto:  $40/mo. Upon receipt of RX [facility] can provide copay savings card to reduce this to $10/mo.  Eliquis:  $40/mo. Upon receipt of RX [facility] can provide copay savings card to reduce this to $10/mo.    Enoxaparin 120mg x 10 syringes:  $15    Jantoven (warfarin): $9/mo      -JOJO Esqueda, Pharmacy Technician/Liaison, [facility]charge [facility] *[phone]

## 2019-09-30 NOTE — PLAN OF CARE
Patient admitted from the ED this shift with heparin drip infusing. Patient has + Bilateral PE's and LLE DVT. Pt complains of dyspnea with activity. LS clear. Tele- SR. Trending troponins. Up with SBA x1. Tolerating diet.

## 2019-09-30 NOTE — ED TRIAGE NOTES
Patient presents to the ED with SOB. Reports began over the weekend. Denies asthma, COPD or other lung problems. No recent URI symptoms or swelling. Denies chest pain.

## 2019-09-30 NOTE — ED PROVIDER NOTES
History   Chief Complaint:  Shortness of Breath    HPI   Racquel Nunn is a 54 year old female with a history of hypertension who presents with shortness of breath. The patient reports that for the past three months she has been intermittently short of breath. This has worsened over the past two days to the point where is short of breath at rest. The shortness of breath is exacerbated significantly by exertion. She states that two days ago she also developed a new cramping pain in her left calf which has now resolved. The patient also endorses baseline intermittent fatigue and has gained weight recently. The patient does have family history of heart problems in her father who had an emergency CABG at the age of 60. She denies chest pain, leg swelling, fever, new productive cough, and recent long travel, immobilizations, or surgeries. She is on oral hormone replacement therapy.    CARDIAC RISK FACTORS:  Sex:    Female  Tobacco:   No  Hypertension:   Yes  Hyperlipidemia:  No  Diabetes:   No  Family History:  No    PE/DVT RISK FACTORS:  Sex:    Female  Hormones:   Yes  Tobacco:   No  Cancer:   No  Travel:   No  Surgery:   No  Other immobilization: No  Personal history:  No  Family history:  No    Allergies:  No known drug allergies    Medications:    Celexa  Lisinopril   Meclizine  Metformin  Norgestim-eth estrad triphasic   Proair inhaler     Past Medical History:    Benign essential hypertension  Morbid obesity  Major depression in complete remission  Anxiety  Tendinopathy of right rotator cuff  Mild intermittent asthma    Past Surgical History:    History reviewed. No pertinent surgical history.    Family History:    CAD, CABG age 60 (father)    Social History:  Smoking status: Never smoker  Alcohol use: Yes  Drug use: No  Marital Status:   [2]     Review of Systems   Constitutional: Positive for fatigue (intermittent, baseline). Negative for fever.   Respiratory: Positive for shortness of breath.  "Negative for cough (productive).    Cardiovascular: Negative for chest pain and leg swelling.   Musculoskeletal:        Positive for pain in left calf (resolved).   Skin: Negative for rash.   All other systems reviewed and are negative.      Physical Exam   Patient Vitals for the past 24 hrs:   BP Temp Pulse Heart Rate Resp SpO2 Height Weight   09/30/19 1115 -- -- -- -- 18 95 % -- --   09/30/19 1101 (!) 166/94 -- 94 -- -- 96 % -- --   09/30/19 0931 -- -- -- -- -- -- 1.651 m (5' 5\") 121.5 kg (267 lb 12.8 oz)   09/30/19 0930 (!) 146/106 -- 128 104 11 98 % -- --   09/30/19 0845 (!) 173/113 -- -- 105 13 -- -- --   09/30/19 0830 -- -- -- 105 15 98 % -- --   09/30/19 0819 (!) 174/101 98.5  F (36.9  C) 113 -- 26 97 % -- --     Physical Exam  General: Large adult female sitting upright  Eyes: PERRL, Conjunctive within normal limits  ENT: Moist mucous membranes, oropharynx clear.   CV: Normal S1S2, no murmur, rub or gallop. Tachycardic, regular. No JVD.  Resp: Clear to auscultation bilaterally, no wheezes, rales or rhonchi. Normal respiratory effort.  GI: Abdomen is soft, nontender and nondistended. No palpable masses. No rebound or guarding.  MSK: No edema. Nontender on palpation. No palpable cord. Normal active range of motion.  Skin: Warm and dry. No rashes or lesions or ecchymoses on visible skin.  Neuro: Alert and oriented. Responds appropriately to all questions and commands. No focal findings appreciated. Normal muscle tone.  Psych: Normal mood and affect. Pleasant.    Emergency Department Course   ECG (08:28:34):  Rate 108 bpm. WA interval 148. QRS duration 94. QT/QTc 346/463. P-R-T axes 58 61 56. Sinus tachycardia with premature atrial complexes with aberrant conduction. Low voltage QRS. Septal infarct, age undetermined. Interpreted at 0841 by Mary Porter MD.    Imaging:  Radiographic findings were communicated with the patient who voiced understanding of the findings.    CT Chest PE w Contrast:  There " are small pulmonary emboli in the mid to distal  branches of the pulmonary arteries bilaterally, predominantly in the  lower lobes. There is no additional evidence of heart failure or  vascular congestion.   As read by Radiology.    XR Chest 2 Views:  Since January 29, 2018, heart size is normal. No pleural  effusion, pneumothorax, or abnormal area of consolidation. No acute  osseous abnormality.  As read by Radiology.     US Lower Extremity Venous Duplex Bilateral:  Occlusive thrombus of the peroneal veins of the left calf.  As read by Radiology.    Laboratory:  CBC: WNL (WBC 10.8, HGB 13.2, )  CMP: Glucose 116 (H), Albumin 3.2 (L) o/w WNL (Creatinine 0.95)  D-dimer: 2.7 (H)  Troponin: 0.279 (HH)  BNP: 413  TSH with free T4 Reflex: 2.43    Procedures:    Interventions:  0929: aspirin 324 mg PO  1004: heparin loading dose 6000 Units IV            Heparin infusion 1500 Units/hr IV    Emergency Department Course:  Past medical records, nursing notes, and vitals reviewed.   0844: I performed an exam of the patient and obtained history, as documented above.    IV inserted and blood drawn. EKG obtained, results above. The patient was sent for a chest x-ray and chest CT while in the emergency department, findings above.    0928: I rechecked the patient. No new concerns.     0957: I spoke to Dr. Boyce of radiology regarding the patient's imaging.     Patient reassessed. She denies any change in her symptoms. I discussed the results of the CT scan and the use of heparin for which there do not seem to be any contraindication and the patient is agreeable to its use.    The patient was sent for a bilateral lower extremity ultrasound while in the emergency department, findings above.    1032: I spoke to LAUREN Hamilton for Dr. Telles of the hospitalist service who accepts the patient for admission.     Findings and plan explained to the patient who consents to admission.     Discussed the patient with Rosa Ferrer  PA for Dr. Telles, who will admit the patient to a cardiac telemetry bed for further monitoring, evaluation, and treatment.     Impression & Plan    Medical Decision Making:  Racquel Nunn is a 54 year old female who presents to the emergency department with intermittent shortness of breath for three months with two days of significant, more severe shortness of breath. She was also noting a cramp in her left leg that has since resolved. She presents to the emergency department hemodynamically hypertensive and tachycardic. This improved over time though she remains mildly hypertensive throughout her stay in the setting of underlying hypertension. At rest, she was not hypoxic nor hypotensive. She has no signs of instability. She had evidence of bilateral pulmonary emboli on CT scan without evidence to suggest heart failure or right heart strain. She did have an elevated troponin with a normal BNP. Despite the elevated troponin, as there is no other evidence of right heart strain or instability, the use of thrombolytics is not emergently indicated. I discussed this with the patient and she felt comfortable without thrombolytics at this time. She was administered heparin while in the emergency department. Ultrasound was performed here in the emergency department and showed left calf thrombus which may be the implicating clot. She will be admitted to telemetry given elevated troponin and heparin infusion. All questions were answered prior to admission.    Diagnosis:    ICD-10-CM   1. Bilateral pulmonary embolism (H) I26.99   2. Elevated troponin R74.8   3. Deep vein thrombosis (DVT) of distal vein of left lower extremity, unspecified chronicity (H) I82.4Z2       Disposition:  Admitted to cardiac telemetry in the care of Dr. Her Mahesh Raman  9/30/2019   [facility] EMERGENCY DEPARTMENT  Mahesh ARITA, am serving as a scribe at 8:44 AM on 9/30/2019 to document services personally performed by  Mary Porter MD based on my observations and the provider's statements to me.        Mary Porter MD  09/30/19 1514

## 2019-09-30 NOTE — PHARMACY-ADMISSION MEDICATION HISTORY
Admission medication history interview status for this patient is complete. See [facility] admission navigator for allergy information, prior to admission medications and immunization status.     Medication history interview source(s):Patient  Medication history resources (including written lists, pill bottles, clinic record):None  Primary pharmacy: [address] [address] [facility]    Changes made to PTA medication list:  Added: none  Deleted: none  Changed: flonase (daily --> daily PRN)    Actions taken by pharmacist (provider contacted, etc):None   Additional medication history information:None  Medication reconciliation/reorder completed by provider prior to medication history? No       Prior to Admission medications    Medication Sig Last Dose Taking? Auth Provider   citalopram (CELEXA) 20 MG tablet Take 2 tablets (40 mg) by mouth daily 9/30/2019 at am Yes Nancy Small MD   lisinopril (PRINIVIL/ZESTRIL) 40 MG tablet Take 1 tablet (40 mg) by mouth daily 9/30/2019 at am Yes Nancy Small MD   meclizine (ANTIVERT) 25 MG tablet Take 1 tablet (25 mg) by mouth 3 times daily as needed for dizziness Past Week at Unknown time Yes Nancy Small MD   metFORMIN (GLUCOPHAGE) 500 MG tablet Take 1 tablet (500 mg) by mouth daily (with breakfast) 9/29/2019 at am Yes Nancy Small MD   multivitamin, therapeutic (THERA-VIT) TABS tablet Take 1 tablet by mouth daily 9/30/2019 at am Yes Unknown, Entered By History   norgestim-eth estrad triphasic (TRI-ESTARYLLA) 0.18/0.215/0.25 MG-35 MCG tablet Take 1 tablet by mouth daily Due for annual physical at this time. Please call to schedule. 9/30/2019 at am Yes Nancy Small MD   PROAIR  (90 Base) MCG/ACT inhaler INHALE 2 PUFFS BY MOUTH EVERY 6 HOURS AS NEEDED FOR WHEEZE OR FOR SHORTNESS OF BREATH Past Week at 0700 Yes Nancy Small MD   fluticasone (FLONASE) 50 MCG/ACT nasal spray Spray 1 spray into both nostrils  daily as needed for rhinitis or allergies More than a month at Unknown time  Unknown, Entered By History   glucose blood VI test strips strip by In Vitro route daily.   Irene Beal MD

## 2019-09-30 NOTE — ED NOTES
"[facility]  ED Nurse Handoff Report    Racquel Nunn is a 54 year old female   ED Chief complaint: Shortness of Breath  . ED Diagnosis:   Final diagnoses:   Bilateral pulmonary embolism (H)   Elevated troponin     Allergies:   Allergies   Allergen Reactions     No Known Allergies        Code Status: Full Code  Activity level - Baseline/Home:  Independent. Activity Level - Current:   Independent. Lift room needed: No. Bariatric: No   Needed: No   Isolation: No. Infection: Not Applicable.     Vital Signs:   Vitals:    09/30/19 0830 09/30/19 0845 09/30/19 0930 09/30/19 0931   BP:  (!) 173/113 (!) 146/106    Pulse:   128    Resp: 15 13 11    Temp:       SpO2: 98%  98%    Weight:    121.5 kg (267 lb 12.8 oz)   Height:    1.651 m (5' 5\")       Cardiac Rhythm:  ,      Pain level:    Patient confused: No. Patient Falls Risk: Yes.   Elimination Status: Has voided   Patient Report - Initial Complaint: Sob. Focused Assessment:  54 year old female with a history of hypertension who presents with shortness of breath. The patient reports that for the past three months she has been intermittently short of breath. This has worsened over the past two days to the point where is short of breath at rest. The shortness of breath is exacerbated significantly by exertion. She states that two days ago she also developed a new pain in her left calf. The patient also endorses baseline intermittent fatigue and has gained weight recently. The patient does have family history of heart problems in her father who had an emergency CABG at the age of 60. She denies chest pain, leg swelling, fever, new productive cough, hair loss, rash, and recent long travel, immobilizations, or surgeries.     Tests Performed: labs, imaging. Abnormal Results:   Labs Ordered and Resulted from Time of ED Arrival Up to the Time of Departure from the ED   D DIMER QUANTITATIVE - Abnormal; Notable for the following components:       Result " Value    D Dimer 2.7 (*)     All other components within normal limits   COMPREHENSIVE METABOLIC PANEL - Abnormal; Notable for the following components:    Glucose 116 (*)     Albumin 3.2 (*)     All other components within normal limits   TROPONIN I - Abnormal; Notable for the following components:    Troponin I ES 0.279 (*)     All other components within normal limits   CBC WITH PLATELETS DIFFERENTIAL   NT PROBNP INPATIENT   TSH WITH FREE T4 REFLEX   PERIPHERAL IV CATHETER   MEASURE WEIGHT   NOTIFY PHYSICIAN   NOTIFY PHYSICIAN   PLATELETS MONITORED PER HEPARIN TREATMENT PROTOCOL (FOR MEANINGFUL USE     CT Chest Pulmonary Embolism w Contrast   Final Result   Abnormal   IMPRESSION: There are small pulmonary emboli in the mid to distal   branches of the pulmonary arteries bilaterally, predominantly in the   lower lobes. There is no additional evidence of heart failure or   vascular congestion.       [Critical Result: Pulmonary emboli.]      Finding was identified on 9/30/2019 9:53 AM.       Dr. Porter in the emergency department was contacted by me on   9/30/2019 9:57 AM and verbalized understanding of the critical result.         LEONARDO SKINNER MD      XR Chest 2 Views   Final Result   IMPRESSION: Since January 29, 2018, heart size is normal. No pleural   effusion, pneumothorax, or abnormal area of consolidation. No acute   osseous abnormality.      AJ GOINS MD       Lower Extremity Venous Duplex Bilateral    (Results Pending)      Treatments provided: See MAR  Family Comments: At Bedside  OBS brochure/video discussed/provided to patient:  No  ED Medications:   Medications   heparin infusion 25,000 units in 0.45% NaCl 250 mL (1,500 Units/hr Intravenous New Bag 9/30/19 1004)   aspirin (ASA) chewable tablet 324 mg (324 mg Oral Given 9/30/19 0929)   iopamidol (ISOVUE-370) solution 80 mL (80 mLs Intravenous Given 9/30/19 0941)   sodium chloride (PF) 0.9% PF flush 90 mL (90 mLs Intravenous Given 9/30/19  0942)   heparin Loading Dose bolus dose from infusion pump 6,000 Units (6,000 Units Intravenous Given 9/30/19 1004)     Drips infusing:  Yes  For the majority of the shift, the patient's behavior Green. Interventions performed were na.     Severe Sepsis OR Septic Shock Diagnosis Present: No      ED Nurse Name/Phone Number: Erica Bera RN,   10:43 AM  RECEIVING UNIT ED HANDOFF REVIEW    Above ED Nurse Handoff Report was reviewed: Yes  Reviewed by: Mary Wilkins RN on September 30, 2019 at 11:42 AM

## 2019-10-01 VITALS
HEIGHT: 65 IN | BODY MASS INDEX: 43.3 KG/M2 | RESPIRATION RATE: 16 BRPM | TEMPERATURE: 97.1 F | SYSTOLIC BLOOD PRESSURE: 137 MMHG | DIASTOLIC BLOOD PRESSURE: 83 MMHG | HEART RATE: 90 BPM | OXYGEN SATURATION: 97 % | WEIGHT: 259.9 LBS

## 2019-10-01 LAB
ANION GAP SERPL CALCULATED.3IONS-SCNC: 4 MMOL/L (ref 3–14)
BUN SERPL-MCNC: 12 MG/DL (ref 7–30)
CALCIUM SERPL-MCNC: 8.7 MG/DL (ref 8.5–10.1)
CHLORIDE SERPL-SCNC: 104 MMOL/L (ref 94–109)
CO2 SERPL-SCNC: 31 MMOL/L (ref 20–32)
CREAT SERPL-MCNC: 0.95 MG/DL (ref 0.52–1.04)
ERYTHROCYTE [DISTWIDTH] IN BLOOD BY AUTOMATED COUNT: 13.3 % (ref 10–15)
GFR SERPL CREATININE-BSD FRML MDRD: 68 ML/MIN/{1.73_M2}
GLUCOSE BLDC GLUCOMTR-MCNC: 129 MG/DL (ref 70–99)
GLUCOSE SERPL-MCNC: 140 MG/DL (ref 70–99)
HCT VFR BLD AUTO: 41 % (ref 35–47)
HGB BLD-MCNC: 13.3 G/DL (ref 11.7–15.7)
LMWH PPP CHRO-ACNC: 0.6 IU/ML
MCH RBC QN AUTO: 30.4 PG (ref 26.5–33)
MCHC RBC AUTO-ENTMCNC: 32.4 G/DL (ref 31.5–36.5)
MCV RBC AUTO: 94 FL (ref 78–100)
PLATELET # BLD AUTO: 237 10E9/L (ref 150–450)
POTASSIUM SERPL-SCNC: 4.2 MMOL/L (ref 3.4–5.3)
RBC # BLD AUTO: 4.38 10E12/L (ref 3.8–5.2)
SODIUM SERPL-SCNC: 139 MMOL/L (ref 133–144)
WBC # BLD AUTO: 11.7 10E9/L (ref 4–11)

## 2019-10-01 PROCEDURE — 85520 HEPARIN ASSAY: CPT | Performed by: PHYSICIAN ASSISTANT

## 2019-10-01 PROCEDURE — 25000132 ZZH RX MED GY IP 250 OP 250 PS 637: Performed by: PHYSICIAN ASSISTANT

## 2019-10-01 PROCEDURE — 85027 COMPLETE CBC AUTOMATED: CPT | Performed by: PHYSICIAN ASSISTANT

## 2019-10-01 PROCEDURE — 00000146 ZZHCL STATISTIC GLUCOSE BY METER IP

## 2019-10-01 PROCEDURE — 25000132 ZZH RX MED GY IP 250 OP 250 PS 637: Performed by: INTERNAL MEDICINE

## 2019-10-01 PROCEDURE — 36415 COLL VENOUS BLD VENIPUNCTURE: CPT | Performed by: PHYSICIAN ASSISTANT

## 2019-10-01 PROCEDURE — 80048 BASIC METABOLIC PNL TOTAL CA: CPT | Performed by: PHYSICIAN ASSISTANT

## 2019-10-01 PROCEDURE — 99238 HOSP IP/OBS DSCHRG MGMT 30/<: CPT | Performed by: INTERNAL MEDICINE

## 2019-10-01 RX ADMIN — CITALOPRAM HYDROBROMIDE 40 MG: 20 TABLET ORAL at 09:30

## 2019-10-01 RX ADMIN — APIXABAN 10 MG: 5 TABLET, FILM COATED ORAL at 13:14

## 2019-10-01 RX ADMIN — METFORMIN HYDROCHLORIDE 500 MG: 500 TABLET ORAL at 09:31

## 2019-10-01 RX ADMIN — LISINOPRIL 40 MG: 40 TABLET ORAL at 09:30

## 2019-10-01 ASSESSMENT — ACTIVITIES OF DAILY LIVING (ADL)
ADLS_ACUITY_SCORE: 11

## 2019-10-01 NOTE — PHARMACY - DISCHARGE MEDICATION RECONCILIATION AND EDUCATION
Racquel Pateltstone     1965      female    [id number]                                [phone]    Allergy: No known allergies    RX: Discharge Medication Consult by Pharmacist  EDUCATION:   Discharge Medication List   Racquel Nunn   Home Medication Instructions MAYRA:[id number]    Printed on:10/01/19 9798   Medication Information                      apixaban ANTICOAGULANT (ELIQUIS) 5 MG tablet  Take 2 tablets (10 mg) by mouth 2 times daily             apixaban ANTICOAGULANT (ELIQUIS) 5 MG tablet  Take 1 tablet (5 mg) by mouth 2 times daily             citalopram (CELEXA) 20 MG tablet  Take 2 tablets (40 mg) by mouth daily             fluticasone (FLONASE) 50 MCG/ACT nasal spray  Spray 1 spray into both nostrils daily as needed for rhinitis or allergies             glucose blood VI test strips strip  by In Vitro route daily.             lisinopril (PRINIVIL/ZESTRIL) 40 MG tablet  Take 1 tablet (40 mg) by mouth daily             meclizine (ANTIVERT) 25 MG tablet  Take 1 tablet (25 mg) by mouth 3 times daily as needed for dizziness             metFORMIN (GLUCOPHAGE) 500 MG tablet  Take 1 tablet (500 mg) by mouth daily (with breakfast)             multivitamin, therapeutic (THERA-VIT) TABS tablet  Take 1 tablet by mouth daily             PROAIR  (90 Base) MCG/ACT inhaler  INHALE 2 PUFFS BY MOUTH EVERY 6 HOURS AS NEEDED FOR WHEEZE OR FOR SHORTNESS OF BREATH                 Patient was informed to STOP taking the following HOME medications:   Tri-Estarylla    Patient was informed to start taking the following NEW medications:   apixaban    Patient was educated on the following for each discharge medication:  Rationale for therapy  Duration of treatment  Dosing and or monitoring drug levels  Common side effects  Importance of compliance  Drug/food interactions  Missed doses  Self monitoring parameters    OUTCOMES:   Discussed s/sx of bleeding to watch for. Discussed avoiding aspirin and NSAIDs  (ibuprofen, naproxen) while on anticoagulation with apixaban. Patient verbalized understanding.  Left written materials and instructions (Clinical notes from [facility]) - apixaban.  IMPORTANT FOLLOW UP NOTES:   Follow up with primary care provider, Nancy Small, within 7 days for hospital follow- up.  Time spent: 15 minutes.

## 2019-10-01 NOTE — PLAN OF CARE
Vss. A&OX4 up ind. D.c inst reviewed. First dose of eliquis given. Pt instructed on dosage and how to take. Pt is to take second 10mg po dose tonight at 10 or 11pm. Eliquis filled here and given to pt . Ls clear. Denies sob. discharge home with spouse

## 2019-10-01 NOTE — PLAN OF CARE
A&O. VSS ex hypertensive. Denies pain. Heparin gtt continues @ 1500 units/hr. Tele SR. Independent in room.  Aurora Phillips RN on 10/1/2019 at 5:15 AM

## 2019-10-01 NOTE — PROGRESS NOTES
Patient seen and examined.  Up ambulatory on room air and feels much better in terms of breathing.  Lung exam is clear.  Cardiovascular: Normal S1-S2 regular rate and rhythm with no murmurs rubs or gallops.  Okay to discharge later this afternoon once pharmacy liaison can discuss cost of direct oral anticoagulants.  Follow-up primary MD within 1 week at [facility].  With return to work next Wednesday if cleared by primary MD in the interim.  Work note has been printed.

## 2019-10-02 NOTE — DISCHARGE SUMMARY
Discharge Summary  [facility]    Racquel Nunn MRN# [id number]   YOB: 1965 Age: 54 year old     Date of Admission:  9/30/2019  Date of Discharge:  10/1/2019  2:08 PM  Admitting Physician:  Andrea Telles MD  Discharge Physician: Andrea Telles MD  Discharging Service: Hospitalist     Primary Provider: Nancy Moore  Primary Care Physician Phone Number: [phone]         Discharge Diagnoses/Problem Oriented Hospital Course (Providers):    Racquel Nunn was admitted on 9/30/2019 by Andrea Telles MD and I would refer you to their history and physical.  The following problems were addressed during her hospitalization:     I saw and evaluated the patient     Discharge diagnoses:  1. Acute bilateral PE with left lower extremity peroneal DVT  2. Type 2 diabetes  3. Hypertension  4. History of asthma without evidence of acute exacerbation  5. Depressive disorder  6. Type II MI secondary to supply demand mismatch from problem #1      Hospital course:  Racquel Nunn is a 54 year old female who presents with acute worsening of shortness of breath.  She states for the past 2 to 3 months she has had intermittent shortness of breath but this acutely worsened this weekend.  She denies cough, chest discomfort and orthopnea but feels her symptoms are worse with exertion.  In the ED she did have some left calf pain that she described as a charley horse that is now gone.  She has never had symptoms like this before and denies both a personal and family history of blood clots.  She has not traveled by car plane recently, denies smoking, denies cancer history with up-to-date colon and breast screening, and denies recent surgery/trauma.  She does use birth control prescribed by her primary care provider.  She denies recent illness but has been struggling with dizziness and vertigo-like symptoms she attributes to Aleve use.  She has not started any other medications and is taking her  medications as prescribed.  Her dyspnea had gotten to the point where she was unable to walk her dog.  She presented and had clinical findings on CT scan with bilateral PE.  She was otherwise hemodynamically stable and was on room air.  Patient was placed on an IV heparin drip.  Provocative factor is likely secondary to her oral contraceptives use.  She was educated to discontinue this and in the setting of a provocative factor, could ideally just go on anticoagulation for 3 months.  Pharmacy liaison was consulted and patient will be able to go on Eliquis at discharge.  She was on room air and ambulatory with less dyspnea prior to discharge.  Follow-up primary MD and will defer to primary MD whether or not they want to pursue a hypercoagulable work-up although no risk factors and no family history of PE.            Pending Results:        Unresulted Labs Ordered in the Past 30 Days of this Admission     No orders found for last 31 day(s).               Discharge Instructions and Follow-Up:      Follow-up Appointments     Follow-up and recommended labs and tests       Follow up with primary care provider, Nancy Small, within 7 days   for hospital follow- up.  The following labs/tests are recommended: CBC.                    Discharge Disposition:      Discharged to home          Discharge Medications:        Discharge Medication List as of 10/1/2019  1:16 PM      START taking these medications    Details   !! apixaban ANTICOAGULANT (ELIQUIS) 5 MG tablet Take 2 tablets (10 mg) by mouth 2 times daily, Disp-26 tablet, R-0, E-Prescribe      !! apixaban ANTICOAGULANT (ELIQUIS) 5 MG tablet Take 1 tablet (5 mg) by mouth 2 times daily, Disp-60 tablet, R-3, E-Prescribe       !! - Potential duplicate medications found. Please discuss with provider.      CONTINUE these medications which have NOT CHANGED    Details   citalopram (CELEXA) 20 MG tablet Take 2 tablets (40 mg) by mouth daily, Disp-180 tablet, R-3, E-Prescribe       fluticasone (FLONASE) 50 MCG/ACT nasal spray Spray 1 spray into both nostrils daily as needed for rhinitis or allergies, Historical      glucose blood VI test strips strip by In Vitro route daily.Disp-1 Box, [id number]      lisinopril (PRINIVIL/ZESTRIL) 40 MG tablet Take 1 tablet (40 mg) by mouth daily, Disp-90 tablet, R-3, E-Prescribe      meclizine (ANTIVERT) 25 MG tablet Take 1 tablet (25 mg) by mouth 3 times daily as needed for dizziness, Disp-30 tablet, R-11, E-Prescribe      metFORMIN (GLUCOPHAGE) 500 MG tablet Take 1 tablet (500 mg) by mouth daily (with breakfast), Disp-90 tablet, R-3, E-Prescribe      multivitamin, therapeutic (THERA-VIT) TABS tablet Take 1 tablet by mouth daily, Historical      PROAIR  (90 Base) MCG/ACT inhaler INHALE 2 PUFFS BY MOUTH EVERY 6 HOURS AS NEEDED FOR WHEEZE OR FOR SHORTNESS OF BREATH, Disp-8.5 Inhaler, R-1, E-Prescribe         STOP taking these medications       norgestim-eth estrad triphasic (TRI-ESTARYLLA) 0.18/0.215/0.25 MG-35 MCG tablet Comments:   Reason for Stopping:                    Allergies:         Allergies   Allergen Reactions     No Known Allergies             Consultations This Hospital Stay:      No consultations were requested during this admission           Discharge Orders for Skilled Facility (from Discharge Orders):        After Care Instructions     Activity      Your activity upon discharge: activity as tolerated         Diet      Follow this diet upon discharge: Orders Placed This Encounter     1990-4601 Calories: Moderate Consistent CHO (4-6 CHO units/meal)                    Rehab orders for Skilled Facility (from Discharge Orders):                Image Results From This Hospital Stay (For Non-EPIC Providers):        Results for orders placed or performed during the hospital encounter of 09/30/19   XR Chest 2 Views    Narrative    CHEST TWO VIEWS  9/30/2019 9:04 AM     HISTORY: 54-year-old woman with dyspnea.       Impression     IMPRESSION: Since January 29, 2018, heart size is normal. No pleural  effusion, pneumothorax, or abnormal area of consolidation. No acute  osseous abnormality.    AJ GOINS MD   CT Chest Pulmonary Embolism w Contrast     Value    Radiologist flags Pulmonary emboli. (AA)    Narrative    CT CHEST WITH CONTRAST  9/30/2019 9:45 AM    HISTORY: Dyspnea and shortness of breath. Evaluate for pulmonary  embolism.    COMPARISON: Radiographs earlier today.    TECHNIQUE: Routine transverse CT imaging of the chest was performed  following the uneventful intravenous administration of 80 mL  Isovue-370 contrast. A pulmonary embolism protocol was utilized.  Radiation dose for this scan was reduced using automated exposure  control, adjustment of the mA and/or kV according to patient size, or  iterative reconstruction technique.    FINDINGS: The heart size is normal.No enlarged lymph node or other  abnormal mediastinal mass is seen. The thoracic aorta is unremarkable.  There is very good opacification of the pulmonary arteries with  contrast. There are several small pulmonary emboli in the mid to  distal arterial branches of the pulmonary arteries in both lungs, the  lungs are clear. No pneumothorax is demonstrated. No pleural effusion  is identified. There are mild degenerative changes in the spine. No  other osseous abnormality is seen. No chest wall pathology is seen.  The visualized upper abdomen is unremarkable.      Impression    IMPRESSION: There are small pulmonary emboli in the mid to distal  branches of the pulmonary arteries bilaterally, predominantly in the  lower lobes. There is no additional evidence of heart failure or  vascular congestion.     [Critical Result: Pulmonary emboli.]    Finding was identified on 9/30/2019 9:53 AM.     Dr. Porter in the emergency department was contacted by me on  9/30/2019 9:57 AM and verbalized understanding of the critical result.      LEONARDO SKINNER MD    Lower Extremity  Venous Duplex Bilateral    Narrative    BILATERAL LOWER EXTREMITY VENOUS DOPPLER ULTRASOUND  9/30/2019 10:46  AM    HISTORY: Pulmonary embolism diagnosed today. Shortness of breath. The  concern is for deep venous thrombosis.    COMPARISON: None.    FINDINGS: Color flow and Doppler spectral waveform analysis was  performed of the common femoral, femoral, popliteal, posterior tibial,  and greater saphenous veins of the lower extremities bilaterally.  There is occlusive thrombus within the left peroneal veins. The  remaining deep veins of both lower extremities are widely patent.      Impression    IMPRESSION: Occlusive thrombus of the peroneal veins of the left calf.    LEONARDO SKINNER MD

## 2019-10-07 ENCOUNTER — OFFICE VISIT (OUTPATIENT)
Dept: PEDIATRICS | Facility: CLINIC | Age: 54
End: 2019-10-07
Payer: COMMERCIAL

## 2019-10-07 VITALS
HEART RATE: 79 BPM | WEIGHT: 260.3 LBS | SYSTOLIC BLOOD PRESSURE: 124 MMHG | TEMPERATURE: 98 F | BODY MASS INDEX: 43.37 KG/M2 | RESPIRATION RATE: 16 BRPM | OXYGEN SATURATION: 100 % | DIASTOLIC BLOOD PRESSURE: 76 MMHG | HEIGHT: 65 IN

## 2019-10-07 DIAGNOSIS — Z12.31 VISIT FOR SCREENING MAMMOGRAM: ICD-10-CM

## 2019-10-07 DIAGNOSIS — I26.99 BILATERAL PULMONARY EMBOLISM (H): Primary | ICD-10-CM

## 2019-10-07 DIAGNOSIS — I82.452 ACUTE DEEP VEIN THROMBOSIS (DVT) OF LEFT PERONEAL VEIN (H): ICD-10-CM

## 2019-10-07 LAB
ERYTHROCYTE [DISTWIDTH] IN BLOOD BY AUTOMATED COUNT: 12.9 % (ref 10–15)
HCT VFR BLD AUTO: 41.4 % (ref 35–47)
HGB BLD-MCNC: 13.2 G/DL (ref 11.7–15.7)
MCH RBC QN AUTO: 30.1 PG (ref 26.5–33)
MCHC RBC AUTO-ENTMCNC: 31.9 G/DL (ref 31.5–36.5)
MCV RBC AUTO: 95 FL (ref 78–100)
PLATELET # BLD AUTO: 245 10E9/L (ref 150–450)
RBC # BLD AUTO: 4.38 10E12/L (ref 3.8–5.2)
WBC # BLD AUTO: 6.7 10E9/L (ref 4–11)

## 2019-10-07 PROCEDURE — 99495 TRANSJ CARE MGMT MOD F2F 14D: CPT | Performed by: PEDIATRICS

## 2019-10-07 PROCEDURE — 36415 COLL VENOUS BLD VENIPUNCTURE: CPT | Performed by: PEDIATRICS

## 2019-10-07 PROCEDURE — 85027 COMPLETE CBC AUTOMATED: CPT | Performed by: PEDIATRICS

## 2019-10-07 ASSESSMENT — MIFFLIN-ST. JEOR: SCORE: 1791.59

## 2019-10-07 ASSESSMENT — PATIENT HEALTH QUESTIONNAIRE - PHQ9: SUM OF ALL RESPONSES TO PHQ QUESTIONS 1-9: 2

## 2019-10-07 NOTE — PATIENT INSTRUCTIONS
Lab today - recheck CBC      Thank you for getting your flu shot!    Continue your blood thinner - eliquis - we'll plan for you taking this for 3 months and will have you check in with me before you stop    Keep walking!  Keep hydrated.    No more birth control pills or hormone replacement therapy pills ever moving forward.    Mammogram due next month - order waiting for you.

## 2019-10-07 NOTE — PROGRESS NOTES
Subjective     Racquel Nunn is a 54 year old female who presents to clinic today for the following health issues:    hospitals       Hospital Follow-up Visit:    Hospital/Nursing Home/IP Rehab Facility: [facility]  Date of Admission: 9/30/2019  Date of Discharge: 10/01/2019  Reason(s) for Admission: Bilateral pulmonary embolism   Current Status: Patient states that she has been improving since her visit. Patient has been walking her dog without running out of breath.             Problems taking medications regularly:  None       Medication changes since discharge: None       Problems adhering to non-medication therapy:  None    Summary of hospitalization:  [facility] discharge summary reviewed  Diagnostic Tests/Treatments reviewed.  Follow up needed: CBC  Other Healthcare Providers Involved in Patient s Care:         None  Update since discharge: improved.     Post Discharge Medication Reconciliation: discharge medications reconciled, continue medications without change.  Plan of care communicated with patient     Coding guidelines for this visit:  Type of Medical   Decision Making Face-to-Face Visit       within 7 Days of discharge Face-to-Face Visit        within 14 days of discharge   Moderate Complexity [id number]   High Complexity [id number]            Doing very well since hospital discharge for left peroneal DVT, bilateral PE.   Tolerating eliquis well.  No bleeding concerns.    Left calf cramping and dyspnea now much improved.  Has been able to walk further each day.   Walking dog with minimal symptoms.        Off of OCP and aware that she cannot use estrogen.     UTD on cancer screening.    No personal or family hx of DVT.        Reviewed and updated as needed this visit by Provider         Review of Systems   ROS COMP: Constitutional, HEENT, cardiovascular, pulmonary, neuro, psych systems are negative, except as otherwise noted.      Objective    /76 (BP Location: Right arm,  "Patient Position: Sitting, Cuff Size: Adult Large)   Pulse 79   Temp 98  F (36.7  C) (Tympanic)   Resp 16   Ht 1.651 m (5' 5\")   Wt 118.1 kg (260 lb 4.8 oz)   LMP 09/30/2019   SpO2 100%   BMI 43.32 kg/m    Body mass index is 43.32 kg/m .  Physical Exam   GENERAL: healthy, alert and no distress  RESP: lungs clear to auscultation - no rales, rhonchi or wheezes  CV: regular rate and rhythm, normal S1 S2, no S3 or S4, no murmur, click or rub, no peripheral edema and peripheral pulses strong  PSYCH: mentation appears normal, affect normal/bright    Diagnostic Test Results:  Labs reviewed in Epic  CBC pending        Assessment & Plan       ICD-10-CM    1. Bilateral pulmonary embolism (H) I26.99 CBC with platelets    Plan for 3 months of eliquis, no hypercoagulable work up at this time as patient is >45 with no prior hx and no family hx.  Presumed provoked by OCP - now discontinued.   Follow up with me prior to discontinuing eliquis.       Discussed activity, hydration.  Will continue to encourage in weight management.     2. Acute deep vein thrombosis (DVT) of left peroneal vein I82.452 CBC with platelets   3. Visit for screening mammogram Z12.31 *MA Screening Digital Bilateral        BMI:   Estimated body mass index is 43.32 kg/m  as calculated from the following:    Height as of this encounter: 1.651 m (5' 5\").    Weight as of this encounter: 118.1 kg (260 lb 4.8 oz).   Weight management plan: Discussed healthy diet and exercise guidelines        See Patient Instructions    Nancy Small MD  [facility]    "

## 2019-10-21 ENCOUNTER — APPOINTMENT (OUTPATIENT)
Dept: CT IMAGING | Facility: CLINIC | Age: 54
End: 2019-10-21
Attending: EMERGENCY MEDICINE
Payer: COMMERCIAL

## 2019-10-21 ENCOUNTER — HOSPITAL ENCOUNTER (EMERGENCY)
Facility: CLINIC | Age: 54
Discharge: HOME | End: 2019-10-21
Attending: EMERGENCY MEDICINE | Admitting: EMERGENCY MEDICINE
Payer: COMMERCIAL

## 2019-10-21 VITALS
RESPIRATION RATE: 20 BRPM | OXYGEN SATURATION: 100 % | DIASTOLIC BLOOD PRESSURE: 88 MMHG | TEMPERATURE: 97.5 F | HEART RATE: 75 BPM | SYSTOLIC BLOOD PRESSURE: 144 MMHG

## 2019-10-21 DIAGNOSIS — R06.09 DYSPNEA ON EXERTION: ICD-10-CM

## 2019-10-21 LAB
ANION GAP SERPL CALCULATED.3IONS-SCNC: 6 MMOL/L (ref 3–14)
BASE EXCESS BLDV CALC-SCNC: 3 MMOL/L
BASOPHILS # BLD AUTO: 0.1 10E9/L (ref 0–0.2)
BASOPHILS NFR BLD AUTO: 0.7 %
BUN SERPL-MCNC: 10 MG/DL (ref 7–30)
CALCIUM SERPL-MCNC: 9.2 MG/DL (ref 8.5–10.1)
CHLORIDE SERPL-SCNC: 106 MMOL/L (ref 94–109)
CO2 SERPL-SCNC: 26 MMOL/L (ref 20–32)
CREAT BLD-MCNC: 1 MG/DL (ref 0.52–1.04)
CREAT SERPL-MCNC: 0.99 MG/DL (ref 0.52–1.04)
DIFFERENTIAL METHOD BLD: NORMAL
EOSINOPHIL # BLD AUTO: 0.4 10E9/L (ref 0–0.7)
EOSINOPHIL NFR BLD AUTO: 5.5 %
ERYTHROCYTE [DISTWIDTH] IN BLOOD BY AUTOMATED COUNT: 12.9 % (ref 10–15)
GFR SERPL CREATININE-BSD FRML MDRD: 58 ML/MIN/{1.73_M2}
GFR SERPL CREATININE-BSD FRML MDRD: 65 ML/MIN/{1.73_M2}
GLUCOSE SERPL-MCNC: 91 MG/DL (ref 70–99)
HCO3 BLDV-SCNC: 28 MMOL/L (ref 21–28)
HCT VFR BLD AUTO: 38.9 % (ref 35–47)
HGB BLD-MCNC: 12.5 G/DL (ref 11.7–15.7)
IMM GRANULOCYTES # BLD: 0 10E9/L (ref 0–0.4)
IMM GRANULOCYTES NFR BLD: 0.4 %
INTERPRETATION ECG - MUSE: NORMAL
LYMPHOCYTES # BLD AUTO: 2 10E9/L (ref 0.8–5.3)
LYMPHOCYTES NFR BLD AUTO: 30.1 %
MCH RBC QN AUTO: 29.8 PG (ref 26.5–33)
MCHC RBC AUTO-ENTMCNC: 32.1 G/DL (ref 31.5–36.5)
MCV RBC AUTO: 93 FL (ref 78–100)
MONOCYTES # BLD AUTO: 0.6 10E9/L (ref 0–1.3)
MONOCYTES NFR BLD AUTO: 8.3 %
NEUTROPHILS # BLD AUTO: 3.7 10E9/L (ref 1.6–8.3)
NEUTROPHILS NFR BLD AUTO: 55 %
NRBC # BLD AUTO: 0 10*3/UL
NRBC BLD AUTO-RTO: 0 /100
NT-PROBNP SERPL-MCNC: 17 PG/ML (ref 0–900)
O2/TOTAL GAS SETTING VFR VENT: ABNORMAL %
PCO2 BLDV: 44 MM HG (ref 40–50)
PH BLDV: 7.42 PH (ref 7.32–7.43)
PLATELET # BLD AUTO: 267 10E9/L (ref 150–450)
PO2 BLDV: 24 MM HG (ref 25–47)
POTASSIUM SERPL-SCNC: 3.7 MMOL/L (ref 3.4–5.3)
RBC # BLD AUTO: 4.19 10E12/L (ref 3.8–5.2)
SODIUM SERPL-SCNC: 138 MMOL/L (ref 133–144)
TROPONIN I SERPL-MCNC: <0.015 UG/L (ref 0–0.04)
WBC # BLD AUTO: 6.8 10E9/L (ref 4–11)

## 2019-10-21 PROCEDURE — 84484 ASSAY OF TROPONIN QUANT: CPT | Performed by: EMERGENCY MEDICINE

## 2019-10-21 PROCEDURE — 93005 ELECTROCARDIOGRAM TRACING: CPT

## 2019-10-21 PROCEDURE — 82803 BLOOD GASES ANY COMBINATION: CPT | Performed by: EMERGENCY MEDICINE

## 2019-10-21 PROCEDURE — 25000125 ZZHC RX 250: Performed by: EMERGENCY MEDICINE

## 2019-10-21 PROCEDURE — 25000128 H RX IP 250 OP 636: Performed by: EMERGENCY MEDICINE

## 2019-10-21 PROCEDURE — 71275 CT ANGIOGRAPHY CHEST: CPT

## 2019-10-21 PROCEDURE — 82565 ASSAY OF CREATININE: CPT

## 2019-10-21 PROCEDURE — 83880 ASSAY OF NATRIURETIC PEPTIDE: CPT | Performed by: EMERGENCY MEDICINE

## 2019-10-21 PROCEDURE — 85025 COMPLETE CBC W/AUTO DIFF WBC: CPT | Performed by: EMERGENCY MEDICINE

## 2019-10-21 PROCEDURE — 80048 BASIC METABOLIC PNL TOTAL CA: CPT | Performed by: EMERGENCY MEDICINE

## 2019-10-21 PROCEDURE — 99285 EMERGENCY DEPT VISIT HI MDM: CPT | Mod: 25

## 2019-10-21 RX ORDER — IOPAMIDOL 755 MG/ML
500 INJECTION, SOLUTION INTRAVASCULAR ONCE
Status: COMPLETED | OUTPATIENT
Start: 2019-10-21 | End: 2019-10-21

## 2019-10-21 RX ADMIN — SODIUM CHLORIDE 90 ML: 9 INJECTION, SOLUTION INTRAVENOUS at 11:15

## 2019-10-21 RX ADMIN — IOPAMIDOL 74 ML: 755 INJECTION, SOLUTION INTRAVENOUS at 11:15

## 2019-10-21 ASSESSMENT — ENCOUNTER SYMPTOMS
RHINORRHEA: 0
ABDOMINAL PAIN: 0
VOMITING: 0
NAUSEA: 0
SHORTNESS OF BREATH: 1

## 2019-10-21 NOTE — LETTER
October 21, 2019      To Whom It May Concern:      Racquel Nunn was seen in our Emergency Department today, 10/21/19.  I expect her condition to improve over the next 1-2 days.  She may return to work when improved.    Sincerely,        Nick Garcia MD

## 2019-10-21 NOTE — ED TRIAGE NOTES
Patient presents to the ED with ongoing SOB after being diagnosed with a PE in September. States is slightly less SOB than before, but continues to have difficulty breathing.

## 2019-10-21 NOTE — ED PROVIDER NOTES
History     Chief Complaint:  Shortness of Breath    HPI   Racquel Nunn is a 54 year old female with recent history of pulmonary embolism on Eliquis who presents with shortness of breath. The patient was seen on 9/29 and was noted to have a DVT and pulmonary embolism. The patient was started on Eliquis and she states she has been taking this regularly. However, the patient states that she is having ongoing shortness of breath that seems to be getting a little worse particularly with exercise. She states that when she was walking her dog she was unable to make it up the stairs in her house without pausing for a break. She notes that she also felt some chest pain in her upper left chest that started several days ago. The patient denies any chest pain the first time she was diagnosed with her pulmonary embolism. The patient denies nausea, vomiting, abdominal pain, rhinorrhea, congestion, new swelling in her legs, or worsening pain in her calves.    Allergies:  No known allergies      Medications:    Eliquis 5 mg   Celexa 20 mg   Flonase 50 mcg/act nasal spray  Lisinopril 40 mg   Meclizine 25 mg   Metformin 500 mg     Past Medical History:    Anxiety   Tendopathy of right rotator cuff   Obstructive sleep apnea  Bilateral pulmonary embolism   Hypertension  Chronic kidney disease stage 3  Obesity     Past Surgical History:    The patient does not have any pertinent past surgical history.    Family History:    Father: CAD (CABG)  Maternal grandmother ;cerebrovascular disease     Social History:  Smoking Status: Never Smoker  Smokeless Tobacco: Never Used  Alcohol Use: Yes  Drug Use: No  PCP: Nancy Small  Marital Status:        Review of Systems   HENT: Negative for congestion and rhinorrhea.    Respiratory: Positive for shortness of breath.    Cardiovascular: Positive for chest pain. Negative for leg swelling.   Gastrointestinal: Negative for abdominal pain, nausea and vomiting.   All other systems  reviewed and are negative.    Physical Exam     Patient Vitals for the past 24 hrs:   BP Temp Pulse Heart Rate Resp SpO2   10/21/19 1145 (!) 146/86 -- 76 73 -- 100 %   10/21/19 1045 130/86 -- 84 -- -- --   10/21/19 1032 (!) 146/107 97.5  F (36.4  C) 89 -- 20 100 %     Physical Exam  General: Patient is awake, alert and interactive when I enter the room. Very pleasant and talkative. Does not appear in acute distress.   Head: The scalp, face, and head appear normal  Eyes: The pupils are equal, round, and reactive to light. Conjunctivae and sclerae are normal  Neck: Normal range of motion. No anterior cervical lymphadenopathy noted  CV: Regular rate. S1/S2. No murmurs. Peripheral pulses including radial pulses are symmetric.   Resp: Lungs are clear without wheezes or rales. No respiratory distress.   GI: Abdomen is soft, no rigidity, guarding, or rebound. No distension. No tenderness to palpation in any quadrant.     MS: Chest wall is non tender to palpation. Normal tone. Joints grossly normal without effusions. No asymmetric leg swelling, calf or thigh tenderness.    Skin: No rash or lesions noted. Normal capillary refill noted  Neuro: Speech is normal and fluent. Face is symmetric. Moving all extremities.   Psych:  Normal affect.  Appropriate interactions.    Emergency Department Course   ECG:  ECG taken at 1111, ECG read at 1111  Normal sinus rhythm  Low voltage QRS  Borderline ECG  Tachycardia improved from previous EKG dated 9/30/19  Rate 72 bpm. NH interval 152 ms. QRS duration 98 ms. QT/QTc 402/440 ms. P-R-T axes 29 57 42.    Imaging:  Radiology findings were communicated with the patient who voiced understanding of the findings.    CT Chest Pulmonary embolism w Contrast  1. No evidence for pulmonary embolism or thoracic aortic dissection.  Previously described bilateral pulmonary embolism is no longer seen on   Today's exam.   2. No definite cause for chest pain and shortness of breath is identified.   2. Small  hiatal hernia.   Reading per radiology     Laboratory:  Laboratory findings were communicated with the patient who voiced understanding of the findings.    CBC: WBC 6.8, HGB 12.5,   BMP: WNL (Creatinine 0.99)  Troponin (Collected 1104): <0.015  NT probnp inpatient: 17  Blood gas nevous :pH 7.42, pCO2 44, PO2 24(L), bicarbonate 28, base venous excess 3.0, FIO2 room air     Emergency Department Course:  Nursing notes and vitals reviewed.    1039 I performed an exam of the patient as documented above.     IV was inserted and blood was drawn for laboratory testing, results above.    The patient was sent for a CT Chest Pulmonary embolism while in the emergency department, results above.     1202 I returned to update the patient.     Findings and plan explained to the Patient. Patient discharged home with instructions regarding supportive care, medications, and reasons to return. The importance of close follow-up was reviewed.    Impression & Plan    Medical Decision Making:  Racquel Nunn is a 54 year old female who presents to the emergency department today for evaluation of chest pain and shortness of breath.  Upon initial evaluation she is hemodynamically stable with normal vital signs.  She is afebrile.  On physical exam the patient is overall very well-appearing and does not exhibit any significant respiratory distress.  However given her recent history I was concerned about the possibility of repeat pulmonary embolism versus congestive heart failure secondary to previous pulmonary embolism versus initial cardiac event.  CT PE study was obtained thankfully did not show any new clot burden and also revealed resolution of previous clot burden.  Additionally there was no evidence of pulmonary edema, pleural effusion or pneumonia on the CT scan.  EKG was obtained which did not show any acute signs of ischemia or dysrhythmia.  Troponin was negative.  The remainder of the patient's work-up was reassuring.  I  did discussion with the patient regarding her findings and need for close follow-up.  I believe the patient's shortness of breath may be secondary to deconditioning following her pulmonary embolism.  She may benefit from rehab and physical therapy.  All questions were answered and patient will be discharged home in stable condition to continue her evaluation as an outpatient.    Diagnosis:    ICD-10-CM    1. Dyspnea on exertion R06.09      Disposition:   The patient is discharged to home.    Discharge Medications:  No discharge medication.     Scribe Disclosure:  Miranda ARITA, am serving as a scribe at 10:37 AM on 10/21/2019 to document services personally performed by Nick Garcia MD based on my observations and the provider's statements to me.   [facility] EMERGENCY DEPARTMENT       Nick Garcia MD  10/21/19 [id number]

## 2019-10-21 NOTE — ED AVS SNAPSHOT
[facility] Emergency Department  [address]  [facility] [address]  Phone:  [phone]  Fax:  [phone]                                    Racquel Nunn   MRN: [id number]    Department:  [facility] Emergency Department   Date of Visit:  10/21/2019           After Visit Summary Signature Page    I have received my discharge instructions, and my questions have been answered. I have discussed any challenges I see with this plan with the nurse or doctor.    ..........................................................................................................................................  Patient/Patient Representative Signature      ..........................................................................................................................................  Patient Representative Print Name and Relationship to Patient    ..................................................               ................................................  Date                                   Time    ..........................................................................................................................................  Reviewed by Signature/Title    ...................................................              ..............................................  Date                                               Time          22EPIC Rev 08/18

## 2019-11-13 ENCOUNTER — HOSPITAL ENCOUNTER (OUTPATIENT)
Dept: MAMMOGRAPHY | Facility: CLINIC | Age: 54
Discharge: HOME | End: 2019-11-13
Attending: PEDIATRICS | Admitting: PEDIATRICS
Payer: COMMERCIAL

## 2019-11-13 DIAGNOSIS — Z12.31 VISIT FOR SCREENING MAMMOGRAM: ICD-10-CM

## 2019-11-13 PROCEDURE — 77067 SCR MAMMO BI INCL CAD: CPT

## 2019-12-06 ENCOUNTER — NURSE TRIAGE (OUTPATIENT)
Dept: NURSING | Facility: CLINIC | Age: 54
End: 2019-12-06

## 2019-12-06 NOTE — TELEPHONE ENCOUNTER
Patient calling requesting to know if she can take cough medication with her blood thinner. Reviewed Micromedix Eliquis with Dextromethorphan and Guaifenesin no drug to drug interaction listed.  Patient declines triage. Encouraged patient to speak with pharmacy regarding over the counter medication options and interactions. Patient agrees stating she will talk with pharmacy.    Caller verbalized understanding. Denies further questions.    Tamy Jin RN  [facility]

## 2020-01-08 ENCOUNTER — OFFICE VISIT (OUTPATIENT)
Dept: PEDIATRICS | Facility: CLINIC | Age: 55
End: 2020-01-08
Payer: COMMERCIAL

## 2020-01-08 VITALS
HEIGHT: 65 IN | WEIGHT: 265.8 LBS | OXYGEN SATURATION: 100 % | HEART RATE: 76 BPM | SYSTOLIC BLOOD PRESSURE: 120 MMHG | TEMPERATURE: 98 F | DIASTOLIC BLOOD PRESSURE: 70 MMHG | BODY MASS INDEX: 44.28 KG/M2 | RESPIRATION RATE: 18 BRPM

## 2020-01-08 DIAGNOSIS — I10 BENIGN ESSENTIAL HYPERTENSION: ICD-10-CM

## 2020-01-08 DIAGNOSIS — Z86.711 HISTORY OF PULMONARY EMBOLUS (PE): ICD-10-CM

## 2020-01-08 DIAGNOSIS — N18.30 CKD (CHRONIC KIDNEY DISEASE) STAGE 3, GFR 30-59 ML/MIN (H): ICD-10-CM

## 2020-01-08 DIAGNOSIS — D22.9 MULTIPLE PIGMENTED NEVI: ICD-10-CM

## 2020-01-08 DIAGNOSIS — J45.20 MILD INTERMITTENT ASTHMA, UNSPECIFIED WHETHER COMPLICATED: ICD-10-CM

## 2020-01-08 DIAGNOSIS — E66.01 MORBID OBESITY, UNSPECIFIED OBESITY TYPE (H): ICD-10-CM

## 2020-01-08 DIAGNOSIS — G47.33 OSA (OBSTRUCTIVE SLEEP APNEA): ICD-10-CM

## 2020-01-08 DIAGNOSIS — Z00.00 ROUTINE GENERAL MEDICAL EXAMINATION AT A HEALTH CARE FACILITY: Primary | ICD-10-CM

## 2020-01-08 DIAGNOSIS — G56.03 BILATERAL CARPAL TUNNEL SYNDROME: ICD-10-CM

## 2020-01-08 DIAGNOSIS — F33.9 RECURRENT MAJOR DEPRESSIVE DISORDER, REMISSION STATUS UNSPECIFIED (H): ICD-10-CM

## 2020-01-08 DIAGNOSIS — F41.9 ANXIETY: ICD-10-CM

## 2020-01-08 DIAGNOSIS — Z80.3 FAMILY HISTORY OF MALIGNANT NEOPLASM OF BREAST: ICD-10-CM

## 2020-01-08 PROCEDURE — 99396 PREV VISIT EST AGE 40-64: CPT | Performed by: PEDIATRICS

## 2020-01-08 PROCEDURE — 99213 OFFICE O/P EST LOW 20 MIN: CPT | Mod: 25 | Performed by: PEDIATRICS

## 2020-01-08 RX ORDER — CITALOPRAM HYDROBROMIDE 20 MG/1
40 TABLET ORAL DAILY
Qty: 180 TABLET | Refills: 3 | Status: SHIPPED | OUTPATIENT
Start: 2020-01-08 | End: 2021-01-15

## 2020-01-08 RX ORDER — LISINOPRIL 40 MG/1
40 TABLET ORAL DAILY
Qty: 90 TABLET | Refills: 3 | Status: SHIPPED | OUTPATIENT
Start: 2020-01-08 | End: 2021-01-15

## 2020-01-08 ASSESSMENT — MIFFLIN-ST. JEOR: SCORE: 1808.6

## 2020-01-08 NOTE — PATIENT INSTRUCTIONS
Game plan:    Finish the Eliquis you have, then stop    Come back for fasting lab work - we'll help you schedule before you leave    I love the elliptical plan - goal of a little bit each day    Keep wearing wrist braces to keep your wrists in a stable position and help with your carpal tunnel syndrome.    Expect a phone call to schedule a visit with the specialist for your CTS    I love the idea of hand knitting instead of snacking    Call for visit with dermatology

## 2020-01-08 NOTE — PROGRESS NOTES
SUBJECTIVE:   CC: Racquel Nunn is an 54 year old woman who presents for preventive health visit.     Healthy Habits:    Getting at least 3 servings of Calcium per day:  Yes    Bi-annual eye exam:  Yes    Dental care twice a year:  NO    Sleep apnea or symptoms of sleep apnea:  Sleep apnea    Diet:  Regular (no restrictions)    Frequency of exercise:  6-7 days/week    Duration of exercise:  15-30 minutes    Taking medications regularly:  Yes    Barriers to taking medications:  None    PHQ-2 Total Score:    Additional concerns today:  Yes    Patient would like to discuss hand pain and menopausal changes.     Today's PHQ-2 Score:   PHQ-2 ( 1999 [employer]) 1/14/2019   Q1: Little interest or pleasure in doing things 2   Q2: Feeling down, depressed or hopeless 1   PHQ-2 Score 3   Q1: Little interest or pleasure in doing things More than half the days   Q2: Feeling down, depressed or hopeless Several days   PHQ-2 Score 3       Abuse: Current or Past(Physical, Sexual or Emotional)- No  Do you feel safe in your environment? Yes    Social History     Tobacco Use     Smoking status: Never Smoker     Smokeless tobacco: Never Used   Substance Use Topics     Alcohol use: Yes     Comment: 1 drink per week or less       Alcohol Use 1/14/2019   Prescreen: >3 drinks/day or >7 drinks/week? No   Prescreen: >3 drinks/day or >7 drinks/week? -     Reviewed orders with patient.  Reviewed health maintenance and updated orders accordingly - Yes      Mammogram Screening: Patient over age 50, mutual decision to screen reflected in health maintenance.    Pertinent mammograms are reviewed under the imaging tab.  History of abnormal Pap smear: NO - age 30- 65 PAP every 3 years recommended  PAP / HPV Latest Ref Rng & Units 2/1/2018 7/7/2014   PAP - NIL NIL   HPV 16 DNA NEG:Negative Negative -   HPV 18 DNA NEG:Negative Negative -   OTHER HR HPV NEG:Negative Negative -     Reviewed and updated as needed this visit by clinical staff  Tobacco   "Allergies  Meds  Med Hx  Surg Hx  Fam Hx  Soc Hx        Reviewed and updated as needed this visit by Provider          New concern - hand concerns over the last month - same on both sides.  Feels like losing strength and they are numb.  This has not been a problem previously.  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  Hands usually feel worse after work.  Has been using wrist brace - hasn't helped.  Symptoms limited to hands, no arm involvement.  No hx of neck injury.    PE - completing course of eliquis for provoked PE from OCP - now off.  No additional concerns.    Repeat CT without clot.  Has completed 3 months of anticoagulation.    Having hot flashes off of her OCP, but doing ok    Mood update - depression has been under good control, stress related to her mother's dementia, but feels that she is doing well    ERICA - on CPAP every night    HTN - has been under good control and is tolerating medications well.  No new cardiac symptoms.    Asthma - has been wheezing more with the cold air.  Using inhaler with very cold and dry air once day.  Uses it when sick.   Overall, feels that asthma is well controlled.    Frustrated by recent weight gain.  Planning on losing weight by cutting down on stress eating and using her elliptical machine.    High risk skin type - hasn't yet had a full skin check with dermatology     Review of Systems   ROS: 10 point ROS neg other than the symptoms noted above in the HPI.       OBJECTIVE:   /78 (BP Location: Right arm, Patient Position: Sitting, Cuff Size: Adult Large)   Pulse 76   Temp 98  F (36.7  C) (Tympanic)   Resp 18   Ht 1.638 m (5' 4.5\")   Wt 120.6 kg (265 lb 12.8 oz)   LMP 09/30/2019   SpO2 100%   BMI 44.92 kg/m     Wt Readings from Last 4 Encounters:   01/08/20 120.6 kg (265 lb 12.8 oz)   10/07/19 118.1 kg (260 lb 4.8 oz)   09/30/19 117.9 kg (259 lb 14.4 oz)   09/23/19 118.4 kg (261 lb)       Physical Exam  GENERAL: healthy, " alert and no distress  EYES: Eyes grossly normal to inspection, PERRL and conjunctivae and sclerae normal  HENT: ear canals and TM's normal, nose and mouth without ulcers or lesions  NECK: no adenopathy, no asymmetry, masses, or scars and thyroid normal to palpation  RESP: lungs clear to auscultation - no rales, rhonchi or wheezes  BREAST: normal without masses, tenderness or nipple discharge and no palpable axillary masses or adenopathy  CV: regular rate and rhythm, normal S1 S2, no S3 or S4, no murmur, click or rub, no peripheral edema and peripheral pulses strong  ABDOMEN: soft, nontender, no hepatosplenomegaly, no masses and bowel sounds normal  MS: no gross musculoskeletal defects noted, no edema, positive Tinel and Phalen testing bilaterally.  SKIN: freckles covering sun exposed areas, few irregular nevi posterior upper back, also with scattered areas of scale posterior back  NEURO: Normal strength and tone, mentation intact and speech normal  PSYCH: mentation appears normal, affect normal/bright    Diagnostic Test Results:  pending    ASSESSMENT/PLAN:       ICD-10-CM    1. Routine general medical examination at a health care facility Z00.00 Lipid panel reflex to direct LDL Fasting     Comprehensive metabolic panel     Albumin Random Urine Quantitative with Creat Ratio     Hemoglobin A1c     **CBC with platelets FUTURE anytime   2. Benign essential hypertension I10 Lipid panel reflex to direct LDL Fasting     Comprehensive metabolic panel     Albumin Random Urine Quantitative with Creat Ratio     lisinopril (PRINIVIL/ZESTRIL) 40 MG tablet     Hemoglobin A1c  Well controlled, continue current medications     3. Mild intermittent asthma, unspecified whether complicated J45.20 Good control, monitor with cold air triggers   4. ERICA (obstructive sleep apnea) G47.33 Continue nightly CPAP   5. CKD (chronic kidney disease) stage 3, GFR 30-59 ml/min (H) N18.3 Lipid panel reflex to direct LDL Fasting     Comprehensive  "metabolic panel     Albumin Random Urine Quantitative with Creat Ratio     **CBC with platelets FUTURE anytime   6. History of pulmonary embolus (PE) Z86.711 To complete her current supply of eliquis, then stop, having completed her planned therapy.  Repeat Chest CT showed resolution of emboli.  Off of any hormone related treatment.   7. Family history of malignant neoplasm of breast Z80.3 UTD on mammography   8. Anxiety F41.9 citalopram (CELEXA) 20 MG tablet  Well controlled, continue current medications     9. Morbid obesity, unspecified obesity type (H) E66.01 Lipid panel reflex to direct LDL Fasting     Comprehensive metabolic panel     metFORMIN (GLUCOPHAGE) 500 MG tablet    Discussed recommended lifestyle changes       Hemoglobin A1c   10. Bilateral carpal tunnel syndrome G56.03 ORTHO  REFERRAL  Continue to wear wrist braces, see orthopedics for evaluation   11. Recurrent major depressive disorder, remission status unspecified (H) F33.9 citalopram (CELEXA) 20 MG tablet  Well controlled, continue current medications     12. Multiple pigmented nevi D22.9 DERMATOLOGY REFERRAL  Needs full skin exam - referral placed       COUNSELING:  Special attention given to:        Regular exercise       Healthy diet/nutrition       Vision screening       Osteoporosis Prevention/Bone Health       Colon cancer screening       (Saira)menopause management    Estimated body mass index is 44.92 kg/m  as calculated from the following:    Height as of this encounter: 1.638 m (5' 4.5\").    Weight as of this encounter: 120.6 kg (265 lb 12.8 oz).    Weight management plan: Discussed healthy diet and exercise guidelines     reports that she has never smoked. She has never used smokeless tobacco.      Counseling Resources:  ATP IV Guidelines  Pooled Cohorts Equation Calculator  Breast Cancer Risk Calculator  FRAX Risk Assessment  ICSI Preventive Guidelines  Dietary Guidelines for Americans, 2010  USDA's MyPlate  ASA " Prophylaxis  Lung CA Screening    Nancy Small MD  [facility]

## 2020-01-09 ENCOUNTER — TELEPHONE (OUTPATIENT)
Dept: PEDIATRICS | Facility: CLINIC | Age: 55
End: 2020-01-09

## 2020-01-09 NOTE — TELEPHONE ENCOUNTER
Letter written, printed, signed, placed in station outbasket.    Nancy Small MD  Internal Medicine - Pediatrics

## 2020-01-09 NOTE — TELEPHONE ENCOUNTER
Reason for Call:  Other call back    Detailed comments: Pt is calling because she was seen yesterday and forgot to grab a doctors note with her current lift restriction. She is asking that we send it to her work as she is there right now.  The email they would like it sent to is [email or web address]. They do not have a fax machine. If any questions please give the patient a call back. Thank you.    Phone Number Patient can be reached at: Cell number on file:    Telephone Information:   Mobile [phone]       Best Time: any    Can we leave a detailed message on this number? YES    Call taken on 1/9/2020 at 8:12 AM by Daxa Warren

## 2020-01-09 NOTE — LETTER
[address]  [address]  [facility] [id number] [phone]          January 9, 2020    RE:  Racquel Nunn                                                                                                                                                       [address]  [facility] [address]            To whom it may concern:    Racquel Nunn is under my professional care.   She may return to work, but has the following restrictions while waiting for orthopedics evaluation for her wrists.    When the patient returns to work, the following restrictions apply until 4/1/2020.  A) Bend: Frequently (4-8 hours)  B) Squat: Frequently (4-8 hours)  C) Walk/Stand: Frequently (4-8 hours)  D) Reach Above Shoulders: Occasionally (1-3 hours)  E) Lift, carry, push, and pull no more than: 10 lbs.  Light duty-unable to lift more than 10 pounds on or about 01/09/2020.        Sincerely,        Nancy Small MD

## 2020-01-13 ENCOUNTER — OFFICE VISIT (OUTPATIENT)
Dept: ORTHOPEDICS | Facility: CLINIC | Age: 55
End: 2020-01-13
Payer: COMMERCIAL

## 2020-01-13 VITALS
SYSTOLIC BLOOD PRESSURE: 126 MMHG | WEIGHT: 265 LBS | BODY MASS INDEX: 44.15 KG/M2 | HEIGHT: 65 IN | DIASTOLIC BLOOD PRESSURE: 80 MMHG

## 2020-01-13 DIAGNOSIS — G56.03 CARPAL TUNNEL SYNDROME ON BOTH SIDES: Primary | ICD-10-CM

## 2020-01-13 PROCEDURE — 99203 OFFICE O/P NEW LOW 30 MIN: CPT | Performed by: ORTHOPAEDIC SURGERY

## 2020-01-13 ASSESSMENT — MIFFLIN-ST. JEOR: SCORE: 1804.97

## 2020-01-13 NOTE — PATIENT INSTRUCTIONS
An EMG  Nerve study has been ordered. This exam will be scheduled with [facility].     [facility] [place]  Address: [address] [facility] [address], [place] [address]  Phone: [phone]      Please schedule to follow up with Dr. Rogers 3-4 days following your EMG study  Or alternatively call us to discuss the result    Call [phone] with any questions or concerns.

## 2020-01-13 NOTE — LETTER
1/13/2020         RE: Racquel Nunn  [address]  [place] [address]        Dear Colleague,    Thank you for referring your patient, Racquel Nunn, to the [facility]. Please see a copy of my visit note below.    HISTORY OF PRESENT ILLNESS:    Racquel Nunn is a 54 year old female who is seen in consultation at the request of Dr. Small for bilateral carpal tunnel syndrome. 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 Patient believes onset of symptoms early in December, approximately 6 weeks. (Patient did message into PCP regarding hand discomfort in early November, symptoms may be ongoing closer to 2-3 months.) She notes initially she felt more nerve pain in the right hand, but symptoms are comparable bilaterally.   Present symptoms: Patient reports pins and needles, nerve pain in right fingers: index, long, and ring. She notes weakness of her hands that may be due to new symptoms and arthritis. Difficulties with fine motor pinching, and gripping. Increased symptoms with driving, talking on the phone, using I-pad.   Current pain level: 2/10, Worst pain level: 10/10   Treatments tried to this point: bracing with mild improvement, Gabapentin, Tylenol. Patient unable to take Advil due to blood thinners.   Orthopedic PMH: no pertinent orthopedic history to upper extremity.    A1C on 3/20/18: 5.2     Past Medical History:   Diagnosis Date     Bilateral pulmonary embolism (H) 9/30/2019     HTN (hypertension)        Past Surgical History:   Procedure Laterality Date     NO HISTORY OF SURGERY         Family History   Problem Relation Age of Onset     C.A.D. Father         CABG-age 60     Cerebrovascular Disease Maternal Grandmother      Breast Cancer Paternal Aunt         age 40     Cancer Maternal Aunt         melanoma     Diabetes No family hx of      Hypertension No family hx of      Cancer - colorectal No family hx of      Thyroid Disease No family  hx of        Social History     Socioeconomic History     Marital status:      Spouse name: Not on file     Number of children: 0     Years of education: 12     Highest education level: Not on file   Occupational History     Occupation:      Comment: [employer]   Social Needs     Financial resource strain: Not on file     Food insecurity:     Worry: Not on file     Inability: Not on file     Transportation needs:     Medical: Not on file     Non-medical: Not on file   Tobacco Use     Smoking status: Never Smoker     Smokeless tobacco: Never Used   Substance and Sexual Activity     Alcohol use: Yes     Comment: 1 drink per week or less     Drug use: No     Sexual activity: Never     Comment: never been sexually active, declining pap smears   Lifestyle     Physical activity:     Days per week: Not on file     Minutes per session: Not on file     Stress: Not on file   Relationships     Social connections:     Talks on phone: Not on file     Gets together: Not on file     Attends Yazdanism service: Not on file     Active member of club or organization: Not on file     Attends meetings of clubs or organizations: Not on file     Relationship status: Not on file     Intimate partner violence:     Fear of current or ex partner: Not on file     Emotionally abused: Not on file     Physically abused: Not on file     Forced sexual activity: Not on file   Other Topics Concern     Parent/sibling w/ CABG, MI or angioplasty before 65F 55M? No   Social History Narrative    Wedding 7/18/15       Current Outpatient Medications   Medication Sig Dispense Refill     citalopram (CELEXA) 20 MG tablet Take 2 tablets (40 mg) by mouth daily 180 tablet 3     glucose blood VI test strips strip by In Vitro route daily. 1 Box 12     lisinopril (PRINIVIL/ZESTRIL) 40 MG tablet Take 1 tablet (40 mg) by mouth daily 90 tablet 3     meclizine (ANTIVERT) 25 MG tablet Take 1 tablet (25 mg) by mouth 3 times daily as needed for  "dizziness 30 tablet 11     metFORMIN (GLUCOPHAGE) 500 MG tablet Take 1 tablet (500 mg) by mouth daily (with breakfast) 90 tablet 3     multivitamin, therapeutic (THERA-VIT) TABS tablet Take 1 tablet by mouth daily       PROAIR  (90 Base) MCG/ACT inhaler INHALE 2 PUFFS BY MOUTH EVERY 6 HOURS AS NEEDED FOR WHEEZE OR FOR SHORTNESS OF BREATH 8.5 Inhaler 1       Allergies   Allergen Reactions     No Known Allergies        REVIEW OF SYSTEMS:  CONSTITUTIONAL:  NEGATIVE for fever, chills, change in weight  INTEGUMENTARY/SKIN:  NEGATIVE for worrisome rashes, moles or lesions  EYES:  NEGATIVE for vision changes or irritation  ENT/MOUTH:  NEGATIVE for ear, mouth and throat problems  RESP:  NEGATIVE for significant cough or SOB  BREAST:  NEGATIVE for masses, tenderness or discharge  CV:  Hypertension   GI:  NEGATIVE for nausea, abdominal pain, heartburn, or change in bowel habits  :  Negative   MUSCULOSKELETAL:  See HPI above  NEURO:  NEGATIVE for weakness, dizziness or paresthesias  ENDOCRINE:  NEGATIVE for temperature intolerance, skin/hair changes  HEME/ALLERGY/IMMUNE:  NEGATIVE for bleeding problems  PSYCHIATRIC:  Depression     PHYSICAL EXAM:  /80 (BP Location: Right arm, Patient Position: Chair, Cuff Size: Adult Large)   Ht 1.638 m (5' 4.5\")   Wt 120.2 kg (265 lb)   LMP 09/30/2019   BMI 44.78 kg/m     Body mass index is 44.78 kg/m .   GENERAL APPEARANCE: healthy, alert and no distress   HEENT: No apparent thyroid megaly. Clear sclera with normal ocular movement  RESPIRATORY: No labored breathing  SKIN: no suspicious lesions or rashes  NEURO: Normal strength and tone, mentation intact and speech normal  VASCULAR: Good pulses, and capillary refill   LYMPH: no lymphadenopathy   PSYCH:  mentation appears normal and affect normal/bright    MUSCULOSKELETAL:    Normal gait   not in acute distress  Minimal asymmetry in the appearance and bulk of thenar eminence, right has very subtle indentation in the " midportion of the thenar eminence  No other deformities or swelling noted  No erythema noted  Decreased sensation to light touch left thumb more so than right  Negative Tinel signs, bilateral  Positive Phalen test at 50 seconds, bilateral  Full grasping and pinching strength, bilateral  No catching or locking of the digits  Circulation is intact      Elbow range of motion is full with a full strength  Shoulder range of motion is full with a full strength     Neck range of motion is well-maintained        ASSESSMENT:  Bilateral carpal tunnel syndrome, severe according to her report  Relatively recent onset  Prediabetic    PLAN:  Based on her symptoms and clinical examination findings, most likely were dealing with bilateral carpal tunnel syndrome, right worse than left  We discussed the treatment options.  On one hand, the duration of the symptoms has been fairly short however, on the other hand, her symptoms are quite significant waking her up rather consistently and more importantly has been getting worse.  For that reason, at this point EMG evaluation was felt to be reasonable to assess the severity.  She understands our discussion.  She will continue to use the nighttime splinting and during the day as well as much as possible.  Follow-up either by another visit or by phone to discuss the EMG result and to discuss treatment options going forward.    Imaging Interpretation: None taken today      Henry Rogers MD  Department of Orthopedic Surgery        Disclaimer: This note consists of symbols derived from keyboarding, dictation and/or voice recognition software. As a result, there may be errors in the script that have gone undetected. Please consider this when interpreting information found in this chart.      Again, thank you for allowing me to participate in the care of your patient.        Sincerely,        Henry Rogers MD

## 2020-01-13 NOTE — PROGRESS NOTES
HISTORY OF PRESENT ILLNESS:    Racquel Nunn is a 54 year old female who is seen in consultation at the request of Dr. Small for bilateral carpal tunnel syndrome. 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 Patient believes onset of symptoms early in December, approximately 6 weeks. (Patient did message into PCP regarding hand discomfort in early November, symptoms may be ongoing closer to 2-3 months.) She notes initially she felt more nerve pain in the right hand, but symptoms are comparable bilaterally.   Present symptoms: Patient reports pins and needles, nerve pain in right fingers: index, long, and ring. She notes weakness of her hands that may be due to new symptoms and arthritis. Difficulties with fine motor pinching, and gripping. Increased symptoms with driving, talking on the phone, using I-pad.   Current pain level: 2/10, Worst pain level: 10/10   Treatments tried to this point: bracing with mild improvement, Gabapentin, Tylenol. Patient unable to take Advil due to blood thinners.   Orthopedic PMH: no pertinent orthopedic history to upper extremity.    A1C on 3/20/18: 5.2     Past Medical History:   Diagnosis Date     Bilateral pulmonary embolism (H) 9/30/2019     HTN (hypertension)        Past Surgical History:   Procedure Laterality Date     NO HISTORY OF SURGERY         Family History   Problem Relation Age of Onset     C.A.D. Father         CABG-age 60     Cerebrovascular Disease Maternal Grandmother      Breast Cancer Paternal Aunt         age 40     Cancer Maternal Aunt         melanoma     Diabetes No family hx of      Hypertension No family hx of      Cancer - colorectal No family hx of      Thyroid Disease No family hx of        Social History     Socioeconomic History     Marital status:      Spouse name: Not on file     Number of children: 0     Years of education: 12     Highest education level: Not on file   Occupational History     Occupation: Sales  Associate     Comment: [employer]   Social Needs     Financial resource strain: Not on file     Food insecurity:     Worry: Not on file     Inability: Not on file     Transportation needs:     Medical: Not on file     Non-medical: Not on file   Tobacco Use     Smoking status: Never Smoker     Smokeless tobacco: Never Used   Substance and Sexual Activity     Alcohol use: Yes     Comment: 1 drink per week or less     Drug use: No     Sexual activity: Never     Comment: never been sexually active, declining pap smears   Lifestyle     Physical activity:     Days per week: Not on file     Minutes per session: Not on file     Stress: Not on file   Relationships     Social connections:     Talks on phone: Not on file     Gets together: Not on file     Attends Anabaptism service: Not on file     Active member of club or organization: Not on file     Attends meetings of clubs or organizations: Not on file     Relationship status: Not on file     Intimate partner violence:     Fear of current or ex partner: Not on file     Emotionally abused: Not on file     Physically abused: Not on file     Forced sexual activity: Not on file   Other Topics Concern     Parent/sibling w/ CABG, MI or angioplasty before 65F 55M? No   Social History Narrative    Wedding 7/18/15       Current Outpatient Medications   Medication Sig Dispense Refill     citalopram (CELEXA) 20 MG tablet Take 2 tablets (40 mg) by mouth daily 180 tablet 3     glucose blood VI test strips strip by In Vitro route daily. 1 Box 12     lisinopril (PRINIVIL/ZESTRIL) 40 MG tablet Take 1 tablet (40 mg) by mouth daily 90 tablet 3     meclizine (ANTIVERT) 25 MG tablet Take 1 tablet (25 mg) by mouth 3 times daily as needed for dizziness 30 tablet 11     metFORMIN (GLUCOPHAGE) 500 MG tablet Take 1 tablet (500 mg) by mouth daily (with breakfast) 90 tablet 3     multivitamin, therapeutic (THERA-VIT) TABS tablet Take 1 tablet by mouth daily       PROAIR  (90 Base) MCG/ACT  "inhaler INHALE 2 PUFFS BY MOUTH EVERY 6 HOURS AS NEEDED FOR WHEEZE OR FOR SHORTNESS OF BREATH 8.5 Inhaler 1       Allergies   Allergen Reactions     No Known Allergies        REVIEW OF SYSTEMS:  CONSTITUTIONAL:  NEGATIVE for fever, chills, change in weight  INTEGUMENTARY/SKIN:  NEGATIVE for worrisome rashes, moles or lesions  EYES:  NEGATIVE for vision changes or irritation  ENT/MOUTH:  NEGATIVE for ear, mouth and throat problems  RESP:  NEGATIVE for significant cough or SOB  BREAST:  NEGATIVE for masses, tenderness or discharge  CV:  Hypertension   GI:  NEGATIVE for nausea, abdominal pain, heartburn, or change in bowel habits  :  Negative   MUSCULOSKELETAL:  See HPI above  NEURO:  NEGATIVE for weakness, dizziness or paresthesias  ENDOCRINE:  NEGATIVE for temperature intolerance, skin/hair changes  HEME/ALLERGY/IMMUNE:  NEGATIVE for bleeding problems  PSYCHIATRIC:  Depression     PHYSICAL EXAM:  /80 (BP Location: Right arm, Patient Position: Chair, Cuff Size: Adult Large)   Ht 1.638 m (5' 4.5\")   Wt 120.2 kg (265 lb)   LMP 09/30/2019   BMI 44.78 kg/m    Body mass index is 44.78 kg/m .   GENERAL APPEARANCE: healthy, alert and no distress   HEENT: No apparent thyroid megaly. Clear sclera with normal ocular movement  RESPIRATORY: No labored breathing  SKIN: no suspicious lesions or rashes  NEURO: Normal strength and tone, mentation intact and speech normal  VASCULAR: Good pulses, and capillary refill   LYMPH: no lymphadenopathy   PSYCH:  mentation appears normal and affect normal/bright    MUSCULOSKELETAL:    Normal gait   not in acute distress  Minimal asymmetry in the appearance and bulk of thenar eminence, right has very subtle indentation in the midportion of the thenar eminence  No other deformities or swelling noted  No erythema noted  Decreased sensation to light touch left thumb more so than right  Negative Tinel signs, bilateral  Positive Phalen test at 50 seconds, bilateral  Full grasping and " pinching strength, bilateral  No catching or locking of the digits  Circulation is intact      Elbow range of motion is full with a full strength  Shoulder range of motion is full with a full strength     Neck range of motion is well-maintained        ASSESSMENT:  Bilateral carpal tunnel syndrome, severe according to her report  Relatively recent onset  Prediabetic    PLAN:  Based on her symptoms and clinical examination findings, most likely were dealing with bilateral carpal tunnel syndrome, right worse than left  We discussed the treatment options.  On one hand, the duration of the symptoms has been fairly short however, on the other hand, her symptoms are quite significant waking her up rather consistently and more importantly has been getting worse.  For that reason, at this point EMG evaluation was felt to be reasonable to assess the severity.  She understands our discussion.  She will continue to use the nighttime splinting and during the day as well as much as possible.  Follow-up either by another visit or by phone to discuss the EMG result and to discuss treatment options going forward.    Imaging Interpretation: None taken today      Henry Rogers MD  Department of Orthopedic Surgery        Disclaimer: This note consists of symbols derived from keyboarding, dictation and/or voice recognition software. As a result, there may be errors in the script that have gone undetected. Please consider this when interpreting information found in this chart.

## 2020-01-14 ENCOUNTER — TRANSFERRED RECORDS (OUTPATIENT)
Dept: HEALTH INFORMATION MANAGEMENT | Facility: CLINIC | Age: 55
End: 2020-01-14

## 2020-01-16 ENCOUNTER — VIRTUAL VISIT (OUTPATIENT)
Dept: ORTHOPEDICS | Facility: CLINIC | Age: 55
End: 2020-01-16
Payer: COMMERCIAL

## 2020-01-16 DIAGNOSIS — G56.03 BILATERAL CARPAL TUNNEL SYNDROME: Primary | ICD-10-CM

## 2020-01-16 PROCEDURE — 99207 ZZC NO BILLABLE SERVICE THIS VISIT: CPT | Performed by: ORTHOPAEDIC SURGERY

## 2020-01-16 NOTE — PROGRESS NOTES
I contacted her today to relay the message about the results of EMG study that she underwent on January 14, 2020 at [facility], [facility].  She was found to have moderately severe bilateral carpal tunnel syndrome.  We went over the options of further observation versus surgery.  We went over the details of the surgery including different choices for anesthesia.  She is to think it through and make a decision.  If she decided to go ahead with the surgery, she will contact our surgery scheduler and notify her about anesthesia choice she had made.  She can also come back for further discussion as needed.

## 2020-01-20 ENCOUNTER — TELEPHONE (OUTPATIENT)
Dept: ORTHOPEDICS | Facility: CLINIC | Age: 55
End: 2020-01-20

## 2020-01-23 DIAGNOSIS — G56.01 CARPAL TUNNEL SYNDROME OF RIGHT WRIST: Primary | ICD-10-CM

## 2020-01-24 ENCOUNTER — PREP FOR PROCEDURE (OUTPATIENT)
Dept: ORTHOPEDICS | Facility: CLINIC | Age: 55
End: 2020-01-24

## 2020-01-24 DIAGNOSIS — G56.01 CARPAL TUNNEL SYNDROME OF RIGHT WRIST: Primary | ICD-10-CM

## 2020-01-25 DIAGNOSIS — I10 BENIGN ESSENTIAL HYPERTENSION: ICD-10-CM

## 2020-01-25 DIAGNOSIS — N18.30 CKD (CHRONIC KIDNEY DISEASE) STAGE 3, GFR 30-59 ML/MIN (H): ICD-10-CM

## 2020-01-25 DIAGNOSIS — Z00.00 ROUTINE GENERAL MEDICAL EXAMINATION AT A HEALTH CARE FACILITY: ICD-10-CM

## 2020-01-25 DIAGNOSIS — E66.01 MORBID OBESITY, UNSPECIFIED OBESITY TYPE (H): ICD-10-CM

## 2020-01-25 LAB
ALBUMIN SERPL-MCNC: 3.3 G/DL (ref 3.4–5)
ALP SERPL-CCNC: 91 U/L (ref 40–150)
ALT SERPL W P-5'-P-CCNC: 37 U/L (ref 0–50)
ANION GAP SERPL CALCULATED.3IONS-SCNC: 5 MMOL/L (ref 3–14)
AST SERPL W P-5'-P-CCNC: 23 U/L (ref 0–45)
BILIRUB SERPL-MCNC: 0.5 MG/DL (ref 0.2–1.3)
BUN SERPL-MCNC: 16 MG/DL (ref 7–30)
CALCIUM SERPL-MCNC: 9.3 MG/DL (ref 8.5–10.1)
CHLORIDE SERPL-SCNC: 106 MMOL/L (ref 94–109)
CHOLEST SERPL-MCNC: 201 MG/DL
CO2 SERPL-SCNC: 28 MMOL/L (ref 20–32)
CREAT SERPL-MCNC: 0.92 MG/DL (ref 0.52–1.04)
CREAT UR-MCNC: 99 MG/DL
ERYTHROCYTE [DISTWIDTH] IN BLOOD BY AUTOMATED COUNT: 13.2 % (ref 10–15)
GFR SERPL CREATININE-BSD FRML MDRD: 71 ML/MIN/{1.73_M2}
GLUCOSE SERPL-MCNC: 114 MG/DL (ref 70–99)
HBA1C MFR BLD: 5.5 % (ref 0–5.6)
HCT VFR BLD AUTO: 38.9 % (ref 35–47)
HDLC SERPL-MCNC: 62 MG/DL
HGB BLD-MCNC: 12.8 G/DL (ref 11.7–15.7)
LDLC SERPL CALC-MCNC: 121 MG/DL
MCH RBC QN AUTO: 30 PG (ref 26.5–33)
MCHC RBC AUTO-ENTMCNC: 32.9 G/DL (ref 31.5–36.5)
MCV RBC AUTO: 91 FL (ref 78–100)
MICROALBUMIN UR-MCNC: 5 MG/L
MICROALBUMIN/CREAT UR: 5.44 MG/G CR (ref 0–25)
NONHDLC SERPL-MCNC: 139 MG/DL
PLATELET # BLD AUTO: 219 10E9/L (ref 150–450)
POTASSIUM SERPL-SCNC: 4.6 MMOL/L (ref 3.4–5.3)
PROT SERPL-MCNC: 7.3 G/DL (ref 6.8–8.8)
RBC # BLD AUTO: 4.27 10E12/L (ref 3.8–5.2)
SODIUM SERPL-SCNC: 139 MMOL/L (ref 133–144)
TRIGL SERPL-MCNC: 90 MG/DL
WBC # BLD AUTO: 5.4 10E9/L (ref 4–11)

## 2020-01-25 PROCEDURE — 36415 COLL VENOUS BLD VENIPUNCTURE: CPT | Performed by: PEDIATRICS

## 2020-01-25 PROCEDURE — 82043 UR ALBUMIN QUANTITATIVE: CPT | Performed by: PEDIATRICS

## 2020-01-25 PROCEDURE — 80053 COMPREHEN METABOLIC PANEL: CPT | Performed by: PEDIATRICS

## 2020-01-25 PROCEDURE — 83036 HEMOGLOBIN GLYCOSYLATED A1C: CPT | Performed by: PEDIATRICS

## 2020-01-25 PROCEDURE — 80061 LIPID PANEL: CPT | Performed by: PEDIATRICS

## 2020-01-25 PROCEDURE — 85027 COMPLETE CBC AUTOMATED: CPT | Performed by: PEDIATRICS

## 2020-01-26 PROBLEM — R73.01 IFG (IMPAIRED FASTING GLUCOSE): Status: ACTIVE | Noted: 2020-01-26

## 2020-01-27 ENCOUNTER — TELEPHONE (OUTPATIENT)
Dept: PEDIATRICS | Facility: CLINIC | Age: 55
End: 2020-01-27

## 2020-01-27 NOTE — TELEPHONE ENCOUNTER
1st attempt, LVM for pt to call back to schedule.     Daria Oquendo,     on 1/27/2020 at 1:24 PM

## 2020-01-27 NOTE — TELEPHONE ENCOUNTER
Reason for call:  Other   Patient called regarding (reason for call): appointment  Additional comments: Pre-op physical needed for right carpal tunnel release, DOS 2/18/2020. Ok with any provider and open to any day/time.     Phone number to reach patient:  Cell number on file:    Telephone Information:   Mobile [phone]       Best Time:  any    Can we leave a detailed message on this number?  YES       Shara Pastor on 1/27/2020 at 11:04 AM

## 2020-01-28 NOTE — TELEPHONE ENCOUNTER
2nd attempt to call patient left detailed message:  There are availabilities starting Feb 3rd with different providers.   Will also send MC message.       //Jessica Palomo MA// January 28, 2020 10:05 AM

## 2020-01-31 NOTE — TELEPHONE ENCOUNTER
Pt is scheduled on 2/13/2020 with Miranda Laguna.    Daria Oquendo,     on 1/31/2020 at 10:51 AM

## 2020-02-13 ENCOUNTER — OFFICE VISIT (OUTPATIENT)
Dept: PEDIATRICS | Facility: CLINIC | Age: 55
End: 2020-02-13
Payer: COMMERCIAL

## 2020-02-13 ENCOUNTER — TELEPHONE (OUTPATIENT)
Dept: PEDIATRICS | Facility: CLINIC | Age: 55
End: 2020-02-13

## 2020-02-13 VITALS
WEIGHT: 263.2 LBS | HEART RATE: 78 BPM | BODY MASS INDEX: 43.85 KG/M2 | HEIGHT: 65 IN | OXYGEN SATURATION: 98 % | TEMPERATURE: 97.5 F | DIASTOLIC BLOOD PRESSURE: 68 MMHG | SYSTOLIC BLOOD PRESSURE: 114 MMHG

## 2020-02-13 DIAGNOSIS — G56.01 CARPAL TUNNEL SYNDROME OF RIGHT WRIST: ICD-10-CM

## 2020-02-13 DIAGNOSIS — Z01.818 PREOP GENERAL PHYSICAL EXAM: Primary | ICD-10-CM

## 2020-02-13 PROCEDURE — 99215 OFFICE O/P EST HI 40 MIN: CPT | Performed by: NURSE PRACTITIONER

## 2020-02-13 SDOH — HEALTH STABILITY: PHYSICAL HEALTH: ON AVERAGE, HOW MANY DAYS PER WEEK DO YOU ENGAGE IN MODERATE TO STRENUOUS EXERCISE (LIKE A BRISK WALK)?: 2 DAYS

## 2020-02-13 SDOH — SOCIAL STABILITY: SOCIAL NETWORK: HOW OFTEN DO YOU GET TOGETHER WITH FRIENDS OR RELATIVES?: MORE THAN THREE TIMES A WEEK

## 2020-02-13 SDOH — ECONOMIC STABILITY: FOOD INSECURITY: WITHIN THE PAST 12 MONTHS, THE FOOD YOU BOUGHT JUST DIDN'T LAST AND YOU DIDN'T HAVE MONEY TO GET MORE.: SOMETIMES TRUE

## 2020-02-13 SDOH — HEALTH STABILITY: MENTAL HEALTH: HOW MANY STANDARD DRINKS CONTAINING ALCOHOL DO YOU HAVE ON A TYPICAL DAY?: 1 OR 2

## 2020-02-13 SDOH — SOCIAL STABILITY: SOCIAL NETWORK
DO YOU BELONG TO ANY CLUBS OR ORGANIZATIONS SUCH AS CHURCH GROUPS UNIONS, FRATERNAL OR ATHLETIC GROUPS, OR SCHOOL GROUPS?: NO

## 2020-02-13 SDOH — ECONOMIC STABILITY: TRANSPORTATION INSECURITY
IN THE PAST 12 MONTHS, HAS THE LACK OF TRANSPORTATION KEPT YOU FROM MEDICAL APPOINTMENTS OR FROM GETTING MEDICATIONS?: NO

## 2020-02-13 SDOH — HEALTH STABILITY: MENTAL HEALTH: HOW OFTEN DO YOU HAVE 6 OR MORE DRINKS ON ONE OCCASION?: LESS THAN MONTHLY

## 2020-02-13 SDOH — ECONOMIC STABILITY: TRANSPORTATION INSECURITY
IN THE PAST 12 MONTHS, HAS LACK OF TRANSPORTATION KEPT YOU FROM MEETINGS, WORK, OR FROM GETTING THINGS NEEDED FOR DAILY LIVING?: NO

## 2020-02-13 SDOH — HEALTH STABILITY: MENTAL HEALTH: HOW OFTEN DO YOU HAVE A DRINK CONTAINING ALCOHOL?: MONTHLY OR LESS

## 2020-02-13 SDOH — SOCIAL STABILITY: SOCIAL NETWORK: ARE YOU MARRIED, WIDOWED, DIVORCED, SEPARATED, NEVER MARRIED, OR LIVING WITH A PARTNER?: MARRIED

## 2020-02-13 SDOH — SOCIAL STABILITY: SOCIAL NETWORK: HOW OFTEN DO YOU ATTEND CHURCH OR RELIGIOUS SERVICES?: 1 TO 4 TIMES PER YEAR

## 2020-02-13 SDOH — SOCIAL STABILITY: SOCIAL NETWORK
IN A TYPICAL WEEK, HOW MANY TIMES DO YOU TALK ON THE PHONE WITH FAMILY, FRIENDS, OR NEIGHBORS?: MORE THAN THREE TIMES A WEEK

## 2020-02-13 SDOH — ECONOMIC STABILITY: FOOD INSECURITY: WITHIN THE PAST 12 MONTHS, YOU WORRIED THAT YOUR FOOD WOULD RUN OUT BEFORE YOU GOT MONEY TO BUY MORE.: SOMETIMES TRUE

## 2020-02-13 SDOH — HEALTH STABILITY: MENTAL HEALTH
STRESS IS WHEN SOMEONE FEELS TENSE, NERVOUS, ANXIOUS, OR CAN'T SLEEP AT NIGHT BECAUSE THEIR MIND IS TROUBLED. HOW STRESSED ARE YOU?: TO SOME EXTENT

## 2020-02-13 SDOH — HEALTH STABILITY: PHYSICAL HEALTH: ON AVERAGE, HOW MANY MINUTES DO YOU ENGAGE IN EXERCISE AT THIS LEVEL?: 30 MIN

## 2020-02-13 SDOH — ECONOMIC STABILITY: INCOME INSECURITY: HOW HARD IS IT FOR YOU TO PAY FOR THE VERY BASICS LIKE FOOD, HOUSING, MEDICAL CARE, AND HEATING?: NOT VERY HARD

## 2020-02-13 SDOH — SOCIAL STABILITY: SOCIAL NETWORK: HOW OFTEN DO YOU ATTENT MEETINGS OF THE CLUB OR ORGANIZATION YOU BELONG TO?: NEVER

## 2020-02-13 ASSESSMENT — MIFFLIN-ST. JEOR: SCORE: 1796.81

## 2020-02-13 NOTE — TELEPHONE ENCOUNTER
Reason for Call:  Other prescription    Detailed comments: wants to know if PCP can order a anti nausea medication for after her surgery or is this something the surgeon will have to do.    Pharmacy verifed.     Phone Number Patient can be reached at: Home number on file [phone] (home)    Best Time: any    Can we leave a detailed message on this number? YES    Call taken on 2/13/2020 at 12:25 PM by Shiresa H. Ormond

## 2020-02-13 NOTE — PATIENT INSTRUCTIONS
1) Hold Lisinopril and Metformin the morning of your procedure. Resume the following day.       Before Your Surgery      Call your surgeon if there is any change in your health. This includes signs of a cold or flu (such as a sore throat, runny nose, cough, rash or fever).    Do not smoke, drink alcohol or take over the counter medicine (unless your surgeon or primary care doctor tells you to) for the 24 hours before and after surgery.    If you take prescribed drugs: Follow your doctor s orders about which medicines to take and which to stop until after surgery.    Eating and drinking prior to surgery: follow the instructions from your surgeon    Take a shower or bath the night before surgery. Use the soap your surgeon gave you to gently clean your skin. If you do not have soap from your surgeon, use your regular soap. Do not shave or scrub the surgery site.  Wear clean pajamas and have clean sheets on your bed.

## 2020-02-13 NOTE — TELEPHONE ENCOUNTER
Called patient.   She is having carpal tunnel surgery under anesthia.   She's never had surgery. Her family was worried she'll be nauseous so mentioned to the patient to call us.     Let patient know that Surgeon will prescribe anti-nausea medication if she needs it. She can always call us too.     Patient agrees with plan.

## 2020-02-13 NOTE — PROGRESS NOTES
[facility] [place]  [address]  [address]  [place] [address]  [phone]  Dept: [phone]    PRE-OP EVALUATION:  Today's date: 2020    Racquel Nunn (: 1965) presents for pre-operative evaluation assessment as requested by Dr. Manley.  She requires evaluation and anesthesia risk assessment prior to undergoing surgery/procedure for treatment of Carpal tunnel surgery on R hand .    Hospital or facility where procedure will be performed: [facility]  Primary Physician: Nancy Small  Type of Anesthesia Anticipated: Combined MAC with Local    Patient has a Health Care Directive or Living Will:  NO      Preop Questions 2020   Who is doing your surgery? dr manley   What are you having done? carpol tunnel   Date of Surgery/Procedure:    Facility or Hospital where procedure/surgery will be performed: [facility]   1.  Do you have a history of Heart attack, stroke, stent, coronary bypass surgery, or other heart surgery? No   2.  Do you ever have any pain or discomfort in your chest? No   3.  Do you have a history of  Heart Failure? No   4.   Are you troubled by shortness of breath when:  walking on a level surface, or up a slight hill, or at night? No   5.  Do you currently have a cold, bronchitis or other respiratory infection? No   6.  Do you have a cough, shortness of breath, or wheezing? YES - History of asthma, worse when allergies are exacerbated, has shortness of breath. Shortness of breath is described as mild, progressively improving since her blood clots last year. Worse with walking and activity. Symptoms are not significant enough to warrant use of her inhaler.   7.  Do you sometimes get pains in the calves of your legs when you walk? No   8. Do you or anyone in your family have previous history of blood clots? YES - history of DVT and PE 2019. Thought to be secondary to birth control pills.    9.  Do you or does anyone in your  family have a serious bleeding problem such as prolonged bleeding following surgeries or cuts? No   10. Have you ever had problems with anemia or been told to take iron pills? No   11. Have you had any abnormal blood loss such as black, tarry or bloody stools, or abnormal vaginal bleeding? No   12. Have you ever had a blood transfusion? No   13. Have you or any of your relatives ever had problems with anesthesia? No   14. Do you have sleep apnea, excessive snoring or daytime drowsiness? YES - uses a CPAP machine at home.    15. Do you have any prosthetic heart valves? No   16. Do you have prosthetic joints? No   17. Is there any chance that you may be pregnant? No         HPI:     HPI related to upcoming procedure:     Racquel Nunn is a 54 year old female who was seen in consultation by sports medicine (Dr. Henry Rogers) on 1/13/20 for reports of 2-3 month history of right handed nerve pain in addition to bilateral hand weakness and fine motor difficulties (gripping, pinching). She had previously tried bracing with mild improvement as well as gabapentin and tylenol. It was thought that her symptoms were most consistent with bilateral carpal tunnel syndrome, R>L. EMG was recommended to assess for severity which was completed on 1/14/2020 at [facility], [facility]. She was found to have moderately severe bilateral carpal tunnel syndrome. Patient discussed with Dr. Rogers the options for observation vs surgery. Patient elected to move forward with the right carpal tunnel release on 2/18/2020.       MEDICAL HISTORY:     Patient Active Problem List    Diagnosis Date Noted     IFG (impaired fasting glucose) 01/26/2020     Priority: Medium     Carpal tunnel syndrome of right wrist 01/24/2020     Priority: Medium     Added automatically from request for surgery [id number]       History of pulmonary embolus (PE) 09/30/2019     Priority: Medium     Mild intermittent asthma, unspecified whether complicated  01/14/2019     Priority: Medium     Benign essential hypertension 01/14/2019     Priority: Medium     ERICA (obstructive sleep apnea) 07/17/2018     Priority: Medium     Morbid obesity, unspecified obesity type (H) 10/24/2016     Priority: Medium     Anxiety 07/07/2014     Priority: Medium     Major depression in complete remission (H) 01/08/2013     Priority: Medium     CKD (chronic kidney disease) stage 3, GFR 30-59 ml/min (H) 06/21/2011     Priority: Medium     CARDIOVASCULAR SCREENING; LDL GOAL LESS THAN 160 02/10/2010     Priority: Medium     HTN (Hypertension) BP goal <140/90 02/03/2010     Priority: Medium     Family history of malignant neoplasm of breast 02/11/2005     Priority: Medium     Aunt with breast cancer           Past Medical History:   Diagnosis Date     Arthritis     hands     Bilateral pulmonary embolism (H) 9/30/2019     Diabetes (H)      HTN (hypertension)      IFG (impaired fasting glucose) 1/26/2020     Mild intermittent asthma, unspecified whether complicated 1/14/2019     Morbid obesity (H)      ERICA (obstructive sleep apnea)     CPAP     Past Surgical History:   Procedure Laterality Date     NO HISTORY OF SURGERY       Family History   Problem Relation Age of Onset     Breast Cancer Mother         S/P bilateral mastectomy     Brain Tumor Mother      Dementia Mother      C.A.D. Father         CABG-age 60     Cerebrovascular Disease Maternal Grandmother      Breast Cancer Paternal Aunt         age 40     Cancer Maternal Aunt         melanoma     Diabetes No family hx of      Hypertension No family hx of      Cancer - colorectal No family hx of      Thyroid Disease No family hx of      Social History     Socioeconomic History     Marital status:      Spouse name: Not on file     Number of children: 0     Years of education: 12     Highest education level: 12th grade   Occupational History     Occupation:      Comment: [employer]   Social Needs     Financial resource strain:  Not very hard     Food insecurity:     Worry: Sometimes true     Inability: Sometimes true     Transportation needs:     Medical: No     Non-medical: No   Tobacco Use     Smoking status: Never Smoker     Smokeless tobacco: Never Used   Substance and Sexual Activity     Alcohol use: Yes     Frequency: Monthly or less     Drinks per session: 1 or 2     Binge frequency: Less than monthly     Comment: 1 drink per week or less     Drug use: No     Sexual activity: Never     Comment: never been sexually active, declining pap smears   Lifestyle     Physical activity:     Days per week: 2 days     Minutes per session: 30 min     Stress: To some extent   Relationships     Social connections:     Talks on phone: More than three times a week     Gets together: More than three times a week     Attends Christianity service: 1 to 4 times per year     Active member of club or organization: No     Attends meetings of clubs or organizations: Never     Relationship status:      Intimate partner violence:     Fear of current or ex partner: Not on file     Emotionally abused: Not on file     Physically abused: Not on file     Forced sexual activity: Not on file   Other Topics Concern     Parent/sibling w/ CABG, MI or angioplasty before 65F 55M? No   Social History Narrative    Wedding 7/18/15       Current Outpatient Medications   Medication Sig Dispense Refill     citalopram (CELEXA) 20 MG tablet Take 2 tablets (40 mg) by mouth daily 180 tablet 3     glucose blood VI test strips strip by In Vitro route daily. 1 Box 12     lisinopril (PRINIVIL/ZESTRIL) 40 MG tablet Take 1 tablet (40 mg) by mouth daily 90 tablet 3     meclizine (ANTIVERT) 25 MG tablet Take 1 tablet (25 mg) by mouth 3 times daily as needed for dizziness 30 tablet 11     metFORMIN (GLUCOPHAGE) 500 MG tablet Take 1 tablet (500 mg) by mouth daily (with breakfast) 90 tablet 3     multivitamin, therapeutic (THERA-VIT) TABS tablet Take 1 tablet by mouth daily       PROAIR  " (90 Base) MCG/ACT inhaler INHALE 2 PUFFS BY MOUTH EVERY 6 HOURS AS NEEDED FOR WHEEZE OR FOR SHORTNESS OF BREATH 8.5 Inhaler 1     OTC products: Tylenol as needed for pain.     Allergies   Allergen Reactions     No Known Allergies       Latex Allergy: NO      REVIEW OF SYSTEMS:   CONSTITUTIONAL: NEGATIVE for fever, chills, change in weight  INTEGUMENTARY/SKIN: NEGATIVE for worrisome rashes, moles or lesions  EYES: NEGATIVE for vision changes or irritation  ENT/MOUTH: NEGATIVE for ear, mouth and throat problems  RESP: NEGATIVE for significant cough or SOB. History of asthma, worse when allergies are exacerbated, has shortness of breath. Shortness of breath is described as mild, progressively improving since her blood clots last year. Worse with walking and activity. Symptoms are not significant enough to warrant use of her inhaler.   BREAST: NEGATIVE for masses, tenderness or discharge  CV: NEGATIVE for chest pain, palpitations or peripheral edema  GI: NEGATIVE for nausea, abdominal pain, heartburn, or change in bowel habits  : NEGATIVE for frequency, dysuria, or hematuria  MUSCULOSKELETAL: NEGATIVE for significant arthralgias or myalgia  NEURO: NEGATIVE for weakness, dizziness or paresthesias  ENDOCRINE: NEGATIVE for temperature intolerance, skin/hair changes  HEME: NEGATIVE for bleeding problems  PSYCHIATRIC: NEGATIVE for changes in mood or affect    EXAM:   /68 (BP Location: Right arm, Patient Position: Sitting, Cuff Size: Adult Large)   Pulse 78   Temp 97.5  F (36.4  C) (Oral)   Ht 1.638 m (5' 4.5\")   Wt 119.4 kg (263 lb 3.2 oz)   LMP 09/30/2019   SpO2 98%   BMI 44.48 kg/m    Constitutional: appears to be in no acute distress, comfortable, pleasant.   Eyes: anicteric, conjunctiva clear without drainage or erythema. DO.   Ears, Nose and Throat: tympanic membranes gray with LR,  nose without nasal discharge. OP: no erythema to posterior pharynx, negative post nasal drainage, tonsils +1 no " erythema or exudate.  Neck: supple, thyroid palpable,not enlarged, no nodules   Cardiovascular: regular rate and rhythm, normal S1 and S2, no murmurs, rubs or gallops, peripheral pulses full and symmetric; negative peripheral edema   Respiratory: Air entry throughout. Breathing pattern unlabored without the use of accessory muscles. Clear to auscultation A and P, no wheezes or crackles, normal breath sounds.    Gastrointestinal: rounded, soft. Positive bowel sounds x4, nontender, no masses.   Musculoskeletal: full range of motion, no edema.   Skin: pink, turgor smooth and elastic. Negative for lesions or dryness.  Neurological: normal gait, no tremor.   Psychological: appropriate mood and affect.   Lymphatic: no cervical, axillary, supraclavicular, or infraclavicular lymphadenopathy.      DIAGNOSTICS:     EKG completed within the past year, according to [facility] Guidelines, this is sufficient. EKG 10/21/19 reveals normal sinus rhythm HR 72 with normal axis and intervals.      Recent Labs   Lab Test 01/25/20  0934 10/21/19  1104  03/20/18  1335   HGB 12.8 12.5   < >  --     267   < >  --     138   < >  --    POTASSIUM 4.6 3.7   < >  --    CR 0.92 0.99   < >  --    A1C 5.5  --   --  5.2    < > = values in this interval not displayed.      IMPRESSION:     Reason for surgery/procedure: Bilateral carpal tunnel syndrome, R>L      Surgical Risk:  The proposed surgical procedure is considered LOW risk.    Revised Cardiac Risk Index  The patient has the following serious cardiovascular risks for perioperative complications such as (MI, PE, VFib and 3  AV Block): No serious cardiac risks    INTERPRETATION: 0 risks: Class I (very low risk - 0.4% complication rate)    [facility] Activity Status Index  Total Points: 58.2  Total METs: 9.89    Functional capacity is classified as:  Excellent: >10 METs  Good: 7-10 METs   Moderate: 4 - 6 METs  Poor: <4 METs    The patient has the following additional risks for  "perioperative complications:  Obesity, Body mass index is 44.48 kg/m .        ICD-10-CM    1. Preop general physical exam Z01.818    2. Carpal tunnel syndrome of right wrist G56.01        RECOMMENDATIONS:       Cardiovascular Risk  History of hypertension, currently managed on lisinopril 40 mg daily. History of type 2 diabetes currently managed on metformin 500 mg once daily. Recommend holding both of these medications 24 hours prior to surgery.       Pulmonary Risk  History of mild intermittent asthma, worse when allergies are exacerbated, hasn't used albuterol inhaler in a \"couple of weeks.\" Shortness of breath is described as mild, progressively improving since her blood clots last year. Worse with walking and activity. Symptoms are not significant enough to warrant use of her inhaler. History of DVT and PE August 2019. Thought to be secondary to birth control pills, which have since been discontinued.      Obstructive Sleep Apnea (or suspected sleep apnea)  History of ERICA, uses CPAP at home.       --Patient is to HOLD lisinopril 40 mg daily and metformin 500 mg daily for 24 hours prior to surgery. She can take all other scheduled medications on the day of surgery.      APPROVAL GIVEN to proceed with proposed procedure, without further diagnostic evaluation       Signed Electronically by: PERLA Hodge CNP    Copy of this evaluation report is provided to requesting physician.    [facility] Preop Guidelines    Revised Cardiac Risk Index  "

## 2020-02-18 ENCOUNTER — HOSPITAL ENCOUNTER (OUTPATIENT)
Facility: CLINIC | Age: 55
Discharge: HOME | End: 2020-02-18
Attending: ORTHOPAEDIC SURGERY | Admitting: ORTHOPAEDIC SURGERY
Payer: COMMERCIAL

## 2020-02-18 ENCOUNTER — ANESTHESIA EVENT (OUTPATIENT)
Dept: SURGERY | Facility: CLINIC | Age: 55
End: 2020-02-18
Payer: COMMERCIAL

## 2020-02-18 ENCOUNTER — ANESTHESIA (OUTPATIENT)
Dept: SURGERY | Facility: CLINIC | Age: 55
End: 2020-02-18
Payer: COMMERCIAL

## 2020-02-18 VITALS
BODY MASS INDEX: 44.9 KG/M2 | HEIGHT: 64 IN | TEMPERATURE: 98.7 F | RESPIRATION RATE: 15 BRPM | OXYGEN SATURATION: 100 % | DIASTOLIC BLOOD PRESSURE: 87 MMHG | SYSTOLIC BLOOD PRESSURE: 144 MMHG | WEIGHT: 263 LBS

## 2020-02-18 DIAGNOSIS — G56.01 CARPAL TUNNEL SYNDROME OF RIGHT WRIST: ICD-10-CM

## 2020-02-18 DIAGNOSIS — G89.18 POST-OP PAIN: Primary | ICD-10-CM

## 2020-02-18 LAB
GLUCOSE BLDC GLUCOMTR-MCNC: 100 MG/DL (ref 70–99)
GLUCOSE BLDC GLUCOMTR-MCNC: 123 MG/DL (ref 70–99)
HCG UR QL: NEGATIVE

## 2020-02-18 PROCEDURE — 37000008 ZZH ANESTHESIA TECHNICAL FEE, 1ST 30 MIN: Performed by: ORTHOPAEDIC SURGERY

## 2020-02-18 PROCEDURE — 25800030 ZZH RX IP 258 OP 636: Performed by: ANESTHESIOLOGY

## 2020-02-18 PROCEDURE — 25000125 ZZHC RX 250: Performed by: ANESTHESIOLOGY

## 2020-02-18 PROCEDURE — 40000306 ZZH STATISTIC PRE PROC ASSESS II: Performed by: ORTHOPAEDIC SURGERY

## 2020-02-18 PROCEDURE — 36000050 ZZH SURGERY LEVEL 2 1ST 30 MIN: Performed by: ORTHOPAEDIC SURGERY

## 2020-02-18 PROCEDURE — 25000125 ZZHC RX 250: Performed by: ORTHOPAEDIC SURGERY

## 2020-02-18 PROCEDURE — 81025 URINE PREGNANCY TEST: CPT | Performed by: ANESTHESIOLOGY

## 2020-02-18 PROCEDURE — 25000125 ZZHC RX 250

## 2020-02-18 PROCEDURE — 82962 GLUCOSE BLOOD TEST: CPT

## 2020-02-18 PROCEDURE — 25000132 ZZH RX MED GY IP 250 OP 250 PS 637: Performed by: ORTHOPAEDIC SURGERY

## 2020-02-18 PROCEDURE — 37000009 ZZH ANESTHESIA TECHNICAL FEE, EACH ADDTL 15 MIN: Performed by: ORTHOPAEDIC SURGERY

## 2020-02-18 PROCEDURE — 27210794 ZZH OR GENERAL SUPPLY STERILE: Performed by: ORTHOPAEDIC SURGERY

## 2020-02-18 PROCEDURE — 71000027 ZZH RECOVERY PHASE 2 EACH 15 MINS: Performed by: ORTHOPAEDIC SURGERY

## 2020-02-18 PROCEDURE — 36000052 ZZH SURGERY LEVEL 2 EA 15 ADDTL MIN: Performed by: ORTHOPAEDIC SURGERY

## 2020-02-18 PROCEDURE — 64721 CARPAL TUNNEL SURGERY: CPT | Mod: RT | Performed by: ORTHOPAEDIC SURGERY

## 2020-02-18 PROCEDURE — 25000128 H RX IP 250 OP 636

## 2020-02-18 RX ORDER — LIDOCAINE 40 MG/G
CREAM TOPICAL
Status: DISCONTINUED | OUTPATIENT
Start: 2020-02-18 | End: 2020-02-18 | Stop reason: HOSPADM

## 2020-02-18 RX ORDER — DEXAMETHASONE SODIUM PHOSPHATE 4 MG/ML
INJECTION, SOLUTION INTRA-ARTICULAR; INTRALESIONAL; INTRAMUSCULAR; INTRAVENOUS; SOFT TISSUE PRN
Status: DISCONTINUED | OUTPATIENT
Start: 2020-02-18 | End: 2020-02-18

## 2020-02-18 RX ORDER — CELECOXIB 200 MG/1
400 CAPSULE ORAL
Status: COMPLETED | OUTPATIENT
Start: 2020-02-18 | End: 2020-02-18

## 2020-02-18 RX ORDER — ONDANSETRON 2 MG/ML
INJECTION INTRAMUSCULAR; INTRAVENOUS PRN
Status: DISCONTINUED | OUTPATIENT
Start: 2020-02-18 | End: 2020-02-18

## 2020-02-18 RX ORDER — ONDANSETRON 4 MG/1
4 TABLET, ORALLY DISINTEGRATING ORAL EVERY 30 MIN PRN
Status: CANCELLED | OUTPATIENT
Start: 2020-02-18

## 2020-02-18 RX ORDER — FENTANYL CITRATE 50 UG/ML
INJECTION, SOLUTION INTRAMUSCULAR; INTRAVENOUS PRN
Status: DISCONTINUED | OUTPATIENT
Start: 2020-02-18 | End: 2020-02-18

## 2020-02-18 RX ORDER — HYDROCODONE BITARTRATE AND ACETAMINOPHEN 5; 325 MG/1; MG/1
1 TABLET ORAL
Status: CANCELLED | OUTPATIENT
Start: 2020-02-18

## 2020-02-18 RX ORDER — FENTANYL CITRATE 50 UG/ML
25-50 INJECTION, SOLUTION INTRAMUSCULAR; INTRAVENOUS
Status: CANCELLED | OUTPATIENT
Start: 2020-02-18

## 2020-02-18 RX ORDER — SODIUM CHLORIDE, SODIUM LACTATE, POTASSIUM CHLORIDE, CALCIUM CHLORIDE 600; 310; 30; 20 MG/100ML; MG/100ML; MG/100ML; MG/100ML
INJECTION, SOLUTION INTRAVENOUS CONTINUOUS
Status: CANCELLED | OUTPATIENT
Start: 2020-02-18

## 2020-02-18 RX ORDER — ACETAMINOPHEN 325 MG/1
975 TABLET ORAL ONCE
Status: COMPLETED | OUTPATIENT
Start: 2020-02-18 | End: 2020-02-18

## 2020-02-18 RX ORDER — SODIUM CHLORIDE, SODIUM LACTATE, POTASSIUM CHLORIDE, CALCIUM CHLORIDE 600; 310; 30; 20 MG/100ML; MG/100ML; MG/100ML; MG/100ML
INJECTION, SOLUTION INTRAVENOUS CONTINUOUS
Status: DISCONTINUED | OUTPATIENT
Start: 2020-02-18 | End: 2020-02-18 | Stop reason: HOSPADM

## 2020-02-18 RX ORDER — NALOXONE HYDROCHLORIDE 0.4 MG/ML
.1-.4 INJECTION, SOLUTION INTRAMUSCULAR; INTRAVENOUS; SUBCUTANEOUS
Status: CANCELLED | OUTPATIENT
Start: 2020-02-18 | End: 2020-02-19

## 2020-02-18 RX ORDER — LIDOCAINE HYDROCHLORIDE 10 MG/ML
INJECTION, SOLUTION EPIDURAL; INFILTRATION; INTRACAUDAL; PERINEURAL PRN
Status: DISCONTINUED | OUTPATIENT
Start: 2020-02-18 | End: 2020-02-18 | Stop reason: HOSPADM

## 2020-02-18 RX ORDER — PROPOFOL 10 MG/ML
INJECTION, EMULSION INTRAVENOUS CONTINUOUS PRN
Status: DISCONTINUED | OUTPATIENT
Start: 2020-02-18 | End: 2020-02-18

## 2020-02-18 RX ORDER — MEPERIDINE HYDROCHLORIDE 25 MG/ML
12.5 INJECTION INTRAMUSCULAR; INTRAVENOUS; SUBCUTANEOUS
Status: CANCELLED | OUTPATIENT
Start: 2020-02-18

## 2020-02-18 RX ORDER — ONDANSETRON 2 MG/ML
4 INJECTION INTRAMUSCULAR; INTRAVENOUS EVERY 30 MIN PRN
Status: CANCELLED | OUTPATIENT
Start: 2020-02-18

## 2020-02-18 RX ORDER — HYDROCODONE BITARTRATE AND ACETAMINOPHEN 5; 325 MG/1; MG/1
1-2 TABLET ORAL EVERY 4 HOURS PRN
Status: DISCONTINUED | OUTPATIENT
Start: 2020-02-18 | End: 2020-02-18 | Stop reason: HOSPADM

## 2020-02-18 RX ORDER — GLYCOPYRROLATE 0.2 MG/ML
INJECTION, SOLUTION INTRAMUSCULAR; INTRAVENOUS PRN
Status: DISCONTINUED | OUTPATIENT
Start: 2020-02-18 | End: 2020-02-18

## 2020-02-18 RX ORDER — HYDROCODONE BITARTRATE AND ACETAMINOPHEN 5; 325 MG/1; MG/1
1-2 TABLET ORAL EVERY 6 HOURS PRN
Qty: 10 TABLET | Refills: 0 | Status: SHIPPED | OUTPATIENT
Start: 2020-02-18 | End: 2020-11-06

## 2020-02-18 RX ADMIN — GLYCOPYRROLATE 0.2 MG: 0.2 INJECTION, SOLUTION INTRAMUSCULAR; INTRAVENOUS at 10:38

## 2020-02-18 RX ADMIN — ACETAMINOPHEN 975 MG: 325 TABLET, FILM COATED ORAL at 09:13

## 2020-02-18 RX ADMIN — DEXAMETHASONE SODIUM PHOSPHATE 4 MG: 4 INJECTION, SOLUTION INTRA-ARTICULAR; INTRALESIONAL; INTRAMUSCULAR; INTRAVENOUS; SOFT TISSUE at 10:35

## 2020-02-18 RX ADMIN — HYDROCODONE BITARTRATE AND ACETAMINOPHEN 1 TABLET: 5; 325 TABLET ORAL at 12:10

## 2020-02-18 RX ADMIN — PROPOFOL 100 MCG/KG/MIN: 10 INJECTION, EMULSION INTRAVENOUS at 10:30

## 2020-02-18 RX ADMIN — ONDANSETRON HYDROCHLORIDE 4 MG: 2 INJECTION, SOLUTION INTRAVENOUS at 10:38

## 2020-02-18 RX ADMIN — LIDOCAINE HYDROCHLORIDE 40 MG: 10 INJECTION, SOLUTION EPIDURAL; INFILTRATION; INTRACAUDAL; PERINEURAL at 10:37

## 2020-02-18 RX ADMIN — SODIUM CHLORIDE, POTASSIUM CHLORIDE, SODIUM LACTATE AND CALCIUM CHLORIDE: 600; 310; 30; 20 INJECTION, SOLUTION INTRAVENOUS at 09:52

## 2020-02-18 RX ADMIN — CELECOXIB 400 MG: 200 CAPSULE ORAL at 09:13

## 2020-02-18 RX ADMIN — FENTANYL CITRATE 50 MCG: 50 INJECTION, SOLUTION INTRAMUSCULAR; INTRAVENOUS at 10:32

## 2020-02-18 RX ADMIN — MIDAZOLAM 2 MG: 1 INJECTION INTRAMUSCULAR; INTRAVENOUS at 10:28

## 2020-02-18 ASSESSMENT — ENCOUNTER SYMPTOMS
DYSRHYTHMIAS: 0
SEIZURES: 0

## 2020-02-18 ASSESSMENT — MIFFLIN-ST. JEOR: SCORE: 1787.96

## 2020-02-18 NOTE — OP NOTE
Procedure Date: 02/18/2020      PREOPERATIVE DIAGNOSIS:  Right carpal tunnel syndrome.      POSTOPERATIVE DIAGNOSIS:  Right carpal tunnel syndrome.      PROCEDURE:  Right carpal tunnel release.      SURGEON:  Henry Rogers MD      FIRST ASSISTANT:  Lavelle Royal PA-C      TYPE OF ANESTHESIA:  Local MAC.      INDICATIONS FOR THE PROCEDURE:  Ms. Nunn is currently a 54-year-old female who has had chronic paresthesia symptoms on both hands, right side worse than left.  The problem became particularly worse in 12/2019.  It has been progressing quite significantly on a daily basis.  She was found to have carpal tunnel syndrome based on examinations and EMG study.  With understanding of the nature of the problem and treatment options, she decided to go ahead with the surgical intervention of carpal tunnel release under local MAC.      DESCRIPTION OF PROCEDURE:  With satisfactory intravenous sedation in supine position, the right hand was prepped and draped in routine sterile fashion.  A longitudinal incision was made along the radial border of the ring finger from the wrist crease down to Colin's line.  A sharp dissection through the skin and subcutaneous fatty tissue was made.  The underlying palmar aponeurosis was fine and this was divided.  Subsequently, the underlying transverse carpal ligament was identified and this was divided in a longitudinal fashion superficial to deep with care taken to avoid any damage to the contents of the carpal tunnel.  The median nerve was quite tight within the carpal tunnel.      Further proximally, the distal extension of brachial fascia was released using the tenotomy technique ulnar to the palmaris longus tendon and distally we confirmed complete release of the transverse carpal ligament.  A little finger was easily passed proximally and distally at this point.      With the tourniquet deflated at this point, the wound was closed with satisfactory coagulation obtained.  Soft dressing  and protecting volar splint was applied.  The patient was then taken to the recovery room in stable condition.  Blood loss was minimal, that is less than 2 mL.  The needle count and sponge count were correct at the end of the case.         EDWIN GUAMAN MD             D: 2020   T: 2020   MT: HOSEA      Name:     THOMAS MCLEOD   MRN:      [id number]        Account:        [id number]   :      1965           Procedure Date: 2020      Document: [id number]

## 2020-02-18 NOTE — BRIEF OP NOTE
[facility]    Brief Operative Note    Pre-operative diagnosis: Carpal tunnel syndrome of right wrist [G56.01]  Post-operative diagnosis right carpal tunnel syndrome    Procedure: Procedure(s):  Right carpal tunnel release  Surgeon: Surgeon(s) and Role:     * Henry Rogers MD - Primary     * Nick Royal PA-C  Anesthesia: Combined MAC with Local   Estimated blood loss: Minimal  Drains: None  Specimens: * No specimens in log *  Findings:   None.  Complications: None.  Implants: * No implants in log *

## 2020-02-18 NOTE — ANESTHESIA PREPROCEDURE EVALUATION
Anesthesia Pre-Procedure Evaluation    Patient: Racquel Nunn   MRN: [id number] : 1965          Preoperative Diagnosis: Carpal tunnel syndrome of right wrist [G56.01]    Procedure(s):  Right carpal tunnel release    Past Medical History:   Diagnosis Date     Arthritis     hands     Bilateral pulmonary embolism (H) 2019     Diabetes (H)      HTN (hypertension)      IFG (impaired fasting glucose) 2020     Mild intermittent asthma, unspecified whether complicated 2019     Morbid obesity (H)      ERICA (obstructive sleep apnea)     CPAP     Past Surgical History:   Procedure Laterality Date     NO HISTORY OF SURGERY       Anesthesia Evaluation     .             ROS/MED HX    ENT/Pulmonary:     (+)sleep apnea, Moderate Persistent asthma uses CPAP , . .    Neurologic:      (-) seizures, CVA, Dementia, Neuropathy and migraines   Cardiovascular:     (+) Dyslipidemia, hypertension----. : . . . :. .      (-) CAD, CHF, arrhythmias and pulmonary hypertension   METS/Exercise Tolerance:     Hematologic:     (+) History of blood clots -      Musculoskeletal:   (+) arthritis,  other musculoskeletal- CTS      GI/Hepatic:     (+) GERD       Renal/Genitourinary:     (+) chronic renal disease, type: CRI,       Endo:     (+) type II DM Obesity, .   (-) thyroid disease and chronic steroid usage   Psychiatric:     (+) psychiatric history depression      Infectious Disease:         Malignancy:         Other:                          Physical Exam      Airway   Mallampati: II  TM distance: >3 FB  Neck ROM: full    Dental     Cardiovascular   Rhythm and rate: regular and normal  (-) no murmur    Pulmonary    breath sounds clear to auscultation    Other findings: Lab Test        01/25/20     10/21/19     10/07/19                       0934          1104          0826          WBC          5.4          6.8          6.7           HGB          12.8         12.5         13.2          MCV          91           93          "  95            PLT          219          267          245            Lab Test        01/25/20     10/21/19     10/01/19                       0934          1104          0652          NA           139          138          139           POTASSIUM    4.6          3.7          4.2           CHLORIDE     106          106          104           CO2          28           26           31            BUN          16           10           12            CR           0.92         0.99         0.95          ANIONGAP     5            6            4             JEFFERSON          9.3          9.2          8.7           GLC          114*         91           140*                Lab Results   Component Value Date    WBC 5.4 01/25/2020    HGB 12.8 01/25/2020    HCT 38.9 01/25/2020     01/25/2020     01/25/2020    POTASSIUM 4.6 01/25/2020    CHLORIDE 106 01/25/2020    CO2 28 01/25/2020    BUN 16 01/25/2020    CR 0.92 01/25/2020     (H) 01/25/2020    JEFFERSON 9.3 01/25/2020    ALBUMIN 3.3 (L) 01/25/2020    PROTTOTAL 7.3 01/25/2020    ALT 37 01/25/2020    AST 23 01/25/2020    ALKPHOS 91 01/25/2020    BILITOTAL 0.5 01/25/2020    TSH 2.43 09/30/2019    T4 0.85 01/14/2019    HCG Negative 02/18/2020       Preop Vitals  BP Readings from Last 3 Encounters:   02/18/20 (!) 148/81   02/13/20 114/68   01/13/20 126/80    Pulse Readings from Last 3 Encounters:   02/13/20 78   01/08/20 76   10/21/19 75      Resp Readings from Last 3 Encounters:   02/18/20 14   01/08/20 18   10/21/19 20    SpO2 Readings from Last 3 Encounters:   02/18/20 100%   02/13/20 98%   01/08/20 100%      Temp Readings from Last 1 Encounters:   02/18/20 98  F (36.7  C) (Temporal)    Ht Readings from Last 1 Encounters:   02/18/20 1.626 m (5' 4\")      Wt Readings from Last 1 Encounters:   02/18/20 119.3 kg (263 lb)    Estimated body mass index is 45.14 kg/m  as calculated from the following:    Height as of this encounter: 1.626 m (5' 4\").    Weight as of this " encounter: 119.3 kg (263 lb).       Anesthesia Plan      History & Physical Review  History and physical reviewed and following examination; no interval change. History and physical reviewed and following examination, relevant changes include:    ASA Status:  3 .    NPO Status:  > 8 hours    Plan for MAC with Propofol induction. Maintenance will be Balanced.  Reason for MAC:  Deep or markedly invasive procedure (G8)  PONV prophylaxis:  Ondansetron (or other 5HT-3)       Postoperative Care  Postoperative pain management:  IV analgesics.      Consents  Anesthetic plan, risks, benefits and alternatives discussed with:  Patient..                 Mahesh Andersen MD                    .

## 2020-02-18 NOTE — DISCHARGE INSTRUCTIONS
[facility] ADULT DISCHARGE INSTRUCTIONS   SPECIAL PRECAUTIONS FOR 24 HOURS AFTER SURGERY    IT IS NOT UNUSUAL TO FEEL LIGHT-HEADED OR FAINT, UP TO 24 HOURS AFTER SURGERY OR WHILE TAKING PAIN MEDICATION.  IF YOU HAVE THESE SYMPTOMS; SIT FOR A FEW MINUTES BEFORE STANDING AND HAVE SOMEONE ASSIST YOU WHEN YOU GET UP TO WALK OR USE THE BATHROOM.    YOU SHOULD REST AND RELAX FOR THE NEXT 24 HOURS AND YOU MUST MAKE ARRANGEMENTS TO HAVE SOMEONE STAY WITH YOU FOR AT LEAST 24 HOURS AFTER YOUR DISCHARGE.  AVOID HAZARDOUS AND STRENUOUS ACTIVITIES.  DO NOT MAKE IMPORTANT DECISIONS FOR 24 HOURS.    DO NOT DRIVE ANY VEHICLE OR OPERATE MECHANICAL EQUIPMENT FOR 24 HOURS FOLLOWING THE END OF YOUR SURGERY.  EVEN THOUGH YOU MAY FEEL NORMAL, YOUR REACTIONS MAY BE AFFECTED BY THE MEDICATION YOU HAVE RECEIVED.    DO NOT DRINK ALCOHOLIC BEVERAGES FOR 24 HOURS FOLLOWING YOUR SURGERY.    DRINK CLEAR LIQUIDS (APPLE JUICE, GINGER ALE, 7-UP, BROTH, ETC.).  PROGRESS TO YOUR REGULAR DIET AS YOU FEEL ABLE.    YOU MAY HAVE A DRY MOUTH, A SORE THROAT, MUSCLES ACHES OR TROUBLE SLEEPING.  THESE SHOULD GO AWAY AFTER 24 HOURS.    CALL YOUR DOCTOR FOR ANY OF THE FOLLOWING:  SIGNS OF INFECTION (FEVER, GROWING TENDERNESS AT THE SURGERY SITE, A LARGE AMOUNT OF DRAINAGE OR BLEEDING, SEVERE PAIN, FOUL-SMELLING DRAINAGE, REDNESS OR SWELLING.    IT HAS BEEN OVER 8 TO 10 HOURS SINCE SURGERY AND YOU ARE STILL NOT ABLE TO URINATE (PASS WATER).

## 2020-02-18 NOTE — ANESTHESIA POSTPROCEDURE EVALUATION
Patient: Racquel Nunn    Procedure(s):  Right carpal tunnel release    Diagnosis:Carpal tunnel syndrome of right wrist [G56.01]  Diagnosis Additional Information: No value filed.    Anesthesia Type:  MAC    Note:  Anesthesia Post Evaluation    Patient location during evaluation: PACU  Patient participation: Able to fully participate in evaluation  Level of consciousness: awake  Pain management: adequate  Airway patency: patent  Cardiovascular status: acceptable  Respiratory status: acceptable  Hydration status: euvolemic  PONV: controlled     Anesthetic complications: None          Last vitals:  Vitals:    02/18/20 0900 02/18/20 1107   BP: (!) 148/81 (!) 144/87   Resp: 14 15   Temp: 98  F (36.7  C) 98.7  F (37.1  C)   SpO2: 100% 100%         Electronically Signed By: Mahesh Andersen MD  February 18, 2020  12:03 PM

## 2020-02-18 NOTE — ANESTHESIA CARE TRANSFER NOTE
Patient: Racquel Nunn    Procedure(s):  Right carpal tunnel release    Diagnosis: Carpal tunnel syndrome of right wrist [G56.01]  Diagnosis Additional Information: No value filed.    Anesthesia Type:   MAC     Note:  Airway :Room Air  Patient transferred to:Phase II  Comments: Pt to phase 2 recovery, VSS, eport to RNHandoff Report: Identifed the Patient, Identified the Reponsible Provider, Reviewed the pertinent medical history, Discussed the surgical course, Reviewed Intra-OP anesthesia mangement and issues during anesthesia, Set expectations for post-procedure period and Allowed opportunity for questions and acknowledgement of understanding      Vitals: (Last set prior to Anesthesia Care Transfer)    CRNA VITALS  2/18/2020 1030 - 2/18/2020 1111      2/18/2020             Pulse:  85    SpO2:  100 %                Electronically Signed By: PERLA Will CRNA  February 18, 2020  11:11 AM   forehead

## 2020-02-20 ENCOUNTER — TELEPHONE (OUTPATIENT)
Dept: ORTHOPEDICS | Facility: CLINIC | Age: 55
End: 2020-02-20

## 2020-02-20 NOTE — TELEPHONE ENCOUNTER
Racquel Nunn is a 54 year old female. Received voicemail from Annemarie, nurse with [employer]. She would like to confirm patient had R carpal tunnel release on 2/18/20, first date seen for this condition, and next appointment date. She would also like to know potential RTW date.     Caller expecting call back: YES      Preferred contact number:  [phone]  Can we leave a detailed message on this number: YES     Phone call to Annemarie and informed we would need a written request faxed to our office. She states patient was informed to authorize our office to release information. Informed we do not have consent to speak with her. Asked that she fax requested information to our office. Fax number provided. She states we should receive by tomorrow.     When forms received, will need patient's verbal permission to fax form if one is not provided with forms.    Please watch for forms.     CÉSAR Schwartz RN

## 2020-02-20 NOTE — TELEPHONE ENCOUNTER
Spoke with patient.  Doing well, no questions at this time.  Offered number for nurse triage and confirmed follow up appointment.    Nick Royal PA-C  [facility] - Surgery

## 2020-02-21 NOTE — TELEPHONE ENCOUNTER
Reason for Call:  Form, our goal is to have forms completed with 7 days, however, some forms may require a visit or additional information.    Type of letter, form or note: Attending Physicians Statement     Who is the form from?: [employer]    Where did the form come from: form was faxed in    Phone number of person requesting form: [phone]  Can we leave a detailed message on this number:  Not Applicable    Desired completion date of form: 2/27/20      How will form be returned?:  fax to [phone]    Has the patient signed a consent form for release of information (may be included with form)? NO    Additional comments: right carpal tunnel release, DOS: 2/18/20, Dr. Henry Rogers    Form was started and place in accordion folder on Lavelle Royal's desk  for provider review/ completion at [facility].

## 2020-02-27 ENCOUNTER — OFFICE VISIT (OUTPATIENT)
Dept: ORTHOPEDICS | Facility: CLINIC | Age: 55
End: 2020-02-27
Payer: COMMERCIAL

## 2020-02-27 DIAGNOSIS — Z47.89 ORTHOPEDIC AFTERCARE: Primary | ICD-10-CM

## 2020-02-27 PROCEDURE — 99024 POSTOP FOLLOW-UP VISIT: CPT | Performed by: PHYSICIAN ASSISTANT

## 2020-02-27 NOTE — PROGRESS NOTES
HISTORY OF PRESENT ILLNESS:    Racquel Nunn is a 54 year old female who is seen in follow up for Right CTR, DOS 02/18/20, Dr. Rogers - Carpal tunnel release.  Present symptoms: Pt reports some symptom improvement to CT symptoms.  Is  keeping covered. Pt is lightly using hand for activities. No new complaints.  Denies Chest pain, Calve pain, Fever, Chills.    Current Treatment: Postop.    PHYSICAL EXAM:  There were no vitals taken for this visit.  There is no weight on file to calculate BMI.   GENERAL APPEARANCE: healthy, alert and no distress   PSYCH: mentation appears normal and affect normal/bright    MSK:  Right:  Volar wrist.  Incision clean and dry, Sutures present, healing.  No incisional erythema.   No Ecchymosis.  Edema min at wrist, hand and digits.  CMS: seymour incisional numbness, otherwise grossly intact to digits.  AROM: mild restriction in palmar wrist  flexion, otherwise WNL.      ASSESSMENT:  Racquel Nunn is a 54 year old female who is seen in follow up for Right CTR, DOS 02/18/20, Dr. Rogers  CTR.    Some CTS improvement. Healing.  We discussed that CT symptoms may improve for several months after surgery.    PLAN:  - Surgery discussed, images reviewed if applicable, and all questions were answered at this time.  - Sutures removed with sterile technique, steri-strips applied in usual fashion, care instructions given and verbally acknowledged.  - Medications: Taper RX pain meds, OTC PRN.  - Physical Therapy: As instructed / RICE and PROM.  - AAT    Return to clinic PRN.    Nick Royal PA-C    Dept. Orthopedic Surgery  [facility]     2/27/2020

## 2020-02-27 NOTE — LETTER
[facility]  [address]  [place] [address]  [phone]        February 27, 2020    RE:  Racquel Nunn                                                                                                                                                       [address]  [place] [address]            To whom it may concern:    Racquel Nunn is under my professional care for Right carpal tunnel release surgery.  She  may return to work on 03/02/20.    Sincerely,        Nick Royal PA-C

## 2020-02-28 NOTE — TELEPHONE ENCOUNTER
"Patient left voicemail asking if we received the forms from her [employer] company that were faxed on 2/21/20. They will be faxing again.     She states she had surgery on her hand and is doing \"great\".     Would like a return call about forms.     She can be reached at: [phone]    CÉSAR Schwartz RN    "

## 2020-02-28 NOTE — TELEPHONE ENCOUNTER
Called and talked with patient. Let her know the her forms were faxed over today. All questions answered. Patient was appreciative of phone call.     Forms sent to scan.     Erlinda Iniguez MS, ATC

## 2020-05-03 ENCOUNTER — MYC MEDICAL ADVICE (OUTPATIENT)
Dept: PEDIATRICS | Facility: CLINIC | Age: 55
End: 2020-05-03

## 2020-05-03 DIAGNOSIS — M79.641 BILATERAL HAND PAIN: Primary | ICD-10-CM

## 2020-05-03 DIAGNOSIS — M79.642 BILATERAL HAND PAIN: Primary | ICD-10-CM

## 2020-05-04 NOTE — TELEPHONE ENCOUNTER
Patient stating she has arthritis and its affecting her at work.   Sent [employer] message to gather more information to better advise on next steps.

## 2020-05-04 NOTE — TELEPHONE ENCOUNTER
Patient got back. Has bilateral hand arthritis pain.     Huddled with Dr. Staci Banuelos for Hand Therapy referral.     Entered referral. Updated patient via [employer] message update.

## 2020-05-07 ENCOUNTER — THERAPY VISIT (OUTPATIENT)
Dept: OCCUPATIONAL THERAPY | Facility: CLINIC | Age: 55
End: 2020-05-07
Attending: PEDIATRICS
Payer: COMMERCIAL

## 2020-05-07 DIAGNOSIS — M79.641 BILATERAL HAND PAIN: ICD-10-CM

## 2020-05-07 DIAGNOSIS — M79.642 BILATERAL HAND PAIN: ICD-10-CM

## 2020-05-07 PROCEDURE — 97110 THERAPEUTIC EXERCISES: CPT | Mod: GO | Performed by: OCCUPATIONAL THERAPIST

## 2020-05-07 PROCEDURE — 97112 NEUROMUSCULAR REEDUCATION: CPT | Mod: GO | Performed by: OCCUPATIONAL THERAPIST

## 2020-05-07 PROCEDURE — 97165 OT EVAL LOW COMPLEX 30 MIN: CPT | Mod: GO | Performed by: OCCUPATIONAL THERAPIST

## 2020-05-07 PROCEDURE — 97140 MANUAL THERAPY 1/> REGIONS: CPT | Mod: GO | Performed by: OCCUPATIONAL THERAPIST

## 2020-05-07 NOTE — PROGRESS NOTES
Hand Therapy Initial Evaluation  Current Date:  5/7/2020    Subjective:  Racquel Nunn is a 54 year old right hand dominant female.    Diagnosis: B hand pain  DOI: ~1-1.5 mos ago (5/4/20 MD order date)    Patient reports symptoms of pain, stiffness/loss of motion, weakness/loss of strength and edema of the B hands (pt reports CT symptoms on the L--currently not treating this) which occurred due to unknown cause. Since onset symptoms are gradually getting worse. Special tests:  none.  Previous treatment: tylenol arthritis, arthritis gloves, paraffin. General health as reported by patient is good.  Pertinent medical history includes: Depression, High Blood Pressure, Menopausal, Overweight, Sleep Disorder/Apnea.  Medical allergies: none.  Surgical history: orthopedic: R CTR 2/18/20.  Medication history: Anti-depressants, High Blood Pressure.    Occupational Profile Information:  Current occupation is [employer]  Currently working in normal job without restrictions  Job Tasks: Lifting, Carrying, Prolonged Standing, Pushing, Pulling  Prior functional level:  no limitations  Barriers include:none  Mobility: No difficulty  Transportation: drives  Leisure activities/hobbies: riding bicycle, gardening    Upper Extremity Functional Index Score:  SCORE:   Column Totals: /80: 40   (A lower score indicates greater disability.)    Pain Level (Scale 0-10):   5/7/2020   At Rest 3   With Use 9     Pain Description:  Date 5/7/2020   Location B whole hand   Pain Quality Aching   Frequency constant     Pain is worst  daytime   Exacerbated by  work--pushing down, lifting, gripping   Relieved by Tylenol arthritis, arthritis gloves, paraffin   Progression Gradually worsening       Observation: mild edema present in all fingers at dorsal PIP and MP joints    AROM of fingers: WNL, but pt reports pain and stiffness at full composite flexion    PROM of fingers: Intrinsic tightness present B, mild pain with composite flexion on R and  both hook and composite flexion on L    Strength   (Measured in pounds)  Pain Report:  scale of 0-10/10   5/7/2020 5/7/2020   Trials L R   1  2  3 57, 7/10 35, 7/10   Average 57, 7/10 35, 7/10     Lat Pinch 5/7/2020 5/7/2020   Trials L R   1  2  3 15, 0/10 12. 5/10   Average 15, 0/10 12. 5/10     3 Pt Pinch 5/7/2020 5/7/2020   Trials L R   1  2  3 12 12   Average 12 12     Assessment/Plan:  Patient presents with symptoms consistent with diagnosis of B hand pain, with conservative intervention.     Patient's limitations or Problem List includes:  Pain, Increased edema, Weakness, Decreased coordination and Decreased dexterity of the bilateral hand which interferes with the patient's ability to perform Self Care Tasks (dressing, eating, bathing, hygiene/toileting), Work Tasks, Sleep Patterns, Recreational Activities, Household Chores and Driving  as compared to previous level of function.    Rehab Potential:  Good - Return to full activity, some limitations    Patient will benefit from skilled Occupational Therapy to increase flexibility, overall strength,  strength, coordination and dexterity and decrease pain and edema to return to previous activity level and resume normal daily tasks and to reach their rehab potential.    Barriers to Learning:  No barrier    Communication Issues:  Patient appears to be able to clearly communicate and understand verbal and written communication and follow directions correctly.    Assessment of Occupational Performance:  5 or more Performance Deficits  Identified Performance Deficits: bathing/showering, toileting, dressing, feeding, driving and community mobility, health management and maintenance, home establishment and management, meal preparation and cleanup, shopping, sleep, work and leisure activities      Clinical Decision Making (Complexity): Low complexity    Treatment Explanation:  The following has been discussed with the patient:  RX ordered/plan of  care  Anticipated outcomes  Possible risks and side effects    P: Frequency:  1 X week, once daily  Duration:  for 6 weeks    Treatment Plan:  Therapeutic Exercise:  AROM, PROM, Tendon Gliding, Blocking, Place and Hold, Isotonics and Isometrics  Neuromuscular re-education:  Nerve Gliding, Coordination/Dexterity, Kinesthetic Training and Proprioceptive Training  Manual Techniques:  Coordination/Dexterity, Joint mobilization and Myofascial release  Orthotic Fabrication:  Static orthosis, Hand based orthosis and Forearm based orthosis  Discharge Plan:  Achieve all LTG.  Independent in home treatment program.  Reach maximal therapeutic benefit.    Home Exercise Program:  Exercise Name: Finger Passive Range of Motion Hook/IP Joint Flexion, Sets: 1 - Reps: 5 - Sessions: 2x/day, 20 second hold, Notes: Can do all fingers at the same time  Exercise Name: Finger Passive Range of Motion Composite Flexion, Sets: 1 - Reps: 5 - Sessions: 2x/day, 20 second hold, Notes: Can do all fingers at the same time  Exercise Name: Ball Massage to Flexors - Reps: 3-5 minutes - Sessions: 2  Exercise Name: Nerve Gliding Proximal Median, Sets: 1 - Reps: 10 - Sessions: 2, Notes: Hold 5 sec  Exercise Name: Nerve Gliding Proximal Radial, Sets: 1 - Reps: 10 - Sessions: 2, Notes: Hold for 5 seconds  Left--don't drop shoulder    Next Visit:  MFR to intrinsics  PROM to hook and composite fist  Passive nerve glides  Splinting for at night if needed

## 2020-05-08 PROBLEM — M79.642 BILATERAL HAND PAIN: Status: ACTIVE | Noted: 2020-05-08

## 2020-05-08 PROBLEM — M79.641 BILATERAL HAND PAIN: Status: ACTIVE | Noted: 2020-05-08

## 2020-06-22 PROBLEM — M79.641 BILATERAL HAND PAIN: Status: RESOLVED | Noted: 2020-05-08 | Resolved: 2020-06-22

## 2020-06-22 PROBLEM — M79.642 BILATERAL HAND PAIN: Status: RESOLVED | Noted: 2020-05-08 | Resolved: 2020-06-22

## 2020-08-28 ENCOUNTER — MYC MEDICAL ADVICE (OUTPATIENT)
Dept: PEDIATRICS | Facility: CLINIC | Age: 55
End: 2020-08-28

## 2020-08-28 DIAGNOSIS — M79.673 FOOT PAIN: Primary | ICD-10-CM

## 2020-09-15 ENCOUNTER — ANCILLARY PROCEDURE (OUTPATIENT)
Dept: GENERAL RADIOLOGY | Facility: CLINIC | Age: 55
End: 2020-09-15
Attending: PODIATRIST
Payer: COMMERCIAL

## 2020-09-15 ENCOUNTER — OFFICE VISIT (OUTPATIENT)
Dept: PODIATRY | Facility: CLINIC | Age: 55
End: 2020-09-15
Payer: COMMERCIAL

## 2020-09-15 ENCOUNTER — MYC MEDICAL ADVICE (OUTPATIENT)
Dept: PODIATRY | Facility: CLINIC | Age: 55
End: 2020-09-15

## 2020-09-15 ENCOUNTER — TELEPHONE (OUTPATIENT)
Dept: PODIATRY | Facility: CLINIC | Age: 55
End: 2020-09-15

## 2020-09-15 VITALS
WEIGHT: 253 LBS | DIASTOLIC BLOOD PRESSURE: 80 MMHG | SYSTOLIC BLOOD PRESSURE: 134 MMHG | BODY MASS INDEX: 43.19 KG/M2 | HEIGHT: 64 IN

## 2020-09-15 DIAGNOSIS — M77.42 METATARSALGIA, LEFT FOOT: ICD-10-CM

## 2020-09-15 DIAGNOSIS — M76.72 PERONEAL TENDONITIS, LEFT: ICD-10-CM

## 2020-09-15 DIAGNOSIS — M76.72 PERONEAL TENDONITIS, LEFT: Primary | ICD-10-CM

## 2020-09-15 PROCEDURE — 99243 OFF/OP CNSLTJ NEW/EST LOW 30: CPT | Performed by: PODIATRIST

## 2020-09-15 PROCEDURE — 73630 X-RAY EXAM OF FOOT: CPT | Mod: LT

## 2020-09-15 ASSESSMENT — MIFFLIN-ST. JEOR: SCORE: 1737.6

## 2020-09-15 NOTE — PATIENT INSTRUCTIONS
Thank you for choosing [facility] Podiatry / Foot & Ankle Surgery!    [facility] SCHEDULE SURGERY: [phone]   [address] BILLING QUESTIONS: [phone]   [place] [address] AFTER HOURS: [phone]   PH: [phone] CONSUMER PRICE LINE:[phone]   FAX: [phone] APPOINTMENTS: [phone]     PRICE THERAPY  Many aches and pains throughout the foot and ankle can be helped with many simple treatments. This is usually described as PRICE Therapy.      P - Protection - often times, inflammation/pain in the lower extremity is not able to improve simply because the areas involved are never allowed to rest. Every step we take can bother the problematic area. Protecting those areas is an important step in the healing process. This may involve a walking cast boot, a special insert/orthotic device, an ankle brace, or simply avoiding barefoot walking.    R - Rest - in addition to protecting the foot/ankle, resting is an important, but often times difficult, treatment option. Getting off your feet when they bother you, and specifically avoiding activities that cause pain/discomfort, are very beneficial to prevent, and treat, foot/ankle pain.      I - Ice - icing regularly can help to decrease inflammation and swelling in the foot, thus decreasing pain. Using an ice pack or a bag of frozen veggies works very well. Ice for 20 minutes multiple times per day as needed.  Do not place the ice directly on the skin as this can cause tissue damage.    C - Compression - using a compression wrap or an ACE wrap can help to decrease swelling, which can help to decrease pain. Wearing the wraps is generally not needed at night, but they should be worn on a regular basis when you are going to be on your feet for prolonged periods as gravity tends to pull fluids down to your feet/ankles.    E - Elevation - elevating your lower extremities multiple times daily for 15-20 minutes can help to  decrease swelling, which works well in decreasing pain levels.    NSAID/Tylenol - Anti-inflammatories like Aleve or ibuprofen, and/or a pain medication, such as Tylenol, can help to improve pain levels and get the issue resolved sooner rather than later. Anyone with liver issues should be careful with Tylenol, and anyone with high blood pressure or heart, stomach or kidney issues should be careful with anti-inflammatories. Please ask if you have questions about these medications, including dosage.    [employer] / CAM WALKING BOOT INSTRUCTIONS  - Do NOT drive with CAM walker on. This is due to safety and legal issues.   - Do NOT wear the CAM walker on long car/train rides or on an airplane.  - Remove the CAM walker several times a day and do ankle range of motion (ROM) exercises/wiggle toes.  - It is recommended that a thick-soled shoe be worn on the other foot to offset any created leg length issue.   - The boot does not have to be worn at night.   - There is an increased risk of developing a blood clot with lower extremity immobilization. ROM exercises and knee-high compression (tenso /ACE wrap) is recommended to lower that risk.   - You should seek medical attention if you experience calf swelling and/or pain, chest pain, or shortness of breath.

## 2020-09-15 NOTE — TELEPHONE ENCOUNTER
3:42pm on 9/15/2020.  Pt. Went to [facility] to  a medication that Dr. Chacon prescribed for her and they did not have it yet today.   Please call pt. Regarding this at [phone].

## 2020-09-15 NOTE — LETTER
September 16, 2020      Racquel Nunn  [address]  [place] [place] [address]        To Whom It May Concern:    Racquel Nunn was seen in our clinic on 9/15/20. She can return to work on 9/19/20 without restrictions.           Sincerely,        David Chacon DPM FACFAS FACFAOM  Podiatric Foot & Ankle Surgeon  [facility]

## 2020-09-15 NOTE — PROGRESS NOTES
"Foot & Ankle Surgery  September 15, 2020    CC: left foot pain    I was asked to see Racquel Nunn regarding the chief complaint by:  Dr. TAYLER Small    HPI:  Pt is a 55 year old female who presents with above complaint.  Left lateral foot pain x 1 month.  \"I don't know what I did\".  No injury noted.  Hurts to touch, hurts to walk.  Pain 8/10, worse with walking.  \"Nothing\" for treatment.  Previously seen for right heel pain.    ROS:   Pos for CC.  The patient denies current nausea, vomiting, chills, fevers, belly pain, calf pain, chest pain or SOB.  Complete remainder of ROS is otherwise neg.    VITALS:    Vitals:    09/15/20 1324   BP: 134/80   Weight: 114.8 kg (253 lb)   Height: 1.626 m (5' 4\")       PMH:    Past Medical History:   Diagnosis Date     Arthritis     hands     Bilateral pulmonary embolism (H) 9/30/2019     Diabetes (H)      HTN (hypertension)      IFG (impaired fasting glucose) 1/26/2020     Mild intermittent asthma, unspecified whether complicated 1/14/2019     Morbid obesity (H)      ERICA (obstructive sleep apnea)     CPAP       SXHX:    Past Surgical History:   Procedure Laterality Date     NO HISTORY OF SURGERY       RELEASE CARPAL TUNNEL Right 2/18/2020    Procedure: Right carpal tunnel release;  Surgeon: Henry Rogers MD;  Location:  OR        MEDS:    Current Outpatient Medications   Medication     citalopram (CELEXA) 20 MG tablet     glucose blood VI test strips strip     HYDROcodone-acetaminophen (NORCO) 5-325 MG tablet     lisinopril (PRINIVIL/ZESTRIL) 40 MG tablet     meclizine (ANTIVERT) 25 MG tablet     metFORMIN (GLUCOPHAGE) 500 MG tablet     multivitamin, therapeutic (THERA-VIT) TABS tablet     PROAIR  (90 Base) MCG/ACT inhaler     No current facility-administered medications for this visit.        ALL:     Allergies   Allergen Reactions     No Known Allergies        FMH:    Family History   Problem Relation Age of Onset     Breast Cancer Mother         S/P bilateral " mastectomy     Brain Tumor Mother      Dementia Mother      C.A.D. Father         CABG-age 60     Cerebrovascular Disease Maternal Grandmother      Breast Cancer Paternal Aunt         age 40     Cancer Maternal Aunt         melanoma     Diabetes No family hx of      Hypertension No family hx of      Cancer - colorectal No family hx of      Thyroid Disease No family hx of        SocHx:    Social History     Socioeconomic History     Marital status:      Spouse name: Not on file     Number of children: 0     Years of education: 12     Highest education level: 12th grade   Occupational History     Occupation:      Comment: [employer]   Social Needs     Financial resource strain: Not very hard     Food insecurity     Worry: Sometimes true     Inability: Sometimes true     Transportation needs     Medical: No     Non-medical: No   Tobacco Use     Smoking status: Never Smoker     Smokeless tobacco: Never Used   Substance and Sexual Activity     Alcohol use: Yes     Frequency: Monthly or less     Drinks per session: 1 or 2     Binge frequency: Less than monthly     Comment: 1 drink per week or less     Drug use: No     Sexual activity: Never     Comment: never been sexually active, declining pap smears   Lifestyle     Physical activity     Days per week: 2 days     Minutes per session: 30 min     Stress: To some extent   Relationships     Social connections     Talks on phone: More than three times a week     Gets together: More than three times a week     Attends Christian service: 1 to 4 times per year     Active member of club or organization: No     Attends meetings of clubs or organizations: Never     Relationship status:      Intimate partner violence     Fear of current or ex partner: Not on file     Emotionally abused: Not on file     Physically abused: Not on file     Forced sexual activity: Not on file   Other Topics Concern     Parent/sibling w/ CABG, MI or angioplasty before 65F 55M?  No   Social History Narrative    Wedding 7/18/15           EXAMINATION:  Gen:   No apparent distress  Neuro:   A&Ox3, no deficits  Psych:    Answering questions appropriately for age and situation with normal affect  Head:    NCAT  Eye:    Visual scanning without deficit  Ear:    Response to auditory stimuli wnl  Lung:    Non-labored breathing on RA noted  Abd:    NTND per patient report  Lymph:    Mild edema lateral left midfoot  Vasc:    Pulses palpable, CFT minimally delayed  Neuro:    Light touch sensation intact to all sensory nerve distributions without paresthesias  Derm:    Neg for nodules, lesions or ulcerations  MSK:    Left lower extremity - very tender at proximal 5th metatarsal base/styloid process, painful at P.brevis insertion and pain along peroneal tendons up towards lateral malleolus  Calf:    Neg for redness, swelling or tenderness      Imaging:  3 views WB - IMPRESSION: Minimal first MTP joint degenerative change. No acute  fracture or malalignment. Normal appearance of the fifth metatarsal  base.    Assessment:  55 year old female with left lateral foot pain, including 5th metatarsal pain and peroneal tendonitis      Plan:  Discussed etiologies, anatomy and options  1.  left lateral foot pain, including 5th metatarsal pain and peroneal tendonitis  -personally reviewed imaging.   -WBAT in walking cast boot  -RICE/NSAID vs tylenol prn based on pain  -tensogrip for edema control    Regarding the CAM walker, the following proper-usage instructions were discussed and dispensed:  1.  Do not sleep with the CAM boot on; 2.  Do not wear during long-distance travel, ie long car rides, plane trips(they were instructed not to drive with it if the involved foot is required to operate a vehicle); 3.  The patient was encouraged to remove the boot throughout the day when off their feet;  4.  They are to have the boot on when ambulating, regardless of WB status      Follow up:  3-4 weeks or sooner with acute  issues      Patient's medical history was reviewed today    Body mass index is 43.43 kg/m .  Weight management plan: Patient was referred to their PCP to discuss a diet and exercise plan.        David Chacon DPM FACFAS FACFAOM  Podiatric Foot & Ankle Surgeon  [facility]  [phone]

## 2020-09-16 NOTE — TELEPHONE ENCOUNTER
Message from Abida. Asks if we have closed-toe boots here. States she is having an issue at work.  Please call back.

## 2020-09-16 NOTE — TELEPHONE ENCOUNTER
Please see [employer] message regarding previous history of blood clots and PE that occurred last year.  Patient also inquires about missing prescription that was not sent to [facility].     Plan from office visit on 9/15/20 does not instruct on any prescription medications. Plan noted below, please advise. Route message back to Pool to notify patient of any further instruction.     Plan:  Discussed etiologies, anatomy and options  1.  left lateral foot pain, including 5th metatarsal pain and peroneal tendonitis  -personally reviewed imaging.   -WBAT in walking cast boot  -RICE/NSAID vs tylenol prn based on pain  -tensogrip for edema control     Regarding the CAM walker, the following proper-usage instructions were discussed and dispensed:  1.  Do not sleep with the CAM boot on; 2.  Do not wear during long-distance travel, ie long car rides, plane trips(they were instructed not to drive with it if the involved foot is required to operate a vehicle); 3.  The patient was encouraged to remove the boot throughout the day when off their feet;  4.  They are to have the boot on when ambulating, regardless of WB status    Kelly Porter, ATC

## 2020-09-16 NOTE — TELEPHONE ENCOUNTER
Return call to patient.    She states that she decided to come in to clinic to see if we have closed toed boots. She states she spoke with one of the  staff and is currently waiting in the lobby for them to return with more information after speaking with the podiatry team.    She also states she needs a letter to return to work.    She will call back or ask the  staff to send writer a message if she has any further questions.    Shannon Sunshine, ATC

## 2020-09-16 NOTE — TELEPHONE ENCOUNTER
Upon chart review, patient sent additional ChatStat message dated 9/15/20 inquiring about medication / Rx and hx of blood clots. Staff reviewed ChatStat message and routed message to Dr. Chacon for review on 9/16.    Return call to patient. She states she thought Dr. Chacon said he was going to put her on an NSAID medication but can take Tylenol as well. Instructed her that she can take up to 1000mg Tylenol TID - no more than 3000mg / day.    Informed her that we have sent a message to Dr. Chacon regarding her questions and are awaiting response. Will call her back once we have more information. She will call if she has any other questions / concerns in the meantime. She was appreciative of call back.    Shannon Sunshine ATC

## 2020-09-17 NOTE — TELEPHONE ENCOUNTER
I called and spoke with Racquel. She would like to return to work 9/19/20 without restrictions.    Letter printed and e-mailed to patient 9/17/20 @ 7:35.    David Chacon DPM FACFAS FACFAOM  Podiatric Foot & Ankle Surgeon  [facility]  [phone]

## 2020-09-22 ENCOUNTER — MYC MEDICAL ADVICE (OUTPATIENT)
Dept: PODIATRY | Facility: CLINIC | Age: 55
End: 2020-09-22

## 2020-09-22 ENCOUNTER — MYC MEDICAL ADVICE (OUTPATIENT)
Dept: ORTHOPEDICS | Facility: CLINIC | Age: 55
End: 2020-09-22

## 2020-10-13 ENCOUNTER — ANCILLARY PROCEDURE (OUTPATIENT)
Dept: GENERAL RADIOLOGY | Facility: CLINIC | Age: 55
End: 2020-10-13
Attending: PODIATRIST
Payer: COMMERCIAL

## 2020-10-13 ENCOUNTER — OFFICE VISIT (OUTPATIENT)
Dept: PODIATRY | Facility: CLINIC | Age: 55
End: 2020-10-13
Payer: COMMERCIAL

## 2020-10-13 VITALS
HEIGHT: 64 IN | WEIGHT: 253 LBS | DIASTOLIC BLOOD PRESSURE: 74 MMHG | SYSTOLIC BLOOD PRESSURE: 132 MMHG | BODY MASS INDEX: 43.19 KG/M2

## 2020-10-13 DIAGNOSIS — M79.672 LEFT FOOT PAIN: Primary | ICD-10-CM

## 2020-10-13 DIAGNOSIS — M79.672 LEFT FOOT PAIN: ICD-10-CM

## 2020-10-13 DIAGNOSIS — M76.72 PERONEAL TENDONITIS, LEFT: ICD-10-CM

## 2020-10-13 PROCEDURE — 73630 X-RAY EXAM OF FOOT: CPT | Mod: LT | Performed by: RADIOLOGY

## 2020-10-13 PROCEDURE — 99214 OFFICE O/P EST MOD 30 MIN: CPT | Performed by: PODIATRIST

## 2020-10-13 ASSESSMENT — MIFFLIN-ST. JEOR: SCORE: 1737.6

## 2020-10-13 NOTE — PROGRESS NOTES
"Foot & Ankle Surgery   October 13, 2020    S:  Pt is seen today for evaluation of lateral L midffoot/rearfoot. Previously diagnosed with peroneal tendonitis, including P.brevis insertion.  Boot, activity restrictions, RICE/NSAID vs tylenol.  Boot helped \"a bit\" but the area is still quite sore and overall she has not noticed much improvement..    Vitals:    10/13/20 1427   BP: 132/74   Weight: 114.8 kg (253 lb)   Height: 1.626 m (5' 4\")   '      ROS - Pos for CC.  Patient denies current nausea, vomiting, chills, fevers, belly pain, calf pain, chest pain or SOB.  Complete remainder of ROS it otherwise neg.      PE:  Gen:   No apparent distress  Eye:    Visual scanning without deficit  Ear:    Response to auditory stimuli wnl  Lung:    Non-labored breathing on RA noted  Abd:    NTND per patient report  Lymph:    Mild edema lateral left midfoot  Vasc:    Pulses palpable, CFT minimally delayed  Neuro:    Light touch sensation intact to all sensory nerve distributions without paresthesias  Derm:    Neg for nodules, lesions or ulcerations  MSK:    Left lower extremity - minimal 5th metatarsal pain, but painful at P.brevis insertion and pain along peroneal tendons up towards lateral malleolus  Calf:    Neg for redness, swelling or tenderness      Imaging:  FINDINGS: There is no significant degenerative change. The Lisfranc  joint appears unremarkable in these views. The plantar arch is  unremarkable in these views. There is no acute fracture or  dislocation. There are no worrisome bony lesions.                                                                      IMPRESSION: No acute osseous abnormality demonstrated.    Assessment:  55 year old female with continued peroneal tendon pain left lower extremity       Plan:  Discussed etiologies, anatomy and options  1.  Continued peroneal tendon pain left lower extremity   -continue WBAT in boot  -RICE/NSAID vs tylenol  -personally reviewed imaging today.  -MRI to assess " tendons, will call. If no significant structural pathology, anticipate PT order.  If damage, discussed surgical repair likely necessary      Follow up:  Based on imaging or sooner with acute issues           David Chacon DPM FACFAS FACFAOM  Podiatric Foot & Ankle Surgeon  [facility]  [phone]

## 2020-10-13 NOTE — PATIENT INSTRUCTIONS
Thank you for choosing [facility] / Foot & Ankle Surgery!    [facility] SCHEDULE SURGERY: [phone]   [address] BILLING QUESTIONS: [phone]   [place] [address] AFTER HOURS: [phone]   PH: [phone] CONSUMER GOTTLIE[employer] LINE:[phone]   FAX: [phone] APPOINTMENTS: [phone]     [facility] SCHEDUL[facility]  They should be calling you within 24 hours to schedule your scan.  If not, please call the location discussed at your appointment.    1) [facility]:       [phone]      [address] [place] [address]    2) [facility]:      [phone]      [address] [place] [address]    3) [facility]:       [phone]      [address] [place] [address]    * We will call and/or mychart you with the results. Please allow 24-48 hours for the results to be read and final.

## 2020-10-23 ENCOUNTER — HOSPITAL ENCOUNTER (OUTPATIENT)
Dept: MRI IMAGING | Facility: CLINIC | Age: 55
Discharge: HOME | End: 2020-10-23
Attending: PODIATRIST | Admitting: PODIATRIST
Payer: COMMERCIAL

## 2020-10-23 DIAGNOSIS — M79.672 LEFT FOOT PAIN: ICD-10-CM

## 2020-10-23 DIAGNOSIS — M76.72 PERONEAL TENDONITIS, LEFT: ICD-10-CM

## 2020-10-23 PROCEDURE — 73721 MRI JNT OF LWR EXTRE W/O DYE: CPT | Mod: LT

## 2020-10-23 PROCEDURE — 73721 MRI JNT OF LWR EXTRE W/O DYE: CPT | Mod: 26 | Performed by: RADIOLOGY

## 2020-10-27 ENCOUNTER — TELEPHONE (OUTPATIENT)
Dept: PODIATRY | Facility: CLINIC | Age: 55
End: 2020-10-27

## 2020-10-27 NOTE — TELEPHONE ENCOUNTER
Reason for Call:  Request for results:    Name of test or procedure: MRI left ankle    Date of test of procedure: 10/23/20    Results:                                                                 Impression:  1. Tendinosis with moderate grade tearing of the peroneal brevis at  its attachment to the base of the fifth metatarsal. Mild tenosynovitis  of the peroneal brevis proximal to the lateral malleolus.     2. Mild tendinosis of the distal Achilles tendon.     3. 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     4. Tenosynovitis of the distal tibialis posterior tendon.     Plan for results from last OV: call patient    OK to leave the result message on voice mail or with a family member? yes      Phone number Patient can be reached at:  Home number on file [phone]    Phone call to patient and informed that Dr. Chacon is out of the office unexpectedly today and will not return until 10/29/20. We will have him get back with her at that time. She verbalized understanding.     CÉSAR Schwartz RN

## 2020-10-27 NOTE — TELEPHONE ENCOUNTER
Patient left voicemail asking if we received MRI results from 10/23/20 for her foot.   She can be reached at: [phone].     CÉSAR Schwartz RN

## 2020-10-29 ENCOUNTER — TELEPHONE (OUTPATIENT)
Dept: PODIATRY | Facility: CLINIC | Age: 55
End: 2020-10-29

## 2020-10-29 NOTE — TELEPHONE ENCOUNTER
I called and spoke with Racquel about her MRI results:    Impression:  1. Tendinosis with moderate grade tearing of the peroneal brevis at  its attachment to the base of the fifth metatarsal. Mild tenosynovitis  of the peroneal brevis proximal to the lateral malleolus.     2. Mild tendinosis of the distal Achilles tendon.     3. 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     4. Tenosynovitis of the distal tibialis posterior tendon    Conservatively, we discussed physical therapy as an option.  We discussed that once the tendon is symptomatic in the setting of underlying structural damage, non-surgical therapy is not as effective.  Certainly, however, it would be something to consider, and I encouraged her to call for a PT order if she would like.    Surgically, discussed repair of the tendon.  She's mainly symptomatic at the P.brevis insertion, but does have pain along peroneals up to the lateral malleolus.  2 weeks down-time, 6 weeks NWB, likely into shoe/brace by 10 weeks.  Anticipate starting PT at 6 weeks post-op.      She will consider her options.  surg erika # provided, advised she call with further questions.    David Chacon DPM FACFAS FACFAOM  Podiatric Foot & Ankle Surgeon  [facility]  [phone]

## 2020-10-29 NOTE — LETTER
October 29, 2020      Racquel Nunn  [address]  [place] [address]        To Whom It May Concern,      Racquel Nunn is a patient under my care. Please allow her to wear a walking boot on her left foot while working. Otherwise, she has no restrictions.     Sincerely,        David Chacon, MOE FACFAS FACFAOM  Podiatric Foot & Ankle Surgeon  [facility]

## 2020-10-29 NOTE — TELEPHONE ENCOUNTER
Patient called and wants to schedule surgery.     Please place order.       Mitchell Demarco, Surgery Scheduler

## 2020-10-29 NOTE — TELEPHONE ENCOUNTER
Letter with work restriction(weight bearing as tolerated in boot) printed, will contact patient about delivery method.    David Chacon DPM FACFAS FACFAOM  Podiatric Foot & Ankle Surgeon  [facility]  [phone]

## 2020-10-30 ENCOUNTER — PREP FOR PROCEDURE (OUTPATIENT)
Dept: PODIATRY | Facility: CLINIC | Age: 55
End: 2020-10-30

## 2020-10-30 DIAGNOSIS — Z11.59 ENCOUNTER FOR SCREENING FOR OTHER VIRAL DISEASES: Primary | ICD-10-CM

## 2020-10-30 DIAGNOSIS — M76.72 PERONEAL TENDONITIS, LEFT: Primary | ICD-10-CM

## 2020-10-30 NOTE — PROGRESS NOTES
"Orders signed for \"peroneal tendon repair left lower extremity\"     Lateral left up    Gen + popliteal      David Chacon DPM FACFAS FACFAOM  Podiatric Foot & Ankle Surgeon  [facility]  [phone]      "

## 2020-10-30 NOTE — TELEPHONE ENCOUNTER
Scheduled surgery.     Type of surgery: left peroneal tendon repair  Location of surgery: [facility]  Date and time of surgery: 11/12/20 @ 11am   Surgeon: Oswaldo  Pre-Op Appt Date: patient will schedule  Post-Op Appt Date: 11/20/20   Packet sent out: Yes  Pre-cert/Authorization completed:  No  Date: 10/30/20    Mitchell Demarco, Surgery Scheduler

## 2020-11-06 ASSESSMENT — MIFFLIN-ST. JEOR: SCORE: 1723.99

## 2020-11-09 ENCOUNTER — TRANSFERRED RECORDS (OUTPATIENT)
Dept: HEALTH INFORMATION MANAGEMENT | Facility: CLINIC | Age: 55
End: 2020-11-09

## 2020-11-09 ENCOUNTER — OFFICE VISIT (OUTPATIENT)
Dept: PEDIATRICS | Facility: CLINIC | Age: 55
End: 2020-11-09
Payer: COMMERCIAL

## 2020-11-09 VITALS
TEMPERATURE: 97.9 F | HEART RATE: 85 BPM | WEIGHT: 268 LBS | DIASTOLIC BLOOD PRESSURE: 78 MMHG | HEIGHT: 64 IN | BODY MASS INDEX: 45.75 KG/M2 | SYSTOLIC BLOOD PRESSURE: 132 MMHG | OXYGEN SATURATION: 100 %

## 2020-11-09 DIAGNOSIS — M76.72 PERONEAL TENDONITIS, LEFT: ICD-10-CM

## 2020-11-09 DIAGNOSIS — G47.33 OSA (OBSTRUCTIVE SLEEP APNEA): ICD-10-CM

## 2020-11-09 DIAGNOSIS — J45.20 MILD INTERMITTENT ASTHMA, UNSPECIFIED WHETHER COMPLICATED: ICD-10-CM

## 2020-11-09 DIAGNOSIS — Z01.818 PREOP GENERAL PHYSICAL EXAM: Primary | ICD-10-CM

## 2020-11-09 DIAGNOSIS — I10 ESSENTIAL HYPERTENSION: ICD-10-CM

## 2020-11-09 DIAGNOSIS — E11.9 TYPE 2 DIABETES MELLITUS WITHOUT COMPLICATION, WITHOUT LONG-TERM CURRENT USE OF INSULIN (H): ICD-10-CM

## 2020-11-09 LAB
BASOPHILS # BLD AUTO: 0.1 10E9/L (ref 0–0.2)
BASOPHILS NFR BLD AUTO: 0.6 %
DIFFERENTIAL METHOD BLD: NORMAL
EOSINOPHIL # BLD AUTO: 0.5 10E9/L (ref 0–0.7)
EOSINOPHIL NFR BLD AUTO: 5.4 %
ERYTHROCYTE [DISTWIDTH] IN BLOOD BY AUTOMATED COUNT: 12.8 % (ref 10–15)
HBA1C MFR BLD: 5.6 % (ref 0–5.6)
HCT VFR BLD AUTO: 38.6 % (ref 35–47)
HGB BLD-MCNC: 12.9 G/DL (ref 11.7–15.7)
LYMPHOCYTES # BLD AUTO: 2.3 10E9/L (ref 0.8–5.3)
LYMPHOCYTES NFR BLD AUTO: 25.6 %
MCH RBC QN AUTO: 29.8 PG (ref 26.5–33)
MCHC RBC AUTO-ENTMCNC: 33.4 G/DL (ref 31.5–36.5)
MCV RBC AUTO: 89 FL (ref 78–100)
MONOCYTES # BLD AUTO: 0.6 10E9/L (ref 0–1.3)
MONOCYTES NFR BLD AUTO: 6.7 %
NEUTROPHILS # BLD AUTO: 5.5 10E9/L (ref 1.6–8.3)
NEUTROPHILS NFR BLD AUTO: 61.7 %
PLATELET # BLD AUTO: 276 10E9/L (ref 150–450)
RBC # BLD AUTO: 4.33 10E12/L (ref 3.8–5.2)
WBC # BLD AUTO: 8.8 10E9/L (ref 4–11)

## 2020-11-09 PROCEDURE — 85025 COMPLETE CBC W/AUTO DIFF WBC: CPT | Performed by: NURSE PRACTITIONER

## 2020-11-09 PROCEDURE — 83036 HEMOGLOBIN GLYCOSYLATED A1C: CPT | Performed by: NURSE PRACTITIONER

## 2020-11-09 PROCEDURE — 36415 COLL VENOUS BLD VENIPUNCTURE: CPT | Performed by: NURSE PRACTITIONER

## 2020-11-09 PROCEDURE — 99215 OFFICE O/P EST HI 40 MIN: CPT | Performed by: NURSE PRACTITIONER

## 2020-11-09 PROCEDURE — 93000 ELECTROCARDIOGRAM COMPLETE: CPT | Performed by: NURSE PRACTITIONER

## 2020-11-09 PROCEDURE — 80048 BASIC METABOLIC PNL TOTAL CA: CPT | Performed by: NURSE PRACTITIONER

## 2020-11-09 ASSESSMENT — MIFFLIN-ST. JEOR: SCORE: 1805.64

## 2020-11-09 NOTE — PROGRESS NOTES
[facility]  [address]  [address]  [place] [address]  Phone: [phone]  Fax: [phone]  Primary Provider: Nancy Small  Pre-op Performing Provider: EDUARDO BERNARD    PREOPERATIVE EVALUATION:  Today's date: 11/9/2020    Racquel Nunn is a 55 year old female who presents for a preoperative evaluation.    Surgical Information:  Surgery/Procedure: Tendon repair left foot   Surgery Location: [facility]   Surgeon: Dr. Valdovinos  Surgery Date: 11/12/2020  Time of Surgery: 9:30am  Where patient plans to recover: At home with family  Fax number for surgical facility: Note does not need to be faxed, will be available electronically in Epic.    Type of Anesthesia Anticipated: to be determined    Subjective     HPI related to upcoming procedure: Peroneal tendonitis of left foot and left foot pain.      Preop Questions 11/9/2020   1. Have you ever had a heart attack or stroke? No   2. Have you ever had surgery on your heart or blood vessels, such as a stent placement, a coronary artery bypass, or surgery on an artery in your head, neck, heart, or legs? No   3. Do you have chest pain with activity? No   4. Do you have a history of  heart failure? No   5. Do you currently have a cold, bronchitis or symptoms of other infection? No   6. Do you have a cough, shortness of breath, or wheezing? No   7. Do you or anyone in your family have previous history of blood clots? YES - 2019 pulmonary embolism   8. Do you or does anyone in your family have a serious bleeding problem such as prolonged bleeding following surgeries or cuts? No   9. Have you ever had problems with anemia or been told to take iron pills? No   10. Have you had any abnormal blood loss such as black, tarry or bloody stools, or abnormal vaginal bleeding? No   11. Have you ever had a blood transfusion? No   12. Are you willing to have a blood transfusion if it is medically needed before, during, or after your  surgery? Yes   13. Have you or any of your relatives ever had problems with anesthesia? No   14. Do you have sleep apnea, excessive snoring or daytime drowsiness? YES - sleep apnea   14a. Do you have a CPAP machine? Yes   15. Do you have any artifical heart valves or other implanted medical devices like a pacemaker, defibrillator, or continuous glucose monitor? No   16. Do you have artificial joints? No   17. Are you allergic to latex? No   18. Is there any chance that you may be pregnant? No     Health Care Directive:  Patient does not have a Health Care Directive or Living Will: Discussed advance care planning with patient; however, patient declined at this time.    Preoperative Review of :   reviewed - controlled substances prescribed by other outside provider(s).[id number]}    Status of Chronic Conditions:  See problem list for active medical problems.  Problems all longstanding and stable, except as noted/documented.  See ROS for pertinent symptoms related to these conditions.    Review of Systems  CONSTITUTIONAL: NEGATIVE for fever, chills, change in weight  INTEGUMENTARY/SKIN: NEGATIVE for worrisome rashes, moles or lesions  EYES: NEGATIVE for vision changes or irritation  ENT/MOUTH: NEGATIVE for ear, mouth and throat problems  RESP: NEGATIVE for significant cough or SOB  BREAST: NEGATIVE for masses, tenderness or discharge  CV: NEGATIVE for chest pain, palpitations or peripheral edema  GI: NEGATIVE for nausea, abdominal pain, heartburn, or change in bowel habits  : NEGATIVE for frequency, dysuria, or hematuria  MUSCULOSKELETAL:  POSITIVE for myalgia of left foot  NEURO: NEGATIVE for weakness, dizziness or paresthesias  ENDOCRINE: NEGATIVE for temperature intolerance, skin/hair changes  HEME: NEGATIVE for bleeding problems  PSYCHIATRIC: NEGATIVE for changes in mood or affect    Patient Active Problem List    Diagnosis Date Noted     Type 2 diabetes mellitus without complication, without long-term current  use of insulin (H) 11/09/2020     Priority: Medium     Peroneal tendonitis, left 10/30/2020     Priority: Medium     Added automatically from request for surgery [id number]       IFG (impaired fasting glucose) 01/26/2020     Priority: Medium     Carpal tunnel syndrome of right wrist 01/24/2020     Priority: Medium     Added automatically from request for surgery [id number]       History of pulmonary embolus (PE) 09/30/2019     Priority: Medium     Mild intermittent asthma, unspecified whether complicated 01/14/2019     Priority: Medium     Benign essential hypertension 01/14/2019     Priority: Medium     ERICA (obstructive sleep apnea) 07/17/2018     Priority: Medium     Morbid obesity, unspecified obesity type (H) 10/24/2016     Priority: Medium     Anxiety 07/07/2014     Priority: Medium     Major depression in complete remission (H) 01/08/2013     Priority: Medium     CKD (chronic kidney disease) stage 3, GFR 30-59 ml/min 06/21/2011     Priority: Medium     CARDIOVASCULAR SCREENING; LDL GOAL LESS THAN 160 02/10/2010     Priority: Medium     HTN (Hypertension) BP goal <140/90 02/03/2010     Priority: Medium     Family history of malignant neoplasm of breast 02/11/2005     Priority: Medium     Aunt with breast cancer           Past Medical History:   Diagnosis Date     Arthritis     hands     Bilateral pulmonary embolism (H) 9/30/2019     Diabetes (H)      HTN (hypertension)      IFG (impaired fasting glucose) 1/26/2020     Mild intermittent asthma, unspecified whether complicated 1/14/2019     Morbid obesity (H)      ERICA (obstructive sleep apnea)     CPAP     Past Surgical History:   Procedure Laterality Date     NO HISTORY OF SURGERY       RELEASE CARPAL TUNNEL Right 2/18/2020    Procedure: Right carpal tunnel release;  Surgeon: Henry Rogers MD;  Location:  OR     Current Outpatient Medications   Medication Sig Dispense Refill     citalopram (CELEXA) 20 MG tablet Take 2 tablets (40 mg) by mouth daily 180 tablet 3  "    glucose blood VI test strips strip by In Vitro route daily. 1 Box 12     lisinopril (PRINIVIL/ZESTRIL) 40 MG tablet Take 1 tablet (40 mg) by mouth daily 90 tablet 3     meclizine (ANTIVERT) 25 MG tablet Take 1 tablet (25 mg) by mouth 3 times daily as needed for dizziness 30 tablet 11     metFORMIN (GLUCOPHAGE) 500 MG tablet Take 1 tablet (500 mg) by mouth daily (with breakfast) 90 tablet 3     multivitamin, therapeutic (THERA-VIT) TABS tablet Take 1 tablet by mouth daily       PROAIR  (90 Base) MCG/ACT inhaler INHALE 2 PUFFS BY MOUTH EVERY 6 HOURS AS NEEDED FOR WHEEZE OR FOR SHORTNESS OF BREATH 8.5 Inhaler 1       Allergies   Allergen Reactions     No Known Allergies         Social History     Tobacco Use     Smoking status: Never Smoker     Smokeless tobacco: Never Used   Substance Use Topics     Alcohol use: Yes     Frequency: Monthly or less     Drinks per session: 1 or 2     Binge frequency: Less than monthly     Comment: 1 drink per week or less     Family History   Problem Relation Age of Onset     Breast Cancer Mother         S/P bilateral mastectomy     Brain Tumor Mother      Dementia Mother      C.A.D. Father         CABG-age 60     Cerebrovascular Disease Maternal Grandmother      Breast Cancer Paternal Aunt         age 40     Cancer Maternal Aunt         melanoma     Diabetes No family hx of      Hypertension No family hx of      Cancer - colorectal No family hx of      Thyroid Disease No family hx of      History   Drug Use No         Objective     /78 (BP Location: Left arm, Patient Position: Sitting)   Pulse 85   Temp 97.9  F (36.6  C) (Tympanic)   Ht 1.626 m (5' 4\")   Wt 121.6 kg (268 lb)   LMP 09/30/2019   SpO2 100%   BMI 46.00 kg/m      Physical Exam    GENERAL APPEARANCE: healthy, alert and no distress     EYES: EOMI,  PERRL     HENT: ear canals and TM's normal and nose and mouth without ulcers or lesions     NECK: no adenopathy, no asymmetry, masses, or scars and thyroid " normal to palpation     RESP: lungs clear to auscultation - no rales, rhonchi or wheezes     CV: regular rates and rhythm, normal S1 S2, no S3 or S4 and no murmur, click or rub     ABDOMEN:  soft, nontender, no HSM or masses and bowel sounds normal     MS: extremities normal- no gross deformities noted, no evidence of inflammation in joints, FROM in all extremities.  Patient is wearing a orthopedic walker boot on left foot.  Unable to assess.     SKIN: no suspicious lesions or rashes     NEURO: Normal strength and tone, sensory exam grossly normal, mentation intact and speech normal     PSYCH: mentation appears normal. and affect normal/bright     LYMPHATICS: No cervical adenopathy    Diagnostics:  Recent Results (from the past 168 hour(s))   CBC with platelets and differential    Collection Time: 11/09/20  5:06 PM   Result Value Ref Range    WBC 8.8 4.0 - 11.0 10e9/L    RBC Count 4.33 3.8 - 5.2 10e12/L    Hemoglobin 12.9 11.7 - 15.7 g/dL    Hematocrit 38.6 35.0 - 47.0 %    MCV 89 78 - 100 fl    MCH 29.8 26.5 - 33.0 pg    MCHC 33.4 31.5 - 36.5 g/dL    RDW 12.8 10.0 - 15.0 %    Platelet Count 276 150 - 450 10e9/L    Diff Method Automated Method     % Neutrophils 61.7 %    % Lymphocytes 25.6 %    % Monocytes 6.7 %    % Eosinophils 5.4 %    % Basophils 0.6 %    Absolute Neutrophil 5.5 1.6 - 8.3 10e9/L    Absolute Lymphocytes 2.3 0.8 - 5.3 10e9/L    Absolute Monocytes 0.6 0.0 - 1.3 10e9/L    Absolute Eosinophils 0.5 0.0 - 0.7 10e9/L    Absolute Basophils 0.1 0.0 - 0.2 10e9/L   Basic metabolic panel  (Ca, Cl, CO2, Creat, Gluc, K, Na, BUN)    Collection Time: 11/09/20  5:06 PM   Result Value Ref Range    Sodium 138 133 - 144 mmol/L    Potassium 4.3 3.4 - 5.3 mmol/L    Chloride 107 94 - 109 mmol/L    Carbon Dioxide 27 20 - 32 mmol/L    Anion Gap 4 3 - 14 mmol/L    Glucose 96 70 - 99 mg/dL    Urea Nitrogen 13 7 - 30 mg/dL    Creatinine 0.91 0.52 - 1.04 mg/dL    GFR Estimate 72 >60 mL/min/[1.73_m2]    GFR Estimate If Black  83 >60 mL/min/[1.73_m2]    Calcium 9.0 8.5 - 10.1 mg/dL   Hemoglobin A1c    Collection Time: 11/09/20  5:06 PM   Result Value Ref Range    Hemoglobin A1C 5.6 0 - 5.6 %      EKG:    Sinus Rhythm   -Poor R-wave progression -may be secondary to pulmonary disease consider old anterior infarct.    -  Negative precordial T-waves.    Low voltage with rightward P-axis and rotation -possible pulmonary disease.       Revised Cardiac Risk Index (RCRI):  The patient has the following serious cardiovascular risks for perioperative complications:   - No serious cardiac risks = 0 points     RCRI Interpretation: 0 points: Class I (very low risk - 0.4% complication rate)           Assessment & Plan   The proposed surgical procedure is considered INTERMEDIATE risk.    1. Preop general physical exam  - EKG 12-lead complete w/read - Clinics  - CBC with platelets and differential  - Basic metabolic panel  (Ca, Cl, CO2, Creat, Gluc, K, Na, BUN)  - Hemoglobin A1c    2. Peroneal tendonitis, left    3. ERICA (obstructive sleep apnea)    4. Mild intermittent asthma, unspecified whether complicated    5. Essential hypertension    6. Type 2 diabetes mellitus without complication, without long-term current use of insulin (H)       Risks and Recommendations:  The patient has the following additional risks and recommendations for perioperative complications:   - Morbid obesity (BMI >40)  Diabetes:  - Patient is not on insulin therapy: regular NPO guidelines can be followed.   Obstructive Sleep Apnea:   - Patient to take CPAP to surgery center     Medication Instructions:   - ACE/ARB: May be continued on the day of surgery.    - metformin: HOLD day of surgery.   - rescue Inhaler: Continue PRN. Bring to hospital on the day of surgery.    RECOMMENDATION:  APPROVAL GIVEN to proceed with proposed procedure, without further diagnostic evaluation.    Signed Electronically by: Shelby Bolden NP    Copy of this evaluation report is provided to requesting  physician.    Preop [facility] Preop Guidelines    Revised Cardiac Risk Index

## 2020-11-09 NOTE — PATIENT INSTRUCTIONS

## 2020-11-10 LAB
ANION GAP SERPL CALCULATED.3IONS-SCNC: 4 MMOL/L (ref 3–14)
BUN SERPL-MCNC: 13 MG/DL (ref 7–30)
CALCIUM SERPL-MCNC: 9 MG/DL (ref 8.5–10.1)
CHLORIDE SERPL-SCNC: 107 MMOL/L (ref 94–109)
CO2 SERPL-SCNC: 27 MMOL/L (ref 20–32)
CREAT SERPL-MCNC: 0.91 MG/DL (ref 0.52–1.04)
GFR SERPL CREATININE-BSD FRML MDRD: 72 ML/MIN/{1.73_M2}
GLUCOSE SERPL-MCNC: 96 MG/DL (ref 70–99)
POTASSIUM SERPL-SCNC: 4.3 MMOL/L (ref 3.4–5.3)
SODIUM SERPL-SCNC: 138 MMOL/L (ref 133–144)

## 2020-11-12 ENCOUNTER — ANESTHESIA (OUTPATIENT)
Dept: SURGERY | Facility: CLINIC | Age: 55
End: 2020-11-12
Payer: COMMERCIAL

## 2020-11-12 ENCOUNTER — HOSPITAL ENCOUNTER (OUTPATIENT)
Facility: CLINIC | Age: 55
Discharge: HOME | End: 2020-11-12
Attending: PODIATRIST | Admitting: PODIATRIST
Payer: COMMERCIAL

## 2020-11-12 ENCOUNTER — ANESTHESIA EVENT (OUTPATIENT)
Dept: SURGERY | Facility: CLINIC | Age: 55
End: 2020-11-12
Payer: COMMERCIAL

## 2020-11-12 VITALS
OXYGEN SATURATION: 98 % | HEIGHT: 64 IN | SYSTOLIC BLOOD PRESSURE: 132 MMHG | RESPIRATION RATE: 16 BRPM | DIASTOLIC BLOOD PRESSURE: 78 MMHG | WEIGHT: 262 LBS | HEART RATE: 78 BPM | BODY MASS INDEX: 44.73 KG/M2 | TEMPERATURE: 98 F

## 2020-11-12 DIAGNOSIS — Z98.890 S/P PERONEAL TENDON REPAIR: Primary | ICD-10-CM

## 2020-11-12 DIAGNOSIS — M76.72 PERONEAL TENDONITIS, LEFT: ICD-10-CM

## 2020-11-12 LAB
GLUCOSE BLDC GLUCOMTR-MCNC: 111 MG/DL (ref 70–99)
GLUCOSE BLDC GLUCOMTR-MCNC: 151 MG/DL (ref 70–99)

## 2020-11-12 PROCEDURE — 761N000001 HC RECOVERY PHASE 1 LEVEL 1 FIRST HR: Performed by: PODIATRIST

## 2020-11-12 PROCEDURE — 28200 REPAIR OF FOOT TENDON: CPT | Mod: LT | Performed by: PODIATRIST

## 2020-11-12 PROCEDURE — 999N001017 HC STATISTIC GLUCOSE BY METER IP

## 2020-11-12 PROCEDURE — 370N000001 HC ANESTHESIA TECHNICAL FEE, 1ST 30 MIN: Performed by: PODIATRIST

## 2020-11-12 PROCEDURE — 250N000013 HC RX MED GY IP 250 OP 250 PS 637: Performed by: ANESTHESIOLOGY

## 2020-11-12 PROCEDURE — 250N000011 HC RX IP 250 OP 636: Performed by: ANESTHESIOLOGY

## 2020-11-12 PROCEDURE — 370N000002 HC ANESTHESIA TECHNICAL FEE, EACH ADDTL 15 MIN: Performed by: PODIATRIST

## 2020-11-12 PROCEDURE — 250N000011 HC RX IP 250 OP 636: Performed by: NURSE ANESTHETIST, CERTIFIED REGISTERED

## 2020-11-12 PROCEDURE — 250N000009 HC RX 250: Performed by: PODIATRIST

## 2020-11-12 PROCEDURE — 272N000001 HC OR GENERAL SUPPLY STERILE: Performed by: PODIATRIST

## 2020-11-12 PROCEDURE — 999N000136 HC STATISTIC PRE PROC ASSESS II: Performed by: PODIATRIST

## 2020-11-12 PROCEDURE — 258N000003 HC RX IP 258 OP 636: Performed by: ANESTHESIOLOGY

## 2020-11-12 PROCEDURE — 250N000009 HC RX 250: Performed by: NURSE ANESTHETIST, CERTIFIED REGISTERED

## 2020-11-12 PROCEDURE — 360N000020 HC SURGERY LEVEL 3 1ST 30 MIN: Performed by: PODIATRIST

## 2020-11-12 PROCEDURE — 250N000011 HC RX IP 250 OP 636: Performed by: PODIATRIST

## 2020-11-12 PROCEDURE — 250N000013 HC RX MED GY IP 250 OP 250 PS 637: Performed by: PODIATRIST

## 2020-11-12 PROCEDURE — 360N000021 HC SURGERY LEVEL 3 EA 15 ADDTL MIN: Performed by: PODIATRIST

## 2020-11-12 PROCEDURE — 761N000007 HC RECOVERY PHASE 2 EACH 15 MINS: Performed by: PODIATRIST

## 2020-11-12 RX ORDER — MEPERIDINE HYDROCHLORIDE 25 MG/ML
12.5 INJECTION INTRAMUSCULAR; INTRAVENOUS; SUBCUTANEOUS
Status: DISCONTINUED | OUTPATIENT
Start: 2020-11-12 | End: 2020-11-12 | Stop reason: HOSPADM

## 2020-11-12 RX ORDER — MAGNESIUM HYDROXIDE 1200 MG/15ML
LIQUID ORAL PRN
Status: DISCONTINUED | OUTPATIENT
Start: 2020-11-12 | End: 2020-11-12 | Stop reason: HOSPADM

## 2020-11-12 RX ORDER — ACETAMINOPHEN 325 MG/1
975 TABLET ORAL ONCE
Status: COMPLETED | OUTPATIENT
Start: 2020-11-12 | End: 2020-11-12

## 2020-11-12 RX ORDER — NEOSTIGMINE METHYLSULFATE 1 MG/ML
VIAL (ML) INJECTION PRN
Status: DISCONTINUED | OUTPATIENT
Start: 2020-11-12 | End: 2020-11-12

## 2020-11-12 RX ORDER — HYDROXYZINE HYDROCHLORIDE 25 MG/1
TABLET, FILM COATED ORAL
Qty: 20 TABLET | Refills: 0 | Status: SHIPPED | OUTPATIENT
Start: 2020-11-12 | End: 2021-02-11

## 2020-11-12 RX ORDER — ONDANSETRON 4 MG/1
4 TABLET, ORALLY DISINTEGRATING ORAL EVERY 30 MIN PRN
Status: DISCONTINUED | OUTPATIENT
Start: 2020-11-12 | End: 2020-11-12 | Stop reason: HOSPADM

## 2020-11-12 RX ORDER — FENTANYL CITRATE 50 UG/ML
25-50 INJECTION, SOLUTION INTRAMUSCULAR; INTRAVENOUS
Status: DISCONTINUED | OUTPATIENT
Start: 2020-11-12 | End: 2020-11-12 | Stop reason: HOSPADM

## 2020-11-12 RX ORDER — NALOXONE HYDROCHLORIDE 0.4 MG/ML
.1-.4 INJECTION, SOLUTION INTRAMUSCULAR; INTRAVENOUS; SUBCUTANEOUS
Status: DISCONTINUED | OUTPATIENT
Start: 2020-11-12 | End: 2020-11-12 | Stop reason: HOSPADM

## 2020-11-12 RX ORDER — EPHEDRINE SULFATE 50 MG/ML
INJECTION, SOLUTION INTRAMUSCULAR; INTRAVENOUS; SUBCUTANEOUS PRN
Status: DISCONTINUED | OUTPATIENT
Start: 2020-11-12 | End: 2020-11-12

## 2020-11-12 RX ORDER — LIDOCAINE 40 MG/G
CREAM TOPICAL
Status: DISCONTINUED | OUTPATIENT
Start: 2020-11-12 | End: 2020-11-12 | Stop reason: HOSPADM

## 2020-11-12 RX ORDER — PROPOFOL 10 MG/ML
INJECTION, EMULSION INTRAVENOUS CONTINUOUS PRN
Status: DISCONTINUED | OUTPATIENT
Start: 2020-11-12 | End: 2020-11-12

## 2020-11-12 RX ORDER — OXYCODONE HYDROCHLORIDE 5 MG/1
5 TABLET ORAL
Status: COMPLETED | OUTPATIENT
Start: 2020-11-12 | End: 2020-11-12

## 2020-11-12 RX ORDER — CEFAZOLIN SODIUM 1 G/3ML
1 INJECTION, POWDER, FOR SOLUTION INTRAMUSCULAR; INTRAVENOUS SEE ADMIN INSTRUCTIONS
Status: DISCONTINUED | OUTPATIENT
Start: 2020-11-12 | End: 2020-11-12 | Stop reason: HOSPADM

## 2020-11-12 RX ORDER — PROPOFOL 10 MG/ML
INJECTION, EMULSION INTRAVENOUS PRN
Status: DISCONTINUED | OUTPATIENT
Start: 2020-11-12 | End: 2020-11-12

## 2020-11-12 RX ORDER — IBUPROFEN 600 MG/1
TABLET, FILM COATED ORAL
Qty: 28 TABLET | Refills: 0 | Status: SHIPPED | OUTPATIENT
Start: 2020-11-12 | End: 2023-03-15

## 2020-11-12 RX ORDER — HYDROCODONE BITARTRATE AND ACETAMINOPHEN 5; 325 MG/1; MG/1
TABLET ORAL
Qty: 20 TABLET | Refills: 0 | Status: SHIPPED | OUTPATIENT
Start: 2020-11-12 | End: 2021-02-11

## 2020-11-12 RX ORDER — CEFAZOLIN SODIUM 2 G/100ML
2 INJECTION, SOLUTION INTRAVENOUS
Status: COMPLETED | OUTPATIENT
Start: 2020-11-12 | End: 2020-11-12

## 2020-11-12 RX ORDER — SODIUM CHLORIDE, SODIUM LACTATE, POTASSIUM CHLORIDE, CALCIUM CHLORIDE 600; 310; 30; 20 MG/100ML; MG/100ML; MG/100ML; MG/100ML
INJECTION, SOLUTION INTRAVENOUS CONTINUOUS
Status: DISCONTINUED | OUTPATIENT
Start: 2020-11-12 | End: 2020-11-12 | Stop reason: HOSPADM

## 2020-11-12 RX ORDER — GLYCOPYRROLATE 0.2 MG/ML
INJECTION, SOLUTION INTRAMUSCULAR; INTRAVENOUS PRN
Status: DISCONTINUED | OUTPATIENT
Start: 2020-11-12 | End: 2020-11-12

## 2020-11-12 RX ORDER — HYDROMORPHONE HYDROCHLORIDE 1 MG/ML
.3-.5 INJECTION, SOLUTION INTRAMUSCULAR; INTRAVENOUS; SUBCUTANEOUS EVERY 10 MIN PRN
Status: DISCONTINUED | OUTPATIENT
Start: 2020-11-12 | End: 2020-11-12 | Stop reason: HOSPADM

## 2020-11-12 RX ORDER — ONDANSETRON 2 MG/ML
4 INJECTION INTRAMUSCULAR; INTRAVENOUS EVERY 30 MIN PRN
Status: DISCONTINUED | OUTPATIENT
Start: 2020-11-12 | End: 2020-11-12 | Stop reason: HOSPADM

## 2020-11-12 RX ORDER — FENTANYL CITRATE 50 UG/ML
INJECTION, SOLUTION INTRAMUSCULAR; INTRAVENOUS PRN
Status: DISCONTINUED | OUTPATIENT
Start: 2020-11-12 | End: 2020-11-12

## 2020-11-12 RX ORDER — ONDANSETRON 2 MG/ML
INJECTION INTRAMUSCULAR; INTRAVENOUS PRN
Status: DISCONTINUED | OUTPATIENT
Start: 2020-11-12 | End: 2020-11-12

## 2020-11-12 RX ORDER — DEXAMETHASONE SODIUM PHOSPHATE 4 MG/ML
INJECTION, SOLUTION INTRA-ARTICULAR; INTRALESIONAL; INTRAMUSCULAR; INTRAVENOUS; SOFT TISSUE PRN
Status: DISCONTINUED | OUTPATIENT
Start: 2020-11-12 | End: 2020-11-12

## 2020-11-12 RX ADMIN — ACETAMINOPHEN 975 MG: 325 TABLET, FILM COATED ORAL at 13:13

## 2020-11-12 RX ADMIN — Medication 5 MG: at 11:57

## 2020-11-12 RX ADMIN — OXYCODONE HYDROCHLORIDE 5 MG: 5 TABLET ORAL at 13:33

## 2020-11-12 RX ADMIN — MIDAZOLAM 2 MG: 1 INJECTION INTRAMUSCULAR; INTRAVENOUS at 11:00

## 2020-11-12 RX ADMIN — PROPOFOL 50 MG: 10 INJECTION, EMULSION INTRAVENOUS at 12:15

## 2020-11-12 RX ADMIN — CEFAZOLIN SODIUM 2 G: 2 INJECTION, SOLUTION INTRAVENOUS at 11:05

## 2020-11-12 RX ADMIN — GLYCOPYRROLATE 0.4 MG: 0.2 INJECTION, SOLUTION INTRAMUSCULAR; INTRAVENOUS at 12:30

## 2020-11-12 RX ADMIN — FENTANYL CITRATE 25 MCG: 50 INJECTION, SOLUTION INTRAMUSCULAR; INTRAVENOUS at 13:21

## 2020-11-12 RX ADMIN — DEXAMETHASONE SODIUM PHOSPHATE 4 MG: 4 INJECTION, SOLUTION INTRA-ARTICULAR; INTRALESIONAL; INTRAMUSCULAR; INTRAVENOUS; SOFT TISSUE at 11:03

## 2020-11-12 RX ADMIN — GLYCOPYRROLATE 0.2 MG: 0.2 INJECTION, SOLUTION INTRAMUSCULAR; INTRAVENOUS at 11:03

## 2020-11-12 RX ADMIN — FENTANYL CITRATE 100 MCG: 50 INJECTION, SOLUTION INTRAMUSCULAR; INTRAVENOUS at 11:27

## 2020-11-12 RX ADMIN — SODIUM CHLORIDE, POTASSIUM CHLORIDE, SODIUM LACTATE AND CALCIUM CHLORIDE: 600; 310; 30; 20 INJECTION, SOLUTION INTRAVENOUS at 12:07

## 2020-11-12 RX ADMIN — SODIUM CHLORIDE, POTASSIUM CHLORIDE, SODIUM LACTATE AND CALCIUM CHLORIDE: 600; 310; 30; 20 INJECTION, SOLUTION INTRAVENOUS at 10:24

## 2020-11-12 RX ADMIN — PROPOFOL 50 MCG/KG/MIN: 10 INJECTION, EMULSION INTRAVENOUS at 11:18

## 2020-11-12 RX ADMIN — FENTANYL CITRATE 100 MCG: 50 INJECTION, SOLUTION INTRAMUSCULAR; INTRAVENOUS at 11:03

## 2020-11-12 RX ADMIN — ONDANSETRON HYDROCHLORIDE 4 MG: 2 INJECTION, SOLUTION INTRAVENOUS at 11:52

## 2020-11-12 RX ADMIN — ROCURONIUM BROMIDE 40 MG: 10 INJECTION INTRAVENOUS at 11:03

## 2020-11-12 RX ADMIN — PROPOFOL 300 MG: 10 INJECTION, EMULSION INTRAVENOUS at 11:03

## 2020-11-12 RX ADMIN — Medication 3 MG: at 12:30

## 2020-11-12 ASSESSMENT — MIFFLIN-ST. JEOR: SCORE: 1778.42

## 2020-11-12 NOTE — OP NOTE
Procedure Date: 11/12/2020      SURGEON:  David Chacon DPM      ASSISTANT:  Ariel Long, PGY-2      PREOPERATIVE DIAGNOSES:     1.  Peroneus brevis insertion tear, left lower extremity.   2.  Perimalleolar peroneus brevis tendon tear, left lower extremity.      POSTOPERATIVE DIAGNOSES:   1.  Peroneus brevis insertion tear, left lower extremity.   2.  Perimalleolar peroneus brevis tendon tear, left lower extremity.   3.  Peroneus longus degenerative changes.      PATHOLOGY:  None.      ANESTHESIA:  General endotracheal with popliteal block.      HEMOSTASIS:  A well-padded pneumatic thigh cuff.      ESTIMATED BLOOD LOSS:  1 mL.      MATERIALS:  We utilized 2-0 FiberWire.      INJECTABLES:  Preoperative popliteal block.      COMPLICATIONS:  None apparent.      INDICATIONS FOR PROCEDURE:  The patient is a pleasant 55-year-old female who I have seen in clinic for lateral left mid foot and lateral left ankle pain.  We attempted conservative therapies with insufficient overall improvement.  An MRI was ordered that showed splitting of the peroneus brevis tendon near the insertion point, as well as degenerative changes of the peroneus brevis around the lateral malleolus.  She wished to forego further nonoperative management in favor of surgical intervention.      DESCRIPTION OF PROCEDURE:  After obtaining written consent, the patient received a preoperative popliteal block by Anesthesia.  She was transferred to the operating room and placed in supine position on the operating room table.  She was placed under general anesthesia and then transferred lateral left side up with well-padded positioning equipment.  The left lower extremity was then prepped and draped in normal aseptic fashion, exsanguinated, and the well-padded pneumatic thigh cuff was inflated.  The patient, procedure, and site were correctly identified by OR staff.      PROCEDURE #1:  Attention was directed to the lateral left ankle.  Pertinent anatomy  was drawn out.  A 12 cm curvilinear incision was carried down to subcutaneous tissue along the course of the peroneal tendons.  Bleeding vessels were electrocauterized and hand-tied as necessary.  Blunt dissection was used to carry the incision down to the peroneal tendon sheath.  The sural nerve was identified at the mid portion of the incision and the nerve and its branches were carefully retracted and protected throughout the duration of the case.  The peroneal tendon sheath was then opened and the peroneal tendons were identified.  Just inferior to the lateral malleolus, the peroneus brevis had midsubstance thickening and degenerative changes.  There was also 2 full-thickness tears at the insertion point extending proximally approximately 6 cm.  The peroneus longus also had some midsubstance thickening and degenerative changes just inferior to the lateral malleolus.      PROCEDURE #2:  Attention was directed to the peroneus brevis tendon where just inferior to the lateral malleolus the thickened portion was debrided and removed using a #15 blade back to healthy-appearing tendon fibers.  The tendon itself in this area was also quite flattened.  The debrided portion was then repaired using an interlocking running stitch of 2-0 FiberWire.  This was done from distal to proximal and then from this point distally, the tendon was retubularized using a running, interlocking stitch with the same 2-0 FiberWire.      PROCEDURE #3:  Attention was then directed to the peroneus brevis insertion point where the full-thickness tears were debrided/reefed with a #15 blade.  From distal to proximal, the tendon slips were then reapproximated and repaired using the interlocking 2-0 FiberWire stitch.  There was good apposition of tendon slips.      PROCEDURE #4:  Attention was then directed to the peroneus longus tendon where the thickened degenerated portion was excised using a #15 blade back to healthy-appearing tendon fibers.   This was then repaired distal to proximal using an interlocking, running 2-0 FiberWire stitch.      The remaining base of the wound was evaluated.  The patient had a sufficiently deep and retromalleolar groove without any osseous prominences.  The peroneal tubercle was unremarkable.  The base of the wound was then flushed with copious amounts of normal saline.  Layered closure was performed with 3-0 Vicryl, 4-0 Vicryl and skin staples.  A dry sterile dressing was applied to the patient's left foot.  She appeared to tolerate the procedure and anesthesia well and was transferred to the PACU with vital signs stable and vascular status intact to the left lower extremity.        The patient will follow up with me in clinic in approximately 1 week or sooner with any acute issues.         EDIN DONALD DPM             D: 2020   T: 2020   MT: [id number]      Name:     THOMAS MCLEOD   MRN:      [id number]        Account:        [id number]   :      1965           Procedure Date: 2020      Document: [id number]

## 2020-11-12 NOTE — DISCHARGE INSTRUCTIONS
Maximum acetaminophen (Tylenol) dose from all sources should not exceed 4 grams (4000 mg) per day. You received 975mg at 1220.    Oxycodone was given at 1:30 pm,  You can take your [place] at 5:30 pm      GENERAL ANESTHESIA OR SEDATION ADULT DISCHARGE INSTRUCTIONS   SPECIAL PRECAUTIONS FOR 24 HOURS AFTER SURGERY    IT IS NOT UNUSUAL TO FEEL LIGHT-HEADED OR FAINT, UP TO 24 HOURS AFTER SURGERY OR WHILE TAKING PAIN MEDICATION.  IF YOU HAVE THESE SYMPTOMS; SIT FOR A FEW MINUTES BEFORE STANDING AND HAVE SOMEONE ASSIST YOU WHEN YOU GET UP TO WALK OR USE THE BATHROOM.    YOU SHOULD REST AND RELAX FOR THE NEXT 24 HOURS AND YOU MUST MAKE ARRANGEMENTS TO HAVE SOMEONE STAY WITH YOU FOR AT LEAST 24 HOURS AFTER YOUR DISCHARGE.  AVOID HAZARDOUS AND STRENUOUS ACTIVITIES.  DO NOT MAKE IMPORTANT DECISIONS FOR 24 HOURS.    DO NOT DRIVE ANY VEHICLE OR OPERATE MECHANICAL EQUIPMENT FOR 24 HOURS FOLLOWING THE END OF YOUR SURGERY.  EVEN THOUGH YOU MAY FEEL NORMAL, YOUR REACTIONS MAY BE AFFECTED BY THE MEDICATION YOU HAVE RECEIVED.    DO NOT DRINK ALCOHOLIC BEVERAGES FOR 24 HOURS FOLLOWING YOUR SURGERY.    DRINK CLEAR LIQUIDS (APPLE JUICE, GINGER ALE, 7-UP, BROTH, ETC.).  PROGRESS TO YOUR REGULAR DIET AS YOU FEEL ABLE.    YOU MAY HAVE A DRY MOUTH, A SORE THROAT, MUSCLES ACHES OR TROUBLE SLEEPING.  THESE SHOULD GO AWAY AFTER 24 HOURS.    CALL YOUR DOCTOR FOR ANY OF THE FOLLOWING:  SIGNS OF INFECTION (FEVER, GROWING TENDERNESS AT THE SURGERY SITE, A LARGE AMOUNT OF DRAINAGE OR BLEEDING, SEVERE PAIN, FOUL-SMELLING DRAINAGE, REDNESS OR SWELLING.    IT HAS BEEN OVER 8 TO 10 HOURS SINCE SURGERY AND YOU ARE STILL NOT ABLE TO URINATE (PASS WATER).

## 2020-11-12 NOTE — ANESTHESIA PROCEDURE NOTES
Airway   Date/Time: 11/12/2020 11:06 AM   Patient location during procedure: OR    Staff -   Performed By: CRNA    Indications and Patient Condition  Indications for airway management: seymour-procedural  Induction type:intravenousMask difficulty assessment: 1 - vent by mask    Final Airway Details  Final airway type: endotracheal airway  Successful airway:ETT - single  Endotracheal Airway Details   ETT size (mm): 7.0  Cuffed: yes  Successful intubation technique: direct laryngoscopy  Grade View of Cords: 1  Adjucts: stylet  Secured with: plastic tape  Bite block used: Soft    Post intubation assessment   Placement verified by: capnometry and equal breath sounds   Number of attempts at approach: 1  Secured with:plastic tape  Ease of procedure: easy  Dentition: Intact

## 2020-11-12 NOTE — ANESTHESIA PREPROCEDURE EVALUATION
Anesthesia Pre-Procedure Evaluation    Patient: Racquel Nunn   MRN: [id number] : 1965          Preoperative Diagnosis: Peroneal tendonitis, left [M76.72]    Procedure(s):  Peroneal tendon repair left lower extremity    Past Medical History:   Diagnosis Date     Arthritis     hands     Bilateral pulmonary embolism (H) 2019     Diabetes (H)      HTN (hypertension)      IFG (impaired fasting glucose) 2020     Mild intermittent asthma, unspecified whether complicated 2019     Morbid obesity (H)      ERICA (obstructive sleep apnea)     CPAP     Past Surgical History:   Procedure Laterality Date     NO HISTORY OF SURGERY       RELEASE CARPAL TUNNEL Right 2020    Procedure: Right carpal tunnel release;  Surgeon: Henry Rogers MD;  Location: [facility] OR     Anesthesia Evaluation     .             ROS/MED HX    ENT/Pulmonary:     (+)sleep apnea, asthma , . .    Neurologic:       Cardiovascular:     (+) hypertension----. : . . . :. .       METS/Exercise Tolerance:     Hematologic:         Musculoskeletal:         GI/Hepatic:         Renal/Genitourinary:     (+) chronic renal disease, type: CRI,       Endo: Comment: .Body mass index is 44.97 kg/m .        (+) type II DM Obesity, .      Psychiatric:         Infectious Disease:         Malignancy:         Other:                          Physical Exam  Normal systems: cardiovascular and pulmonary    Airway   Mallampati: II    Dental     Cardiovascular   Rhythm and rate: regular and normal      Pulmonary    breath sounds clear to auscultation            Lab Results   Component Value Date    WBC 8.8 2020    HGB 12.9 2020    HCT 38.6 2020     2020     2020    POTASSIUM 4.3 2020    CHLORIDE 107 2020    CO2 27 2020    BUN 13 2020    CR 0.91 2020    GLC 96 2020    JEFFERSON 9.0 2020    ALBUMIN 3.3 (L) 2020    PROTTOTAL 7.3 2020    ALT 37 2020    AST 23  "01/25/2020    ALKPHOS 91 01/25/2020    BILITOTAL 0.5 01/25/2020    TSH 2.43 09/30/2019    T4 0.85 01/14/2019    HCG Negative 02/18/2020       Preop Vitals  BP Readings from Last 3 Encounters:   11/12/20 (!) 163/87   11/09/20 132/78   10/13/20 132/74    Pulse Readings from Last 3 Encounters:   11/12/20 72   11/09/20 85   02/13/20 78      Resp Readings from Last 3 Encounters:   11/12/20 20   02/18/20 15   01/08/20 18    SpO2 Readings from Last 3 Encounters:   11/12/20 100%   11/09/20 100%   02/18/20 100%      Temp Readings from Last 1 Encounters:   11/12/20 98.1  F (36.7  C) (Temporal)    Ht Readings from Last 1 Encounters:   11/12/20 1.626 m (5' 4\")      Wt Readings from Last 1 Encounters:   11/12/20 118.8 kg (262 lb)    Estimated body mass index is 44.97 kg/m  as calculated from the following:    Height as of this encounter: 1.626 m (5' 4\").    Weight as of this encounter: 118.8 kg (262 lb).       Anesthesia Plan      History & Physical Review  History and physical reviewed and following examination; no interval change.    ASA Status:  3 .        Plan for General and Peripheral Nerve Block   PONV prophylaxis:  Ondansetron (or other 5HT-3) and Dexamethasone or Solumedrol         Postoperative Care  Postoperative pain management:  IV analgesics, Oral pain medications and Multi-modal analgesia.      Consents                 Dean Meyer DO                    .  "

## 2020-11-12 NOTE — ANESTHESIA POSTPROCEDURE EVALUATION
Patient: Racquel Nunn    Procedure(s):  Peroneal tendon repair left lower extremity    Diagnosis:Peroneal tendonitis, left [M76.72]  Diagnosis Additional Information: No value filed.    Anesthesia Type:  General, Peripheral Nerve Block    Note:  Anesthesia Post Evaluation    Patient location during evaluation: PACU  Patient participation: Able to fully participate in evaluation  Level of consciousness: awake  Pain management: adequate  Airway patency: patent  Cardiovascular status: acceptable  Respiratory status: acceptable  Hydration status: acceptable  PONV: controlled     Anesthetic complications: None          Last vitals:  Vitals:    11/12/20 1300 11/12/20 1330 11/12/20 1357   BP: 111/72 120/74 134/83   Pulse: 73 81 82   Resp: 21  18   Temp:  97.8  F (36.6  C) 96.7  F (35.9  C)   SpO2: 100% 96% 99%         Electronically Signed By: Dean Meyer DO  November 12, 2020  2:36 PM

## 2020-11-12 NOTE — ANESTHESIA CARE TRANSFER NOTE
Patient: Racquel Nunn    Procedure(s):  Peroneal tendon repair left lower extremity    Diagnosis: Peroneal tendonitis, left [M76.72]  Diagnosis Additional Information: No value filed.    Anesthesia Type:   General, Peripheral Nerve Block     Note:    Patient transferred to:PACU  Comments: Pt spont resps ETT removed to PACU VSS Report to RNHandoff Report: Identifed the Patient, Identified the Reponsible Provider, Reviewed the pertinent medical history, Discussed the surgical course, Reviewed Intra-OP anesthesia mangement and issues during anesthesia, Set expectations for post-procedure period and Allowed opportunity for questions and acknowledgement of understanding      Vitals: (Last set prior to Anesthesia Care Transfer)    CRNA VITALS  11/12/2020 1211 - 11/12/2020 1247      11/12/2020             Pulse:  81    SpO2:  (!) 89 %                Electronically Signed By: PERLA Rose CRNA  November 12, 2020  12:47 PM

## 2020-11-12 NOTE — ANESTHESIA PROCEDURE NOTES
Procedure note : Sciatic and Popliteal      Staff -   Anesthesiologist:  Dean Meyer DO  Performed By: anesthesiologist  Referred By: David Marmolejo DPM  Pre-Procedure  Performed by Dean Meyer DO  Referred by David Marmolejo DPM  Location: pre-op    Procedure Times:11/12/2020 10:52 AM and 11/12/2020 10:58 AM  Pre-Anesthestic Checklist: patient identified, IV checked, site marked, risks and benefits discussed, informed consent, monitors and equipment checked, pre-op evaluation, at physician/surgeon's request and post-op pain management    Timeout  Correct Patient: Yes   Correct Procedure: Yes   Correct Site: Yes   Correct Laterality: Yes   Correct Position: Yes   Site Marked: Yes   .   Procedure Documentation    .    Procedure: Sciatic and Popliteal, left.   Patient Position:prone Local skin infiltrated with mL of 1% lidocaine.    Ultrasound used to identify targeted nerve, plexus, or vascular marker and placed a needle adjacent to it., Ultrasound was used to visualize the spread of the anesthetic in close proximity to the above stated nerve.   Patient Prep/Sterile Barriers; mask, sterile gloves, povidone-iodine 7.5% surgical scrub.  Nerve Stim: Initial Level 1 mA. Lowest motor response mA..  Needle: insulated    Needle Length (Inches) 3.13   Insertion Method: Single Shot.        Assessment/Narrative  Paresthesias: No.  .  The placement was negative for: blood aspirated, painful injection and site bleeding.  Bolus given via needle. Blood aspirated via catheter.   Secured via.   Complications: none. Comments:  .The surgeon has given a verbal order transferring care of this patient to me for the performance of a regional analgesia block for post-op pain control. It is requested of me because I am uniquely trained and qualified to perform this block and the surgeon is neither trained nor qualified to perform this procedure.    .Sciatic nerve Block     Popliteal Approach    Medication Bupivicaine  0.75% 24cc + Lido 2% w/ epi 1:200k 6cc    R Meyer

## 2020-11-20 ENCOUNTER — OFFICE VISIT (OUTPATIENT)
Dept: PODIATRY | Facility: CLINIC | Age: 55
End: 2020-11-20
Payer: COMMERCIAL

## 2020-11-20 VITALS
HEIGHT: 64 IN | BODY MASS INDEX: 44.73 KG/M2 | DIASTOLIC BLOOD PRESSURE: 94 MMHG | WEIGHT: 262 LBS | SYSTOLIC BLOOD PRESSURE: 144 MMHG

## 2020-11-20 DIAGNOSIS — Z98.890 S/P PERONEAL TENDON REPAIR: Primary | ICD-10-CM

## 2020-11-20 PROCEDURE — 99024 POSTOP FOLLOW-UP VISIT: CPT | Performed by: PODIATRIST

## 2020-11-20 ASSESSMENT — MIFFLIN-ST. JEOR: SCORE: 1778.42

## 2020-11-20 NOTE — PROGRESS NOTES
"Foot & Ankle Surgery  November 20, 2020    S:  Patient in today sp peroneal tendon repair, including 2-site brevis repair and longus repair, on 11/12/20.  Pain levels low.  Following post-op instructions.  No acute concerns    BP (!) 144/94   Ht 1.626 m (5' 4\")   Wt 118.8 kg (262 lb)   LMP 09/30/2019   BMI 44.97 kg/m        ROS - positive for CC.  Patient denies current nausea, vomiting, chills, fevers, belly pain, calf pain, chest pain or SOB.  Complete remainder of ROS is otherwise neg.    PE - staples intact.  Minimal inflammation, no drainage or SOI.  Skin shows no trophic, color or temperature changes otherwise.  No calf redness, swelling or pain noted otherwise.    A/P - 55 year old yo patient approx 8 days sp above procedure  -reviewed procedure and surgical findings  -redressed foot  -continue all post-op instructions without change; reviewed  -ok to remove splint at night or when off feet, but should have on when up-and-about    Follow up  -  10 days or sooner with acute issues    Plan for [id number] - once healed sufficiently to resume job duties       David Chacon, MOE FACFAS FACFAOM  Podiatric Foot & Ankle Surgeon  [facility]  [phone]      "

## 2020-11-30 ENCOUNTER — OFFICE VISIT (OUTPATIENT)
Dept: PODIATRY | Facility: CLINIC | Age: 55
End: 2020-11-30
Payer: COMMERCIAL

## 2020-11-30 VITALS
WEIGHT: 262 LBS | BODY MASS INDEX: 44.73 KG/M2 | DIASTOLIC BLOOD PRESSURE: 72 MMHG | HEIGHT: 64 IN | SYSTOLIC BLOOD PRESSURE: 124 MMHG

## 2020-11-30 DIAGNOSIS — Z98.890 S/P PERONEAL TENDON REPAIR: ICD-10-CM

## 2020-11-30 DIAGNOSIS — M76.72 PERONEAL TENDONITIS, LEFT: Primary | ICD-10-CM

## 2020-11-30 PROCEDURE — 99024 POSTOP FOLLOW-UP VISIT: CPT | Performed by: PODIATRIST

## 2020-11-30 ASSESSMENT — MIFFLIN-ST. JEOR: SCORE: 1778.42

## 2020-11-30 NOTE — PROGRESS NOTES
Foot & Ankle Surgery  November 30, 2020    S:  Patient in today sp peroneal tendon repair, including 2-site P.brevis repair and P.longus repair on 11/12/20.  Pain levels improving.  Following post-op instructions    LMP 09/30/2019       ROS - positive for CC.  Patient denies current nausea, vomiting, chills, fevers, belly pain, calf pain, chest pain or SOB.  Complete remainder of ROS is otherwise neg.    PE - removed proximal 3 staples and the incision started gapping, so remaining staples left intact.  No drainage or SOI along incision.  Mild edema/ecchymosis, wnl for this stage post-op.  Skin shows no trophic, color or temperature changes otherwise.  No calf redness, swelling or pain noted otherwise.    A/P - 55 year old yo patient approx 18 days sp above procedure  -remaining staples left intact  -redressed ankle  -continue all post-op instructions; reviewed  -did not have PFS splint on today, it's at home.  Advised she have it on when she's up-and-about    Follow up  -  1 week or sooner with acute issues    Plan for [id number] - once healed sufficiently       David Chacon, MOE FACFAS FACFAOM  Podiatric Foot & Ankle Surgeon  [facility]  [phone]

## 2020-12-09 ENCOUNTER — OFFICE VISIT (OUTPATIENT)
Dept: PODIATRY | Facility: CLINIC | Age: 55
End: 2020-12-09
Payer: COMMERCIAL

## 2020-12-09 VITALS
WEIGHT: 262 LBS | HEIGHT: 64 IN | BODY MASS INDEX: 44.73 KG/M2 | DIASTOLIC BLOOD PRESSURE: 70 MMHG | SYSTOLIC BLOOD PRESSURE: 124 MMHG

## 2020-12-09 DIAGNOSIS — Z09 SURGERY FOLLOW-UP EXAMINATION: Primary | ICD-10-CM

## 2020-12-09 PROCEDURE — 99024 POSTOP FOLLOW-UP VISIT: CPT | Performed by: PODIATRIST

## 2020-12-09 ASSESSMENT — MIFFLIN-ST. JEOR: SCORE: 1778.42

## 2020-12-09 NOTE — PROGRESS NOTES
S: Racquel Nunn  presents nearly 4 weeks post op.      Status post peroneal tendon repair, left.  No complaints  Inquires about length of non weight bearing status.  Some staples were removed 11/30      O:   Vascular:  Pedal pulses are palpable.  Moderate left ankle edema.    Neuro: Light touch sensation is intact distal to the incision.    Derm: The incision is well coapted. With removal of some staples over the lateral ankle, there is an invagination in the skin. It does not appear to be an open wound.    ASSESSMENT:  1) s/p above noted surgery.  No clinical signs of infection.  Pain is controlled.  Encounter Diagnosis   Name Primary?     Surgery follow-up examination Yes       PLAN:  1) I prepped the incision with betadine and removed some additional staples.  Due to some swelling along the incision, her pain and still questioning if it is fully healed, I left the staples in the distal 1/2.  2) sterile re-dress  3) she is to continue non weight bearing with the wheeled knee walker  4) I encouraged ongoing ankle range of motion exercises and elevation to reduce edema.  5) follow up with Dr. Chacon next week    Christopher Dubno, MOE;

## 2020-12-09 NOTE — Clinical Note
I still didn't get all the sutures out. Still a bit of swelling and pain.  There is some invagination of the skin over the ankle, but it does not look like an open wound.  Steri strips applied.  She is to try to see you next week.

## 2020-12-09 NOTE — LETTER
12/9/2020         RE: Racquel Nunn  [address]  [place] [address]        Dear Colleague,    Thank you for referring your patient, Racquel Nunn, to the [facility]. Please see a copy of my visit note below.    S: Racquel Nunn  presents nearly 4 weeks post op.      Status post peroneal tendon repair, left.  No complaints  Inquires about length of non weight bearing status.  Some staples were removed 11/30      O:   Vascular:  Pedal pulses are palpable.  Moderate left ankle edema.    Neuro: Light touch sensation is intact distal to the incision.    Derm: The incision is well coapted. With removal of some staples over the lateral ankle, there is an invagination in the skin. It does not appear to be an open wound.    ASSESSMENT:  1) s/p above noted surgery.  No clinical signs of infection.  Pain is controlled.  Encounter Diagnosis   Name Primary?     Surgery follow-up examination Yes       PLAN:  1) I prepped the incision with betadine and removed some additional staples.  Due to some swelling along the incision, her pain and still questioning if it is fully healed, I left the staples in the distal 1/2.  2) sterile re-dress  3) she is to continue non weight bearing with the wheeled knee walker  4) I encouraged ongoing ankle range of motion exercises and elevation to reduce edema.  5) follow up with Dr. Chacon next week    Christopher Dubon DPM;        Again, thank you for allowing me to participate in the care of your patient.        Sincerely,        Christopher Dubon DPM

## 2020-12-17 ENCOUNTER — OFFICE VISIT (OUTPATIENT)
Dept: PODIATRY | Facility: CLINIC | Age: 55
End: 2020-12-17
Payer: COMMERCIAL

## 2020-12-17 VITALS
BODY MASS INDEX: 44.73 KG/M2 | DIASTOLIC BLOOD PRESSURE: 76 MMHG | WEIGHT: 262 LBS | SYSTOLIC BLOOD PRESSURE: 130 MMHG | HEIGHT: 64 IN

## 2020-12-17 DIAGNOSIS — M76.72 PERONEAL TENDONITIS, LEFT: ICD-10-CM

## 2020-12-17 DIAGNOSIS — Z98.890 S/P PERONEAL TENDON REPAIR: Primary | ICD-10-CM

## 2020-12-17 PROCEDURE — 99024 POSTOP FOLLOW-UP VISIT: CPT | Performed by: PODIATRIST

## 2020-12-17 ASSESSMENT — MIFFLIN-ST. JEOR: SCORE: 1778.42

## 2020-12-17 NOTE — PROGRESS NOTES
"Foot & Ankle Surgery  December 17, 2020    S:  Patient in today sp P.longus and P.brevis repair on 11/12/20.  Pain levels improved.  No PFS splint today, states it's at home, but NWB on [employer].  Was seen by Dr Dubon 12/9/20 as I was the on-call provider, proximal staples removed    /76   Ht 1.626 m (5' 4\")   Wt 118.8 kg (262 lb)   LMP 09/30/2019   BMI 44.97 kg/m        ROS - positive for CC.  Patient denies current nausea, vomiting, chills, fevers, belly pain, calf pain, chest pain or SOB.  Complete remainder of ROS is otherwise neg.    PE - proximal staples previously removed by my partner.  Remaining staples removed.  Some superficial gapping and scabbing noted along the incision, but no drainage or SOI.  Much of the \"gapping\" appears to be invaginating skin.  Skin shows no trophic, color or temperature changes otherwise.  No calf redness, swelling or pain noted otherwise.    A/P - 55 year old yo patient approx 4 weeks sp above procedure  -staples removed, s-s applied to distal 1/2; remove in 1 week if still present  -continue - NWB; compression with tensogrip, dispensed; DVT exercises  -change - ok to increase activity levels; RICE prn based on swelling/pain; ok to wash tomorrow, but no soaking/submerging  -follow up 2 weeks  -she will start wearing the Aircast boot that she has at home; ok to remove at night and when off feet, but should be on when up-and-about, even though she's still NWB    Follow up  -  2 weeks or sooner with acute issues    Plan for [id number] - once healed sufficiently to resume job duties       David Chacon DPM FACFAS FACFAOM  Podiatric Foot & Ankle Surgeon  [facility]  [phone]        "

## 2020-12-31 ENCOUNTER — TELEPHONE (OUTPATIENT)
Dept: PODIATRY | Facility: CLINIC | Age: 55
End: 2020-12-31

## 2020-12-31 NOTE — TELEPHONE ENCOUNTER
Patient is calling to ask if Dr Marmolejo received [facility] paperwork for her that was suppose to be faxed yesterday. Please call the patient to let her know if this was received or not.

## 2020-12-31 NOTE — TELEPHONE ENCOUNTER
Forms received and placed for providers review and completion at [facility].    Flakita ESPOSITO CMA

## 2020-12-31 NOTE — TELEPHONE ENCOUNTER
Forms completed and faxed to [employer] at [phone], with OV notes from 11/30/20, 12/9/20 and 12/17/20. Patient was phoned and informed. Patient verbalized understanding and appreciative of the call.    Flakita ESPOSITO CMA

## 2021-01-04 ENCOUNTER — TELEPHONE (OUTPATIENT)
Dept: PODIATRY | Facility: CLINIC | Age: 56
End: 2021-01-04

## 2021-01-04 DIAGNOSIS — Z98.890 S/P PERONEAL TENDON REPAIR: Primary | ICD-10-CM

## 2021-01-04 DIAGNOSIS — M76.72 PERONEAL TENDONITIS, LEFT: ICD-10-CM

## 2021-01-04 NOTE — TELEPHONE ENCOUNTER
----- Message from Natalie Emanuel sent at 1/4/2021 10:34 AM CST -----  Good morning,    This pt of Dr. Chacon's called to speak with someone on Dr. Chacon's care team. This pt was scheduled for an appt on 12/31/2020, that the pt had to cancel. At that appt, Dr. Chacon was going to determine whether the pt was cleared to begin walking using a walking boot. The pt called to ask whether she would be able to begin doing that prior to her next appt with Dr. Chacon on 1/12/2021. Please have someone on Dr. Chacon's care team reach out to the pt directly to confirm whether she can begin to walk using a walking boot.     Thanks,     Natalie

## 2021-01-04 NOTE — TELEPHONE ENCOUNTER
I called and spoke with Abida.  Cancelled appointment after testing + for COVID.  Still tired but feeling better.    Ok to start protected WB in boot with crutches and follow up in 4 weeks for next appointment.  Over the 4 weeks, gradually increase weight on the foot in the boot to tolerance with care to avoid exceeding pain threshold at surgical site with amt of weight/time on foot, as this can have a neg impact on healing.  RICE/NSAID vs tylenol prn.    [facility] PT referral placed as well to start functional rehab.    All questions answered, patient happy with plan.    RADHA SladeM FACFAS FACFAOM  Podiatric Foot & Ankle Surgeon  [facility]  [phone]

## 2021-01-12 ENCOUNTER — TELEPHONE (OUTPATIENT)
Dept: PODIATRY | Facility: CLINIC | Age: 56
End: 2021-01-12

## 2021-01-12 NOTE — TELEPHONE ENCOUNTER
Forms were already sent to abstracting prior to 1/7/21 so will need to wait for call back or for forms to be scanned into patient's chart.

## 2021-01-12 NOTE — TELEPHONE ENCOUNTER
Please see message below and see if you are able to find a contact number on our  form for call back to. Otherwise will need to wait for a call back.     CÉSAR Schwartz RN

## 2021-01-12 NOTE — TELEPHONE ENCOUNTER
Shamika from [employer] left voicemail with questions regarding disability forms dated 01/04/2020. She stated that they were incomplete.  Case #[id number]    Message kept cutting out and unable to get a complete call back number.

## 2021-01-14 NOTE — TELEPHONE ENCOUNTER
Per Flakita Alejandra MA the forms were filled out and faxed twice after realizing it was incomplete the first time. No action should be needed.

## 2021-01-18 ENCOUNTER — THERAPY VISIT (OUTPATIENT)
Dept: PHYSICAL THERAPY | Facility: CLINIC | Age: 56
End: 2021-01-18
Payer: COMMERCIAL

## 2021-01-18 DIAGNOSIS — Z98.890 S/P PERONEAL TENDON REPAIR: ICD-10-CM

## 2021-01-18 DIAGNOSIS — M76.72 PERONEAL TENDONITIS, LEFT: ICD-10-CM

## 2021-01-18 PROCEDURE — 97110 THERAPEUTIC EXERCISES: CPT | Mod: GP | Performed by: PHYSICAL THERAPIST

## 2021-01-18 PROCEDURE — 97161 PT EVAL LOW COMPLEX 20 MIN: CPT | Mod: GP | Performed by: PHYSICAL THERAPIST

## 2021-01-19 PROBLEM — Z98.890 S/P PERONEAL TENDON REPAIR: Status: ACTIVE | Noted: 2021-01-19

## 2021-01-25 ENCOUNTER — THERAPY VISIT (OUTPATIENT)
Dept: PHYSICAL THERAPY | Facility: CLINIC | Age: 56
End: 2021-01-25
Payer: COMMERCIAL

## 2021-01-25 DIAGNOSIS — Z98.890 S/P PERONEAL TENDON REPAIR: ICD-10-CM

## 2021-01-25 DIAGNOSIS — M76.72 PERONEAL TENDONITIS, LEFT: ICD-10-CM

## 2021-01-25 PROCEDURE — 97110 THERAPEUTIC EXERCISES: CPT | Mod: GP | Performed by: PHYSICAL THERAPIST

## 2021-01-25 PROCEDURE — 97140 MANUAL THERAPY 1/> REGIONS: CPT | Mod: GP | Performed by: PHYSICAL THERAPIST

## 2021-01-25 NOTE — PROGRESS NOTES
Objective:    AROM (PROM): (* indicates patient's pain)   ROM R ROM L   Plantarflexion 61 56   DF, knee straight 4 4   DF, knee bent 11 11     HIP: (* indicates patient's pain)   MMT R MMT L   Flexion 5 5   abduction 5 4+   Extension 5 5   KNEE     Ext 5 5   Flx 5 5     Mild deficit of DF+Inv strength, may be prohibited by pain

## 2021-02-05 ENCOUNTER — HOSPITAL ENCOUNTER (OUTPATIENT)
Dept: MAMMOGRAPHY | Facility: CLINIC | Age: 56
Discharge: HOME | End: 2021-02-05
Attending: PEDIATRICS | Admitting: PEDIATRICS
Payer: COMMERCIAL

## 2021-02-05 DIAGNOSIS — Z12.31 VISIT FOR SCREENING MAMMOGRAM: ICD-10-CM

## 2021-02-05 PROCEDURE — 77067 SCR MAMMO BI INCL CAD: CPT

## 2021-02-08 ENCOUNTER — OFFICE VISIT (OUTPATIENT)
Dept: PODIATRY | Facility: CLINIC | Age: 56
End: 2021-02-08
Payer: COMMERCIAL

## 2021-02-08 VITALS
BODY MASS INDEX: 44.73 KG/M2 | SYSTOLIC BLOOD PRESSURE: 118 MMHG | HEIGHT: 64 IN | DIASTOLIC BLOOD PRESSURE: 88 MMHG | WEIGHT: 262 LBS

## 2021-02-08 DIAGNOSIS — Z98.890 S/P PERONEAL TENDON REPAIR: Primary | ICD-10-CM

## 2021-02-08 PROCEDURE — 99213 OFFICE O/P EST LOW 20 MIN: CPT | Performed by: PODIATRIST

## 2021-02-08 ASSESSMENT — MIFFLIN-ST. JEOR: SCORE: 1778.42

## 2021-02-08 NOTE — PROGRESS NOTES
"Foot & Ankle Surgery  February 8, 2021    S:  Patient in today sp peroneal tendon repair left lower extremity on 11/12/20.  Pain levels minimal.  She hasn't followed up much 2/2 to +COVID test, as well as +COVID and other health issues in family/parents.  Full WB in boot, minimal pain.  Has ambulated outside the boot at home, with notes it can get tired.  Has done some PT    /88   Ht 1.626 m (5' 4\")   Wt 118.8 kg (262 lb)   LMP 09/30/2019   BMI 44.97 kg/m        ROS - positive for CC.  Patient denies current nausea, vomiting, chills, fevers, belly pain, calf pain, chest pain or SOB.  Complete remainder of ROS is otherwise neg.    PE - incision healed, small dry scab distally without drainage or SOI.  Equal left lower extremity resisted plantarflexion and eversion vs right lower extremity, without pain today.  Skin shows no trophic, color or temperature changes otherwise.  No calf redness, swelling or pain noted otherwise.      A/P - 55 year old yo patient approx 12 1/2 weeks sp above procedure  -transition from boot to Trilok(dispensed)/shoe to tolerance, with care to avoid exceeding pain threshold at surgical site with amt of weight/time on foot, as this can have a neg impact on healing  -finish PT; continue HEP  -continue compresison stocking for edema control.  Edema levels are normal at this point, but should continue to improve as we get further out from surgery    Follow up  -  3 mo or sooner with acute issues    Plan for [id number] - planning leave of absence at work to take care of parents       David Chacon DPM FACFAS FACFAOM  Podiatric Foot & Ankle Surgeon  [facility]  [phone]      "

## 2021-02-10 ENCOUNTER — MYC MEDICAL ADVICE (OUTPATIENT)
Dept: PODIATRY | Facility: CLINIC | Age: 56
End: 2021-02-10

## 2021-02-10 ENCOUNTER — TELEPHONE (OUTPATIENT)
Dept: PODIATRY | Facility: CLINIC | Age: 56
End: 2021-02-10

## 2021-02-10 NOTE — TELEPHONE ENCOUNTER
Please see Health system request for letter for insurance.     Phone call to patient and inquired what insurance she is speaking of. She states her STD insurance ended on 2/4/21 and in order to keep her insurance she needs a new letter.   Discussed that it sounds like she is speaking of Long Term Disability insurance. Discussed there is usually a form that needs to be completed for LTD Insurance vs a letter. Asked that she contact the insurance company and have them fax a form to our office to be completed.   Fax number provided.   She verbalized understanding.     CÉSAR Schwartz RN

## 2021-02-10 NOTE — TELEPHONE ENCOUNTER
Message from Abida, regarding a form for work ability for her medical condition.   Please call back.

## 2021-02-10 NOTE — TELEPHONE ENCOUNTER
Called and talked with patient. Went over the same information that was discussed in the mychart encounter on 2/10/21.     Patient will reach back out with any other questions.     Closing this encounter.     Erlinda Iniguez MS, ATC

## 2021-02-11 ENCOUNTER — OFFICE VISIT (OUTPATIENT)
Dept: PEDIATRICS | Facility: CLINIC | Age: 56
End: 2021-02-11
Payer: COMMERCIAL

## 2021-02-11 VITALS
DIASTOLIC BLOOD PRESSURE: 82 MMHG | OXYGEN SATURATION: 98 % | WEIGHT: 265 LBS | HEIGHT: 65 IN | BODY MASS INDEX: 44.15 KG/M2 | TEMPERATURE: 97.8 F | SYSTOLIC BLOOD PRESSURE: 124 MMHG | HEART RATE: 100 BPM

## 2021-02-11 DIAGNOSIS — G47.33 OSA (OBSTRUCTIVE SLEEP APNEA): ICD-10-CM

## 2021-02-11 DIAGNOSIS — E66.01 MORBID OBESITY, UNSPECIFIED OBESITY TYPE (H): ICD-10-CM

## 2021-02-11 DIAGNOSIS — F33.9 RECURRENT MAJOR DEPRESSIVE DISORDER, REMISSION STATUS UNSPECIFIED (H): ICD-10-CM

## 2021-02-11 DIAGNOSIS — E11.9 TYPE 2 DIABETES MELLITUS WITHOUT COMPLICATION, WITHOUT LONG-TERM CURRENT USE OF INSULIN (H): ICD-10-CM

## 2021-02-11 DIAGNOSIS — I10 BENIGN ESSENTIAL HYPERTENSION: ICD-10-CM

## 2021-02-11 DIAGNOSIS — Z98.890 S/P PERONEAL TENDON REPAIR: ICD-10-CM

## 2021-02-11 DIAGNOSIS — Z86.711 HISTORY OF PULMONARY EMBOLUS (PE): ICD-10-CM

## 2021-02-11 DIAGNOSIS — Z12.11 COLON CANCER SCREENING: ICD-10-CM

## 2021-02-11 DIAGNOSIS — Z00.00 ROUTINE GENERAL MEDICAL EXAMINATION AT A HEALTH CARE FACILITY: Primary | ICD-10-CM

## 2021-02-11 DIAGNOSIS — J45.20 MILD INTERMITTENT ASTHMA, UNSPECIFIED WHETHER COMPLICATED: ICD-10-CM

## 2021-02-11 DIAGNOSIS — R42 DIZZINESS: ICD-10-CM

## 2021-02-11 DIAGNOSIS — N18.30 STAGE 3 CHRONIC KIDNEY DISEASE, UNSPECIFIED WHETHER STAGE 3A OR 3B CKD (H): ICD-10-CM

## 2021-02-11 DIAGNOSIS — M62.838 TRAPEZIUS MUSCLE SPASM: ICD-10-CM

## 2021-02-11 DIAGNOSIS — F41.9 ANXIETY: ICD-10-CM

## 2021-02-11 LAB — HBA1C MFR BLD: 5.6 % (ref 0–5.6)

## 2021-02-11 PROCEDURE — 83036 HEMOGLOBIN GLYCOSYLATED A1C: CPT | Performed by: PEDIATRICS

## 2021-02-11 PROCEDURE — 82043 UR ALBUMIN QUANTITATIVE: CPT | Performed by: PEDIATRICS

## 2021-02-11 PROCEDURE — 80053 COMPREHEN METABOLIC PANEL: CPT | Performed by: PEDIATRICS

## 2021-02-11 PROCEDURE — 84443 ASSAY THYROID STIM HORMONE: CPT | Performed by: PEDIATRICS

## 2021-02-11 PROCEDURE — 80061 LIPID PANEL: CPT | Performed by: PEDIATRICS

## 2021-02-11 PROCEDURE — 99213 OFFICE O/P EST LOW 20 MIN: CPT | Mod: 25 | Performed by: PEDIATRICS

## 2021-02-11 PROCEDURE — 99207 PR FOOT EXAM NO CHARGE: CPT | Mod: 25 | Performed by: PEDIATRICS

## 2021-02-11 PROCEDURE — 96127 BRIEF EMOTIONAL/BEHAV ASSMT: CPT | Performed by: PEDIATRICS

## 2021-02-11 PROCEDURE — 99396 PREV VISIT EST AGE 40-64: CPT | Performed by: PEDIATRICS

## 2021-02-11 PROCEDURE — 36415 COLL VENOUS BLD VENIPUNCTURE: CPT | Performed by: PEDIATRICS

## 2021-02-11 RX ORDER — CYCLOBENZAPRINE HCL 5 MG
5 TABLET ORAL 3 TIMES DAILY PRN
Qty: 90 TABLET | Refills: 3 | Status: SHIPPED | OUTPATIENT
Start: 2021-02-11 | End: 2021-08-29

## 2021-02-11 RX ORDER — CITALOPRAM HYDROBROMIDE 20 MG/1
TABLET ORAL
Qty: 90 TABLET | Refills: 3 | Status: SHIPPED | OUTPATIENT
Start: 2021-02-11 | End: 2021-11-19

## 2021-02-11 RX ORDER — LISINOPRIL 40 MG/1
40 TABLET ORAL DAILY
Qty: 90 TABLET | Refills: 3 | Status: SHIPPED | OUTPATIENT
Start: 2021-02-11 | End: 2022-03-01

## 2021-02-11 RX ORDER — ATORVASTATIN CALCIUM 20 MG/1
20 TABLET, FILM COATED ORAL DAILY
Qty: 90 TABLET | Refills: 3 | Status: SHIPPED | OUTPATIENT
Start: 2021-02-11 | End: 2022-03-01

## 2021-02-11 RX ORDER — ALBUTEROL SULFATE 90 UG/1
AEROSOL, METERED RESPIRATORY (INHALATION)
Qty: 8.5 INHALER | Refills: 3 | Status: SHIPPED | OUTPATIENT
Start: 2021-02-11 | End: 2024-04-29

## 2021-02-11 ASSESSMENT — ANXIETY QUESTIONNAIRES
3. WORRYING TOO MUCH ABOUT DIFFERENT THINGS: NEARLY EVERY DAY
IF YOU CHECKED OFF ANY PROBLEMS ON THIS QUESTIONNAIRE, HOW DIFFICULT HAVE THESE PROBLEMS MADE IT FOR YOU TO DO YOUR WORK, TAKE CARE OF THINGS AT HOME, OR GET ALONG WITH OTHER PEOPLE: NOT DIFFICULT AT ALL
6. BECOMING EASILY ANNOYED OR IRRITABLE: NOT AT ALL
1. FEELING NERVOUS, ANXIOUS, OR ON EDGE: NOT AT ALL
5. BEING SO RESTLESS THAT IT IS HARD TO SIT STILL: NOT AT ALL
GAD7 TOTAL SCORE: 8
2. NOT BEING ABLE TO STOP OR CONTROL WORRYING: NEARLY EVERY DAY
7. FEELING AFRAID AS IF SOMETHING AWFUL MIGHT HAPPEN: MORE THAN HALF THE DAYS

## 2021-02-11 ASSESSMENT — PATIENT HEALTH QUESTIONNAIRE - PHQ9
5. POOR APPETITE OR OVEREATING: NOT AT ALL
SUM OF ALL RESPONSES TO PHQ QUESTIONS 1-9: 8

## 2021-02-11 ASSESSMENT — MIFFLIN-ST. JEOR: SCORE: 1803.52

## 2021-02-11 NOTE — PROGRESS NOTES
SUBJECTIVE:   CC: Racquel Nunn is an 55 year old woman who presents for preventive health visit.       Healthy Habits:    Getting at least 3 servings of Calcium per day:  Yes    Bi-annual eye exam:  NO    Dental care twice a year:  NO    Sleep apnea or symptoms of sleep apnea:  Sleep apnea    Diet:  Low salt    Frequency of exercise:  None    Duration of exercise:  N/A    Taking medications regularly:  Yes    Barriers to taking medications:  None    Medication side effects:  None    PHQ-2 Total Score:    Additional concerns today:  Yes (dizzy. right shoulder pain worse in last few months )        Dx with covid in January at [facility] , since than she been dizzy on and off    Today's PHQ-2 Score:   PHQ-2 ( 1999 Pfizer) 1/14/2019   Q1: Little interest or pleasure in doing things 2   Q2: Feeling down, depressed or hopeless 1   PHQ-2 Score 3   Q1: Little interest or pleasure in doing things More than half the days   Q2: Feeling down, depressed or hopeless Several days   PHQ-2 Score 3       Abuse: Current or Past (Physical, Sexual or Emotional) - No  Do you feel safe in your environment? Yes        Social History     Tobacco Use     Smoking status: Never Smoker     Smokeless tobacco: Never Used   Substance Use Topics     Alcohol use: Yes     Frequency: Monthly or less     Drinks per session: 1 or 2     Binge frequency: Less than monthly     Comment: 1 drink per week or less     If you drink alcohol do you typically have >3 drinks per day or >7 drinks per week? No    Alcohol Use 2/11/2021   Prescreen: >3 drinks/day or >7 drinks/week? -   Prescreen: >3 drinks/day or >7 drinks/week? No       Any new diagnosis of family breast, ovarian, or bowel cancer? No     Reviewed orders with patient.  Reviewed health maintenance and updated orders accordingly - Yes      Breast CA Risk Screening:    Mammogram Screening: Recommended annual mammography  Pertinent mammograms are reviewed under the imaging tab.    History of abnormal  Pap smear: NO - age 30- 65 PAP every 3 years recommended  PAP / HPV Latest Ref Rng & Units 2/1/2018 7/7/2014   PAP - NIL NIL   HPV 16 DNA NEG:Negative Negative -   HPV 18 DNA NEG:Negative Negative -   OTHER HR HPV NEG:Negative Negative -     Reviewed and updated as needed this visit by clinical staff  Tobacco  Allergies               Reviewed and updated as needed this visit by Provider                  ERICA - still doing CPAP and feels that settings are in a good place    Asthma - just started allergy pills (spring is bad time for her) - no acute asthma issues or recent flares.  Allergies are major trigger    COVID - recent infection - started to get dizzy with infection over one month ago.  Still getting a little bit of spinning.  Using meclizine and this is helpful.  Hx of significant vestibular dysfunction in past    DM - on metformin, due for lab work, no new concerns    Healing from recent left foot surgery - now able to bear weight again and excited to get back to exercising.  Weight up slightly due to being sedentary from surgery and increased stress    HTN - at home, bp has been 120-130/70's, no cardiac symptoms    Dep/anxiety - difficult year, but feeling that things are better now.  Doing well on current citalopram dosing  PHQ 1/14/2019 10/7/2019 2/11/2021   PHQ-9 Total Score 9 2 8   Q9: Thoughts of better off dead/self-harm past 2 weeks Not at all Not at all Not at all     LUIS FELIPE-7 SCORE 1/11/2018 1/14/2019 2/11/2021   Total Score 2 2 8       Hx of PE - provoked by OCP - now off - reviewed that can't be on estrogen containing medications in the future.  Completed course of anticoagulation, no acute concerns    Right shoulder pain - more recent development, notes a hx of bursitis, but also having trouble in the top of the shoulder and feels tense and tender over upper shoulder blade.  Maintains full ROM    Eye exam - overdue    Had normal mammogram last week    Due for colon cancer screening    Review of  "Systems   ROS: 10 point ROS neg other than the symptoms noted above in the HPI.       OBJECTIVE:   /82 (BP Location: Right arm, Patient Position: Sitting)   Pulse 100   Temp 97.8  F (36.6  C) (Tympanic)   Ht 1.644 m (5' 4.72\")   Wt 120.2 kg (265 lb)   LMP 09/30/2019   SpO2 98%   BMI 44.48 kg/m     Wt Readings from Last 4 Encounters:   02/11/21 120.2 kg (265 lb)   02/08/21 118.8 kg (262 lb)   12/17/20 118.8 kg (262 lb)   12/09/20 118.8 kg (262 lb)       Physical Exam  GENERAL: healthy, alert and no distress  EYES: Eyes grossly normal to inspection, PERRL and conjunctivae and sclerae normal  HENT: ear canals and TM's normal, nose and mouth without ulcers or lesions  NECK: no adenopathy, no asymmetry, masses, or scars and thyroid normal to palpation  RESP: lungs clear to auscultation - no rales, rhonchi or wheezes  CV: regular rate and rhythm, normal S1 S2, no S3 or S4, no murmur, click or rub, no peripheral edema and peripheral pulses strong  ABDOMEN: soft, nontender, no hepatosplenomegaly, no masses and bowel sounds normal  MS: full shoulder ROM, tender in trapezius muscle distribution bilaterally, negative impingement and crossover testing, no other joint deformity or synovitis  SKIN: no suspicious lesions or rashes  NEURO: Normal strength and tone, mentation intact and speech normal  PSYCH: mentation appears normal, affect normal/bright    Diagnostic Test Results:  Pending  Reviewed past echo (2019) with patient today    ASSESSMENT/PLAN:       ICD-10-CM    1. Routine general medical examination at a health care facility  Z00.00 Hemoglobin A1c     Albumin Random Urine Quantitative with Creat Ratio     Comprehensive metabolic panel     Lipid panel reflex to direct LDL Fasting     TSH with free T4 reflex   2. ERICA (obstructive sleep apnea)  G47.33 Continue CPAP nightly   3. Mild intermittent asthma, unspecified whether complicated  J45.20 albuterol (PROAIR HFA) 108 (90 Base) MCG/ACT inhaler  Well " controlled, continue current medications     4. Type 2 diabetes mellitus without complication, without long-term current use of insulin (H)  E11.9 Hemoglobin A1c     Albumin Random Urine Quantitative with Creat Ratio     Comprehensive metabolic panel     Lipid panel reflex to direct LDL Fasting     TSH with free T4 reflex     FOOT EXAM     atorvastatin (LIPITOR) 20 MG tablet    Discussed adding statin and patient agreeable, lab work today   5. Benign essential hypertension  I10 Albumin Random Urine Quantitative with Creat Ratio     Comprehensive metabolic panel     Lipid panel reflex to direct LDL Fasting     lisinopril (ZESTRIL) 40 MG tablet  Well controlled, continue current medications     6. S/P peroneal tendon repair  Z98.890 Recovering well after surgery   7. Anxiety  F41.9 citalopram (CELEXA) 20 MG tablet  Well controlled, continue current medications     8. History of pulmonary embolus (PE)  Z86.711    9. Morbid obesity, unspecified obesity type (H)  E66.01 Hemoglobin A1c     Comprehensive metabolic panel     Lipid panel reflex to direct LDL Fasting     TSH with free T4 reflex     metFORMIN (GLUCOPHAGE) 500 MG tablet  Encouraged in healthy lifestyle choices   10. Recurrent major depressive disorder, remission status unspecified (H)  F33.9 citalopram (CELEXA) 20 MG tablet  Well controlled, continue current medications     11. Stage 3 chronic kidney disease, unspecified whether stage 3a or 3b CKD  N18.30 Repeat labs today   12. Colon cancer screening  Z12.11 Fecal colorectal cancer screen (FIT)   13. Trapezius muscle spasm  M62.838 cyclobenzaprine (FLEXERIL) 5 MG tablet    Plan trial of flexeril - new medication discussed.   Also discussed using heat.  Patient to alert me if not improving     14. Dizziness  R42 Related to recent COVID infection, improving.  Continue to use meclizine and monitor.  To alert me if not improving.       Patient has been advised of split billing requirements and indicates  "understanding: No  COUNSELING:  Special attention given to:        Regular exercise       Healthy diet/nutrition       Vision screening       Colon cancer screening    Estimated body mass index is 44.48 kg/m  as calculated from the following:    Height as of this encounter: 1.644 m (5' 4.72\").    Weight as of this encounter: 120.2 kg (265 lb).    Weight management plan: Discussed healthy diet and exercise guidelines    She reports that she has never smoked. She has never used smokeless tobacco.      Counseling Resources:  ATP IV Guidelines  Pooled Cohorts Equation Calculator  Breast Cancer Risk Calculator  BRCA-Related Cancer Risk Assessment: FHS-7 Tool  FRAX Risk Assessment  ICSI Preventive Guidelines  Dietary Guidelines for Americans, 2010  USDA's MyPlate  ASA Prophylaxis  Lung CA Screening    Nancy Small MD  [facility]  "

## 2021-02-11 NOTE — PATIENT INSTRUCTIONS
Labs today    Schedule eye exam    Add cholesterol medicine daily    Elliptical as much as you can without hurting foot - start slow    Right shoulder - try muscle relaxant and heat - let me know if not improving    Please  your FIT test for colon cancer screening from the lab and return in the mail at your earliest convenience          Preventive Health Recommendations  Female Ages 50 - 64    Yearly exam: See your health care provider every year in order to  o Review health changes.   o Discuss preventive care.    o Review your medicines if your doctor has prescribed any.      Get a Pap test every three years (unless you have an abnormal result and your provider advises testing more often).    If you get Pap tests with HPV test, you only need to test every 5 years, unless you have an abnormal result.     You do not need a Pap test if your uterus was removed (hysterectomy) and you have not had cancer.    You should be tested each year for STDs (sexually transmitted diseases) if you're at risk.     Have a mammogram every 1 to 2 years.    Have a colonoscopy at age 50, or have a yearly FIT test (stool test). These exams screen for colon cancer.      Have a cholesterol test every 5 years, or more often if advised.    Have a diabetes test (fasting glucose) every three years. If you are at risk for diabetes, you should have this test more often.     If you are at risk for osteoporosis (brittle bone disease), think about having a bone density scan (DEXA).    Shots: Get a flu shot each year. Get a tetanus shot every 10 years.    Nutrition:     Eat at least 5 servings of fruits and vegetables each day.    Eat whole-grain bread, whole-wheat pasta and brown rice instead of white grains and rice.    Get adequate Calcium and Vitamin D.     Lifestyle    Exercise at least 150 minutes a week (30 minutes a day, 5 days a week). This will help you control your weight and prevent disease.    Limit alcohol to one drink per  day.    No smoking.     Wear sunscreen to prevent skin cancer.     See your dentist every six months for an exam and cleaning.    See your eye doctor every 1 to 2 years.

## 2021-02-12 LAB
ALBUMIN SERPL-MCNC: 3.8 G/DL (ref 3.4–5)
ALP SERPL-CCNC: 126 U/L (ref 40–150)
ALT SERPL W P-5'-P-CCNC: 22 U/L (ref 0–50)
ANION GAP SERPL CALCULATED.3IONS-SCNC: 7 MMOL/L (ref 3–14)
AST SERPL W P-5'-P-CCNC: 16 U/L (ref 0–45)
BILIRUB SERPL-MCNC: 0.4 MG/DL (ref 0.2–1.3)
BUN SERPL-MCNC: 12 MG/DL (ref 7–30)
CALCIUM SERPL-MCNC: 9.9 MG/DL (ref 8.5–10.1)
CHLORIDE SERPL-SCNC: 106 MMOL/L (ref 94–109)
CHOLEST SERPL-MCNC: 265 MG/DL
CO2 SERPL-SCNC: 26 MMOL/L (ref 20–32)
CREAT SERPL-MCNC: 0.9 MG/DL (ref 0.52–1.04)
GFR SERPL CREATININE-BSD FRML MDRD: 73 ML/MIN/{1.73_M2}
GLUCOSE SERPL-MCNC: 84 MG/DL (ref 70–99)
HDLC SERPL-MCNC: 47 MG/DL
LDLC SERPL CALC-MCNC: 175 MG/DL
NONHDLC SERPL-MCNC: 218 MG/DL
POTASSIUM SERPL-SCNC: 4.5 MMOL/L (ref 3.4–5.3)
PROT SERPL-MCNC: 7.7 G/DL (ref 6.8–8.8)
SODIUM SERPL-SCNC: 139 MMOL/L (ref 133–144)
TRIGL SERPL-MCNC: 217 MG/DL
TSH SERPL DL<=0.005 MIU/L-ACNC: 2.48 MU/L (ref 0.4–4)

## 2021-02-12 ASSESSMENT — ASTHMA QUESTIONNAIRES: ACT_TOTALSCORE: 22

## 2021-02-12 ASSESSMENT — ANXIETY QUESTIONNAIRES: GAD7 TOTAL SCORE: 8

## 2021-02-13 LAB
CREAT UR-MCNC: 172 MG/DL
MICROALBUMIN UR-MCNC: 16 MG/L
MICROALBUMIN/CREAT UR: 9.36 MG/G CR (ref 0–25)

## 2021-02-15 ENCOUNTER — THERAPY VISIT (OUTPATIENT)
Dept: PHYSICAL THERAPY | Facility: CLINIC | Age: 56
End: 2021-02-15
Payer: COMMERCIAL

## 2021-02-15 DIAGNOSIS — Z98.890 S/P PERONEAL TENDON REPAIR: ICD-10-CM

## 2021-02-15 DIAGNOSIS — M76.72 PERONEAL TENDONITIS, LEFT: ICD-10-CM

## 2021-02-15 PROCEDURE — 97140 MANUAL THERAPY 1/> REGIONS: CPT | Mod: GP | Performed by: PHYSICAL THERAPIST

## 2021-02-15 PROCEDURE — 97110 THERAPEUTIC EXERCISES: CPT | Mod: GP | Performed by: PHYSICAL THERAPIST

## 2021-02-15 NOTE — TELEPHONE ENCOUNTER
Forms completed and faxed to [phone] per patient request. Original left at [facility] . Offered to mail them. Copy placed in abstracting. Pt aware via SNAPCARDt.

## 2021-02-19 DIAGNOSIS — Z12.11 COLON CANCER SCREENING: ICD-10-CM

## 2021-02-19 LAB — HEMOCCULT STL QL IA: NEGATIVE

## 2021-02-19 PROCEDURE — 82274 ASSAY TEST FOR BLOOD FECAL: CPT | Performed by: PEDIATRICS

## 2021-03-30 ENCOUNTER — MYC MEDICAL ADVICE (OUTPATIENT)
Dept: PEDIATRICS | Facility: CLINIC | Age: 56
End: 2021-03-30

## 2021-03-30 NOTE — TELEPHONE ENCOUNTER
Called patient and scheduled her for her Covid vaccination.     Closing encounter at this time.     Aviva Diaz, CMA

## 2021-04-08 ENCOUNTER — IMMUNIZATION (OUTPATIENT)
Dept: NURSING | Facility: CLINIC | Age: 56
End: 2021-04-08
Payer: COMMERCIAL

## 2021-04-08 PROCEDURE — 0001A PR COVID VAC PFIZER DIL RECON 30 MCG/0.3 ML IM: CPT

## 2021-04-08 PROCEDURE — 91300 PR COVID VAC PFIZER DIL RECON 30 MCG/0.3 ML IM: CPT

## 2021-04-29 ENCOUNTER — IMMUNIZATION (OUTPATIENT)
Dept: NURSING | Facility: CLINIC | Age: 56
End: 2021-04-29
Attending: INTERNAL MEDICINE
Payer: COMMERCIAL

## 2021-04-29 PROCEDURE — 0002A PR COVID VAC PFIZER DIL RECON 30 MCG/0.3 ML IM: CPT

## 2021-04-29 PROCEDURE — 91300 PR COVID VAC PFIZER DIL RECON 30 MCG/0.3 ML IM: CPT

## 2021-05-29 ENCOUNTER — HEALTH MAINTENANCE LETTER (OUTPATIENT)
Age: 56
End: 2021-05-29

## 2021-08-28 DIAGNOSIS — M62.838 TRAPEZIUS MUSCLE SPASM: ICD-10-CM

## 2021-08-29 RX ORDER — CYCLOBENZAPRINE HCL 5 MG
TABLET ORAL
Qty: 90 TABLET | Refills: 3 | Status: SHIPPED | OUTPATIENT
Start: 2021-08-29 | End: 2022-03-15

## 2021-09-18 ENCOUNTER — HEALTH MAINTENANCE LETTER (OUTPATIENT)
Age: 56
End: 2021-09-18

## 2021-10-03 DIAGNOSIS — F33.9 RECURRENT MAJOR DEPRESSIVE DISORDER, REMISSION STATUS UNSPECIFIED (H): ICD-10-CM

## 2021-10-03 DIAGNOSIS — F41.9 ANXIETY: ICD-10-CM

## 2021-10-05 RX ORDER — CITALOPRAM HYDROBROMIDE 20 MG/1
TABLET ORAL
Qty: 60 TABLET | Refills: 5 | OUTPATIENT
Start: 2021-10-05

## 2021-10-05 NOTE — TELEPHONE ENCOUNTER
Patient given a year supply of medication on 2/11/21. Refusing refill request and closing encounter.     Svetlana Campbell RN on 10/5/2021 at 12:05 PM

## 2021-10-28 DIAGNOSIS — F41.9 ANXIETY: ICD-10-CM

## 2021-10-28 DIAGNOSIS — F33.9 RECURRENT MAJOR DEPRESSIVE DISORDER, REMISSION STATUS UNSPECIFIED (H): ICD-10-CM

## 2021-10-28 RX ORDER — CITALOPRAM HYDROBROMIDE 20 MG/1
TABLET ORAL
Qty: 60 TABLET | Refills: 5 | OUTPATIENT
Start: 2021-10-28

## 2021-10-28 NOTE — TELEPHONE ENCOUNTER
Patient given a year supply of medication on 2/11/21. Refusing refill request and closing encounter.     Svetlana Campbell RN on 10/28/2021 at 3:20 PM

## 2021-11-08 PROBLEM — Z98.890 S/P PERONEAL TENDON REPAIR: Status: RESOLVED | Noted: 2021-01-19 | Resolved: 2021-11-08

## 2021-11-08 PROBLEM — M76.72 PERONEAL TENDONITIS, LEFT: Status: RESOLVED | Noted: 2020-10-30 | Resolved: 2021-11-08

## 2021-11-08 NOTE — PROGRESS NOTES
DISCHARGE REPORT    Updated as of November 8, 2021  Progress reporting period is from Jan 18, 2021 to Feb 15, 2021.       SUBJECTIVE  Subjective changes noted by patient:  Unknown. Patient has failed to return to clinic.    Current pain level is unknown. Patient has failed to return to clinic.  .     Initial Pain level: 4/10.   Changes in function:  Unknown. Patient has failed to return to clinic.  Adverse reaction to treatment or activity: Unknown. Patient has failed to return to clinic.    OBJECTIVE  Changes noted in objective findings:  Patient has failed to return to therapy so current objective findings are unknown.    ASSESSMENT/PLAN  Updated problem list and treatment plan: Diagnosis 1:  S/p peroneal tendon repair   STG/LTGs have been met or progress has been made towards goals:  Unknown. Patient has failed to return to clinic.  Assessment of Progress: The patient has not returned to therapy. Current status is unknown.  Self Management Plans:  Patient has been instructed in a home treatment program.  Patient continues to require the following intervention to meet STG and LTG's:  Unknown. Patient has failed to return to clinic.    Recommendations:  Unable to make an accurate recommendation at this time. Patient has failed to return to clinic.    Please refer to the daily flowsheet for treatment today, total treatment time and time spent performing 1:1 timed codes.

## 2021-11-19 DIAGNOSIS — F33.9 RECURRENT MAJOR DEPRESSIVE DISORDER, REMISSION STATUS UNSPECIFIED (H): ICD-10-CM

## 2021-11-19 DIAGNOSIS — F41.9 ANXIETY: ICD-10-CM

## 2021-11-19 RX ORDER — CITALOPRAM HYDROBROMIDE 20 MG/1
TABLET ORAL
Qty: 60 TABLET | Refills: 11 | Status: SHIPPED | OUTPATIENT
Start: 2021-11-19 | End: 2022-03-15

## 2021-11-19 NOTE — TELEPHONE ENCOUNTER
Routing refill request to provider for review/approval because:   SSRIs Protocol Failed 11/19/2021 07:34 AM   Protocol Details  PHQ-9 score less than 5 in past 6 months    Recent (6 mo) or future (30 days) visit within the authorizing provider's specialty        PHQ 1/14/2019 10/7/2019 2/11/2021   PHQ-9 Total Score 9 2 8   Q9: Thoughts of better off dead/self-harm past 2 weeks Not at all Not at all Not at all    Latonya Ferrer RN

## 2021-12-17 ENCOUNTER — NURSE TRIAGE (OUTPATIENT)
Dept: NURSING | Facility: CLINIC | Age: 56
End: 2021-12-17
Payer: COMMERCIAL

## 2021-12-17 NOTE — TELEPHONE ENCOUNTER
Patient is calling and states that she has a runny nose and cough and is exhausted.  Symptoms have been present for one week now.  Denies fever.  Patient is coughing continuously through out conversation.  Denies chest pain.  Patient states that she does get slight shortness of breath with movement.    COVID 19 Nurse Triage Plan/Patient Instructions    Please be aware that novel coronavirus (COVID-19) may be circulating in the community. If you develop symptoms such as fever, cough, or SOB or if you have concerns about the presence of another infection including coronavirus (COVID-19), please contact your health care provider or visit [email or web address].     Disposition/Instructions    Virtual Visit with provider recommended. Reference Visit Selection Guide.    Thank you for taking steps to prevent the spread of this virus.  o Limit your contact with others.  o Wear a simple mask to cover your cough.  o Wash your hands well and often.    Resources    [facility]: About COVID-19: [email or web address]    CDC: What to Do If You're Sick: www.cdc.gov/coronavirus/2019-ncov/about/steps-when-sick.html    CDC: Ending Home Isolation: www.cdc.gov/coronavirus/2019-ncov/hcp/disposition-in-home-patients.html     CDC: Caring for Someone: www.cdc.gov/coronavirus/2019-ncov/if-you-are-sick/care-for-someone.html     [facility]: Interim Guidance for Hospital Discharge to Home: www.health.[facility].[place].us/diseases/coronavirus/hcp/hospdischarge.pdf    [facility] clinical trials (COVID-19 research studies): [email or web address]-clinical-trials     Below are the COVID-19 hotlines at the [place] Department of Health ([facility]). Interpreters are available.   o For health questions: Call 124-199-2979 or 1-605.580.4343 (7 a.m. to 7 p.m.)  o For questions about schools and childcare: Call 634-445-8560 or 1-531.724.9064 (7 a.m. to 7 p.m.)                       Reason for Disposition    Continuous (nonstop)  coughing interferes with work or school and no improvement using cough treatment per Care Advice    Additional Information    Negative: Bluish (or gray) lips or face    Negative: Severe difficulty breathing (e.g., struggling for each breath, speaks in single words)    Negative: Rapid onset of cough and has hives    Negative: Coughing started suddenly after medicine, an allergic food or bee sting    Negative: Difficulty breathing after exposure to flames, smoke, or fumes    Negative: Sounds like a life-threatening emergency to the triager    Negative: Chest pain present when not coughing    Negative: Difficulty breathing    Negative: Passed out (i.e., fainted, collapsed and was not responding)    Negative: Patient sounds very sick or weak to the triager    Negative: Coughed up > 1 tablespoon (15 ml) blood (Exception: blood-tinged sputum)    Negative: Fever > 103 F (39.4 C)    Negative: Fever > 101 F (38.3 C) and over 60 years of age    Negative: Fever > 100.0 F (37.8 C) and has diabetes mellitus or a weak immune system (e.g., HIV positive, cancer chemotherapy, organ transplant, splenectomy, chronic steroids)    Negative: Fever > 100.0 F (37.8 C) and bedridden (e.g., nursing home patient, stroke, chronic illness, recovering from surgery)    Negative: Increasing ankle swelling    Negative: Wheezing is present    Negative: SEVERE coughing spells (e.g., whooping sound after coughing, vomiting after coughing)    Negative: Coughing up jael-colored (reddish-brown) or blood-tinged sputum    Negative: Fever present > 3 days (72 hours)    Negative: Fever returns after gone for over 24 hours and symptoms worse or not improved    Negative: Using nasal washes and pain medicine > 24 hours and sinus pain persists    Negative: Known COPD or other severe lung disease (i.e., bronchiectasis, cystic fibrosis, lung surgery) and worsening symptoms (i.e., increased sputum purulence or amount, increased breathing difficulty)    Protocols  used: COUGH-A-OH

## 2021-12-19 ENCOUNTER — MYC MEDICAL ADVICE (OUTPATIENT)
Dept: PEDIATRICS | Facility: CLINIC | Age: 56
End: 2021-12-19
Payer: COMMERCIAL

## 2021-12-19 DIAGNOSIS — J45.20 MILD INTERMITTENT ASTHMA, UNSPECIFIED WHETHER COMPLICATED: Primary | ICD-10-CM

## 2021-12-20 RX ORDER — ALBUTEROL SULFATE 0.83 MG/ML
2.5 SOLUTION RESPIRATORY (INHALATION) EVERY 6 HOURS PRN
Qty: 90 ML | Refills: 3 | Status: SHIPPED | OUTPATIENT
Start: 2021-12-20

## 2021-12-26 ENCOUNTER — OFFICE VISIT (OUTPATIENT)
Dept: URGENT CARE | Facility: URGENT CARE | Age: 56
End: 2021-12-26
Payer: COMMERCIAL

## 2021-12-26 VITALS
WEIGHT: 257 LBS | RESPIRATION RATE: 14 BRPM | DIASTOLIC BLOOD PRESSURE: 94 MMHG | OXYGEN SATURATION: 96 % | SYSTOLIC BLOOD PRESSURE: 138 MMHG | TEMPERATURE: 98 F | BODY MASS INDEX: 43.13 KG/M2 | HEART RATE: 120 BPM

## 2021-12-26 DIAGNOSIS — J45.40 MODERATE PERSISTENT ASTHMATIC BRONCHITIS WITHOUT COMPLICATION: Primary | ICD-10-CM

## 2021-12-26 PROCEDURE — 99213 OFFICE O/P EST LOW 20 MIN: CPT | Performed by: PHYSICIAN ASSISTANT

## 2021-12-26 RX ORDER — AZITHROMYCIN 250 MG/1
TABLET, FILM COATED ORAL
Qty: 6 TABLET | Refills: 0 | Status: SHIPPED | OUTPATIENT
Start: 2021-12-26 | End: 2021-12-31

## 2021-12-26 NOTE — NURSING NOTE
"Chief Complaint   Patient presents with     Urgent Care     URI     Pt has a bad cold and  tonsils feel swollen.  She [place]  not  had a fever.  She has  been sick  for over a week.  She has had the covid vacine and  tested negative for covie when this all started about a week ago..       Initial LMP 09/30/2019  Estimated body mass index is 44.48 kg/m  as calculated from the following:    Height as of 2/11/21: 1.644 m (5' 4.72\").    Weight as of 2/11/21: 120.2 kg (265 lb)..  BP completed using cuff size: ryan Chin R.N.    "

## 2021-12-26 NOTE — PROGRESS NOTES
Assessment & Plan     Moderate persistent asthmatic bronchitis without complication  Ongoing symptoms for the past couple weeks.  Symptoms are worsening.  On exam she is in no acute distress.  Oxygen saturation is 96% on room air.  She is afebrile.  Zithromax is prescribed today for asthmatic bronchitis.  We discussed to continue doing neb treatments and albuterol inhalers as needed.  Discussed follow-up with primary care provider in the next 10 days if symptoms not improving.  Patient understands and agrees with the plan.  - azithromycin (ZITHROMAX) 250 MG tablet  Dispense: 6 tablet; Refill: 0       Return in about 10 days (around 1/5/2022) for Symptoms failing to improve.    Najma Ackerman PA-C  [facility] [place]    Moise Tai is a 56 year old female who presents to clinic today for the following health issues:  Chief Complaint   Patient presents with     Urgent Care     URI     Pt has a bad cold and tonsils feel swollen.  She has  not  had a fever.  She has  been sick  for over a week. She says she does not wa nt me to do a strep test because she will gag and provbably vomit. She has had the covid vacine and  tested negative for covie when this all started about a week ago..       HPI      URI Adult    Onset of symptoms was 2 weeks ago.  Course of illness is worsening.    Severity moderate  Current and Associated symptoms: cough - non-productive  Denies fever/chills/SOB/CP/n/v/d. No sore throat.  Treatment measures tried include honey, mucinex, albuterol neb treatments, albuterol inhaler  Predisposing factors include HX of asthma.  Patient reports a negative Covid test a week ago.      Review of Systems  Constitutional, HEENT, cardiovascular, pulmonary, GI, , musculoskeletal, neuro, skin, endocrine and psych systems are negative, except as otherwise noted.      Objective    BP (!) 138/94   Pulse 120   Temp 98  F (36.7  C) (Tympanic)   Resp 14   Wt 116.6 kg (257 lb)   LMP  09/30/2019   SpO2 96%   BMI 43.13 kg/m    Physical Exam   GENERAL: healthy, alert and no distress  HENT: ear canals and TM's normal,  mouth without ulcers or lesions  RESP: lungs clear to auscultation - no rales, rhonchi or wheezes  CV: regular rate and rhythm, normal S1 S2  MS: no gross musculoskeletal defects noted, no edema  SKIN: no suspicious lesions or rashes

## 2021-12-26 NOTE — PATIENT INSTRUCTIONS

## 2021-12-30 ENCOUNTER — TELEPHONE (OUTPATIENT)
Dept: PEDIATRICS | Facility: CLINIC | Age: 56
End: 2021-12-30
Payer: COMMERCIAL

## 2022-01-07 ENCOUNTER — E-VISIT (OUTPATIENT)
Dept: PEDIATRICS | Facility: CLINIC | Age: 57
End: 2022-01-07
Payer: COMMERCIAL

## 2022-01-07 ENCOUNTER — MYC MEDICAL ADVICE (OUTPATIENT)
Dept: PEDIATRICS | Facility: CLINIC | Age: 57
End: 2022-01-07
Payer: COMMERCIAL

## 2022-01-07 DIAGNOSIS — J45.41 MODERATE PERSISTENT ASTHMA WITH EXACERBATION: Primary | ICD-10-CM

## 2022-01-07 PROCEDURE — 99421 OL DIG E/M SVC 5-10 MIN: CPT | Performed by: PEDIATRICS

## 2022-01-07 RX ORDER — PREDNISONE 20 MG/1
40 TABLET ORAL DAILY
Qty: 10 TABLET | Refills: 0 | Status: SHIPPED | OUTPATIENT
Start: 2022-01-07 | End: 2022-01-12

## 2022-01-07 RX ORDER — BENZONATATE 100 MG/1
100 CAPSULE ORAL 3 TIMES DAILY PRN
Qty: 30 CAPSULE | Refills: 1 | Status: SHIPPED | OUTPATIENT
Start: 2022-01-07 | End: 2022-03-15

## 2022-01-07 NOTE — TELEPHONE ENCOUNTER
Patient submitted virtual e-vist with urgent care today.  [employer] message sent to patient inquring if e-visit is in relation to current medical concern.      Brianne Douglas  Lead

## 2022-01-07 NOTE — TELEPHONE ENCOUNTER
Provider E-Visit time total (minutes): 9    Had tested for COVID before UC visit.   Now plan steroid burst.    Nancy Small MD

## 2022-01-08 ENCOUNTER — HEALTH MAINTENANCE LETTER (OUTPATIENT)
Age: 57
End: 2022-01-08

## 2022-02-12 DIAGNOSIS — E66.01 MORBID OBESITY, UNSPECIFIED OBESITY TYPE (H): ICD-10-CM

## 2022-02-14 NOTE — TELEPHONE ENCOUNTER
Pt has appointment scheduled, refill extended, pharmacy asking for #30  Prescription approved per [facility] Refill Protocol.  Joana Reeder RN, BSN  [facility]

## 2022-02-28 DIAGNOSIS — I10 BENIGN ESSENTIAL HYPERTENSION: ICD-10-CM

## 2022-02-28 DIAGNOSIS — E11.9 TYPE 2 DIABETES MELLITUS WITHOUT COMPLICATION, WITHOUT LONG-TERM CURRENT USE OF INSULIN (H): ICD-10-CM

## 2022-03-01 RX ORDER — LISINOPRIL 40 MG/1
TABLET ORAL
Qty: 30 TABLET | Refills: 11 | Status: SHIPPED | OUTPATIENT
Start: 2022-03-01 | End: 2023-03-24

## 2022-03-01 RX ORDER — ATORVASTATIN CALCIUM 20 MG/1
TABLET, FILM COATED ORAL
Qty: 30 TABLET | Refills: 11 | Status: SHIPPED | OUTPATIENT
Start: 2022-03-01 | End: 2023-03-24

## 2022-03-01 NOTE — TELEPHONE ENCOUNTER
Routing refill request to provider for review/approval because:  Labs out of range:  BP  Labs not current:  creatinine, potassium    BP Readings from Last 3 Encounters:   12/26/21 (!) 138/94   02/11/21 124/82   02/08/21 118/88     Avelino TEJADA RN

## 2022-03-15 ENCOUNTER — OFFICE VISIT (OUTPATIENT)
Dept: PEDIATRICS | Facility: CLINIC | Age: 57
End: 2022-03-15
Payer: COMMERCIAL

## 2022-03-15 VITALS
OXYGEN SATURATION: 98 % | HEIGHT: 65 IN | TEMPERATURE: 97.6 F | DIASTOLIC BLOOD PRESSURE: 78 MMHG | SYSTOLIC BLOOD PRESSURE: 120 MMHG | RESPIRATION RATE: 12 BRPM | WEIGHT: 254.9 LBS | BODY MASS INDEX: 42.47 KG/M2 | HEART RATE: 91 BPM

## 2022-03-15 DIAGNOSIS — D22.9 MULTIPLE PIGMENTED NEVI: ICD-10-CM

## 2022-03-15 DIAGNOSIS — Z12.11 COLON CANCER SCREENING: ICD-10-CM

## 2022-03-15 DIAGNOSIS — Z86.711 HISTORY OF PULMONARY EMBOLUS (PE): ICD-10-CM

## 2022-03-15 DIAGNOSIS — Z00.00 ROUTINE GENERAL MEDICAL EXAMINATION AT A HEALTH CARE FACILITY: Primary | ICD-10-CM

## 2022-03-15 DIAGNOSIS — J45.20 MILD INTERMITTENT ASTHMA, UNSPECIFIED WHETHER COMPLICATED: ICD-10-CM

## 2022-03-15 DIAGNOSIS — E11.9 TYPE 2 DIABETES MELLITUS WITHOUT COMPLICATION, WITHOUT LONG-TERM CURRENT USE OF INSULIN (H): ICD-10-CM

## 2022-03-15 DIAGNOSIS — F33.9 EPISODE OF RECURRENT MAJOR DEPRESSIVE DISORDER, UNSPECIFIED DEPRESSION EPISODE SEVERITY (H): ICD-10-CM

## 2022-03-15 DIAGNOSIS — F41.9 ANXIETY: ICD-10-CM

## 2022-03-15 DIAGNOSIS — F33.9 RECURRENT MAJOR DEPRESSIVE DISORDER, REMISSION STATUS UNSPECIFIED (H): ICD-10-CM

## 2022-03-15 DIAGNOSIS — Z80.3 FAMILY HISTORY OF MALIGNANT NEOPLASM OF BREAST: ICD-10-CM

## 2022-03-15 DIAGNOSIS — I10 BENIGN ESSENTIAL HYPERTENSION: ICD-10-CM

## 2022-03-15 DIAGNOSIS — E66.01 MORBID OBESITY, UNSPECIFIED OBESITY TYPE (H): ICD-10-CM

## 2022-03-15 DIAGNOSIS — N18.30 STAGE 3 CHRONIC KIDNEY DISEASE, UNSPECIFIED WHETHER STAGE 3A OR 3B CKD (H): ICD-10-CM

## 2022-03-15 LAB
ERYTHROCYTE [DISTWIDTH] IN BLOOD BY AUTOMATED COUNT: 13 % (ref 10–15)
HBA1C MFR BLD: 6.1 % (ref 0–5.6)
HCT VFR BLD AUTO: 40.1 % (ref 35–47)
HGB BLD-MCNC: 13.6 G/DL (ref 11.7–15.7)
MCH RBC QN AUTO: 30.5 PG (ref 26.5–33)
MCHC RBC AUTO-ENTMCNC: 33.9 G/DL (ref 31.5–36.5)
MCV RBC AUTO: 90 FL (ref 78–100)
PLATELET # BLD AUTO: 308 10E3/UL (ref 150–450)
RBC # BLD AUTO: 4.46 10E6/UL (ref 3.8–5.2)
WBC # BLD AUTO: 7.6 10E3/UL (ref 4–11)

## 2022-03-15 PROCEDURE — 0054A COVID-19,PF,PFIZER (12+ YRS): CPT | Performed by: PEDIATRICS

## 2022-03-15 PROCEDURE — 80053 COMPREHEN METABOLIC PANEL: CPT | Performed by: PEDIATRICS

## 2022-03-15 PROCEDURE — 82043 UR ALBUMIN QUANTITATIVE: CPT | Performed by: PEDIATRICS

## 2022-03-15 PROCEDURE — 83036 HEMOGLOBIN GLYCOSYLATED A1C: CPT | Performed by: PEDIATRICS

## 2022-03-15 PROCEDURE — 91305 COVID-19,PF,PFIZER (12+ YRS): CPT | Performed by: PEDIATRICS

## 2022-03-15 PROCEDURE — 85027 COMPLETE CBC AUTOMATED: CPT | Performed by: PEDIATRICS

## 2022-03-15 PROCEDURE — 36415 COLL VENOUS BLD VENIPUNCTURE: CPT | Performed by: PEDIATRICS

## 2022-03-15 PROCEDURE — 99214 OFFICE O/P EST MOD 30 MIN: CPT | Mod: 25 | Performed by: PEDIATRICS

## 2022-03-15 PROCEDURE — 80061 LIPID PANEL: CPT | Performed by: PEDIATRICS

## 2022-03-15 PROCEDURE — 99396 PREV VISIT EST AGE 40-64: CPT | Performed by: PEDIATRICS

## 2022-03-15 PROCEDURE — 84443 ASSAY THYROID STIM HORMONE: CPT | Performed by: PEDIATRICS

## 2022-03-15 RX ORDER — CITALOPRAM HYDROBROMIDE 40 MG/1
40 TABLET ORAL DAILY
Qty: 90 TABLET | Refills: 11 | Status: SHIPPED | OUTPATIENT
Start: 2022-03-15 | End: 2023-04-18

## 2022-03-15 RX ORDER — BUPROPION HYDROCHLORIDE 150 MG/1
150 TABLET ORAL EVERY MORNING
Qty: 90 TABLET | Refills: 3 | Status: SHIPPED | OUTPATIENT
Start: 2022-03-15 | End: 2023-04-07

## 2022-03-15 SDOH — ECONOMIC STABILITY: FOOD INSECURITY: WITHIN THE PAST 12 MONTHS, YOU WORRIED THAT YOUR FOOD WOULD RUN OUT BEFORE YOU GOT MONEY TO BUY MORE.: NEVER TRUE

## 2022-03-15 SDOH — HEALTH STABILITY: PHYSICAL HEALTH: ON AVERAGE, HOW MANY DAYS PER WEEK DO YOU ENGAGE IN MODERATE TO STRENUOUS EXERCISE (LIKE A BRISK WALK)?: 5 DAYS

## 2022-03-15 SDOH — ECONOMIC STABILITY: INCOME INSECURITY: IN THE LAST 12 MONTHS, WAS THERE A TIME WHEN YOU WERE NOT ABLE TO PAY THE MORTGAGE OR RENT ON TIME?: NO

## 2022-03-15 SDOH — ECONOMIC STABILITY: FOOD INSECURITY: WITHIN THE PAST 12 MONTHS, THE FOOD YOU BOUGHT JUST DIDN'T LAST AND YOU DIDN'T HAVE MONEY TO GET MORE.: NEVER TRUE

## 2022-03-15 SDOH — HEALTH STABILITY: PHYSICAL HEALTH: ON AVERAGE, HOW MANY MINUTES DO YOU ENGAGE IN EXERCISE AT THIS LEVEL?: 30 MIN

## 2022-03-15 SDOH — ECONOMIC STABILITY: INCOME INSECURITY: HOW HARD IS IT FOR YOU TO PAY FOR THE VERY BASICS LIKE FOOD, HOUSING, MEDICAL CARE, AND HEATING?: NOT HARD AT ALL

## 2022-03-15 ASSESSMENT — ENCOUNTER SYMPTOMS
PALPITATIONS: 0
DIARRHEA: 0
DIZZINESS: 0
HEARTBURN: 0
FREQUENCY: 0
CHILLS: 0
SHORTNESS OF BREATH: 0
NERVOUS/ANXIOUS: 1
EYE PAIN: 0
JOINT SWELLING: 0
ARTHRALGIAS: 0
PARESTHESIAS: 0
FEVER: 0
ABDOMINAL PAIN: 0
DYSURIA: 0
SORE THROAT: 0
WEAKNESS: 0
NAUSEA: 0
HEMATURIA: 0
HEADACHES: 0
COUGH: 0
HEMATOCHEZIA: 0
MYALGIAS: 1
CONSTIPATION: 0

## 2022-03-15 ASSESSMENT — SOCIAL DETERMINANTS OF HEALTH (SDOH)
HOW OFTEN DO YOU ATTEND CHURCH OR RELIGIOUS SERVICES?: 1 TO 4 TIMES PER YEAR
IN A TYPICAL WEEK, HOW MANY TIMES DO YOU TALK ON THE PHONE WITH FAMILY, FRIENDS, OR NEIGHBORS?: ONCE A WEEK
DO YOU BELONG TO ANY CLUBS OR ORGANIZATIONS SUCH AS CHURCH GROUPS UNIONS, FRATERNAL OR ATHLETIC GROUPS, OR SCHOOL GROUPS?: NO
HOW OFTEN DO YOU GET TOGETHER WITH FRIENDS OR RELATIVES?: ONCE A WEEK

## 2022-03-15 ASSESSMENT — LIFESTYLE VARIABLES
HOW OFTEN DO YOU HAVE SIX OR MORE DRINKS ON ONE OCCASION: LESS THAN MONTHLY
HOW MANY STANDARD DRINKS CONTAINING ALCOHOL DO YOU HAVE ON A TYPICAL DAY: 1 OR 2
HOW OFTEN DO YOU HAVE A DRINK CONTAINING ALCOHOL: MONTHLY OR LESS

## 2022-03-15 ASSESSMENT — ASTHMA QUESTIONNAIRES: ACT_TOTALSCORE: 20

## 2022-03-15 NOTE — PROGRESS NOTES
SUBJECTIVE:   CC: Racquel Amin is an 56 year old woman who presents for preventive health visit.   Patient has been advised of split billing requirements and indicates understanding: Yes     Healthy Habits:     Getting at least 3 servings of Calcium per day:  Yes    Bi-annual eye exam:  Yes    Dental care twice a year:  NO    Sleep apnea or symptoms of sleep apnea:  Sleep apnea    Diet:  Regular (no restrictions)    Frequency of exercise:  4-5 days/week    Duration of exercise:  15-30 minutes    Taking medications regularly:  Yes    Medication side effects:  None    PHQ-2 Total Score: 2    Additional concerns today:  No      Today's PHQ-2 Score:   PHQ-2 ( 1999 Pfizer) 3/15/2022   Q1: Little interest or pleasure in doing things 1   Q2: Feeling down, depressed or hopeless 1   PHQ-2 Score 2   PHQ-2 Total Score (12-17 Years)- Positive if 3 or more points; Administer PHQ-A if positive -   Q1: Little interest or pleasure in doing things Several days   Q2: Feeling down, depressed or hopeless Several days   PHQ-2 Score 2       Abuse: Current or Past (Physical, Sexual or Emotional) - No  Do you feel safe in your environment? Yes    Have you ever done Advance Care Planning? (For example, a Health Directive, POLST, or a discussion with a medical provider or your loved ones about your wishes): No, advance care planning information given to patient to review.  Patient plans to discuss their wishes with loved ones or provider.      Social History     Tobacco Use     Smoking status: Never Smoker     Smokeless tobacco: Never Used   Substance Use Topics     Alcohol use: Yes     Comment: 1 drink per week or less         Alcohol Use 3/15/2022   Prescreen: >3 drinks/day or >7 drinks/week? Not Applicable   Prescreen: >3 drinks/day or >7 drinks/week? -       Reviewed orders with patient.  Reviewed health maintenance and updated orders accordingly - Yes  Lab work is in process    Breast Cancer Screening:  Any new diagnosis of  family breast, ovarian, or bowel cancer? No    FHS-7:   Breast CA Risk Assessment (FHS-7) 3/15/2022   Did any of your first-degree relatives have breast or ovarian cancer? Yes   Did any of your relatives have bilateral breast cancer? Unknown   Did any man in your family have breast cancer? No   Did any woman in your family have breast and ovarian cancer? Yes   Did any woman in your family have breast cancer before age 50 y? No   Do you have 2 or more relatives with breast and/or ovarian cancer? Yes   Do you have 2 or more relatives with breast and/or bowel cancer? Yes       Mammogram Screening: Recommended annual mammography  Pertinent mammograms are reviewed under the imaging tab.    History of abnormal Pap smear: NO - age 30-65 PAP every 5 years with negative HPV co-testing recommended  PAP / HPV Latest Ref Rng & Units 2/1/2018 7/7/2014   PAP (Historical) - NIL NIL   HPV16 NEG:Negative Negative -   HPV18 NEG:Negative Negative -   HRHPV NEG:Negative Negative -     Reviewed and updated as needed this visit by clinical staff   Tobacco  Allergies  Meds   Med Hx  Surg Hx  Fam Hx  Soc Hx        Reviewed and updated as needed this visit by Provider                   Jim the dog - madhav garsia - has enjoyed having a dog very much    Fall on ice - left elbow - end of the year last year - left elbow ROM is normal now without pain    Eye exam - 11/2021 - reports normal exam    HTN - has been in the normal range.  No new cardiac symptoms    Type 2 diabetes - tolerating metformin well.  Working on healthy habits    CKD - due for lab work    Hx of pE - while on hormone therapy historically - no new symptoms    Depression - has had a very stressful year - got , aunt just passed away, now caring for her parents full time in their home.  Wondering about what else she can add to help with mood.  Feeling slightly down, no SI.    Asthma - well controlled    Referral - dermatology - would like to have full skin check -  "high risk skin type    Mammogram - scheduled later this month      Review of Systems   Constitutional: Negative for chills and fever.   HENT: Negative for congestion, ear pain, hearing loss and sore throat.    Eyes: Negative for pain and visual disturbance.   Respiratory: Negative for cough and shortness of breath.    Cardiovascular: Negative for chest pain, palpitations and peripheral edema.   Gastrointestinal: Negative for abdominal pain, constipation, diarrhea, heartburn, hematochezia and nausea.   Breasts:  Negative for tenderness and discharge.   Genitourinary: Negative for dysuria, frequency, genital sores, hematuria, pelvic pain, urgency, vaginal bleeding and vaginal discharge.   Musculoskeletal: Positive for myalgias. Negative for arthralgias and joint swelling.   Skin: Negative for rash.   Neurological: Negative for dizziness, weakness, headaches and paresthesias.   Psychiatric/Behavioral: Negative for mood changes. The patient is nervous/anxious.           OBJECTIVE:   /78 (BP Location: Right arm, Patient Position: Sitting, Cuff Size: Adult Large)   Pulse 91   Temp 97.6  F (36.4  C) (Tympanic)   Resp 12   Ht 1.64 m (5' 4.57\")   Wt 115.6 kg (254 lb 14.4 oz)   LMP 09/30/2019   SpO2 98%   BMI 42.99 kg/m     Wt Readings from Last 4 Encounters:   03/15/22 115.6 kg (254 lb 14.4 oz)   12/26/21 116.6 kg (257 lb)   02/11/21 120.2 kg (265 lb)   02/08/21 118.8 kg (262 lb)       Physical Exam  GENERAL: healthy, alert and no distress  EYES: Eyes grossly normal to inspection, PERRL and conjunctivae and sclerae normal  HENT: ear canals and TM's normal, nose and mouth without ulcers or lesions  NECK: no adenopathy, no asymmetry, masses, or scars and thyroid normal to palpation  RESP: lungs clear to auscultation - no rales, rhonchi or wheezes  BREAST: normal without masses, tenderness or nipple discharge and no palpable axillary masses or adenopathy  CV: regular rate and rhythm, normal S1 S2, no S3 or S4, no " murmur, click or rub, no peripheral edema and peripheral pulses strong  ABDOMEN: soft, nontender, no hepatosplenomegaly, no masses and bowel sounds normal  MS: no gross musculoskeletal defects noted, no edema  SKIN: fair skin, scattered pigmented nevi and freckles  NEURO: Normal strength and tone, mentation intact and speech normal  PSYCH: mentation appears normal, affect normal/bright    Diagnostic Test Results:  pending    ASSESSMENT/PLAN:       ICD-10-CM    1. Routine general medical examination at a health care facility  Z00.00 Hemoglobin A1c     Albumin Random Urine Quantitative with Creat Ratio     Comprehensive metabolic panel     Lipid panel reflex to direct LDL Non-fasting     TSH with free T4 reflex     FOOT EXAM     CBC with platelets     COLOGUARD([employer]ACT SCI[employer]ES)   2. Type 2 diabetes mellitus without complication, without long-term current use of insulin (H)  E11.9 Hemoglobin A1c     Albumin Random Urine Quantitative with Creat Ratio     Comprehensive metabolic panel     Lipid panel reflex to direct LDL Non-fasting     TSH with free T4 reflex     FOOT EXAM    Repeat labs today - adjust medication as necessary.  Encouraged in healthy habits.     3. Mild intermittent asthma, unspecified whether complicated  J45.20 Well controlled, continue current medications     4. Stage 3 chronic kidney disease, unspecified whether stage 3a or 3b CKD (H)  N18.30 Comprehensive metabolic panel     CBC with platelets  Repeat labs today     5. History of pulmonary embolus (PE)  Z86.711 No evidence of recurrence, follow     6. Benign essential hypertension  I10 Hemoglobin A1c     Albumin Random Urine Quantitative with Creat Ratio     Comprehensive metabolic panel     Lipid panel reflex to direct LDL Non-fasting  Well controlled, continue current medications     7. Episode of recurrent major depressive disorder, unspecified depression episode severity (H)  F33.9 See below   8. Family history of malignant neoplasm of breast   "Z80.3 Patient has mammogram scheduled later this month   9. Recurrent major depressive disorder, remission status unspecified (H)  F33.9 citalopram (CELEXA) 40 MG tablet     buPROPion (WELLBUTRIN XL) 150 MG 24 hr tablet    Not optimally controlled with increased caregiving stress and recent divorce.  Plan to continue citalopram and add wellbutrin.  Patient to send me update in 4-6 weeks - sooner with any worsening     10. Anxiety  F41.9 citalopram (CELEXA) 40 MG tablet  See above     11. Multiple pigmented nevi  D22.9 Adult Dermatology Referral  Recommend full skin check     12. Morbid obesity, unspecified obesity type (H)  E66.01 Hemoglobin A1c     Albumin Random Urine Quantitative with Creat Ratio     Comprehensive metabolic panel     Lipid panel reflex to direct LDL Non-fasting     TSH with free T4 reflex     metFORMIN (GLUCOPHAGE) 500 MG tablet  Encouraged in healthy habits     13. Colon cancer screening  Z12.11 CHING([employer])           COUNSELING:  Special attention given to:        Regular exercise       Healthy diet/nutrition       Vision screening       Colorectal Cancer Screening    Estimated body mass index is 42.99 kg/m  as calculated from the following:    Height as of this encounter: 1.64 m (5' 4.57\").    Weight as of this encounter: 115.6 kg (254 lb 14.4 oz).    Weight management plan: Discussed healthy diet and exercise guidelines    She reports that she has never smoked. She has never used smokeless tobacco.      Counseling Resources:  ATP IV Guidelines  Pooled Cohorts Equation Calculator  Breast Cancer Risk Calculator  BRCA-Related Cancer Risk Assessment: FHS-7 Tool  FRAX Risk Assessment  ICSI Preventive Guidelines  Dietary Guidelines for Americans, 2010  USDA's MyPlate  ASA Prophylaxis  Lung CA Screening    Nancy Small MD  [facility]  "

## 2022-03-15 NOTE — PATIENT INSTRUCTIONS
Keep up your walking!    Labs today    Keep on citalopram and add wellbutrin in the morning.  Please send me an update in 4-6 weeks with how this is going!    Call for visit with dermatology    Cologuard test for colon cancer - I'll mail it to you    Thanks for scheduling your mammogram!

## 2022-03-16 LAB
ALBUMIN SERPL-MCNC: 3.6 G/DL (ref 3.4–5)
ALP SERPL-CCNC: 108 U/L (ref 40–150)
ALT SERPL W P-5'-P-CCNC: 23 U/L (ref 0–50)
ANION GAP SERPL CALCULATED.3IONS-SCNC: 11 MMOL/L (ref 3–14)
AST SERPL W P-5'-P-CCNC: 22 U/L (ref 0–45)
BILIRUB SERPL-MCNC: 0.6 MG/DL (ref 0.2–1.3)
BUN SERPL-MCNC: 14 MG/DL (ref 7–30)
CALCIUM SERPL-MCNC: 9.5 MG/DL (ref 8.5–10.1)
CHLORIDE BLD-SCNC: 107 MMOL/L (ref 94–109)
CHOLEST SERPL-MCNC: 153 MG/DL
CO2 SERPL-SCNC: 22 MMOL/L (ref 20–32)
CREAT SERPL-MCNC: 1.08 MG/DL (ref 0.52–1.04)
CREAT UR-MCNC: 251 MG/DL
FASTING STATUS PATIENT QL REPORTED: YES
GFR SERPL CREATININE-BSD FRML MDRD: 61 ML/MIN/1.73M2
GLUCOSE BLD-MCNC: 108 MG/DL (ref 70–99)
HDLC SERPL-MCNC: 52 MG/DL
LDLC SERPL CALC-MCNC: 82 MG/DL
MICROALBUMIN UR-MCNC: 15 MG/L
MICROALBUMIN/CREAT UR: 5.98 MG/G CR (ref 0–25)
NONHDLC SERPL-MCNC: 101 MG/DL
POTASSIUM BLD-SCNC: 4 MMOL/L (ref 3.4–5.3)
PROT SERPL-MCNC: 7.6 G/DL (ref 6.8–8.8)
SODIUM SERPL-SCNC: 140 MMOL/L (ref 133–144)
TRIGL SERPL-MCNC: 97 MG/DL
TSH SERPL DL<=0.005 MIU/L-ACNC: 2.46 MU/L (ref 0.4–4)

## 2022-03-17 ENCOUNTER — HOSPITAL ENCOUNTER (OUTPATIENT)
Dept: MAMMOGRAPHY | Facility: CLINIC | Age: 57
Discharge: HOME | End: 2022-03-17
Attending: PEDIATRICS | Admitting: PEDIATRICS
Payer: COMMERCIAL

## 2022-03-17 DIAGNOSIS — Z12.31 BREAST CANCER SCREENING BY MAMMOGRAM: ICD-10-CM

## 2022-03-17 PROCEDURE — 77067 SCR MAMMO BI INCL CAD: CPT

## 2022-03-28 LAB — COLOGUARD-ABSTRACT: NEGATIVE

## 2022-05-17 DIAGNOSIS — F33.9 RECURRENT MAJOR DEPRESSIVE DISORDER, REMISSION STATUS UNSPECIFIED (H): ICD-10-CM

## 2022-05-19 RX ORDER — BUPROPION HYDROCHLORIDE 150 MG/1
TABLET ORAL
Qty: 30 TABLET | Refills: 11 | OUTPATIENT
Start: 2022-05-19

## 2022-06-23 ENCOUNTER — OFFICE VISIT (OUTPATIENT)
Dept: PODIATRY | Facility: CLINIC | Age: 57
End: 2022-06-23
Payer: COMMERCIAL

## 2022-06-23 VITALS
SYSTOLIC BLOOD PRESSURE: 130 MMHG | DIASTOLIC BLOOD PRESSURE: 74 MMHG | BODY MASS INDEX: 41.65 KG/M2 | HEIGHT: 65 IN | WEIGHT: 250 LBS

## 2022-06-23 DIAGNOSIS — M19.071 ARTHRITIS OF RIGHT FOOT: Primary | ICD-10-CM

## 2022-06-23 PROCEDURE — 99213 OFFICE O/P EST LOW 20 MIN: CPT | Performed by: PODIATRIST

## 2022-06-23 RX ORDER — MELOXICAM 7.5 MG/1
7.5 TABLET ORAL DAILY
Qty: 14 TABLET | Refills: 0 | Status: SHIPPED | OUTPATIENT
Start: 2022-06-23 | End: 2023-03-15

## 2022-06-23 NOTE — PATIENT INSTRUCTIONS
Thank you for choosing [facility] / Foot & Ankle Surgery!    DR DONALD'S CLINIC:  [facility]   [address]   [place] [address]      TRIAGE LINE: [phone]  APPOINTMENTS: [phone]  RADIOLOGY: [phone]  SET UP SURGERY: [phone]  BILLING QUESTIONS: [phone]  FAX: [phone]         PRICE THERAPY  Many aches and pains throughout the foot and ankle can be helped with many simple treatments. This is usually described as PRICE Therapy.      P - Protection - often times, inflammation/pain in the lower extremity is not able to improve simply because the areas involved are never allowed to rest. Every step we take can bother the problematic area. Protecting those areas is an important step in the healing process. This may involve a walking cast boot, a special insert/orthotic device, an ankle brace, or simply avoiding barefoot walking.    R - Rest - in addition to protecting the foot/ankle, resting is an important, but often times difficult, treatment option. Getting off your feet when they bother you, and specifically avoiding activities that cause pain/discomfort, are very beneficial to prevent, and treat, foot/ankle pain.      I - Ice - icing regularly can help to decrease inflammation and swelling in the foot, thus decreasing pain. Using an ice pack or a bag of frozen veggies works very well. Ice for 20 minutes multiple times per day as needed.  Do not place the ice directly on the skin as this can cause tissue damage.    C - Compression - using a compression wrap or an ACE wrap can help to decrease swelling, which can help to decrease pain. Wearing the wraps is generally not needed at night, but they should be worn on a regular basis when you are going to be on your feet for prolonged periods as gravity tends to pull fluids down to your feet/ankles.    E - Elevation - elevating your lower extremities multiple times daily for 15-20 minutes can help  to decrease swelling, which works well in decreasing pain levels.    NSAID/Tylenol - Anti-inflammatories like Aleve or ibuprofen, and/or a pain medication, such as Tylenol, can help to improve pain levels and get the issue resolved sooner rather than later. Anyone with liver issues should be careful with Tylenol, and anyone with high blood pressure or heart, stomach or kidney issues should be careful with anti-inflammatories. Please ask if you have questions about these medications, including dosage.    OVER THE COUNTER INSERTS    Most of these can be found at your local [employer], [employer], or online:    [employer]obongo   Sofsole Fit ip.access   Power Step   Walk-Fit ([employer])  *For heel pain* Arch Cradles  *For heel pain*       ** A good high quality over the counter insert should cost around $40-$50    ** Capsulitis/Metarasalgia - try adding a metatarsal pad on your inserts or find an insert with one built in      OSTEOARTHRITIS OF THE FOOT & ANKLE  Osteoarthritis is a condition characterized by the breakdown and eventual loss of cartilage in one or more joints. Cartilage (the connective tissue found at the end of the bones in the joints) protects and cushions the bones during movement. When cartilage deteriorates or is lost, symptoms develop that can restrict one s ability to easily perform daily activities.  Osteoarthritis is also known as degenerative arthritis, reflecting its nature to develop as part of the aging process. As the most common form of arthritis, osteoarthritis affects millions of Americans. Some people refer to osteoarthritis simply as arthritis, even though there are many different types of arthritis.  Osteoarthritis appears at various joints throughout the body, including the hands, feet, spine, hips, and knees. In the foot, the disease most frequently occurs in the big toe, although it is also often found in the midfoot and ankle.  CAUSES  Osteoarthritis is  considered a  wear and tear  disease because the cartilage in the joint wears down with repeated stress and use over time. As the cartilage deteriorates and gets thinner, the bones lose their protective covering and eventually may rub together, causing pain and inflammation of the joint.  An injury may also lead to osteoarthritis, although it may take months or years after the injury for the condition to develop. For example, osteoarthritis in the big toe is often caused by kicking or jamming the toe, or by dropping something on the toe. Osteoarthritis in the midfoot is often caused by dropping something on it, or by a sprain or fracture. In the ankle, osteoarthritis is usually caused by a fracture and occasionally by a severe sprain.  Sometimes osteoarthritis develops as a result of abnormal foot mechanics such as flat feet or high arches. A flat foot causes less stability in the ligaments (bands of tissue that connect bones), resulting in excessive strain on the joints, which can cause arthritis. A high arch is rigid and lacks mobility, causing a jamming of joints that creates an increased risk of arthritis.  SYMPTOMS  People with osteoarthritis in the foot or ankle experience, in varying degrees, one or more of the following: Pain and stiffness in the joint, swelling in or near the joint, or difficulty walking or bending the joint.   Some patients with osteoarthritis also develop a bone spur (a bony protrusion) at the affected joint. Shoe pressure may cause pain at the site of a bone spur, and in some cases blisters or calluses may form over its surface. Bone spurs can also limit the movement of the joint.    DIAGNOSIS  In diagnosing osteoarthritis, the foot and ankle surgeon will examine the foot thoroughly, looking for swelling in the joint, limited mobility, and pain with movement. In some cases, deformity and/or enlargement (spur) of the joint may be noted. X-rays may be ordered to evaluate the extent of the  disease.  NON-SURGICAL TREATMENT  To help relieve symptoms, the surgeon may begin treating osteoarthritis with one or more of the following non-surgical approaches:  Oral medications. Nonsteroidal anti-inflammatory drugs (NSAIDs), such as ibuprofen, are often helpful in reducing the inflammation and pain. Occasionally a prescription for a steroid medication is needed to adequately reduce symptoms.   Orthotic devices. Custom orthotic devices (shoe inserts) are often prescribed to provide support to improve the foot s mechanics or cushioning to help minimize pain.   Bracing. Bracing, which restricts motion and supports the joint, can reduce pain during walking and help prevent further deformity.   Immobilization. Protecting the foot from movement by wearing a cast or removable cast-boot may be necessary to allow the inflammation to resolve.   Steroid injections. In some cases, steroid injections are applied to the affected joint to deliver anti-inflammatory medication.   Physical therapy. Exercises to strengthen the muscles, especially when the osteoarthritis occurs in the ankle, may give the patient greater stability and help avoid injury that might worsen the condition.   DO I NEED SURGERY?  When osteoarthritis has progressed substantially or failed to improve with non-surgical treatment, surgery may be recommended. In advanced cases, surgery may be the only option. The goal of surgery is to decrease pain and improve function. The foot and ankle surgeon will consider a number of factors when selecting the procedure best suited to the patient s condition and lifestyle.

## 2022-06-23 NOTE — PROGRESS NOTES
"Foot & Ankle Surgery   June 23, 2022    S:  Pt is seen today for evaluation of recent onset of dorsal right midfoot pain without history of injury.  Minimal redness or swelling.  I did a peroneal tendon repair on her left ankle in 2020..    Vitals:    06/23/22 1005   BP: 130/74   Weight: 113.4 kg (250 lb)   Height: 1.638 m (5' 4.5\")   '      ROS - Pos for CC.  Patient denies current nausea, vomiting, chills, fevers, belly pain, calf pain, chest pain or SOB.  Complete remainder of ROS it otherwise neg.      PE:  Gen:   No apparent distress  Eye:    Visual scanning without deficit  Ear:    Response to auditory stimuli wnl  Lung:    Non-labored breathing on RA noted  Abd:    NTND per patient report  Lymph:    Neg for pitting/non-pitting edema BLE  Vasc:    Pulses palpable, CFT minimally delayed  Neuro:    Light touch sensation intact to all sensory nerve distributions without paresthesias  Derm:    Neg for nodules, lesions or ulcerations  MSK:    Pain and mild dorsal spurring along the Lisfranc joint complex of the right foot.  Specifically, she is tender on the second tarsometatarsal joint  Calf:    Neg for redness, swelling or tenderness      Imaging: 3 views weightbearing right foot -irregularity noted at the posterior aspect of the talus on the lateral view.  Otherwise no acute pathology noted.      Assessment:  57 year old female with right midfoot pain consistent with arthritis at the second tarsometatarsal joint      Plan:  Discussed etiologies, anatomy and options  1.  Right midfoot pain consistent with arthritis at the second tarsometatarsal joint  -I personally reviewed and interpreted the x-ray results  -Comfortable shoes and minimize shoeless walking  -OTC insert for arch support and stress relief on joints  -RICE/NSAID versus Tylenol as needed based on pain  -Our arthritis handout was dispensed for patient information  -Prescription for Mobic 7.5 mg daily x14 days  -Consider Voltaren prescription, although " we discussed that this can be purchased OTC  -We will consider image guided steroid injection into the second tarsometatarsal joint if the above the above treatment fails to provide sufficient relief  -We discussed that surgical fusion would be the end-stage therapy should conservative treatments fail to provide sufficient relief    Follow up: As needed or sooner with acute issues           David Chacon DPM FACFAS FACFAOM  Podiatric Foot & Ankle Surgeon  [facility]  [phone]    Disclaimer: This note consists of symbols derived from keyboarding, dictation and/or voice recognition software. As a result, there may be errors in the script that have gone undetected. Please consider this when interpreting information found in this chart.

## 2022-06-25 ENCOUNTER — HEALTH MAINTENANCE LETTER (OUTPATIENT)
Age: 57
End: 2022-06-25

## 2022-11-20 ENCOUNTER — HEALTH MAINTENANCE LETTER (OUTPATIENT)
Age: 57
End: 2022-11-20

## 2022-12-09 NOTE — PROGRESS NOTES
Subjective:   Racquel Nunn is a 53 year old female who presents for   Chief Complaint   Patient presents with     Urgent Care     Back Pain     R side leg pain and back pain X4 days    Did not injure her back while at work. Was out running errands when this suddenly came on  Similar episode 2 years ago. Pain starts in the right lower back and goes down right leg on the side. She has done physical therapy in the past in addition to seeing a chiropractor for her discomfort. Pain is rated at 7/10 -- tylenol taken so far which helps a little.   Muscle relaxers and steroids have helped her in the past.   Patient works at a grocery store and lifts boxes.   She denies any weakness of her lower extremities. No bowel/bladder difficulties.       Patient Active Problem List    Diagnosis Date Noted     Mild intermittent asthma, unspecified whether complicated 01/14/2019     Priority: Medium     Benign essential hypertension 01/14/2019     Priority: Medium     ERICA (obstructive sleep apnea) 07/17/2018     Priority: Medium     Morbid obesity, unspecified obesity type (H) 10/24/2016     Priority: Medium     Tendinopathy of right rotator cuff 02/15/2015     Priority: Medium     Anxiety 07/07/2014     Priority: Medium     Major depression in complete remission (H) 01/08/2013     Priority: Medium     CKD (chronic kidney disease) stage 3, GFR 30-59 ml/min (H) 06/21/2011     Priority: Medium     CARDIOVASCULAR SCREENING; LDL GOAL LESS THAN 160 02/10/2010     Priority: Medium     HTN (Hypertension) BP goal <140/90 02/03/2010     Priority: Medium     Routine medical exam 02/03/2010     Priority: Medium     Family history of malignant neoplasm of breast 02/11/2005     Priority: Medium     Aunt with breast cancer          Obesity 01/28/2005     Priority: Medium     Problem list name updated by automated process. Provider to review         Current Outpatient Medications   Medication     albuterol (PROAIR HFA/PROVENTIL HFA/VENTOLIN  HFA) 108 (90 Base) MCG/ACT inhaler     citalopram (CELEXA) 20 MG tablet     cyclobenzaprine (FLEXERIL) 10 MG tablet     lisinopril (PRINIVIL/ZESTRIL) 40 MG tablet     norgestim-eth estrad triphasic (TRI-ESTARYLLA) 0.18/0.215/0.25 MG-35 MCG tablet     predniSONE (DELTASONE) 20 MG tablet     glucose blood VI test strips strip     MULTI-VITAMIN OR TABS     No current facility-administered medications for this visit.        ROS:  As above per HPI    Objective:   /78 (BP Location: Right arm, Patient Position: Sitting, Cuff Size: Adult Large)   Pulse 80   Temp 98.2  F (36.8  C)   Resp 18   Wt 112.5 kg (248 lb)   SpO2 98%   BMI 41.59 kg/m  , Body mass index is 41.59 kg/m .  Gen:  NAD, well-nourished, sitting in chair comfortably  HEENT: EOMI, sclera anicteric, Head normocephalic, ; nares patent; moist mucous membranes  Neck: trachea midline, no thyromegaly  CV:  Hemodynamically stable  Pulm:  no increased work of breathing , CTAB, no wheezes/rales/rhonchi   Back: good ROM in extension/flexion. Negative straight leg test bilaterally  Neuro: equivocal patellar reflexes bilaterally  MSK: 5/5 strength bilaterally in hip flexion/abduction/adduction and leg flexion/extension  Gait: good pace, stable  Extrem: no cyanosis, edema or clubbing  Skin: no obvious rashes or abnormalities  Psych: Euthymic, linear thoughts, normal rate of speech      Assessment & Plan:   Racquel Nunn, 53 year old female who presents with:  Acute right-sided low back pain with right-sided sciatica  Minor symptoms of sciatica. No weakness of leg muscles.   - predniSONE (DELTASONE) 20 MG tablet  Dispense: 12 tablet; Refill: 0  - cyclobenzaprine (FLEXERIL) 10 MG tablet  Dispense: 21 tablet; Refill: 1    AVS instructions:   Prednisone 40mg a day for 4 days then 20mg a day for the next 4 days    Flexeril as needed every 8 hours for muscle tightness/cramps    Apply heat to the area intermittently to help loosen up muscles    Try to stay up and  active - do not stay sedentary or laying  -- avoid heavy lifting, squatting, over head lifting until the pain gets better    Call in a week if symptoms not improved, we can put in an order for physical therapy    Naproxen 220-440mg every 12 hours as needed  -- tylenol every 4-6 hours as needed for additional pain relief    Obi Murcia MD   [facility]    The use of Dragon/Comsenz dictation services may have been used to construct the content in this note; any grammatical or spelling errors are non-intentional. Please contact the author of this note directly if you are in need of any clarification.    Thalidomide Counseling: I discussed with the patient the risks of thalidomide including but not limited to birth defects, anxiety, weakness, chest pain, dizziness, cough and severe allergy.

## 2023-02-16 ENCOUNTER — MYC MEDICAL ADVICE (OUTPATIENT)
Dept: PEDIATRICS | Facility: CLINIC | Age: 58
End: 2023-02-16
Payer: COMMERCIAL

## 2023-03-09 ENCOUNTER — OFFICE VISIT (OUTPATIENT)
Dept: PEDIATRICS | Facility: CLINIC | Age: 58
End: 2023-03-09
Payer: COMMERCIAL

## 2023-03-09 VITALS
SYSTOLIC BLOOD PRESSURE: 120 MMHG | TEMPERATURE: 97.6 F | RESPIRATION RATE: 16 BRPM | BODY MASS INDEX: 40.65 KG/M2 | WEIGHT: 244 LBS | OXYGEN SATURATION: 98 % | HEIGHT: 65 IN | HEART RATE: 71 BPM | DIASTOLIC BLOOD PRESSURE: 84 MMHG

## 2023-03-09 DIAGNOSIS — R13.10 DYSPHAGIA, UNSPECIFIED TYPE: Primary | ICD-10-CM

## 2023-03-09 DIAGNOSIS — E66.01 MORBID OBESITY, UNSPECIFIED OBESITY TYPE (H): ICD-10-CM

## 2023-03-09 PROCEDURE — 99213 OFFICE O/P EST LOW 20 MIN: CPT | Performed by: PHYSICIAN ASSISTANT

## 2023-03-09 ASSESSMENT — ASTHMA QUESTIONNAIRES
QUESTION_3 LAST FOUR WEEKS HOW OFTEN DID YOUR ASTHMA SYMPTOMS (WHEEZING, COUGHING, SHORTNESS OF BREATH, CHEST TIGHTNESS OR PAIN) WAKE YOU UP AT NIGHT OR EARLIER THAN USUAL IN THE MORNING: NOT AT ALL
ACT_TOTALSCORE: 24
ACT_TOTALSCORE: 24
QUESTION_4 LAST FOUR WEEKS HOW OFTEN HAVE YOU USED YOUR RESCUE INHALER OR NEBULIZER MEDICATION (SUCH AS ALBUTEROL): NOT AT ALL
QUESTION_5 LAST FOUR WEEKS HOW WOULD YOU RATE YOUR ASTHMA CONTROL: COMPLETELY CONTROLLED
QUESTION_1 LAST FOUR WEEKS HOW MUCH OF THE TIME DID YOUR ASTHMA KEEP YOU FROM GETTING AS MUCH DONE AT WORK, SCHOOL OR AT HOME: NONE OF THE TIME
QUESTION_2 LAST FOUR WEEKS HOW OFTEN HAVE YOU HAD SHORTNESS OF BREATH: ONCE OR TWICE A WEEK

## 2023-03-09 ASSESSMENT — PAIN SCALES - GENERAL: PAINLEVEL: NO PAIN (0)

## 2023-03-09 ASSESSMENT — PATIENT HEALTH QUESTIONNAIRE - PHQ9
10. IF YOU CHECKED OFF ANY PROBLEMS, HOW DIFFICULT HAVE THESE PROBLEMS MADE IT FOR YOU TO DO YOUR WORK, TAKE CARE OF THINGS AT HOME, OR GET ALONG WITH OTHER PEOPLE: NOT DIFFICULT AT ALL
SUM OF ALL RESPONSES TO PHQ QUESTIONS 1-9: 5
SUM OF ALL RESPONSES TO PHQ QUESTIONS 1-9: 5

## 2023-03-09 NOTE — PROGRESS NOTES
"  Assessment & Plan      Diagnosis Comments   1. Dysphagia, unspecified type  Adult GI  Referral - Procedure Only   EDG ordered for evaluation. Sxs are worsening and becoming more frequent. Reports mom had a similar condition when she got older. Not associated with any cardiac sxs. Relived by vomiting, does not have any overt GERD sxs.     She will eat small meals, avoid foods that may be irritating, sleep with head of bed elevated.   Can do a trial of Pepcid at night. Worsening sxs to follow up   The patient indicates understanding of these issues and agrees with the plan.    She has annual exam schedule with pcp in few weeks, will hold on doing labs until then.       2. Morbid obesity, unspecified obesity type (H)                         BMI:   Estimated body mass index is 41.24 kg/m  as calculated from the following:    Height as of this encounter: 1.638 m (5' 4.5\").    Weight as of this encounter: 110.7 kg (244 lb).           Return in 6 weeks (on 4/18/2023).    REYNALDO Simons [facility] SOFÍA Tai is a 57 year old, presenting for the following health issues:  Throat Spasm       History of Present Illness       Reason for visit:  Throat issues  Symptom onset:  More than a month  Symptoms include:  Spasam in throat  Symptom intensity:  Moderate  Symptom progression:  Worsening  Had these symptoms before:  No  What makes it worse:  No  What makes it better:  No    She eats 2-3 servings of fruits and vegetables daily.She consumes 2 sweetened beverage(s) daily.She exercises with enough effort to increase her heart rate 9 or less minutes per day.  She exercises with enough effort to increase her heart rate 3 or less days per week. She is missing 1 dose(s) of medications per week.  She is not taking prescribed medications regularly due to remembering to take.    Today's PHQ-9         PHQ-9 Total Score: 5    PHQ-9 Q9 Thoughts of better off dead/self-harm past 2 weeks :   Not " "at all    How difficult have these problems made it for you to do your work, take care of things at home, or get along with other people: Not difficult at all       Concern - When eating, throat wont let her swallow and it comes back up   Onset: 3-4 months   Description: Patient feels like when shes eating, she has throat spasms and feels like her throat is \"rejecting\" food. Mom has same symptoms.   Intensity: moderate  Progression of Symptoms:  worsening  Accompanying Signs & Symptoms: NA   Previous history of similar problem: Not to patient - but patients mother   Precipitating factors:        Worsened by: Laying in bed   Alleviating factors:        Improved by: Sitting up, throwing up   Therapies tried and outcome:  none         Current concern:  When she starts eating she is having throat closing up, hard to swallow sensation. No pain, just feels it is tightening. Sometimes works through it, breathes slow. Other times rushes to bathroom because it causes her to throw up. Then feels better.   Started about 3-4 months. Worsening. Not daily. Currently happening 3-4 times a week.  Now notes it with laying in bed. Sitting up and slow breathing helps her work through it, but returning to laying down position  will start the process. Has not tried any medication.   No reflux, burping. No constipation, diarrhea, bloating, abdominal pain   Not related to type of food or amount amount of food.   Does not happen with liquids.   Not associated with swallowing pills  Points to base of throat as area of concern.    Denies cp, sob, radiating pain.    Dad just  in , sxs started prior to that.  She also reports mom had a similar problem when she got older          Objective    LMP 2019   Body mass index is 41.24 kg/m .  Physical Exam   GENERAL: healthy, alert and no distress  Throat: no sores or lesions, uvula is midline. Oropharynx is patent  NECK: no adenopathy, no asymmetry, masses, or scars and thyroid normal to " palpation  RESP: lungs clear to auscultation - no rales, rhonchi or wheezes  CV: regular rate and rhythm, normal S1 S2, no S3 or S4, no murmur, click or rub, no peripheral edema and peripheral pulses strong  ABDOMEN: soft, nontender, no hepatosplenomegaly, no masses and bowel sounds normal  Nodes: no cervical lymphadenopathy.

## 2023-03-09 NOTE — PATIENT INSTRUCTIONS
Abida,     I ordered an EGD, a scope, to look at your esophagus to see if there is any reason for your swallowing difficulties you are having. Since your symptoms are worse laying down, try to sleep with your head elevated for now.  Also make sure food is cut in small pieces and eat small amounts.    Let myself or Dr Small know if you are having any worsening symptoms.    Corinne Noriega PA-C on 3/9/2023 at 11:10 AM

## 2023-03-10 ENCOUNTER — TELEPHONE (OUTPATIENT)
Dept: GASTROENTEROLOGY | Facility: CLINIC | Age: 58
End: 2023-03-10
Payer: COMMERCIAL

## 2023-03-10 NOTE — TELEPHONE ENCOUNTER
Screening Questions  BLUE  KIND OF PREP RED  LOCATION [review exclusion criteria] GREEN  SEDATION TYPE        Yes Are you active on mychart?       Corinne Brothers Ordering/Referring Provider?        [facility] What type of coverage do you have?      No Have you had a positive covid test in the last 14 days?     41.24 1. BMI  [BMI 40+ - review exclusion criteria]    Yes  2. Are you able to give consent for your medical care? [IF NO,RN REVIEW]          No  3. Are you taking any prescription pain medications on a routine schedule   (ex narcotics: oxycodone, roxicodone, oxycontin,  and percocet)? [RN Review]        No  3a. EXTENDED PREP What kind of prescription?     No 4. Do you have any chemical dependencies such as alcohol, street drugs, or methadone?        **If yes 3- 5 , please schedule with MAC sedation.**          IF YES TO ANY 6 - 10 - HOSPITAL SETTING ONLY.     No 6.   Do you need assistance transferring?     No 7.   Have you had a heart or lung transplant?    No 8.   Are you currently on dialysis?   No 9.   Do you use daily home oxygen?   No 10. Do you take nitroglycerin?   10a. No If yes, how often?     11. [FEMALES]  No Are you currently pregnant?    11a. No If yes, how many weeks? [ Greater than 12 weeks, OR NEEDED]    NO 12. Do you have Pulmonary Hypertension? *NEED PAC APPT AT [facility] w/ [facility]*     nO 13. [review exclusion criteria]  Do you have any implantable devices in your body (pacemaker, defib, LVAD)?    nO 14. In the past 6 months, have you had any heart related issues including cardiomyopathy or heart attack?     14a. nO If yes, did it require cardiac stenting if so when?     No 15. Have you had a stroke or Transient ischemic attack (TIA - aka  mini stroke ) within 6 months?      No 16. Do you have mod to severe Obstructive Sleep Apnea?  [Hospital only]    No 17. Do you have SEVERE AND UNCONTROLLED asthma? *NEED PAC APPT AT [facility] w/[facility]*     18. Are you currently taking any blood thinners?     18a.  "No. Continue to 19.   18b. Yes/no Blood Thinner: No [CONTINUE TO #19]    No 19. Do you take the medication Phentermine?    19a. If yes, \"Hold for 7 days before procedure.  Please consult your prescribing provider if you have questions about holding this medication.\"     No  20. Do you have chronic kidney disease?      No  21. Do you have a diagnosis of diabetes?     Yes  22. On a regular basis do you go 3-5 days between bowel movements?      23. Preferred LOCAL Pharmacy for Pre Prescription    [ LIST ONLY ONE PHARMACY]          [facility] [address] [place], [place] - [address] [facility]        - CLOSING REMINDERS -    Informed patient they will need an adult    Cannot take any type of public or medical transportation alone    Conscious Sedation- Needs  for 6 hours after the procedure       MAC/General-Needs  for 24 hours after procedure    Pre-Procedure Covid test to be completed [[facility] PCR Testing Required]    Confirmed Nurse will call to complete assessment       - SCHEDULING DETAILS -  No Hospital Setting Required? If yes, what is the exclusion?: BMI is greater than 40   Dr. Acevedo  Surgeon    3/21/2023  Date of Procedure  Upper Endoscopy [EGD]  Type of Procedure Scheduled  [facility]     MAC Sedation Type     Yes PAC / Pre-op Required       Scheduled to MAC, because it was the available block and patient had 1-2 week priority order, and we are limited on providers who scope for Dysphagia.          "

## 2023-03-13 ENCOUNTER — TELEPHONE (OUTPATIENT)
Dept: GASTROENTEROLOGY | Facility: CLINIC | Age: 58
End: 2023-03-13
Payer: COMMERCIAL

## 2023-03-13 NOTE — TELEPHONE ENCOUNTER
Pre assessment questions completed for upcoming Upper endoscopy (EGD) procedure scheduled on 3/21/23    COVID policy reviewed.     Pre-op exam? Yes. pre op scheduled 3/15/23 with Haase, Susan Rachele, APRN CNP    Reviewed procedural arrival time 1230, procedure time 1330 and facility location [facility]; [address] [place] [address]    Designated  policy reviewed. Instructed to have someone stay 24 hours post procedure.     Anticoagulation/blood thinners? No    Electronic implanted devices? No    Diabetic? Yes - Patient to hold oral diabetic medications day of procedure    Procedure indication: Dysphagia, intermittent esphoageal spams, causing vomiting of nondigested solid food. worsening in last several weeks. worse with laying down.    Reviewed procedure prep instructions.     Prep instructions previously sent via [employer] by scheduling team.      Patient verbalized understanding and had no questions or concerns at this time.      Cherrie Barbosa RN  Endoscopy Procedure Pre Assessment RN

## 2023-03-15 ENCOUNTER — OFFICE VISIT (OUTPATIENT)
Dept: FAMILY MEDICINE | Facility: CLINIC | Age: 58
End: 2023-03-15
Payer: COMMERCIAL

## 2023-03-15 VITALS
RESPIRATION RATE: 18 BRPM | OXYGEN SATURATION: 98 % | BODY MASS INDEX: 40.65 KG/M2 | HEIGHT: 65 IN | WEIGHT: 244 LBS | DIASTOLIC BLOOD PRESSURE: 78 MMHG | HEART RATE: 94 BPM | TEMPERATURE: 98.3 F | SYSTOLIC BLOOD PRESSURE: 124 MMHG

## 2023-03-15 DIAGNOSIS — Z01.818 PREOP GENERAL PHYSICAL EXAM: Primary | ICD-10-CM

## 2023-03-15 DIAGNOSIS — E11.9 TYPE 2 DIABETES MELLITUS WITHOUT COMPLICATION, WITHOUT LONG-TERM CURRENT USE OF INSULIN (H): ICD-10-CM

## 2023-03-15 DIAGNOSIS — R13.10 DYSPHAGIA, UNSPECIFIED TYPE: ICD-10-CM

## 2023-03-15 LAB
ALBUMIN SERPL BCG-MCNC: 4.3 G/DL (ref 3.5–5.2)
ALP SERPL-CCNC: 101 U/L (ref 35–104)
ALT SERPL W P-5'-P-CCNC: 18 U/L (ref 10–35)
ANION GAP SERPL CALCULATED.3IONS-SCNC: 13 MMOL/L (ref 7–15)
AST SERPL W P-5'-P-CCNC: 25 U/L (ref 10–35)
BASOPHILS # BLD AUTO: 0.1 10E3/UL (ref 0–0.2)
BASOPHILS NFR BLD AUTO: 1 %
BILIRUB SERPL-MCNC: 0.3 MG/DL
BUN SERPL-MCNC: 15.7 MG/DL (ref 6–20)
CALCIUM SERPL-MCNC: 9.8 MG/DL (ref 8.6–10)
CHLORIDE SERPL-SCNC: 106 MMOL/L (ref 98–107)
CREAT SERPL-MCNC: 1.15 MG/DL (ref 0.51–0.95)
DEPRECATED HCO3 PLAS-SCNC: 24 MMOL/L (ref 22–29)
EOSINOPHIL # BLD AUTO: 0.4 10E3/UL (ref 0–0.7)
EOSINOPHIL NFR BLD AUTO: 5 %
ERYTHROCYTE [DISTWIDTH] IN BLOOD BY AUTOMATED COUNT: 13.1 % (ref 10–15)
GFR SERPL CREATININE-BSD FRML MDRD: 56 ML/MIN/1.73M2
GLUCOSE SERPL-MCNC: 106 MG/DL (ref 70–99)
HBA1C MFR BLD: 5.9 % (ref 0–5.6)
HCT VFR BLD AUTO: 41.1 % (ref 35–47)
HGB BLD-MCNC: 13.8 G/DL (ref 11.7–15.7)
LYMPHOCYTES # BLD AUTO: 1.3 10E3/UL (ref 0.8–5.3)
LYMPHOCYTES NFR BLD AUTO: 18 %
MCH RBC QN AUTO: 30.3 PG (ref 26.5–33)
MCHC RBC AUTO-ENTMCNC: 33.6 G/DL (ref 31.5–36.5)
MCV RBC AUTO: 90 FL (ref 78–100)
MONOCYTES # BLD AUTO: 0.7 10E3/UL (ref 0–1.3)
MONOCYTES NFR BLD AUTO: 9 %
NEUTROPHILS # BLD AUTO: 4.7 10E3/UL (ref 1.6–8.3)
NEUTROPHILS NFR BLD AUTO: 67 %
PLATELET # BLD AUTO: 256 10E3/UL (ref 150–450)
POTASSIUM SERPL-SCNC: 4.6 MMOL/L (ref 3.4–5.3)
PROT SERPL-MCNC: 7.1 G/DL (ref 6.4–8.3)
RBC # BLD AUTO: 4.56 10E6/UL (ref 3.8–5.2)
SODIUM SERPL-SCNC: 143 MMOL/L (ref 136–145)
WBC # BLD AUTO: 7.1 10E3/UL (ref 4–11)

## 2023-03-15 PROCEDURE — 36415 COLL VENOUS BLD VENIPUNCTURE: CPT | Performed by: NURSE PRACTITIONER

## 2023-03-15 PROCEDURE — 80053 COMPREHEN METABOLIC PANEL: CPT | Performed by: NURSE PRACTITIONER

## 2023-03-15 PROCEDURE — 85025 COMPLETE CBC W/AUTO DIFF WBC: CPT | Performed by: NURSE PRACTITIONER

## 2023-03-15 PROCEDURE — 83036 HEMOGLOBIN GLYCOSYLATED A1C: CPT | Performed by: NURSE PRACTITIONER

## 2023-03-15 PROCEDURE — 99214 OFFICE O/P EST MOD 30 MIN: CPT | Performed by: NURSE PRACTITIONER

## 2023-03-15 SDOH — ECONOMIC STABILITY: INCOME INSECURITY: IN THE LAST 12 MONTHS, WAS THERE A TIME WHEN YOU WERE NOT ABLE TO PAY THE MORTGAGE OR RENT ON TIME?: NO

## 2023-03-15 SDOH — HEALTH STABILITY: PHYSICAL HEALTH: ON AVERAGE, HOW MANY DAYS PER WEEK DO YOU ENGAGE IN MODERATE TO STRENUOUS EXERCISE (LIKE A BRISK WALK)?: 0 DAYS

## 2023-03-15 SDOH — ECONOMIC STABILITY: FOOD INSECURITY: WITHIN THE PAST 12 MONTHS, THE FOOD YOU BOUGHT JUST DIDN'T LAST AND YOU DIDN'T HAVE MONEY TO GET MORE.: NEVER TRUE

## 2023-03-15 SDOH — ECONOMIC STABILITY: FOOD INSECURITY: WITHIN THE PAST 12 MONTHS, YOU WORRIED THAT YOUR FOOD WOULD RUN OUT BEFORE YOU GOT MONEY TO BUY MORE.: NEVER TRUE

## 2023-03-15 SDOH — HEALTH STABILITY: PHYSICAL HEALTH: ON AVERAGE, HOW MANY MINUTES DO YOU ENGAGE IN EXERCISE AT THIS LEVEL?: 0 MIN

## 2023-03-15 SDOH — ECONOMIC STABILITY: INCOME INSECURITY: HOW HARD IS IT FOR YOU TO PAY FOR THE VERY BASICS LIKE FOOD, HOUSING, MEDICAL CARE, AND HEATING?: NOT HARD AT ALL

## 2023-03-15 ASSESSMENT — SOCIAL DETERMINANTS OF HEALTH (SDOH)
HOW OFTEN DO YOU ATTEND CHURCH OR RELIGIOUS SERVICES?: NEVER
IN A TYPICAL WEEK, HOW MANY TIMES DO YOU TALK ON THE PHONE WITH FAMILY, FRIENDS, OR NEIGHBORS?: MORE THAN THREE TIMES A WEEK
DO YOU BELONG TO ANY CLUBS OR ORGANIZATIONS SUCH AS CHURCH GROUPS UNIONS, FRATERNAL OR ATHLETIC GROUPS, OR SCHOOL GROUPS?: NO
HOW OFTEN DO YOU GET TOGETHER WITH FRIENDS OR RELATIVES?: MORE THAN THREE TIMES A WEEK

## 2023-03-15 ASSESSMENT — LIFESTYLE VARIABLES
HOW OFTEN DO YOU HAVE A DRINK CONTAINING ALCOHOL: 2-4 TIMES A MONTH
HOW MANY STANDARD DRINKS CONTAINING ALCOHOL DO YOU HAVE ON A TYPICAL DAY: 1 OR 2
HOW OFTEN DO YOU HAVE SIX OR MORE DRINKS ON ONE OCCASION: NEVER
AUDIT-C TOTAL SCORE: 2
SKIP TO QUESTIONS 9-10: 1

## 2023-03-15 NOTE — PATIENT INSTRUCTIONS
For informational purposes only. Not to replace the advice of your health care provider. Copyright   2003,  [employer] [facility]. All rights reserved. Clinically reviewed by Gini Bay MD. [employer] [id number] - REV .  Preparing for Your Surgery  Getting started  A nurse will call you to review your health history and instructions. They will give you an arrival time based on your scheduled surgery time. Please be ready to share:    Your doctor's clinic name and phone number    Your medical, surgical, and anesthesia history    A list of allergies and sensitivities    A list of medicines, including herbal treatments and over-the-counter drugs    Whether the patient has a legal guardian (ask how to send us the papers in advance)  Please tell us if you're pregnant--or if there's any chance you might be pregnant. Some surgeries may injure a fetus (unborn baby), so they require a pregnancy test. Surgeries that are safe for a fetus don't always need a test, and you can choose whether to have one.   If you have a child who's having surgery, please ask for a copy of Preparing for Your Child's Surgery.    Preparing for surgery    Within 10 to 30 days of surgery: Have a pre-op exam (sometimes called an H&P, or History and Physical). This can be done at a clinic or pre-operative center.  ? If you're having a , you may not need this exam. Talk to your care team.    At your pre-op exam, talk to your care team about all medicines you take. If you need to stop any medicines before surgery, ask when to start taking them again.  ? We do this for your safety. Many medicines can make you bleed too much during surgery. Some change how well surgery (anesthesia) drugs work.    Call your insurance company to let them know you're having surgery. (If you don't have insurance, call [phone].)    Call your clinic if there's any change in your health. This includes signs of a cold or flu (sore throat, runny nose,  cough, rash, fever). It also includes a scrape or scratch near the surgery site.    If you have questions on the day of surgery, call your hospital or surgery center.  Eating and drinking guidelines  For your safety: Unless your surgeon tells you otherwise, follow the guidelines below.    Eat and drink as usual until 8 hours before you arrive for surgery. After that, no food or milk.    Drink clear liquids until 2 hours before you arrive. These are liquids you can see through, like water, Gatorade, and Propel Water. They also include plain black coffee and tea (no cream or milk), candy, and breath mints. You can spit out gum when you arrive.    If you drink alcohol: Stop drinking it the night before surgery.    If your care team tells you to take medicine on the morning of surgery, it's okay to take it with a sip of water.  Preventing infection    Shower or bathe the night before and morning of your surgery. Follow the instructions your clinic gave you. (If no instructions, use regular soap.)    Don't shave or clip hair near your surgery site. We'll remove the hair if needed.    Don't smoke or vape the morning of surgery. You may chew nicotine gum up to 2 hours before surgery. A nicotine patch is okay.  ? Note: Some surgeries require you to completely quit smoking and nicotine. Check with your surgeon.    Your care team will make every effort to keep you safe from infection. We will:  ? Clean our hands often with soap and water (or an alcohol-based hand rub).  ? Clean the skin at your surgery site with a special soap that kills germs.  ? Give you a special gown to keep you warm. (Cold raises the risk of infection.)  ? Wear special hair covers, masks, gowns and gloves during surgery.  ? Give antibiotic medicine, if prescribed. Not all surgeries need antibiotics.  What to bring on the day of surgery    Photo ID and insurance card    Copy of your health care directive, if you have one    Glasses and hearing aids (bring  cases)  ? You can't wear contacts during surgery    Inhaler and eye drops, if you use them (tell us about these when you arrive)    CPAP machine or breathing device, if you use them    A few personal items, if spending the night    If you have . . .  ? A pacemaker, ICD (cardiac defibrillator) or other implant: Bring the ID card.  ? An implanted stimulator: Bring the remote control.  ? A legal guardian: Bring a copy of the certified (court-stamped) guardianship papers.  Please remove any jewelry, including body piercings. Leave jewelry and other valuables at home.  If you're going home the day of surgery    You must have a responsible adult drive you home. They should stay with you overnight as well.    If you don't have someone to stay with you, and you aren't safe to go home alone, we may keep you overnight. Insurance often won't pay for this.  After surgery  If it's hard to control your pain or you need more pain medicine, please call your surgeon's office.  Questions?   If you have any questions for your care team, list them here: _________________________________________________________________________________________________________________________________________________________________________ ____________________________________ ____________________________________ ____________________________________

## 2023-03-15 NOTE — PROGRESS NOTES
[facility]  [address] [address]  Phone: [phone]  Primary Provider: Nancy Small  Pre-op Performing Provider: HAASE, SUSAN RACHELE  PREOPERATIVE EVALUATION:  Today's date: 3/15/2023    Racquel Amin is a 57 year old female who presents for a preoperative evaluation.    Surgical Information:  Surgery/Procedure: Esophagoscopy, gastroscopy, duodenoscopy (EGD), combined  Surgery Location: [facility]  Surgeon: Dr. Acevedo  Surgery Date: 03/21/2023  Time of Surgery: 1330  Where patient plans to recover: At home with family  Fax number for surgical facility: Note does not need to be faxed, will be available electronically in Epic.    Type of Anesthesia Anticipated: General    Assessment & Plan     The proposed surgical procedure is considered LOW risk.    Preop general physical exam    - HEMOGLOBIN A1C  - Comprehensive metabolic panel (BMP + Alb, Alk Phos, ALT, AST, Total. Bili, TP)  - CBC with platelets and differential    Dysphagia, unspecified type    Type 2 diabetes mellitus without complication, without long-term current use of insulin (H): hold metformin the morning of surgery    - HEMOGLOBIN A1C    CKD (chronic kidney disease) stage 3, GFR 30-59 ml/min (H)    Asthma:  Symptoms well controlled, has not used albuterol in several months.        Risks and Recommendations:  The patient has the following additional risks and recommendations for perioperative complications:   - Morbid obesity (BMI >40)  Diabetes:  - Patient is not on insulin therapy: regular NPO guidelines can be followed.          Medication Instructions:   - aspirin: Bleeding risk is low for this procedure and daily aspirin may be continued, hold the morning of surgery.      RECOMMENDATION:  APPROVAL GIVEN to proceed with proposed procedure, without further diagnostic evaluation.      Subjective     HPI related to upcoming procedure: has been having symptoms of dysphagia with vomiting.       Preop Questions 3/15/2023   1. Have you ever had a heart attack or stroke? No   2. Have you ever had surgery on your heart or blood vessels, such as a stent placement, a coronary artery bypass, or surgery on an artery in your head, neck, heart, or legs? No   3. Do you have chest pain with activity? No   4. Do you have a history of  heart failure? No   5. Do you currently have a cold, bronchitis or symptoms of other infection? No   6. Do you have a cough, shortness of breath, or wheezing? YES - chronic cough   7. Do you or anyone in your family have previous history of blood clots? YES - self, 2019 pulmonary emboli   8. Do you or does anyone in your family have a serious bleeding problem such as prolonged bleeding following surgeries or cuts? No   9. Have you ever had problems with anemia or been told to take iron pills? No   10. Have you had any abnormal blood loss such as black, tarry or bloody stools, or abnormal vaginal bleeding? No   11. Have you ever had a blood transfusion? No   12. Are you willing to have a blood transfusion if it is medically needed before, during, or after your surgery? Yes   13. Have you or any of your relatives ever had problems with anesthesia? No   14. Do you have sleep apnea, excessive snoring or daytime drowsiness? No   14a. Do you have a CPAP machine? -   15. Do you have any artifical heart valves or other implanted medical devices like a pacemaker, defibrillator, or continuous glucose monitor? No   16. Do you have artificial joints? No   17. Are you allergic to latex? No   18. Is there any chance that you may be pregnant? No       Health Care Directive:  Patient does not have a Health Care Directive or Living Will: Discussed advance care planning with patient; however, patient declined at this time.    Preoperative Review of :   reviewed - no record of controlled substances prescribed.      Status of Chronic Conditions:  ASTHMA - Patient has a longstanding history of  moderate-severe Asthma . Patient has been doing well overall noting NO SYMPTOMS and continues on medication regimen consisting of prn albuterol without adverse reactions or side effects.   Rarely using albuterol inhaler, seasonal symptoms.     DIABETES - Patient has a longstanding history of DiabetesType Type II . Patient is being treated with oral agents and denies significant side effects. Control has been good. Complicating factors include but are not limited to: hypertension, hyperlipidemia and morbid obesity .     HYPERLIPIDEMIA - Patient has a long history of significant Hyperlipidemia requiring medication for treatment with recent good control. Patient reports no problems or side effects with the medication.     HYPERTENSION - Patient has longstanding history of HTN , currently denies any symptoms referable to elevated blood pressure. Specifically denies chest pain, palpitations, dyspnea, orthopnea, PND or peripheral edema. Blood pressure readings have been in normal range. Current medication regimen is as listed below. Patient denies any side effects of medication.       Review of Systems  CONSTITUTIONAL: NEGATIVE for fever, chills, change in weight  INTEGUMENTARY/SKIN: NEGATIVE for worrisome rashes, moles or lesions  EYES: NEGATIVE for vision changes or irritation  ENT/MOUTH: NEGATIVE for ear, mouth and throat problems  RESP: NEGATIVE for significant cough or SOB  CV: NEGATIVE for chest pain, palpitations or peripheral edema  GI: NEGATIVE for nausea, abdominal pain,  or change in bowel habits.  See HPI  : NEGATIVE for frequency, dysuria, or hematuria  MUSCULOSKELETAL: NEGATIVE for significant arthralgias or myalgia  NEURO: NEGATIVE for weakness, dizziness or paresthesias  ENDOCRINE: NEGATIVE for temperature intolerance, skin/hair changes  HEME: NEGATIVE for bleeding problems  PSYCHIATRIC: NEGATIVE for changes in mood or affect    Patient Active Problem List    Diagnosis Date Noted     Type 2 diabetes  mellitus without complication, without long-term current use of insulin (H) 11/09/2020     Priority: Medium     Carpal tunnel syndrome of right wrist 01/24/2020     Priority: Medium     Added automatically from request for surgery [id number]       History of pulmonary embolus (PE) 09/30/2019     Priority: Medium     Mild intermittent asthma, unspecified whether complicated 01/14/2019     Priority: Medium     Benign essential hypertension 01/14/2019     Priority: Medium     ERICA (obstructive sleep apnea) 07/17/2018     Priority: Medium     Morbid obesity, unspecified obesity type (H) 10/24/2016     Priority: Medium     Anxiety 07/07/2014     Priority: Medium     Major depression in complete remission (H) 01/08/2013     Priority: Medium     CKD (chronic kidney disease) stage 3, GFR 30-59 ml/min (H) 06/21/2011     Priority: Medium     CARDIOVASCULAR SCREENING; LDL GOAL LESS THAN 160 02/10/2010     Priority: Medium     Family history of malignant neoplasm of breast 02/11/2005     Priority: Medium     Aunt with breast cancer           Past Medical History:   Diagnosis Date     Arthritis     hands     Bilateral pulmonary embolism (H) 9/30/2019     Diabetes (H)      HTN (hypertension)      IFG (impaired fasting glucose) 1/26/2020     Mild intermittent asthma, unspecified whether complicated 1/14/2019     Morbid obesity (H)      ERICA (obstructive sleep apnea)     CPAP     Past Surgical History:   Procedure Laterality Date     NO HISTORY OF SURGERY       RELEASE CARPAL TUNNEL Right 2/18/2020    Procedure: Right carpal tunnel release;  Surgeon: Henry Rogers MD;  Location:  OR     REPAIR TENDON ANKLE Left 11/12/2020    Procedure: Peroneal tendon repair left lower extremity;  Surgeon: David Chacon DPM;  Location:  OR     Current Outpatient Medications   Medication Sig Dispense Refill     albuterol (PROAIR HFA) 108 (90 Base) MCG/ACT inhaler INHALE 2 PUFFS BY MOUTH EVERY 6 HOURS AS NEEDED FOR WHEEZE OR FOR SHORTNESS  OF BREATH 8.5 Inhaler 3     albuterol (PROVENTIL) (2.5 MG/3ML) 0.083% neb solution Take 1 vial (2.5 mg) by nebulization every 6 hours as needed for shortness of breath / dyspnea or wheezing 90 mL 3     atorvastatin (LIPITOR) 20 MG tablet TAKE 1 TABLET BY MOUTH EVERY DAY 30 tablet 11     buPROPion (WELLBUTRIN XL) 150 MG 24 hr tablet Take 1 tablet (150 mg) by mouth every morning 90 tablet 3     citalopram (CELEXA) 40 MG tablet Take 1 tablet (40 mg) by mouth daily 90 tablet 11     glucose blood VI test strips strip by In Vitro route daily. 1 Box 12     lisinopril (ZESTRIL) 40 MG tablet TAKE 1 TABLET BY MOUTH EVERY DAY 30 tablet 11     meclizine (ANTIVERT) 25 MG tablet Take 1 tablet (25 mg) by mouth 3 times daily as needed for dizziness 30 tablet 11     metFORMIN (GLUCOPHAGE) 500 MG tablet TAKE 1 TABLET BY MOUTH EVERY DAY WITH BREAKFAST 90 tablet 3     multivitamin, therapeutic (THERA-VIT) TABS tablet Take 1 tablet by mouth daily         Allergies   Allergen Reactions     No Known Allergies         Social History     Tobacco Use     Smoking status: Never     Smokeless tobacco: Never   Substance Use Topics     Alcohol use: Yes     Comment: 1 drink per week or less       History   Drug Use No         Objective     LMP 09/30/2019     Physical Exam    GENERAL APPEARANCE: healthy, alert and no distress     EYES: appear normal     HENT: ear canals and TM's normal and nose and mouth without ulcers or lesions     NECK: no adenopathy, no asymmetry, masses, or scars and thyroid normal to palpation     RESP: lungs clear to auscultation - no rales, rhonchi or wheezes     CV: regular rates and rhythm, normal S1 S2, no S3 or S4 and no murmur, click or rub     ABDOMEN:  soft, nontender, no HSM or masses and bowel sounds normal     MS: extremities normal- no gross deformities noted,      SKIN: no suspicious lesions or rashes     NEURO: Normal strength and tone, sensory exam grossly normal, mentation intact and speech normal     PSYCH:  mentation appears normal. and affect normal/bright     LYMPHATICS: No cervical adenopathy    Diagnostics:  Recent Results (from the past 24 hour(s))   HEMOGLOBIN A1C    Collection Time: 03/15/23  1:44 PM   Result Value Ref Range    Hemoglobin A1C 5.9 (H) 0.0 - 5.6 %   CBC with platelets and differential    Collection Time: 03/15/23  1:44 PM   Result Value Ref Range    WBC Count 7.1 4.0 - 11.0 10e3/uL    RBC Count 4.56 3.80 - 5.20 10e6/uL    Hemoglobin 13.8 11.7 - 15.7 g/dL    Hematocrit 41.1 35.0 - 47.0 %    MCV 90 78 - 100 fL    MCH 30.3 26.5 - 33.0 pg    MCHC 33.6 31.5 - 36.5 g/dL    RDW 13.1 10.0 - 15.0 %    Platelet Count 256 150 - 450 10e3/uL    % Neutrophils 67 %    % Lymphocytes 18 %    % Monocytes 9 %    % Eosinophils 5 %    % Basophils 1 %    Absolute Neutrophils 4.7 1.6 - 8.3 10e3/uL    Absolute Lymphocytes 1.3 0.8 - 5.3 10e3/uL    Absolute Monocytes 0.7 0.0 - 1.3 10e3/uL    Absolute Eosinophils 0.4 0.0 - 0.7 10e3/uL    Absolute Basophils 0.1 0.0 - 0.2 10e3/uL      No EKG required, no history of coronary heart disease, significant arrhythmia, peripheral arterial disease or other structural heart disease.    Revised Cardiac Risk Index (RCRI):  The patient has the following serious cardiovascular risks for perioperative complications:   - No serious cardiac risks = 0 points     RCRI Interpretation: 0 points: Class I (very low risk - 0.4% complication rate)           Signed Electronically by: Susan Haase, APRN CNP  Copy of this evaluation report is provided to requesting physician.

## 2023-03-15 NOTE — Clinical Note
Can you please call the endoscopy RN and let them know that Abida is on Aspirin 81 mg daily due to history of PE, I have instructed her to continue this, hold the day of surgery.  If they have any different recommendations please ask them to call Abida.  Thanks, Susan Haase, CNP

## 2023-03-15 NOTE — H&P (VIEW-ONLY)
[facility]  [address] [address] [address]  Phone: [phone]  Primary Provider: Nancy Small  Pre-op Performing Provider: HAASE, SUSAN RACHELE  PREOPERATIVE EVALUATION:  Today's date: 3/15/2023    Racquel Amin is a 57 year old female who presents for a preoperative evaluation.    Surgical Information:  Surgery/Procedure: Esophagoscopy, gastroscopy, duodenoscopy (EGD), combined  Surgery Location: [facility]  Surgeon: Dr. Acevedo  Surgery Date: 03/21/2023  Time of Surgery: 1330  Where patient plans to recover: At home with family  Fax number for surgical facility: Note does not need to be faxed, will be available electronically in Epic.    Type of Anesthesia Anticipated: General    Assessment & Plan     The proposed surgical procedure is considered LOW risk.    Preop general physical exam    - HEMOGLOBIN A1C  - Comprehensive metabolic panel (BMP + Alb, Alk Phos, ALT, AST, Total. Bili, TP)  - CBC with platelets and differential    Dysphagia, unspecified type    Type 2 diabetes mellitus without complication, without long-term current use of insulin (H): hold metformin the morning of surgery    - HEMOGLOBIN A1C    CKD (chronic kidney disease) stage 3, GFR 30-59 ml/min (H)    Asthma:  Symptoms well controlled, has not used albuterol in several months.        Risks and Recommendations:  The patient has the following additional risks and recommendations for perioperative complications:   - Morbid obesity (BMI >40)  Diabetes:  - Patient is not on insulin therapy: regular NPO guidelines can be followed.          Medication Instructions:   - aspirin: Bleeding risk is low for this procedure and daily aspirin may be continued, hold the morning of surgery.      RECOMMENDATION:  APPROVAL GIVEN to proceed with proposed procedure, without further diagnostic evaluation.      Subjective     HPI related to upcoming procedure: has been having symptoms of dysphagia with vomiting.       Preop Questions 3/15/2023   1. Have you ever had a heart attack or stroke? No   2. Have you ever had surgery on your heart or blood vessels, such as a stent placement, a coronary artery bypass, or surgery on an artery in your head, neck, heart, or legs? No   3. Do you have chest pain with activity? No   4. Do you have a history of  heart failure? No   5. Do you currently have a cold, bronchitis or symptoms of other infection? No   6. Do you have a cough, shortness of breath, or wheezing? YES - chronic cough   7. Do you or anyone in your family have previous history of blood clots? YES - self, 2019 pulmonary emboli   8. Do you or does anyone in your family have a serious bleeding problem such as prolonged bleeding following surgeries or cuts? No   9. Have you ever had problems with anemia or been told to take iron pills? No   10. Have you had any abnormal blood loss such as black, tarry or bloody stools, or abnormal vaginal bleeding? No   11. Have you ever had a blood transfusion? No   12. Are you willing to have a blood transfusion if it is medically needed before, during, or after your surgery? Yes   13. Have you or any of your relatives ever had problems with anesthesia? No   14. Do you have sleep apnea, excessive snoring or daytime drowsiness? No   14a. Do you have a CPAP machine? -   15. Do you have any artifical heart valves or other implanted medical devices like a pacemaker, defibrillator, or continuous glucose monitor? No   16. Do you have artificial joints? No   17. Are you allergic to latex? No   18. Is there any chance that you may be pregnant? No       Health Care Directive:  Patient does not have a Health Care Directive or Living Will: Discussed advance care planning with patient; however, patient declined at this time.    Preoperative Review of :   reviewed - no record of controlled substances prescribed.      Status of Chronic Conditions:  ASTHMA - Patient has a longstanding history of  moderate-severe Asthma . Patient has been doing well overall noting NO SYMPTOMS and continues on medication regimen consisting of prn albuterol without adverse reactions or side effects.   Rarely using albuterol inhaler, seasonal symptoms.     DIABETES - Patient has a longstanding history of DiabetesType Type II . Patient is being treated with oral agents and denies significant side effects. Control has been good. Complicating factors include but are not limited to: hypertension, hyperlipidemia and morbid obesity .     HYPERLIPIDEMIA - Patient has a long history of significant Hyperlipidemia requiring medication for treatment with recent good control. Patient reports no problems or side effects with the medication.     HYPERTENSION - Patient has longstanding history of HTN , currently denies any symptoms referable to elevated blood pressure. Specifically denies chest pain, palpitations, dyspnea, orthopnea, PND or peripheral edema. Blood pressure readings have been in normal range. Current medication regimen is as listed below. Patient denies any side effects of medication.       Review of Systems  CONSTITUTIONAL: NEGATIVE for fever, chills, change in weight  INTEGUMENTARY/SKIN: NEGATIVE for worrisome rashes, moles or lesions  EYES: NEGATIVE for vision changes or irritation  ENT/MOUTH: NEGATIVE for ear, mouth and throat problems  RESP: NEGATIVE for significant cough or SOB  CV: NEGATIVE for chest pain, palpitations or peripheral edema  GI: NEGATIVE for nausea, abdominal pain,  or change in bowel habits.  See HPI  : NEGATIVE for frequency, dysuria, or hematuria  MUSCULOSKELETAL: NEGATIVE for significant arthralgias or myalgia  NEURO: NEGATIVE for weakness, dizziness or paresthesias  ENDOCRINE: NEGATIVE for temperature intolerance, skin/hair changes  HEME: NEGATIVE for bleeding problems  PSYCHIATRIC: NEGATIVE for changes in mood or affect    Patient Active Problem List    Diagnosis Date Noted     Type 2 diabetes  mellitus without complication, without long-term current use of insulin (H) 11/09/2020     Priority: Medium     Carpal tunnel syndrome of right wrist 01/24/2020     Priority: Medium     Added automatically from request for surgery [id number]       History of pulmonary embolus (PE) 09/30/2019     Priority: Medium     Mild intermittent asthma, unspecified whether complicated 01/14/2019     Priority: Medium     Benign essential hypertension 01/14/2019     Priority: Medium     ERICA (obstructive sleep apnea) 07/17/2018     Priority: Medium     Morbid obesity, unspecified obesity type (H) 10/24/2016     Priority: Medium     Anxiety 07/07/2014     Priority: Medium     Major depression in complete remission (H) 01/08/2013     Priority: Medium     CKD (chronic kidney disease) stage 3, GFR 30-59 ml/min (H) 06/21/2011     Priority: Medium     CARDIOVASCULAR SCREENING; LDL GOAL LESS THAN 160 02/10/2010     Priority: Medium     Family history of malignant neoplasm of breast 02/11/2005     Priority: Medium     Aunt with breast cancer           Past Medical History:   Diagnosis Date     Arthritis     hands     Bilateral pulmonary embolism (H) 9/30/2019     Diabetes (H)      HTN (hypertension)      IFG (impaired fasting glucose) 1/26/2020     Mild intermittent asthma, unspecified whether complicated 1/14/2019     Morbid obesity (H)      ERICA (obstructive sleep apnea)     CPAP     Past Surgical History:   Procedure Laterality Date     NO HISTORY OF SURGERY       RELEASE CARPAL TUNNEL Right 2/18/2020    Procedure: Right carpal tunnel release;  Surgeon: Henry Rogers MD;  Location:  OR     REPAIR TENDON ANKLE Left 11/12/2020    Procedure: Peroneal tendon repair left lower extremity;  Surgeon: David Chacon DPM;  Location:  OR     Current Outpatient Medications   Medication Sig Dispense Refill     albuterol (PROAIR HFA) 108 (90 Base) MCG/ACT inhaler INHALE 2 PUFFS BY MOUTH EVERY 6 HOURS AS NEEDED FOR WHEEZE OR FOR SHORTNESS  OF BREATH 8.5 Inhaler 3     albuterol (PROVENTIL) (2.5 MG/3ML) 0.083% neb solution Take 1 vial (2.5 mg) by nebulization every 6 hours as needed for shortness of breath / dyspnea or wheezing 90 mL 3     atorvastatin (LIPITOR) 20 MG tablet TAKE 1 TABLET BY MOUTH EVERY DAY 30 tablet 11     buPROPion (WELLBUTRIN XL) 150 MG 24 hr tablet Take 1 tablet (150 mg) by mouth every morning 90 tablet 3     citalopram (CELEXA) 40 MG tablet Take 1 tablet (40 mg) by mouth daily 90 tablet 11     glucose blood VI test strips strip by In Vitro route daily. 1 Box 12     lisinopril (ZESTRIL) 40 MG tablet TAKE 1 TABLET BY MOUTH EVERY DAY 30 tablet 11     meclizine (ANTIVERT) 25 MG tablet Take 1 tablet (25 mg) by mouth 3 times daily as needed for dizziness 30 tablet 11     metFORMIN (GLUCOPHAGE) 500 MG tablet TAKE 1 TABLET BY MOUTH EVERY DAY WITH BREAKFAST 90 tablet 3     multivitamin, therapeutic (THERA-VIT) TABS tablet Take 1 tablet by mouth daily         Allergies   Allergen Reactions     No Known Allergies         Social History     Tobacco Use     Smoking status: Never     Smokeless tobacco: Never   Substance Use Topics     Alcohol use: Yes     Comment: 1 drink per week or less       History   Drug Use No         Objective     LMP 09/30/2019     Physical Exam    GENERAL APPEARANCE: healthy, alert and no distress     EYES: appear normal     HENT: ear canals and TM's normal and nose and mouth without ulcers or lesions     NECK: no adenopathy, no asymmetry, masses, or scars and thyroid normal to palpation     RESP: lungs clear to auscultation - no rales, rhonchi or wheezes     CV: regular rates and rhythm, normal S1 S2, no S3 or S4 and no murmur, click or rub     ABDOMEN:  soft, nontender, no HSM or masses and bowel sounds normal     MS: extremities normal- no gross deformities noted,      SKIN: no suspicious lesions or rashes     NEURO: Normal strength and tone, sensory exam grossly normal, mentation intact and speech normal     PSYCH:  mentation appears normal. and affect normal/bright     LYMPHATICS: No cervical adenopathy    Diagnostics:  Recent Results (from the past 24 hour(s))   HEMOGLOBIN A1C    Collection Time: 03/15/23  1:44 PM   Result Value Ref Range    Hemoglobin A1C 5.9 (H) 0.0 - 5.6 %   CBC with platelets and differential    Collection Time: 03/15/23  1:44 PM   Result Value Ref Range    WBC Count 7.1 4.0 - 11.0 10e3/uL    RBC Count 4.56 3.80 - 5.20 10e6/uL    Hemoglobin 13.8 11.7 - 15.7 g/dL    Hematocrit 41.1 35.0 - 47.0 %    MCV 90 78 - 100 fL    MCH 30.3 26.5 - 33.0 pg    MCHC 33.6 31.5 - 36.5 g/dL    RDW 13.1 10.0 - 15.0 %    Platelet Count 256 150 - 450 10e3/uL    % Neutrophils 67 %    % Lymphocytes 18 %    % Monocytes 9 %    % Eosinophils 5 %    % Basophils 1 %    Absolute Neutrophils 4.7 1.6 - 8.3 10e3/uL    Absolute Lymphocytes 1.3 0.8 - 5.3 10e3/uL    Absolute Monocytes 0.7 0.0 - 1.3 10e3/uL    Absolute Eosinophils 0.4 0.0 - 0.7 10e3/uL    Absolute Basophils 0.1 0.0 - 0.2 10e3/uL      No EKG required, no history of coronary heart disease, significant arrhythmia, peripheral arterial disease or other structural heart disease.    Revised Cardiac Risk Index (RCRI):  The patient has the following serious cardiovascular risks for perioperative complications:   - No serious cardiac risks = 0 points     RCRI Interpretation: 0 points: Class I (very low risk - 0.4% complication rate)           Signed Electronically by: Susan Haase, APRN CNP  Copy of this evaluation report is provided to requesting physician.

## 2023-03-20 ENCOUNTER — ANESTHESIA EVENT (OUTPATIENT)
Dept: GASTROENTEROLOGY | Facility: CLINIC | Age: 58
End: 2023-03-20
Payer: COMMERCIAL

## 2023-03-20 ENCOUNTER — HOSPITAL ENCOUNTER (OUTPATIENT)
Dept: MAMMOGRAPHY | Facility: CLINIC | Age: 58
Discharge: HOME | End: 2023-03-20
Attending: PEDIATRICS | Admitting: PEDIATRICS
Payer: COMMERCIAL

## 2023-03-20 DIAGNOSIS — Z12.31 VISIT FOR SCREENING MAMMOGRAM: ICD-10-CM

## 2023-03-20 PROCEDURE — 77067 SCR MAMMO BI INCL CAD: CPT

## 2023-03-20 NOTE — ANESTHESIA PREPROCEDURE EVALUATION
Anesthesia Pre-Procedure Evaluation    Patient: Racquel Amin   MRN: [id number] : 1965        Procedure : Procedure(s):  Esophagoscopy, gastroscopy, duodenoscopy (EGD), combined          Past Medical History:   Diagnosis Date     Arthritis     hands     Bilateral pulmonary embolism (H) 2019     Diabetes (H)      HTN (hypertension)      IFG (impaired fasting glucose) 2020     Mild intermittent asthma, unspecified whether complicated 2019     Morbid obesity (H)      ERICA (obstructive sleep apnea)     CPAP      Past Surgical History:   Procedure Laterality Date     NO HISTORY OF SURGERY       RELEASE CARPAL TUNNEL Right 2020    Procedure: Right carpal tunnel release;  Surgeon: Henry Rogers MD;  Location: [facility] OR     REPAIR TENDON ANKLE Left 2020    Procedure: Peroneal tendon repair left lower extremity;  Surgeon: David Chacon DPM;  Location: [facility] OR      Allergies   Allergen Reactions     No Known Allergies       Social History     Tobacco Use     Smoking status: Never     Smokeless tobacco: Never   Substance Use Topics     Alcohol use: Yes     Comment: 1 drink per week or less      Wt Readings from Last 1 Encounters:   03/15/23 110.7 kg (244 lb)        Anesthesia Evaluation   Pt has had prior anesthetic.     No history of anesthetic complications       ROS/MED HX  ENT/Pulmonary:     (+) sleep apnea, uses CPAP, Moderate Persistent, asthma (no rescue inhaler in months)     Neurologic:       Cardiovascular:     (+) Dyslipidemia hypertension-----pulmonary hypertension, Previous cardiac testing   Echo: Date:  Results:  Interpretation Summary     Left ventricular systolic function is normal.  The visual ejection fraction is estimated at 55-60%.  The aortic valve is not well visualized.  AV is moderately calcified.  No hemodynamically significant valvular aortic stenosis.  The mean AoV pressure gradient is 8mmHg.  Right ventricular systolic pressure is elevated, consistent  with mild  pulmonary hypertension.  Mildly decreased right ventricular systolic function  The study was technically difficult. There is no comparison study available.    Stress Test: Date: Results:    ECG Reviewed: Date: Results:    Cath: Date: Results:      METS/Exercise Tolerance:     Hematologic:     (+) History of blood clots (PE 2019),     Musculoskeletal:   (+) arthritis,     GI/Hepatic: Comment: dysphagia    (+) GERD,     Renal/Genitourinary:     (+) renal disease (stage 3), type: CRI,     Endo:     (+) type II DM, Not using insulin, - not using insulin pump. Obesity (BMI 41),     Psychiatric/Substance Use:     (+) psychiatric history anxiety and depression     Infectious Disease:       Malignancy:       Other:            Physical Exam    Airway        Mallampati: II   TM distance: > 3 FB   Neck ROM: full   Mouth opening: > 3 cm    Respiratory Devices and Support         Dental       (+) Minor Abnormalities - some fillings, tiny chips      Cardiovascular   cardiovascular exam normal       Rhythm and rate: regular     Pulmonary   pulmonary exam normal        breath sounds clear to auscultation           OUTSIDE LABS:  CBC:   Lab Results   Component Value Date    WBC 7.1 03/15/2023    WBC 7.6 03/15/2022    HGB 13.8 03/15/2023    HGB 13.6 03/15/2022    HCT 41.1 03/15/2023    HCT 40.1 03/15/2022     03/15/2023     03/15/2022     BMP:   Lab Results   Component Value Date     03/15/2023     03/15/2022    POTASSIUM 4.6 03/15/2023    POTASSIUM 4.0 03/15/2022    CHLORIDE 106 03/15/2023    CHLORIDE 107 03/15/2022    CO2 24 03/15/2023    CO2 22 03/15/2022    BUN 15.7 03/15/2023    BUN 14 03/15/2022    CR 1.15 (H) 03/15/2023    CR 1.08 (H) 03/15/2022     (H) 03/15/2023     (H) 03/15/2022     COAGS: No results found for: PTT, INR, FIBR  POC:   Lab Results   Component Value Date     (H) 11/12/2020    HCG Negative 02/18/2020     HEPATIC:   Lab Results   Component Value Date     ALBUMIN 4.3 03/15/2023    PROTTOTAL 7.1 03/15/2023    ALT 18 03/15/2023    AST 25 03/15/2023    ALKPHOS 101 03/15/2023    BILITOTAL 0.3 03/15/2023     OTHER:   Lab Results   Component Value Date    A1C 5.9 (H) 03/15/2023    JEFFERSON 9.8 03/15/2023    TSH 2.46 03/15/2022    T4 0.85 01/14/2019       Anesthesia Plan    ASA Status:  3      Anesthesia Type: MAC.     - Reason for MAC: straight local not clinically adequate              Consents    Anesthesia Plan(s) and associated risks, benefits, and realistic alternatives discussed. Questions answered and patient/representative(s) expressed understanding.    - Discussed:     - Discussed with:  Patient      - Extended Intubation/Ventilatory Support Discussed: No.      - Patient is DNR/DNI Status: No    Use of blood products discussed: No .     Postoperative Care            Comments:    Other Comments: H&P reviewed  Patient is counseled on the anesthesia plan and relevant anesthesia procedures including all risks and benefits. All patient questions were answered.             Abe Olmstead MD

## 2023-03-21 ENCOUNTER — HOSPITAL ENCOUNTER (OUTPATIENT)
Facility: CLINIC | Age: 58
Discharge: HOME | End: 2023-03-21
Attending: INTERNAL MEDICINE | Admitting: INTERNAL MEDICINE
Payer: COMMERCIAL

## 2023-03-21 ENCOUNTER — ANESTHESIA (OUTPATIENT)
Dept: GASTROENTEROLOGY | Facility: CLINIC | Age: 58
End: 2023-03-21
Payer: COMMERCIAL

## 2023-03-21 VITALS
HEART RATE: 79 BPM | BODY MASS INDEX: 39.99 KG/M2 | HEIGHT: 65 IN | WEIGHT: 240 LBS | OXYGEN SATURATION: 97 % | SYSTOLIC BLOOD PRESSURE: 163 MMHG | DIASTOLIC BLOOD PRESSURE: 110 MMHG | RESPIRATION RATE: 21 BRPM

## 2023-03-21 DIAGNOSIS — K21.00 GASTROESOPHAGEAL REFLUX DISEASE WITH ESOPHAGITIS, UNSPECIFIED WHETHER HEMORRHAGE: Primary | ICD-10-CM

## 2023-03-21 LAB — UPPER GI ENDOSCOPY: NORMAL

## 2023-03-21 PROCEDURE — 258N000003 HC RX IP 258 OP 636: Performed by: NURSE ANESTHETIST, CERTIFIED REGISTERED

## 2023-03-21 PROCEDURE — 88305 TISSUE EXAM BY PATHOLOGIST: CPT | Mod: TC | Performed by: INTERNAL MEDICINE

## 2023-03-21 PROCEDURE — 88305 TISSUE EXAM BY PATHOLOGIST: CPT | Mod: 26 | Performed by: PATHOLOGY

## 2023-03-21 PROCEDURE — 370N000017 HC ANESTHESIA TECHNICAL FEE, PER MIN: Performed by: INTERNAL MEDICINE

## 2023-03-21 PROCEDURE — 250N000009 HC RX 250: Performed by: NURSE ANESTHETIST, CERTIFIED REGISTERED

## 2023-03-21 PROCEDURE — 43239 EGD BIOPSY SINGLE/MULTIPLE: CPT | Performed by: INTERNAL MEDICINE

## 2023-03-21 PROCEDURE — 250N000011 HC RX IP 250 OP 636: Performed by: NURSE ANESTHETIST, CERTIFIED REGISTERED

## 2023-03-21 PROCEDURE — 999N000010 HC STATISTIC ANES STAT CODE-CRNA PER MINUTE: Performed by: INTERNAL MEDICINE

## 2023-03-21 RX ORDER — SODIUM CHLORIDE, SODIUM LACTATE, POTASSIUM CHLORIDE, CALCIUM CHLORIDE 600; 310; 30; 20 MG/100ML; MG/100ML; MG/100ML; MG/100ML
INJECTION, SOLUTION INTRAVENOUS CONTINUOUS PRN
Status: DISCONTINUED | OUTPATIENT
Start: 2023-03-21 | End: 2023-03-21

## 2023-03-21 RX ORDER — ONDANSETRON 4 MG/1
4 TABLET, ORALLY DISINTEGRATING ORAL EVERY 30 MIN PRN
Status: DISCONTINUED | OUTPATIENT
Start: 2023-03-21 | End: 2023-03-21 | Stop reason: HOSPADM

## 2023-03-21 RX ORDER — ONDANSETRON 2 MG/ML
INJECTION INTRAMUSCULAR; INTRAVENOUS PRN
Status: DISCONTINUED | OUTPATIENT
Start: 2023-03-21 | End: 2023-03-21

## 2023-03-21 RX ORDER — PROPOFOL 10 MG/ML
INJECTION, EMULSION INTRAVENOUS PRN
Status: DISCONTINUED | OUTPATIENT
Start: 2023-03-21 | End: 2023-03-21

## 2023-03-21 RX ORDER — ONDANSETRON 2 MG/ML
4 INJECTION INTRAMUSCULAR; INTRAVENOUS EVERY 30 MIN PRN
Status: DISCONTINUED | OUTPATIENT
Start: 2023-03-21 | End: 2023-03-21 | Stop reason: HOSPADM

## 2023-03-21 RX ORDER — NALOXONE HYDROCHLORIDE 0.4 MG/ML
0.4 INJECTION, SOLUTION INTRAMUSCULAR; INTRAVENOUS; SUBCUTANEOUS
Status: DISCONTINUED | OUTPATIENT
Start: 2023-03-21 | End: 2023-03-21 | Stop reason: HOSPADM

## 2023-03-21 RX ORDER — LIDOCAINE HYDROCHLORIDE 20 MG/ML
INJECTION, SOLUTION INFILTRATION; PERINEURAL PRN
Status: DISCONTINUED | OUTPATIENT
Start: 2023-03-21 | End: 2023-03-21

## 2023-03-21 RX ORDER — SODIUM CHLORIDE, SODIUM LACTATE, POTASSIUM CHLORIDE, CALCIUM CHLORIDE 600; 310; 30; 20 MG/100ML; MG/100ML; MG/100ML; MG/100ML
INJECTION, SOLUTION INTRAVENOUS CONTINUOUS
Status: DISCONTINUED | OUTPATIENT
Start: 2023-03-21 | End: 2023-03-21 | Stop reason: HOSPADM

## 2023-03-21 RX ORDER — DEXMEDETOMIDINE HYDROCHLORIDE 4 UG/ML
INJECTION, SOLUTION INTRAVENOUS PRN
Status: DISCONTINUED | OUTPATIENT
Start: 2023-03-21 | End: 2023-03-21

## 2023-03-21 RX ORDER — FENTANYL CITRATE 50 UG/ML
50 INJECTION, SOLUTION INTRAMUSCULAR; INTRAVENOUS EVERY 5 MIN PRN
Status: DISCONTINUED | OUTPATIENT
Start: 2023-03-21 | End: 2023-03-21 | Stop reason: HOSPADM

## 2023-03-21 RX ORDER — NALOXONE HYDROCHLORIDE 0.4 MG/ML
0.2 INJECTION, SOLUTION INTRAMUSCULAR; INTRAVENOUS; SUBCUTANEOUS
Status: DISCONTINUED | OUTPATIENT
Start: 2023-03-21 | End: 2023-03-21 | Stop reason: HOSPADM

## 2023-03-21 RX ORDER — FENTANYL CITRATE 50 UG/ML
25 INJECTION, SOLUTION INTRAMUSCULAR; INTRAVENOUS EVERY 5 MIN PRN
Status: DISCONTINUED | OUTPATIENT
Start: 2023-03-21 | End: 2023-03-21 | Stop reason: HOSPADM

## 2023-03-21 RX ORDER — OMEPRAZOLE 40 MG/1
40 CAPSULE, DELAYED RELEASE ORAL 2 TIMES DAILY
Qty: 90 CAPSULE | Refills: 0 | Status: SHIPPED | OUTPATIENT
Start: 2023-03-21

## 2023-03-21 RX ORDER — PROPOFOL 10 MG/ML
INJECTION, EMULSION INTRAVENOUS CONTINUOUS PRN
Status: DISCONTINUED | OUTPATIENT
Start: 2023-03-21 | End: 2023-03-21

## 2023-03-21 RX ORDER — HYDROMORPHONE HCL IN WATER/PF 6 MG/30 ML
0.4 PATIENT CONTROLLED ANALGESIA SYRINGE INTRAVENOUS EVERY 5 MIN PRN
Status: DISCONTINUED | OUTPATIENT
Start: 2023-03-21 | End: 2023-03-21 | Stop reason: HOSPADM

## 2023-03-21 RX ORDER — HYDROMORPHONE HCL IN WATER/PF 6 MG/30 ML
0.2 PATIENT CONTROLLED ANALGESIA SYRINGE INTRAVENOUS EVERY 5 MIN PRN
Status: DISCONTINUED | OUTPATIENT
Start: 2023-03-21 | End: 2023-03-21 | Stop reason: HOSPADM

## 2023-03-21 RX ADMIN — PROPOFOL 150 MCG/KG/MIN: 10 INJECTION, EMULSION INTRAVENOUS at 13:31

## 2023-03-21 RX ADMIN — PROPOFOL 30 MG: 10 INJECTION, EMULSION INTRAVENOUS at 13:37

## 2023-03-21 RX ADMIN — PROPOFOL 30 MG: 10 INJECTION, EMULSION INTRAVENOUS at 13:34

## 2023-03-21 RX ADMIN — ONDANSETRON 4 MG: 2 INJECTION INTRAMUSCULAR; INTRAVENOUS at 13:35

## 2023-03-21 RX ADMIN — DEXMEDETOMIDINE HYDROCHLORIDE 8 MCG: 200 INJECTION INTRAVENOUS at 13:32

## 2023-03-21 RX ADMIN — DEXMEDETOMIDINE HYDROCHLORIDE 4 MCG: 200 INJECTION INTRAVENOUS at 13:36

## 2023-03-21 RX ADMIN — LIDOCAINE HYDROCHLORIDE 100 MG: 20 INJECTION, SOLUTION INFILTRATION; PERINEURAL at 13:31

## 2023-03-21 RX ADMIN — SODIUM CHLORIDE, POTASSIUM CHLORIDE, SODIUM LACTATE AND CALCIUM CHLORIDE: 600; 310; 30; 20 INJECTION, SOLUTION INTRAVENOUS at 13:28

## 2023-03-21 RX ADMIN — PROPOFOL 30 MG: 10 INJECTION, EMULSION INTRAVENOUS at 13:31

## 2023-03-21 RX ADMIN — DEXMEDETOMIDINE HYDROCHLORIDE 8 MCG: 200 INJECTION INTRAVENOUS at 13:27

## 2023-03-21 ASSESSMENT — ACTIVITIES OF DAILY LIVING (ADL): ADLS_ACUITY_SCORE: 35

## 2023-03-21 NOTE — ANESTHESIA POSTPROCEDURE EVALUATION
Patient: Racquel Amin    Procedure: Procedure(s):  Esophagoscopy, gastroscopy, duodenoscopy (EGD), combined       Anesthesia Type:  MAC    Note:  Disposition: Outpatient   Postop Pain Control: Uneventful            Sign Out: Well controlled pain   PONV:    Neuro/Psych: Uneventful            Sign Out: Acceptable/Baseline neuro status   Airway/Respiratory: Uneventful            Sign Out: Acceptable/Baseline resp. status   CV/Hemodynamics: Uneventful            Sign Out: Acceptable CV status   Other NRE: NONE   DID A NON-ROUTINE EVENT OCCUR? No           Last vitals:  Vitals Value Taken Time   /110 03/21/23 1430   Temp     Pulse 79 03/21/23 1437   Resp 21 03/21/23 1438   SpO2 97 % 03/21/23 1437       Electronically Signed By: Meet Cerda MD  March 21, 2023  4:02 PM

## 2023-03-21 NOTE — H&P
Pre-Endoscopy History and Physical     Racquel Amin MRN# [id number]   YOB: 1965 Age: 57 year old     Date of Procedure: 3/21/2023  Primary care provider: Nancy Small  Type of Endoscopy: Esophagogastroduodenoscopy with possible biopsy, possible dilation, possible foreign body removal  Reason for Procedure: dysphagia  Type of Anesthesia Anticipated: Per anesthesia     HPI:    Racquel is a 57 year old female who will be undergoing the above procedure.      A history and physical has been performed. The patient's medications and allergies have been reviewed. The risks and benefits of the procedure and the sedation options and risks were discussed with the patient.  All questions were answered and informed consent was obtained.      She denies a personal or family history of anesthesia complications or bleeding disorders.     Patient Active Problem List   Diagnosis     Family history of malignant neoplasm of breast     CARDIOVASCULAR SCREENING; LDL GOAL LESS THAN 160     CKD (chronic kidney disease) stage 3, GFR 30-59 ml/min (H)     Major depression in complete remission (H)     Anxiety     Morbid obesity, unspecified obesity type (H)     ERICA (obstructive sleep apnea)     Mild intermittent asthma, unspecified whether complicated     Benign essential hypertension     History of pulmonary embolus (PE)     Carpal tunnel syndrome of right wrist     Type 2 diabetes mellitus without complication, without long-term current use of insulin (H)        Past Medical History:   Diagnosis Date     Arthritis     hands     Bilateral pulmonary embolism (H) 9/30/2019     Diabetes (H)      HTN (hypertension)      IFG (impaired fasting glucose) 1/26/2020     Mild intermittent asthma, unspecified whether complicated 1/14/2019     Morbid obesity (H)      ERICA (obstructive sleep apnea)     CPAP        Past Surgical History:   Procedure Laterality Date     NO HISTORY OF SURGERY       RELEASE CARPAL TUNNEL Right  2/18/2020    Procedure: Right carpal tunnel release;  Surgeon: Henry Rogers MD;  Location: [facility]     REPAIR TENDON ANKLE Left 11/12/2020    Procedure: Peroneal tendon repair left lower extremity;  Surgeon: David Chacon DPM;  Location: [facility] OR       Social History     Tobacco Use     Smoking status: Never     Smokeless tobacco: Never   Substance Use Topics     Alcohol use: Yes     Comment: 1 drink per week or less       Family History   Problem Relation Age of Onset     Breast Cancer Mother         S/P bilateral mastectomy     Brain Tumor Mother      Dementia Mother      C.A.D. Father         CABG-age 60     Cerebrovascular Disease Maternal Grandmother      Breast Cancer Paternal Aunt         age 40     Cancer Maternal Aunt         melanoma     Diabetes No family hx of      Hypertension No family hx of      Cancer - colorectal No family hx of      Thyroid Disease No family hx of        Prior to Admission medications    Medication Sig Start Date End Date Taking? Authorizing Provider   albuterol (PROAIR HFA) 108 (90 Base) MCG/ACT inhaler INHALE 2 PUFFS BY MOUTH EVERY 6 HOURS AS NEEDED FOR WHEEZE OR FOR SHORTNESS OF BREATH 2/11/21  Yes Nancy Small MD   albuterol (PROVENTIL) (2.5 MG/3ML) 0.083% neb solution Take 1 vial (2.5 mg) by nebulization every 6 hours as needed for shortness of breath / dyspnea or wheezing 12/20/21  Yes Nancy Small MD   atorvastatin (LIPITOR) 20 MG tablet TAKE 1 TABLET BY MOUTH EVERY DAY 3/1/22  Yes Nancy Small MD   buPROPion (WELLBUTRIN XL) 150 MG 24 hr tablet Take 1 tablet (150 mg) by mouth every morning 3/15/22  Yes Nancy Small MD   citalopram (CELEXA) 40 MG tablet Take 1 tablet (40 mg) by mouth daily 3/15/22  Yes Nancy Small MD   lisinopril (ZESTRIL) 40 MG tablet TAKE 1 TABLET BY MOUTH EVERY DAY 3/1/22  Yes Nancy Small MD   meclizine (ANTIVERT) 25 MG tablet Take 1 tablet (25 mg) by mouth 3 times daily as needed for dizziness  "6/3/19  Yes Nancy Small MD   metFORMIN (GLUCOPHAGE) 500 MG tablet TAKE 1 TABLET BY MOUTH EVERY DAY WITH BREAKFAST 3/15/22  Yes Nancy Small MD   multivitamin, therapeutic (THERA-VIT) TABS tablet Take 1 tablet by mouth daily   Yes Unknown, Entered By History   glucose blood VI test strips strip by In Vitro route daily. 1/8/13   Irene Beal MD       Allergies   Allergen Reactions     No Known Allergies         REVIEW OF SYSTEMS:   5 point ROS negative except as noted above in HPI, including Gen., Resp., CV, GI &  system review.    PHYSICAL EXAM:   /83   Ht 1.651 m (5' 5\")   Wt 108.9 kg (240 lb)   LMP 09/30/2019   SpO2 99%   BMI 39.94 kg/m   Estimated body mass index is 39.94 kg/m  as calculated from the following:    Height as of this encounter: 1.651 m (5' 5\").    Weight as of this encounter: 108.9 kg (240 lb).   GENERAL APPEARANCE: alert, and oriented  MENTAL STATUS: alert  AIRWAY EXAM: Mallampatti Class III (visualization of the soft palate and base of uvula)  RESP: lungs clear to auscultation - no rales, rhonchi or wheezes  CV: regular rates and rhythm  DIAGNOSTICS:    Not indicated    IMPRESSION   ASA Class 3 - Severe systemic disease, but not incapacitating    PLAN:   Plan for Esophagogastroduodenoscopy with possible biopsy, possible dilation, possible foreign body removal. We discussed the risks, benefits and alternatives and the patient wished to proceed.    The above has been forwarded to the consulting provider.      Signed Electronically by: Boone Acevedo MD  March 21, 2023                "

## 2023-03-21 NOTE — ANESTHESIA CARE TRANSFER NOTE
Patient: Racquel Amin    Procedure: Procedure(s):  Esophagoscopy, gastroscopy, duodenoscopy (EGD), combined       Diagnosis: Dysphagia, unspecified type [R13.10]  Diagnosis Additional Information: No value filed.    Anesthesia Type:   MAC     Note:    Oropharynx: oropharynx clear of all foreign objects and spontaneously breathing  Level of Consciousness: awake  Oxygen Supplementation: nasal cannula  Level of Supplemental Oxygen (L/min / FiO2): 3  Independent Airway: airway patency satisfactory and stable  Dentition: dentition unchanged  Vital Signs Stable: post-procedure vital signs reviewed and stable  Report to RN Given: handoff report given  Patient transferred to: PACU    Handoff Report: Identifed the Patient, Identified the Reponsible Provider, Reviewed the pertinent medical history, Discussed the surgical course, Reviewed Intra-OP anesthesia mangement and issues during anesthesia, Set expectations for post-procedure period and Allowed opportunity for questions and acknowledgement of understanding      Vitals:  Vitals Value Taken Time   /78 03/21/23 1400   Temp     Pulse 89 03/21/23 1402   Resp 9 03/21/23 1402   SpO2 91 % 03/21/23 1402   Vitals shown include unvalidated device data.    Electronically Signed By: PERLA Fountain CRNA  March 21, 2023  2:03 PM

## 2023-03-21 NOTE — INTERVAL H&P NOTE
"I have reviewed the surgical (or preoperative) H&P that is linked to this encounter, and examined the patient. There are no significant changes    Clinical Conditions Present on Arrival:  Clinically Significant Risk Factors Present on Admission                    # Severe Obesity: Estimated body mass index is 41.24 kg/m  as calculated from the following:    Height as of 3/15/23: 1.638 m (5' 4.5\").    Weight as of 3/15/23: 110.7 kg (244 lb).       "

## 2023-03-22 ENCOUNTER — TELEPHONE (OUTPATIENT)
Dept: GASTROENTEROLOGY | Facility: CLINIC | Age: 58
End: 2023-03-22
Payer: COMMERCIAL

## 2023-03-22 DIAGNOSIS — I10 BENIGN ESSENTIAL HYPERTENSION: ICD-10-CM

## 2023-03-22 DIAGNOSIS — E11.9 TYPE 2 DIABETES MELLITUS WITHOUT COMPLICATION, WITHOUT LONG-TERM CURRENT USE OF INSULIN (H): ICD-10-CM

## 2023-03-22 LAB
PATH REPORT.COMMENTS IMP SPEC: NORMAL
PATH REPORT.COMMENTS IMP SPEC: NORMAL
PATH REPORT.FINAL DX SPEC: NORMAL
PATH REPORT.GROSS SPEC: NORMAL
PATH REPORT.MICROSCOPIC SPEC OTHER STN: NORMAL
PATH REPORT.RELEVANT HX SPEC: NORMAL
PHOTO IMAGE: NORMAL

## 2023-03-22 NOTE — TELEPHONE ENCOUNTER
Screening questions done 3/10      - SCHEDULING DETAILS -  n Hospital Setting Required? If yes, what is the exclusion?: n   Kristina  Surgeon    06/13/2023  Date of Procedure  Upper Endoscopy [EGD]  Type of Procedure Scheduled  [facility] [place]   MAC per order Sedation Type     y- will get through primary  PAC / Pre-op Required

## 2023-03-23 ENCOUNTER — ANESTHESIA EVENT (OUTPATIENT)
Dept: GASTROENTEROLOGY | Facility: CLINIC | Age: 58
End: 2023-03-23
Payer: COMMERCIAL

## 2023-03-24 RX ORDER — ATORVASTATIN CALCIUM 20 MG/1
TABLET, FILM COATED ORAL
Qty: 90 TABLET | Refills: 3 | Status: SHIPPED | OUTPATIENT
Start: 2023-03-24 | End: 2024-04-29

## 2023-03-24 RX ORDER — LISINOPRIL 40 MG/1
TABLET ORAL
Qty: 90 TABLET | Refills: 3 | Status: SHIPPED | OUTPATIENT
Start: 2023-03-24 | End: 2024-04-29

## 2023-03-24 NOTE — TELEPHONE ENCOUNTER
Routing refill request to provider for review/approval because:  Labs out of range:  BP, Creatinine  BP Readings from Last 3 Encounters:   03/21/23 (!) 163/110   03/15/23 124/78   03/09/23 120/84     Creatinine   Date Value Ref Range Status   03/15/2023 1.15 (H) 0.51 - 0.95 mg/dL Final   02/11/2021 0.90 0.52 - 1.04 mg/dL Final     Shamika LINDSEY RN, BSN

## 2023-04-06 DIAGNOSIS — F33.9 RECURRENT MAJOR DEPRESSIVE DISORDER, REMISSION STATUS UNSPECIFIED (H): ICD-10-CM

## 2023-04-07 RX ORDER — BUPROPION HYDROCHLORIDE 150 MG/1
TABLET ORAL
Qty: 30 TABLET | Refills: 11 | Status: SHIPPED | OUTPATIENT
Start: 2023-04-07 | End: 2023-04-18

## 2023-04-07 NOTE — TELEPHONE ENCOUNTER
Routing refill request to provider for review/approval because:  Labs out of range:  PHQ-9 above 4.

## 2023-04-12 NOTE — TELEPHONE ENCOUNTER
Patient left voicemail stating  She needs a note for work stating any restrictions she has before surgery.   She can be reached at: [phone]    Phone call to patient. She states she has decided to proceed with surgery. Her employer is asking if she has any restrictions. She has been wearing the boot at work. Informed that writer will verify with provider, but other than being allowed to wear her boot at work, it did not appear there were other restrictions.   She will watch for letter in [employer].   Suggested she contact surgery scheduling to get surgery set up and she states she has their number.     Please see letter pending completion if appropriate.     CÉSAR Schwartz RN         No

## 2023-04-15 ENCOUNTER — HEALTH MAINTENANCE LETTER (OUTPATIENT)
Age: 58
End: 2023-04-15

## 2023-04-18 ENCOUNTER — OFFICE VISIT (OUTPATIENT)
Dept: PEDIATRICS | Facility: CLINIC | Age: 58
End: 2023-04-18
Payer: COMMERCIAL

## 2023-04-18 VITALS
BODY MASS INDEX: 41.55 KG/M2 | WEIGHT: 249.4 LBS | OXYGEN SATURATION: 98 % | DIASTOLIC BLOOD PRESSURE: 80 MMHG | HEART RATE: 84 BPM | TEMPERATURE: 98.5 F | RESPIRATION RATE: 16 BRPM | HEIGHT: 65 IN | SYSTOLIC BLOOD PRESSURE: 132 MMHG

## 2023-04-18 DIAGNOSIS — Z86.711 HISTORY OF PULMONARY EMBOLUS (PE): ICD-10-CM

## 2023-04-18 DIAGNOSIS — I10 BENIGN ESSENTIAL HYPERTENSION: ICD-10-CM

## 2023-04-18 DIAGNOSIS — D22.9 MULTIPLE PIGMENTED NEVI: ICD-10-CM

## 2023-04-18 DIAGNOSIS — N18.30 STAGE 3 CHRONIC KIDNEY DISEASE, UNSPECIFIED WHETHER STAGE 3A OR 3B CKD (H): ICD-10-CM

## 2023-04-18 DIAGNOSIS — E11.9 TYPE 2 DIABETES MELLITUS WITHOUT COMPLICATION, WITHOUT LONG-TERM CURRENT USE OF INSULIN (H): ICD-10-CM

## 2023-04-18 DIAGNOSIS — Z00.00 ROUTINE HISTORY AND PHYSICAL EXAMINATION OF ADULT: Primary | ICD-10-CM

## 2023-04-18 DIAGNOSIS — E66.01 MORBID OBESITY, UNSPECIFIED OBESITY TYPE (H): ICD-10-CM

## 2023-04-18 DIAGNOSIS — F33.9 EPISODE OF RECURRENT MAJOR DEPRESSIVE DISORDER, UNSPECIFIED DEPRESSION EPISODE SEVERITY (H): ICD-10-CM

## 2023-04-18 DIAGNOSIS — F33.9 RECURRENT MAJOR DEPRESSIVE DISORDER, REMISSION STATUS UNSPECIFIED (H): ICD-10-CM

## 2023-04-18 DIAGNOSIS — F41.9 ANXIETY: ICD-10-CM

## 2023-04-18 DIAGNOSIS — R13.10 DYSPHAGIA, UNSPECIFIED TYPE: ICD-10-CM

## 2023-04-18 DIAGNOSIS — J45.20 MILD INTERMITTENT ASTHMA, UNSPECIFIED WHETHER COMPLICATED: ICD-10-CM

## 2023-04-18 DIAGNOSIS — H91.90 HEARING LOSS, UNSPECIFIED HEARING LOSS TYPE, UNSPECIFIED LATERALITY: ICD-10-CM

## 2023-04-18 DIAGNOSIS — G47.33 OSA (OBSTRUCTIVE SLEEP APNEA): ICD-10-CM

## 2023-04-18 LAB
ALBUMIN SERPL BCG-MCNC: 4.4 G/DL (ref 3.5–5.2)
ALP SERPL-CCNC: 99 U/L (ref 35–104)
ALT SERPL W P-5'-P-CCNC: 25 U/L (ref 10–35)
ANION GAP SERPL CALCULATED.3IONS-SCNC: 13 MMOL/L (ref 7–15)
AST SERPL W P-5'-P-CCNC: 31 U/L (ref 10–35)
BILIRUB SERPL-MCNC: 0.3 MG/DL
BUN SERPL-MCNC: 13.8 MG/DL (ref 6–20)
CALCIUM SERPL-MCNC: 9.9 MG/DL (ref 8.6–10)
CHLORIDE SERPL-SCNC: 104 MMOL/L (ref 98–107)
CHOLEST SERPL-MCNC: 174 MG/DL
CREAT SERPL-MCNC: 1.06 MG/DL (ref 0.51–0.95)
CREAT UR-MCNC: 185 MG/DL
DEPRECATED HCO3 PLAS-SCNC: 24 MMOL/L (ref 22–29)
GFR SERPL CREATININE-BSD FRML MDRD: 62 ML/MIN/1.73M2
GLUCOSE SERPL-MCNC: 104 MG/DL (ref 70–99)
HBA1C MFR BLD: 5.7 % (ref 0–5.6)
HDLC SERPL-MCNC: 50 MG/DL
LDLC SERPL CALC-MCNC: 84 MG/DL
MICROALBUMIN UR-MCNC: <12 MG/L
MICROALBUMIN/CREAT UR: NORMAL MG/G{CREAT}
NONHDLC SERPL-MCNC: 124 MG/DL
POTASSIUM SERPL-SCNC: 4.5 MMOL/L (ref 3.4–5.3)
PROT SERPL-MCNC: 7.2 G/DL (ref 6.4–8.3)
SODIUM SERPL-SCNC: 141 MMOL/L (ref 136–145)
TRIGL SERPL-MCNC: 202 MG/DL
TSH SERPL DL<=0.005 MIU/L-ACNC: 3.05 UIU/ML (ref 0.3–4.2)

## 2023-04-18 PROCEDURE — 82570 ASSAY OF URINE CREATININE: CPT | Performed by: PEDIATRICS

## 2023-04-18 PROCEDURE — 84443 ASSAY THYROID STIM HORMONE: CPT | Performed by: PEDIATRICS

## 2023-04-18 PROCEDURE — 99214 OFFICE O/P EST MOD 30 MIN: CPT | Mod: 25 | Performed by: PEDIATRICS

## 2023-04-18 PROCEDURE — 99396 PREV VISIT EST AGE 40-64: CPT | Performed by: PEDIATRICS

## 2023-04-18 PROCEDURE — 80053 COMPREHEN METABOLIC PANEL: CPT | Performed by: PEDIATRICS

## 2023-04-18 PROCEDURE — 83036 HEMOGLOBIN GLYCOSYLATED A1C: CPT | Performed by: PEDIATRICS

## 2023-04-18 PROCEDURE — 99207 PR FOOT EXAM NO CHARGE: CPT | Mod: 25 | Performed by: PEDIATRICS

## 2023-04-18 PROCEDURE — 36415 COLL VENOUS BLD VENIPUNCTURE: CPT | Performed by: PEDIATRICS

## 2023-04-18 PROCEDURE — 82043 UR ALBUMIN QUANTITATIVE: CPT | Performed by: PEDIATRICS

## 2023-04-18 PROCEDURE — 80061 LIPID PANEL: CPT | Performed by: PEDIATRICS

## 2023-04-18 RX ORDER — BUPROPION HYDROCHLORIDE 300 MG/1
300 TABLET ORAL EVERY MORNING
Qty: 90 TABLET | Refills: 3 | Status: SHIPPED | OUTPATIENT
Start: 2023-04-18 | End: 2024-04-29

## 2023-04-18 RX ORDER — CITALOPRAM HYDROBROMIDE 40 MG/1
40 TABLET ORAL DAILY
Qty: 90 TABLET | Refills: 11 | Status: SHIPPED | OUTPATIENT
Start: 2023-04-18 | End: 2024-04-29

## 2023-04-18 ASSESSMENT — ENCOUNTER SYMPTOMS
CHILLS: 0
HEADACHES: 0
DIARRHEA: 0
ARTHRALGIAS: 0
CONSTIPATION: 0
ABDOMINAL PAIN: 0
FEVER: 0
NAUSEA: 0
MYALGIAS: 0
HEARTBURN: 0
EYE PAIN: 0
SORE THROAT: 0
JOINT SWELLING: 0
HEMATURIA: 0
SHORTNESS OF BREATH: 0
WEAKNESS: 0
HEMATOCHEZIA: 0
FREQUENCY: 0
DIZZINESS: 1
PARESTHESIAS: 0
PALPITATIONS: 0
COUGH: 0
NERVOUS/ANXIOUS: 0
DYSURIA: 0

## 2023-04-18 ASSESSMENT — ASTHMA QUESTIONNAIRES
QUESTION_5 LAST FOUR WEEKS HOW WOULD YOU RATE YOUR ASTHMA CONTROL: SOMEWHAT CONTROLLED
QUESTION_2 LAST FOUR WEEKS HOW OFTEN HAVE YOU HAD SHORTNESS OF BREATH: ONCE OR TWICE A WEEK
QUESTION_3 LAST FOUR WEEKS HOW OFTEN DID YOUR ASTHMA SYMPTOMS (WHEEZING, COUGHING, SHORTNESS OF BREATH, CHEST TIGHTNESS OR PAIN) WAKE YOU UP AT NIGHT OR EARLIER THAN USUAL IN THE MORNING: NOT AT ALL
ACT_TOTALSCORE: 22
QUESTION_1 LAST FOUR WEEKS HOW MUCH OF THE TIME DID YOUR ASTHMA KEEP YOU FROM GETTING AS MUCH DONE AT WORK, SCHOOL OR AT HOME: NONE OF THE TIME
QUESTION_4 LAST FOUR WEEKS HOW OFTEN HAVE YOU USED YOUR RESCUE INHALER OR NEBULIZER MEDICATION (SUCH AS ALBUTEROL): NOT AT ALL
ACT_TOTALSCORE: 22

## 2023-04-18 ASSESSMENT — PAIN SCALES - GENERAL: PAINLEVEL: NO PAIN (0)

## 2023-04-18 ASSESSMENT — PATIENT HEALTH QUESTIONNAIRE - PHQ9
SUM OF ALL RESPONSES TO PHQ QUESTIONS 1-9: 15
SUM OF ALL RESPONSES TO PHQ QUESTIONS 1-9: 15

## 2023-04-18 NOTE — LETTER
My Asthma Action Plan    Name: Racquel Amin   YOB: 1965  Date: 4/18/2023   My doctor: Nancy Small MD   My clinic: [facility]        My Rescue Medicine:   Albuterol inhaler (Proair/Ventolin/Proventil HFA)  2-4 puffs EVERY 4 HOURS as needed. Use a spacer if recommended by your provider.   My Asthma Severity:   Intermittent / Exercise Induced  Know your asthma triggers: upper respiratory infections and pollens             GREEN ZONE   Good Control  I feel good  No cough or wheeze  Can work, sleep and play without asthma symptoms       Take your asthma control medicine every day.     If exercise triggers your asthma, take your rescue medication  15 minutes before exercise or sports, and  During exercise if you have asthma symptoms  Spacer to use with inhaler: If you have a spacer, make sure to use it with your inhaler             YELLOW ZONE Getting Worse  I have ANY of these:  I do not feel good  Cough or wheeze  Chest feels tight  Wake up at night   Keep taking your Green Zone medications  Start taking your rescue medicine:  every 20 minutes for up to 1 hour. Then every 4 hours for 24-48 hours.  If you stay in the Yellow Zone for more than 12-24 hours, contact your doctor.  If you do not return to the Green Zone in 12-24 hours or you get worse, start taking your oral steroid medicine if prescribed by your provider.           RED ZONE Medical Alert - Get Help  I have ANY of these:  I feel awful  Medicine is not helping  Breathing getting harder  Trouble walking or talking  Nose opens wide to breathe       Take your rescue medicine NOW  If your provider has prescribed an oral steroid medicine, start taking it NOW  Call your doctor NOW  If you are still in the Red Zone after 20 minutes and you have not reached your doctor:  Take your rescue medicine again and  Call 911 or go to the emergency room right away    See your regular doctor within 2 weeks of an Emergency Room or  Urgent Care visit for follow-up treatment.          Annual Reminders:  Meet with Asthma Educator,  Flu Shot in the Fall, consider Pneumonia Vaccination for patients with asthma (aged 19 and older).    Pharmacy:    [facility] [place]  [place] - [address]  [place] [id number] [place] [facility] [address] [facility]    Electronically signed by Nancy Small MD   Date: 04/18/23                    Asthma Triggers  How To Control Things That Make Your Asthma Worse    Triggers are things that make your asthma worse.  Look at the list below to help you find your triggers and   what you can do about them. You can help prevent asthma flare-ups by staying away from your triggers.      Trigger                                                          What you can do   Cigarette Smoke  Tobacco smoke can make asthma worse. Do not allow smoking in your home, car or around you.  Be sure no one smokes at a child s day care or school.  If you smoke, ask your health care provider for ways to help you quit.  Ask family members to quit too.  Ask your health care provider for a referral to Quit Plan to help you quit smoking, or call 6-146-002-PLAN.     Colds, Flu, Bronchitis  These are common triggers of asthma. Wash your hands often.  Don t touch your eyes, nose or mouth.  Get a flu shot every year.     Dust Mites  These are tiny bugs that live in cloth or carpet. They are too small to see. Wash sheets and blankets in hot water every week.   Encase pillows and mattress in dust mite proof covers.  Avoid having carpet if you can. If you have carpet, vacuum weekly.   Use a dust mask and HEPA vacuum.   Pollen and Outdoor Mold  Some people are allergic to trees, grass, or weed pollen, or molds. Try to keep your windows closed.  Limit time out doors when pollen count is high.   Ask you health care provider about taking medicine during allergy season.     Animal Dander  Some people are allergic to skin flakes,  urine or saliva from pets with fur or feathers. Keep pets with fur or feathers out of your home.    If you can t keep the pet outdoors, then keep the pet out of your bedroom.  Keep the bedroom door closed.  Keep pets off cloth furniture and away from stuffed toys.     Mice, Rats, and Cockroaches  Some people are allergic to the waste from these pests.   Cover food and garbage.  Clean up spills and food crumbs.  Store grease in the refrigerator.   Keep food out of the bedroom.   Indoor Mold  This can be a trigger if your home has high moisture. Fix leaking faucets, pipes, or other sources of water.   Clean moldy surfaces.  Dehumidify basement if it is damp and smelly.   Smoke, Strong Odors, and Sprays  These can reduce air quality. Stay away from strong odors and sprays, such as perfume, powder, hair spray, paints, smoke incense, paint, cleaning products, candles and new carpet.   Exercise or Sports  Some people with asthma have this trigger. Be active!  Ask your doctor about taking medicine before sports or exercise to prevent symptoms.    Warm up for 5-10 minutes before and after sports or exercise.     Other Triggers of Asthma  Cold air:  Cover your nose and mouth with a scarf.  Sometimes laughing or crying can be a trigger.  Some medicines and food can trigger asthma.

## 2023-04-18 NOTE — PROGRESS NOTES
SUBJECTIVE:   CC: Abida is an 57 year old who presents for preventive health visit.   Patient has been advised of split billing requirements and indicates understanding: Yes   Answers for HPI/ROS submitted by the patient on 4/18/2023  PHQ9 TOTAL SCORE: 15      Healthy Habits:     Getting at least 3 servings of Calcium per day:  Yes    Bi-annual eye exam:  NO    Dental care twice a year:  NO    Sleep apnea or symptoms of sleep apnea:  Daytime drowsiness and Sleep apnea    Diet:  Regular (no restrictions)    Frequency of exercise:  1 day/week    Duration of exercise:  15-30 minutes    Taking medications regularly:  Yes    Medication side effects:  Not applicable and None    PHQ-2 Total Score: 4    Additional concerns today:  No      Today's PHQ-2 Score:       4/18/2023     1:05 PM   PHQ-2 ( 1999 [employer])   Q1: Little interest or pleasure in doing things 3   Q2: Feeling down, depressed or hopeless 1   PHQ-2 Score 4   Q1: Little interest or pleasure in doing things Nearly every day   Q2: Feeling down, depressed or hopeless Several days   PHQ-2 Score 4           Social History     Tobacco Use     Smoking status: Never     Smokeless tobacco: Never   Vaping Use     Vaping status: Never Used   Substance Use Topics     Alcohol use: Yes     Comment: 1 drink per week or less           4/18/2023     1:05 PM   Alcohol Use   Prescreen: >3 drinks/day or >7 drinks/week? No     Reviewed orders with patient.  Reviewed health maintenance and updated orders accordingly - Yes  Lab work is in process  Labs reviewed in EPIC  BP Readings from Last 3 Encounters:   04/18/23 132/80   03/21/23 (!) 163/110   03/15/23 124/78    Wt Readings from Last 3 Encounters:   04/18/23 113.1 kg (249 lb 6.4 oz)   03/21/23 108.9 kg (240 lb)   03/15/23 110.7 kg (244 lb)                    Breast Cancer Screening:    FHS-7:       3/15/2022    11:15 AM 3/17/2022    12:39 PM 3/15/2023     1:03 PM 3/20/2023     1:33 PM 4/18/2023     1:08 PM   Breast CA Risk  Assessment (FHS-7)   Did any of your first-degree relatives have breast or ovarian cancer? Yes Yes Yes Yes Yes   Did any of your relatives have bilateral breast cancer? Unknown No Yes No Yes   Did any man in your family have breast cancer? No No No No No   Did any woman in your family have breast and ovarian cancer? Yes No Yes No No   Did any woman in your family have breast cancer before age 50 y? No No Yes No No   Do you have 2 or more relatives with breast and/or ovarian cancer? Yes Yes Yes Yes No   Do you have 2 or more relatives with breast and/or bowel cancer? Yes Yes Yes Yes No       Mammogram Screening: Recommended mammography every 1-2 years with patient discussion and risk factor consideration  Pertinent mammograms are reviewed under the imaging tab.    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      Asthma - well controlled, no acute concerns.  Very rare albuterol use.    Diabetes - due for labs.  Plans to start walking again now that weather improving.  On metformin.    Wt Readings from Last 4 Encounters:   04/18/23 113.1 kg (249 lb 6.4 oz)   03/21/23 108.9 kg (240 lb)   03/15/23 110.7 kg (244 lb)   03/09/23 110.7 kg (244 lb)         HTN - controlled on current agents.  No new cardiac symptoms.    Depression - has struggled this winter.  Father passed away in January.  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  Feeling less motivated to do things.   "Open to adjustment in medication.      Dysphagia - on omeprazole and doing another EGD in May    Eye - due for exam    Noticing hearing loss and has tinnitus - interested in referral    Large number of moles - hasn't seen dermatology, but interested in full skin check        Review of Systems   Constitutional: Negative for chills and fever.   HENT: Positive for hearing loss. Negative for congestion, ear pain and sore throat.    Eyes: Negative for pain and visual disturbance.   Respiratory: Negative for cough and shortness of breath.    Cardiovascular: Negative for chest pain, palpitations and peripheral edema.   Gastrointestinal: Negative for abdominal pain, constipation, diarrhea, heartburn, hematochezia and nausea.   Genitourinary: Negative for dysuria, frequency, genital sores, hematuria and urgency.   Musculoskeletal: Negative for arthralgias, joint swelling and myalgias.   Skin: Negative for rash.   Neurological: Positive for dizziness. Negative for weakness, headaches and paresthesias.   Psychiatric/Behavioral: Negative for mood changes. The patient is not nervous/anxious.         OBJECTIVE:   /80 (BP Location: Right arm, Patient Position: Sitting, Cuff Size: Adult Large)   Pulse 84   Temp 98.5  F (36.9  C) (Tympanic)   Resp 16   Ht 1.639 m (5' 4.53\")   Wt 113.1 kg (249 lb 6.4 oz)   LMP 09/30/2019   SpO2 98%   BMI 42.11 kg/m     Wt Readings from Last 4 Encounters:   04/18/23 113.1 kg (249 lb 6.4 oz)   03/21/23 108.9 kg (240 lb)   03/15/23 110.7 kg (244 lb)   03/09/23 110.7 kg (244 lb)       Physical Exam  GENERAL: healthy, alert and no distress  EYES: Eyes grossly normal to inspection, PERRL and conjunctivae and sclerae normal  HENT: ear canals and TM's normal, nose and mouth without ulcers or lesions  NECK: no adenopathy, no asymmetry, masses, or scars and thyroid normal to palpation  RESP: lungs clear to auscultation - no rales, rhonchi or wheezes  CV: regular rate and rhythm, normal S1 S2, no " S3 or S4, no murmur, click or rub, no peripheral edema and peripheral pulses strong  ABDOMEN: soft, nontender, no hepatosplenomegaly, no masses and bowel sounds normal  MS: no gross musculoskeletal defects noted, no edema  SKIN: no suspicious lesions or rashes  NEURO: Normal strength and tone, mentation intact and speech normal  PSYCH: mentation appears normal, affect normal/bright  Diabetic foot exam: normal DP and PT pulses, no trophic changes or ulcerative lesions and normal sensory exam    Diagnostic Test Results:  pending    ASSESSMENT/PLAN:       ICD-10-CM    1. Routine history and physical examination of adult  Z00.00 Hemoglobin A1c     Albumin Random Urine Quantitative with Creat Ratio     Comprehensive metabolic panel     Lipid panel reflex to direct LDL Non-fasting     TSH with free T4 reflex    Discussed available vaccines, patient will consider.    Politely declines pap today - will consider next year    UTD on breast and colon cancer screening      2. ERICA (obstructive sleep apnea)  G47.33 Adult Sleep Eval & Management  Referral    Hasn't used CPAP for 2 years and is open to restarting - plan repeat sleep evaluation.        3. Mild intermittent asthma, unspecified whether complicated  J45.20 Well controlled, continue current medications        4. Type 2 diabetes mellitus without complication, without long-term current use of insulin (H)  E11.9 FOOT EXAM     Hemoglobin A1c     Albumin Random Urine Quantitative with Creat Ratio     Comprehensive metabolic panel     Lipid panel reflex to direct LDL Non-fasting     TSH with free T4 reflex    Repeat labs today, adjust therapies as needed, encouraged in healthy lifestyle plans      5. Benign essential hypertension  I10 Well controlled, continue current medications        6. Stage 3 chronic kidney disease, unspecified whether stage 3a or 3b CKD (H)  N18.30 Continue to follow - optimize blood sugar and blood pressure      7. Episode of recurrent major  "depressive disorder, unspecified depression episode severity (H)  F33.9 buPROPion (WELLBUTRIN XL) 300 MG 24 hr tablet    Suboptimally controlled - tough winter.  Continue celexa at current dose and increase wellbutrin to 300mg daily.  Patient will update me with how she is responding via MyChart in 3-4 weeks      8. History of pulmonary embolus (PE)  Z86.711 No current evidence of symptoms      9. Dysphagia, unspecified type  R13.10 Continue PPI, following with GI and has repeat EGD next month      10. Recurrent major depressive disorder, remission status unspecified (H)  F33.9 citalopram (CELEXA) 40 MG tablet  See above      11. Anxiety  F41.9 citalopram (CELEXA) 40 MG tablet  See above      12. Morbid obesity, unspecified obesity type (H)  E66.01 metFORMIN (GLUCOPHAGE) 500 MG tablet    Complicating management of diabetes, htn - continue metformin, labs today, encouraged in plans to start walking      13. Multiple pigmented nevi  D22.9 Adult Dermatology Referral  Due for full skin exam      14. Hearing loss, unspecified hearing loss type, unspecified laterality  H91.90 Adult Audiology  Referral              COUNSELING:  Reviewed preventive health counseling, as reflected in patient instructions      BMI:   Estimated body mass index is 42.11 kg/m  as calculated from the following:    Height as of this encounter: 1.639 m (5' 4.53\").    Weight as of this encounter: 113.1 kg (249 lb 6.4 oz).   Weight management plan: Discussed healthy diet and exercise guidelines      She reports that she has never smoked. She has never used smokeless tobacco.      Nancy Small MD  [facility]  "

## 2023-04-18 NOTE — PATIENT INSTRUCTIONS
Schedule an eye visit    Labs today    Increase your wellbutrin dose - take in the morning - let me know how this is going in a few weeks    I love your plan to start walking!    Expect a call to set up a visit with the sleep medicine doctors     Vaccines to think about at the pharmacy:  COVID Booster (Bivalent)  2nd Shingrix  Prevnar 20 (pneumonia)              Preventive Health Recommendations  Female Ages 50 - 64    Yearly exam: See your health care provider every year in order to  Review health changes.   Discuss preventive care.    Review your medicines if your doctor has prescribed any.    Get a Pap test every three years (unless you have an abnormal result and your provider advises testing more often).  If you get Pap tests with HPV test, you only need to test every 5 years, unless you have an abnormal result.   You do not need a Pap test if your uterus was removed (hysterectomy) and you have not had cancer.  You should be tested each year for STDs (sexually transmitted diseases) if you're at risk.   Have a mammogram every 1 to 2 years.  Have a colonoscopy at age 50, or have a yearly FIT test (stool test). These exams screen for colon cancer.    Have a cholesterol test every 5 years, or more often if advised.  Have a diabetes test (fasting glucose) every three years. If you are at risk for diabetes, you should have this test more often.   If you are at risk for osteoporosis (brittle bone disease), think about having a bone density scan (DEXA).    Shots: Get a flu shot each year. Get a tetanus shot every 10 years.    Nutrition:   Eat at least 5 servings of fruits and vegetables each day.  Eat whole-grain bread, whole-wheat pasta and brown rice instead of white grains and rice.  Get adequate Calcium and Vitamin D.     Lifestyle  Exercise at least 150 minutes a week (30 minutes a day, 5 days a week). This will help you control your weight and prevent disease.  Limit alcohol to one drink per day.  No smoking.    Wear sunscreen to prevent skin cancer.   See your dentist every six months for an exam and cleaning.  See your eye doctor every 1 to 2 years.

## 2023-05-26 ENCOUNTER — TELEPHONE (OUTPATIENT)
Dept: GASTROENTEROLOGY | Facility: CLINIC | Age: 58
End: 2023-05-26
Payer: COMMERCIAL

## 2023-05-26 NOTE — TELEPHONE ENCOUNTER
Pre assessment questions completed for upcoming Upper endoscopy (EGD) procedure scheduled on 6.13.2023    COVID policy reviewed.     Pre-op exam? Yes. 6.8.2023 with Shara Teresa MD    Reviewed procedural arrival time 1300, procedure time 1400 and facility location [facility]; [address] [place] [address]    Designated  policy reviewed. Instructed to have someone stay 24 hours post procedure.     NSAIDs? No    Anticoagulation/blood thinners? No    Electronic implanted devices? No    Diabetic? Yes. Oral medications.  Metformin (glucophage). Patient to hold oral diabetic medications day of procedure.    Procedure indication: LA grade D esophagitis    Reviewed procedure prep instructions.     Prep instructions sent via [employer].      Patient verbalized understanding and had no questions or concerns at this time.    Yuly Gomes RN  Endoscopy Procedure Pre Assessment RN

## 2023-06-08 ENCOUNTER — OFFICE VISIT (OUTPATIENT)
Dept: FAMILY MEDICINE | Facility: CLINIC | Age: 58
End: 2023-06-08
Payer: COMMERCIAL

## 2023-06-08 VITALS
BODY MASS INDEX: 42.88 KG/M2 | OXYGEN SATURATION: 99 % | DIASTOLIC BLOOD PRESSURE: 81 MMHG | HEART RATE: 82 BPM | WEIGHT: 251.2 LBS | TEMPERATURE: 98.2 F | RESPIRATION RATE: 18 BRPM | SYSTOLIC BLOOD PRESSURE: 119 MMHG | HEIGHT: 64 IN

## 2023-06-08 DIAGNOSIS — I10 BENIGN ESSENTIAL HYPERTENSION: ICD-10-CM

## 2023-06-08 DIAGNOSIS — Z01.818 PRE-OP EXAM: Primary | ICD-10-CM

## 2023-06-08 DIAGNOSIS — G47.33 OSA (OBSTRUCTIVE SLEEP APNEA): ICD-10-CM

## 2023-06-08 DIAGNOSIS — E11.9 TYPE 2 DIABETES MELLITUS WITHOUT COMPLICATION, WITHOUT LONG-TERM CURRENT USE OF INSULIN (H): ICD-10-CM

## 2023-06-08 DIAGNOSIS — E66.01 MORBID OBESITY, UNSPECIFIED OBESITY TYPE (H): ICD-10-CM

## 2023-06-08 DIAGNOSIS — N18.30 STAGE 3 CHRONIC KIDNEY DISEASE, UNSPECIFIED WHETHER STAGE 3A OR 3B CKD (H): ICD-10-CM

## 2023-06-08 DIAGNOSIS — K20.90 ESOPHAGITIS: ICD-10-CM

## 2023-06-08 PROCEDURE — 99214 OFFICE O/P EST MOD 30 MIN: CPT | Performed by: FAMILY MEDICINE

## 2023-06-08 ASSESSMENT — PAIN SCALES - GENERAL: PAINLEVEL: NO PAIN (0)

## 2023-06-08 ASSESSMENT — PATIENT HEALTH QUESTIONNAIRE - PHQ9: SUM OF ALL RESPONSES TO PHQ QUESTIONS 1-9: 2

## 2023-06-08 NOTE — H&P (VIEW-ONLY)
[facility]  [address] [address]  Phone: [phone]  Primary Provider: Nancy Smlal  Pre-op Performing Provider: ROBBIE ESCAMILLA      PREOPERATIVE EVALUATION:  Today's date: 6/8/2023    Racquel Amin is a 57 year old female who presents for a preoperative evaluation.      6/8/2023     1:06 PM   Additional Questions   Roomed by Jane LESTER     Surgical Information:  Surgery/Procedure: Esophagoscopy, gastroscopy, duodenoscopy (EGD), combined  Surgery Location: [facility]   Surgeon: Boone Acevedo MD  Surgery Date: 06/13/2023  Time of Surgery: 1pm  Where patient plans to recover: At home with family  Fax number for surgical facility: Note does not need to be faxed, will be available electronically in Epic.    Assessment & Plan     The proposed surgical procedure is considered LOW risk.    Pre-op exam    Esophagitis    Type 2 diabetes mellitus without complication, without long-term current use of insulin (H) - stable    Benign essential hypertension - stable    Stage 3 chronic kidney disease, unspecified whether stage 3a or 3b CKD (H) - stable    ERICA (obstructive sleep apnea) - not using CPAP, respiratory monitoring needed during procedure    Morbid obesity, unspecified obesity type (H)     - No identified additional risk factors other than previously addressed    Antiplatelet or Anticoagulation Medication Instructions:   - Patient is on no antiplatelet or anticoagulation medications.    Additional Medication Instructions:  Patient is to take all scheduled medications on the day of surgery    RECOMMENDATION:  APPROVAL GIVEN to proceed with proposed procedure, without further diagnostic evaluation.      Subjective       HPI related to upcoming procedure: esophagitis, hiatal hernia - surveillance imaging        6/8/2023     1:01 PM   Preop Questions   1. Have you ever had a heart attack or stroke? No   2. Have you ever had surgery on your heart or  blood vessels, such as a stent placement, a coronary artery bypass, or surgery on an artery in your head, neck, heart, or legs? No   3. Do you have chest pain with activity? No   4. Do you have a history of  heart failure? No   5. Do you currently have a cold, bronchitis or symptoms of other infection? No   6. Do you have a cough, shortness of breath, or wheezing? No   7. Do you or anyone in your family have previous history of blood clots? YES - DVT lead to PE, was on OCP at the time   8. Do you or does anyone in your family have a serious bleeding problem such as prolonged bleeding following surgeries or cuts? No   9. Have you ever had problems with anemia or been told to take iron pills? No   10. Have you had any abnormal blood loss such as black, tarry or bloody stools, or abnormal vaginal bleeding? No   11. Have you ever had a blood transfusion? No   12. Are you willing to have a blood transfusion if it is medically needed before, during, or after your surgery? Yes   13. Have you or any of your relatives ever had problems with anesthesia? No   14. Do you have sleep apnea, excessive snoring or daytime drowsiness? YES - has ERICA, not currently using CPAP   14a. Do you have a CPAP machine? No   15. Do you have any artifical heart valves or other implanted medical devices like a pacemaker, defibrillator, or continuous glucose monitor? No   16. Do you have artificial joints? No   17. Are you allergic to latex? No   18. Is there any chance that you may be pregnant? No       Health Care Directive:  Patient does not have a Health Care Directive or Living Will: Advance Directive received and scanned. Click on Code in the patient header to view.    Preoperative Review of :   reviewed - no record of controlled substances prescribed.      Status of Chronic Conditions:  See problem list for active medical problems.  Problems all longstanding and stable, except as noted/documented.  See ROS for pertinent symptoms  related to these conditions.      Review of Systems  Constitutional, neuro, ENT, endocrine, pulmonary, cardiac, gastrointestinal, genitourinary, musculoskeletal, integument and psychiatric systems are negative, except as otherwise noted.    Patient Active Problem List    Diagnosis Date Noted     Type 2 diabetes mellitus without complication, without long-term current use of insulin (H) 11/09/2020     Priority: Medium     Carpal tunnel syndrome of right wrist 01/24/2020     Priority: Medium     Added automatically from request for surgery [id number]       History of pulmonary embolus (PE) 09/30/2019     Priority: Medium     Mild intermittent asthma, unspecified whether complicated 01/14/2019     Priority: Medium     Benign essential hypertension 01/14/2019     Priority: Medium     ERICA (obstructive sleep apnea) 07/17/2018     Priority: Medium     Morbid obesity, unspecified obesity type (H) 10/24/2016     Priority: Medium     Anxiety 07/07/2014     Priority: Medium     Major depression in complete remission (H) 01/08/2013     Priority: Medium     CKD (chronic kidney disease) stage 3, GFR 30-59 ml/min (H) 06/21/2011     Priority: Medium     CARDIOVASCULAR SCREENING; LDL GOAL LESS THAN 160 02/10/2010     Priority: Medium     Family history of malignant neoplasm of breast 02/11/2005     Priority: Medium     Aunt with breast cancer           Past Medical History:   Diagnosis Date     Arthritis     hands     Bilateral pulmonary embolism (H) 9/30/2019     Diabetes (H)      HTN (hypertension)      IFG (impaired fasting glucose) 1/26/2020     Mild intermittent asthma, unspecified whether complicated 1/14/2019     Morbid obesity (H)      ERICA (obstructive sleep apnea)     CPAP     Past Surgical History:   Procedure Laterality Date     ESOPHAGOSCOPY, GASTROSCOPY, DUODENOSCOPY (EGD), COMBINED N/A 3/21/2023    Procedure: Esophagoscopy, gastroscopy, duodenoscopy (EGD), combined;  Surgeon: Boone Acevedo MD;  [facility]: [facility]     NO  HISTORY OF SURGERY       RELEASE CARPAL TUNNEL Right 2/18/2020    Procedure: Right carpal tunnel release;  Surgeon: Henry Rogers MD;  Location: [facility] OR     REPAIR TENDON ANKLE Left 11/12/2020    Procedure: Peroneal tendon repair left lower extremity;  Surgeon: David Chacon DPM;  Location: [facility] OR     Current Outpatient Medications   Medication Sig Dispense Refill     albuterol (PROAIR HFA) 108 (90 Base) MCG/ACT inhaler INHALE 2 PUFFS BY MOUTH EVERY 6 HOURS AS NEEDED FOR WHEEZE OR FOR SHORTNESS OF BREATH 8.5 Inhaler 3     albuterol (PROVENTIL) (2.5 MG/3ML) 0.083% neb solution Take 1 vial (2.5 mg) by nebulization every 6 hours as needed for shortness of breath / dyspnea or wheezing 90 mL 3     atorvastatin (LIPITOR) 20 MG tablet TAKE 1 TABLET BY MOUTH EVERY DAY 90 tablet 3     buPROPion (WELLBUTRIN XL) 300 MG 24 hr tablet Take 1 tablet (300 mg) by mouth every morning 90 tablet 3     citalopram (CELEXA) 40 MG tablet Take 1 tablet (40 mg) by mouth daily 90 tablet 11     glucose blood VI test strips strip by In Vitro route daily. 1 Box 12     lisinopril (ZESTRIL) 40 MG tablet TAKE 1 TABLET BY MOUTH EVERY DAY 90 tablet 3     meclizine (ANTIVERT) 25 MG tablet Take 1 tablet (25 mg) by mouth 3 times daily as needed for dizziness 30 tablet 11     metFORMIN (GLUCOPHAGE) 500 MG tablet TAKE 1 TABLET BY MOUTH EVERY DAY WITH BREAKFAST 90 tablet 3     multivitamin, therapeutic (THERA-VIT) TABS tablet Take 1 tablet by mouth daily       omeprazole (PRILOSEC) 40 MG DR capsule Take 1 capsule (40 mg) by mouth 2 times daily 90 capsule 0       Allergies   Allergen Reactions     No Known Allergies         Social History     Tobacco Use     Smoking status: Never     Smokeless tobacco: Never   Vaping Use     Vaping status: Never Used   Substance Use Topics     Alcohol use: Yes     Comment: 1 drink per week or less     Family History   Problem Relation Age of Onset     Breast Cancer Mother         S/P bilateral mastectomy     Brain  "Tumor Mother      Dementia Mother      C.A.D. Father         CABG-age 60     Cerebrovascular Disease Maternal Grandmother      Breast Cancer Paternal Aunt         age 40     Cancer Maternal Aunt         melanoma     Diabetes No family hx of      Hypertension No family hx of      Cancer - colorectal No family hx of      Thyroid Disease No family hx of      History   Drug Use No         Objective     /81 (BP Location: Right arm, Patient Position: Sitting, Cuff Size: Adult Large)   Pulse 82   Temp 98.2  F (36.8  C) (Oral)   Resp 18   Ht 1.632 m (5' 4.25\")   Wt 113.9 kg (251 lb 3.2 oz)   LMP 09/30/2019   SpO2 99%   BMI 42.78 kg/m      Physical Exam    GENERAL APPEARANCE: healthy, alert and no distress     EYES: EOMI, PERRL     HENT: ear canals and TM's normal and nose and mouth without ulcers or lesions     NECK: no adenopathy, no asymmetry, masses, or scars and thyroid normal to palpation     RESP: lungs clear to auscultation - no rales, rhonchi or wheezes     CV: regular rates and rhythm, normal S1 S2, no S3 or S4 and no murmur, click or rub     ABDOMEN:  soft, nontender, no HSM or masses and bowel sounds normal     MS: extremities normal- no gross deformities noted, no evidence of inflammation in joints, FROM in all extremities.     SKIN: no suspicious lesions or rashes     NEURO: Normal strength and tone, sensory exam grossly normal, mentation intact and speech normal     PSYCH: mentation appears normal. and affect normal/bright     LYMPHATICS: No cervical adenopathy    Recent Labs   Lab Test 04/18/23  1412 03/15/23  1344 03/15/22  1202   HGB  --  13.8 13.6   PLT  --  256 308    143 140   POTASSIUM 4.5 4.6 4.0   CR 1.06* 1.15* 1.08*   A1C 5.7* 5.9* 6.1*        Diagnostics:  No labs were ordered during this visit.   No EKG required for low risk surgery (cataract, skin procedure, breast biopsy, etc).    Revised Cardiac Risk Index (RCRI):  The patient has the following serious cardiovascular risks " for perioperative complications:   - No serious cardiac risks = 0 points     RCRI Interpretation: 0 points: Class I (very low risk - 0.4% complication rate)           Signed Electronically by: Shara Teresa MD  Copy of this evaluation report is provided to requesting physician.

## 2023-06-08 NOTE — PATIENT INSTRUCTIONS
For informational purposes only. Not to replace the advice of your health care provider. Copyright   2003,  [facility]. All rights reserved. Clinically reviewed by Gini Bay MD. [employer] [id number] - REV .  Preparing for Your Surgery  Getting started  A nurse will call you to review your health history and instructions. They will give you an arrival time based on your scheduled surgery time. Please be ready to share:  Your doctor's clinic name and phone number  Your medical, surgical, and anesthesia history  A list of allergies and sensitivities  A list of medicines, including herbal treatments and over-the-counter drugs  Whether the patient has a legal guardian (ask how to send us the papers in advance)  Please tell us if you're pregnant--or if there's any chance you might be pregnant. Some surgeries may injure a fetus (unborn baby), so they require a pregnancy test. Surgeries that are safe for a fetus don't always need a test, and you can choose whether to have one.   If you have a child who's having surgery, please ask for a copy of Preparing for Your Child's Surgery.    Preparing for surgery  Within 10 to 30 days of surgery: Have a pre-op exam (sometimes called an H&P, or History and Physical). This can be done at a clinic or pre-operative center.  If you're having a , you may not need this exam. Talk to your care team.  At your pre-op exam, talk to your care team about all medicines you take. If you need to stop any medicines before surgery, ask when to start taking them again.  We do this for your safety. Many medicines can make you bleed too much during surgery. Some change how well surgery (anesthesia) drugs work.  Call your insurance company to let them know you're having surgery. (If you don't have insurance, call [phone].)  Call your clinic if there's any change in your health. This includes signs of a cold or flu (sore throat, runny nose, cough, rash, fever). It  also includes a scrape or scratch near the surgery site.  If you have questions on the day of surgery, call your hospital or surgery center.  Eating and drinking guidelines  For your safety: Unless your surgeon tells you otherwise, follow the guidelines below.  Eat and drink as usual until 8 hours before you arrive for surgery. After that, no food or milk.  Drink clear liquids until 2 hours before you arrive. These are liquids you can see through, like water, Gatorade, and Propel Water. They also include plain black coffee and tea (no cream or milk), candy, and breath mints. You can spit out gum when you arrive.  If you drink alcohol: Stop drinking it the night before surgery.  If your care team tells you to take medicine on the morning of surgery, it's okay to take it with a sip of water.  Preventing infection  Shower or bathe the night before and morning of your surgery. Follow the instructions your clinic gave you. (If no instructions, use regular soap.)  Don't shave or clip hair near your surgery site. We'll remove the hair if needed.  Don't smoke or vape the morning of surgery. You may chew nicotine gum up to 2 hours before surgery. A nicotine patch is okay.  Note: Some surgeries require you to completely quit smoking and nicotine. Check with your surgeon.  Your care team will make every effort to keep you safe from infection. We will:  Clean our hands often with soap and water (or an alcohol-based hand rub).  Clean the skin at your surgery site with a special soap that kills germs.  Give you a special gown to keep you warm. (Cold raises the risk of infection.)  Wear special hair covers, masks, gowns and gloves during surgery.  Give antibiotic medicine, if prescribed. Not all surgeries need antibiotics.  What to bring on the day of surgery  Photo ID and insurance card  Copy of your health care directive, if you have one  Glasses and hearing aids (bring cases)  You can't wear contacts during surgery  Inhaler and  eye drops, if you use them (tell us about these when you arrive)  CPAP machine or breathing device, if you use them  A few personal items, if spending the night  If you have . . .  A pacemaker, ICD (cardiac defibrillator) or other implant: Bring the ID card.  An implanted stimulator: Bring the remote control.  A legal guardian: Bring a copy of the certified (court-stamped) guardianship papers.  Please remove any jewelry, including body piercings. Leave jewelry and other valuables at home.  If you're going home the day of surgery  You must have a responsible adult drive you home. They should stay with you overnight as well.  If you don't have someone to stay with you, and you aren't safe to go home alone, we may keep you overnight. Insurance often won't pay for this.  After surgery  If it's hard to control your pain or you need more pain medicine, please call your surgeon's office.  Questions?   If you have any questions for your care team, list them here: _________________________________________________________________________________________________________________________________________________________________________ ____________________________________ ____________________________________ ____________________________________    How to Take Your Medication Before Surgery  - Take all of your medications before surgery as usual

## 2023-06-08 NOTE — PROGRESS NOTES
[facility]  [address] [facility] [address]  Phone: [phone]  Primary Provider: Nnacy Small  Pre-op Performing Provider: ROBBIE ESCAMILLA      PREOPERATIVE EVALUATION:  Today's date: 6/8/2023    Racquel Amin is a 57 year old female who presents for a preoperative evaluation.      6/8/2023     1:06 PM   Additional Questions   Roomed by Jane LESTER     Surgical Information:  Surgery/Procedure: Esophagoscopy, gastroscopy, duodenoscopy (EGD), combined  Surgery Location: [facility]   Surgeon: Boone Acevedo MD  Surgery Date: 06/13/2023  Time of Surgery: 1pm  Where patient plans to recover: At home with family  Fax number for surgical facility: Note does not need to be faxed, will be available electronically in Epic.    Assessment & Plan     The proposed surgical procedure is considered LOW risk.    Pre-op exam    Esophagitis    Type 2 diabetes mellitus without complication, without long-term current use of insulin (H) - stable    Benign essential hypertension - stable    Stage 3 chronic kidney disease, unspecified whether stage 3a or 3b CKD (H) - stable    ERICA (obstructive sleep apnea) - not using CPAP, respiratory monitoring needed during procedure    Morbid obesity, unspecified obesity type (H)     - No identified additional risk factors other than previously addressed    Antiplatelet or Anticoagulation Medication Instructions:   - Patient is on no antiplatelet or anticoagulation medications.    Additional Medication Instructions:  Patient is to take all scheduled medications on the day of surgery    RECOMMENDATION:  APPROVAL GIVEN to proceed with proposed procedure, without further diagnostic evaluation.      Subjective       HPI related to upcoming procedure: esophagitis, hiatal hernia - surveillance imaging        6/8/2023     1:01 PM   Preop Questions   1. Have you ever had a heart attack or stroke? No   2. Have you ever had surgery on your heart or  blood vessels, such as a stent placement, a coronary artery bypass, or surgery on an artery in your head, neck, heart, or legs? No   3. Do you have chest pain with activity? No   4. Do you have a history of  heart failure? No   5. Do you currently have a cold, bronchitis or symptoms of other infection? No   6. Do you have a cough, shortness of breath, or wheezing? No   7. Do you or anyone in your family have previous history of blood clots? YES - DVT lead to PE, was on OCP at the time   8. Do you or does anyone in your family have a serious bleeding problem such as prolonged bleeding following surgeries or cuts? No   9. Have you ever had problems with anemia or been told to take iron pills? No   10. Have you had any abnormal blood loss such as black, tarry or bloody stools, or abnormal vaginal bleeding? No   11. Have you ever had a blood transfusion? No   12. Are you willing to have a blood transfusion if it is medically needed before, during, or after your surgery? Yes   13. Have you or any of your relatives ever had problems with anesthesia? No   14. Do you have sleep apnea, excessive snoring or daytime drowsiness? YES - has ERICA, not currently using CPAP   14a. Do you have a CPAP machine? No   15. Do you have any artifical heart valves or other implanted medical devices like a pacemaker, defibrillator, or continuous glucose monitor? No   16. Do you have artificial joints? No   17. Are you allergic to latex? No   18. Is there any chance that you may be pregnant? No       Health Care Directive:  Patient does not have a Health Care Directive or Living Will: Advance Directive received and scanned. Click on Code in the patient header to view.    Preoperative Review of :   reviewed - no record of controlled substances prescribed.      Status of Chronic Conditions:  See problem list for active medical problems.  Problems all longstanding and stable, except as noted/documented.  See ROS for pertinent symptoms  related to these conditions.      Review of Systems  Constitutional, neuro, ENT, endocrine, pulmonary, cardiac, gastrointestinal, genitourinary, musculoskeletal, integument and psychiatric systems are negative, except as otherwise noted.    Patient Active Problem List    Diagnosis Date Noted     Type 2 diabetes mellitus without complication, without long-term current use of insulin (H) 11/09/2020     Priority: Medium     Carpal tunnel syndrome of right wrist 01/24/2020     Priority: Medium     Added automatically from request for surgery [id number]       History of pulmonary embolus (PE) 09/30/2019     Priority: Medium     Mild intermittent asthma, unspecified whether complicated 01/14/2019     Priority: Medium     Benign essential hypertension 01/14/2019     Priority: Medium     ERICA (obstructive sleep apnea) 07/17/2018     Priority: Medium     Morbid obesity, unspecified obesity type (H) 10/24/2016     Priority: Medium     Anxiety 07/07/2014     Priority: Medium     Major depression in complete remission (H) 01/08/2013     Priority: Medium     CKD (chronic kidney disease) stage 3, GFR 30-59 ml/min (H) 06/21/2011     Priority: Medium     CARDIOVASCULAR SCREENING; LDL GOAL LESS THAN 160 02/10/2010     Priority: Medium     Family history of malignant neoplasm of breast 02/11/2005     Priority: Medium     Aunt with breast cancer           Past Medical History:   Diagnosis Date     Arthritis     hands     Bilateral pulmonary embolism (H) 9/30/2019     Diabetes (H)      HTN (hypertension)      IFG (impaired fasting glucose) 1/26/2020     Mild intermittent asthma, unspecified whether complicated 1/14/2019     Morbid obesity (H)      ERICA (obstructive sleep apnea)     CPAP     Past Surgical History:   Procedure Laterality Date     ESOPHAGOSCOPY, GASTROSCOPY, DUODENOSCOPY (EGD), COMBINED N/A 3/21/2023    Procedure: Esophagoscopy, gastroscopy, duodenoscopy (EGD), combined;  Surgeon: Boone Acevedo MD;  [facility]     NO  HISTORY OF SURGERY       RELEASE CARPAL TUNNEL Right 2/18/2020    Procedure: Right carpal tunnel release;  Surgeon: Henry Rogers MD;  Location: [facility] OR     REPAIR TENDON ANKLE Left 11/12/2020    Procedure: Peroneal tendon repair left lower extremity;  Surgeon: David Chacon DPM;  Location: [facility] OR     Current Outpatient Medications   Medication Sig Dispense Refill     albuterol (PROAIR HFA) 108 (90 Base) MCG/ACT inhaler INHALE 2 PUFFS BY MOUTH EVERY 6 HOURS AS NEEDED FOR WHEEZE OR FOR SHORTNESS OF BREATH 8.5 Inhaler 3     albuterol (PROVENTIL) (2.5 MG/3ML) 0.083% neb solution Take 1 vial (2.5 mg) by nebulization every 6 hours as needed for shortness of breath / dyspnea or wheezing 90 mL 3     atorvastatin (LIPITOR) 20 MG tablet TAKE 1 TABLET BY MOUTH EVERY DAY 90 tablet 3     buPROPion (WELLBUTRIN XL) 300 MG 24 hr tablet Take 1 tablet (300 mg) by mouth every morning 90 tablet 3     citalopram (CELEXA) 40 MG tablet Take 1 tablet (40 mg) by mouth daily 90 tablet 11     glucose blood VI test strips strip by In Vitro route daily. 1 Box 12     lisinopril (ZESTRIL) 40 MG tablet TAKE 1 TABLET BY MOUTH EVERY DAY 90 tablet 3     meclizine (ANTIVERT) 25 MG tablet Take 1 tablet (25 mg) by mouth 3 times daily as needed for dizziness 30 tablet 11     metFORMIN (GLUCOPHAGE) 500 MG tablet TAKE 1 TABLET BY MOUTH EVERY DAY WITH BREAKFAST 90 tablet 3     multivitamin, therapeutic (THERA-VIT) TABS tablet Take 1 tablet by mouth daily       omeprazole (PRILOSEC) 40 MG DR capsule Take 1 capsule (40 mg) by mouth 2 times daily 90 capsule 0       Allergies   Allergen Reactions     No Known Allergies         Social History     Tobacco Use     Smoking status: Never     Smokeless tobacco: Never   Vaping Use     Vaping status: Never Used   Substance Use Topics     Alcohol use: Yes     Comment: 1 drink per week or less     Family History   Problem Relation Age of Onset     Breast Cancer Mother         S/P bilateral mastectomy     Brain  "Tumor Mother      Dementia Mother      C.A.D. Father         CABG-age 60     Cerebrovascular Disease Maternal Grandmother      Breast Cancer Paternal Aunt         age 40     Cancer Maternal Aunt         melanoma     Diabetes No family hx of      Hypertension No family hx of      Cancer - colorectal No family hx of      Thyroid Disease No family hx of      History   Drug Use No         Objective     /81 (BP Location: Right arm, Patient Position: Sitting, Cuff Size: Adult Large)   Pulse 82   Temp 98.2  F (36.8  C) (Oral)   Resp 18   Ht 1.632 m (5' 4.25\")   Wt 113.9 kg (251 lb 3.2 oz)   LMP 09/30/2019   SpO2 99%   BMI 42.78 kg/m      Physical Exam    GENERAL APPEARANCE: healthy, alert and no distress     EYES: EOMI, PERRL     HENT: ear canals and TM's normal and nose and mouth without ulcers or lesions     NECK: no adenopathy, no asymmetry, masses, or scars and thyroid normal to palpation     RESP: lungs clear to auscultation - no rales, rhonchi or wheezes     CV: regular rates and rhythm, normal S1 S2, no S3 or S4 and no murmur, click or rub     ABDOMEN:  soft, nontender, no HSM or masses and bowel sounds normal     MS: extremities normal- no gross deformities noted, no evidence of inflammation in joints, FROM in all extremities.     SKIN: no suspicious lesions or rashes     NEURO: Normal strength and tone, sensory exam grossly normal, mentation intact and speech normal     PSYCH: mentation appears normal. and affect normal/bright     LYMPHATICS: No cervical adenopathy    Recent Labs   Lab Test 04/18/23  1412 03/15/23  1344 03/15/22  1202   HGB  --  13.8 13.6   PLT  --  256 308    143 140   POTASSIUM 4.5 4.6 4.0   CR 1.06* 1.15* 1.08*   A1C 5.7* 5.9* 6.1*        Diagnostics:  No labs were ordered during this visit.   No EKG required for low risk surgery (cataract, skin procedure, breast biopsy, etc).    Revised Cardiac Risk Index (RCRI):  The patient has the following serious cardiovascular risks " for perioperative complications:   - No serious cardiac risks = 0 points     RCRI Interpretation: 0 points: Class I (very low risk - 0.4% complication rate)           Signed Electronically by: Shara Teresa MD  Copy of this evaluation report is provided to requesting physician.

## 2023-06-13 ENCOUNTER — HOSPITAL ENCOUNTER (OUTPATIENT)
Facility: CLINIC | Age: 58
Discharge: HOME | End: 2023-06-13
Attending: INTERNAL MEDICINE | Admitting: INTERNAL MEDICINE
Payer: COMMERCIAL

## 2023-06-13 ENCOUNTER — ANESTHESIA (OUTPATIENT)
Dept: GASTROENTEROLOGY | Facility: CLINIC | Age: 58
End: 2023-06-13
Payer: COMMERCIAL

## 2023-06-13 VITALS
SYSTOLIC BLOOD PRESSURE: 125 MMHG | DIASTOLIC BLOOD PRESSURE: 75 MMHG | HEART RATE: 74 BPM | RESPIRATION RATE: 17 BRPM | OXYGEN SATURATION: 96 %

## 2023-06-13 LAB
GLUCOSE BLDC GLUCOMTR-MCNC: 106 MG/DL (ref 70–99)
UPPER GI ENDOSCOPY: NORMAL

## 2023-06-13 PROCEDURE — 370N000017 HC ANESTHESIA TECHNICAL FEE, PER MIN: Performed by: INTERNAL MEDICINE

## 2023-06-13 PROCEDURE — 258N000003 HC RX IP 258 OP 636: Performed by: NURSE ANESTHETIST, CERTIFIED REGISTERED

## 2023-06-13 PROCEDURE — 999N000010 HC STATISTIC ANES STAT CODE-CRNA PER MINUTE: Performed by: INTERNAL MEDICINE

## 2023-06-13 PROCEDURE — 250N000011 HC RX IP 250 OP 636: Performed by: NURSE ANESTHETIST, CERTIFIED REGISTERED

## 2023-06-13 PROCEDURE — 43235 EGD DIAGNOSTIC BRUSH WASH: CPT | Performed by: INTERNAL MEDICINE

## 2023-06-13 PROCEDURE — 250N000009 HC RX 250: Performed by: NURSE ANESTHETIST, CERTIFIED REGISTERED

## 2023-06-13 PROCEDURE — 82962 GLUCOSE BLOOD TEST: CPT

## 2023-06-13 RX ORDER — ONDANSETRON 4 MG/1
4 TABLET, ORALLY DISINTEGRATING ORAL EVERY 6 HOURS PRN
Status: DISCONTINUED | OUTPATIENT
Start: 2023-06-13 | End: 2023-06-13 | Stop reason: HOSPADM

## 2023-06-13 RX ORDER — NALOXONE HYDROCHLORIDE 0.4 MG/ML
0.2 INJECTION, SOLUTION INTRAMUSCULAR; INTRAVENOUS; SUBCUTANEOUS
Status: DISCONTINUED | OUTPATIENT
Start: 2023-06-13 | End: 2023-06-13 | Stop reason: HOSPADM

## 2023-06-13 RX ORDER — LIDOCAINE 40 MG/G
CREAM TOPICAL
Status: DISCONTINUED | OUTPATIENT
Start: 2023-06-13 | End: 2023-06-13 | Stop reason: HOSPADM

## 2023-06-13 RX ORDER — SODIUM CHLORIDE, SODIUM LACTATE, POTASSIUM CHLORIDE, CALCIUM CHLORIDE 600; 310; 30; 20 MG/100ML; MG/100ML; MG/100ML; MG/100ML
INJECTION, SOLUTION INTRAVENOUS CONTINUOUS PRN
Status: DISCONTINUED | OUTPATIENT
Start: 2023-06-13 | End: 2023-06-13

## 2023-06-13 RX ORDER — ONDANSETRON 2 MG/ML
4 INJECTION INTRAMUSCULAR; INTRAVENOUS EVERY 6 HOURS PRN
Status: DISCONTINUED | OUTPATIENT
Start: 2023-06-13 | End: 2023-06-13 | Stop reason: HOSPADM

## 2023-06-13 RX ORDER — PROPOFOL 10 MG/ML
INJECTION, EMULSION INTRAVENOUS CONTINUOUS PRN
Status: DISCONTINUED | OUTPATIENT
Start: 2023-06-13 | End: 2023-06-13

## 2023-06-13 RX ORDER — LIDOCAINE HYDROCHLORIDE 20 MG/ML
INJECTION, SOLUTION INFILTRATION; PERINEURAL PRN
Status: DISCONTINUED | OUTPATIENT
Start: 2023-06-13 | End: 2023-06-13

## 2023-06-13 RX ORDER — PROCHLORPERAZINE MALEATE 10 MG
10 TABLET ORAL EVERY 6 HOURS PRN
Status: DISCONTINUED | OUTPATIENT
Start: 2023-06-13 | End: 2023-06-13 | Stop reason: HOSPADM

## 2023-06-13 RX ORDER — NALOXONE HYDROCHLORIDE 0.4 MG/ML
0.4 INJECTION, SOLUTION INTRAMUSCULAR; INTRAVENOUS; SUBCUTANEOUS
Status: DISCONTINUED | OUTPATIENT
Start: 2023-06-13 | End: 2023-06-13 | Stop reason: HOSPADM

## 2023-06-13 RX ORDER — FLUMAZENIL 0.1 MG/ML
0.2 INJECTION, SOLUTION INTRAVENOUS
Status: DISCONTINUED | OUTPATIENT
Start: 2023-06-13 | End: 2023-06-13 | Stop reason: HOSPADM

## 2023-06-13 RX ORDER — ONDANSETRON 2 MG/ML
4 INJECTION INTRAMUSCULAR; INTRAVENOUS
Status: DISCONTINUED | OUTPATIENT
Start: 2023-06-13 | End: 2023-06-13 | Stop reason: HOSPADM

## 2023-06-13 RX ORDER — PROPOFOL 10 MG/ML
INJECTION, EMULSION INTRAVENOUS PRN
Status: DISCONTINUED | OUTPATIENT
Start: 2023-06-13 | End: 2023-06-13

## 2023-06-13 RX ADMIN — SODIUM CHLORIDE, POTASSIUM CHLORIDE, SODIUM LACTATE AND CALCIUM CHLORIDE: 600; 310; 30; 20 INJECTION, SOLUTION INTRAVENOUS at 14:54

## 2023-06-13 RX ADMIN — LIDOCAINE HYDROCHLORIDE 50 MG: 20 INJECTION, SOLUTION INFILTRATION; PERINEURAL at 14:54

## 2023-06-13 RX ADMIN — PROPOFOL 150 MCG/KG/MIN: 10 INJECTION, EMULSION INTRAVENOUS at 14:54

## 2023-06-13 RX ADMIN — PROPOFOL 50 MG: 10 INJECTION, EMULSION INTRAVENOUS at 14:56

## 2023-06-13 ASSESSMENT — ACTIVITIES OF DAILY LIVING (ADL): ADLS_ACUITY_SCORE: 35

## 2023-06-13 NOTE — ANESTHESIA PREPROCEDURE EVALUATION
Anesthesia Pre-Procedure Evaluation    Patient: Racquel Amin   MRN: [id number] : 1965        Procedure : Procedure(s):  Esophagoscopy, gastroscopy, duodenoscopy (EGD), combined          Past Medical History:   Diagnosis Date     Arthritis     hands     Bilateral pulmonary embolism (H) 2019     Diabetes (H)      HTN (hypertension)      IFG (impaired fasting glucose) 2020     Mild intermittent asthma, unspecified whether complicated 2019     Morbid obesity (H)      ERICA (obstructive sleep apnea)     CPAP      Past Surgical History:   Procedure Laterality Date     ESOPHAGOSCOPY, GASTROSCOPY, DUODENOSCOPY (EGD), COMBINED N/A 3/21/2023    Procedure: Esophagoscopy, gastroscopy, duodenoscopy (EGD), combined;  Surgeon: Boone Acevedo MD;  Location: [facility]     NO HISTORY OF SURGERY       RELEASE CARPAL TUNNEL Right 2020    Procedure: Right carpal tunnel release;  Surgeon: Henry Rogers MD;  Location: [facility]     REPAIR TENDON ANKLE Left 2020    Procedure: Peroneal tendon repair left lower extremity;  Surgeon: David Chacon DPM;  Location: [facility] OR      Allergies   Allergen Reactions     No Known Allergies       Social History     Tobacco Use     Smoking status: Never     Smokeless tobacco: Never   Vaping Use     Vaping status: Never Used   Substance Use Topics     Alcohol use: Yes     Comment: 1 drink per week or less      Wt Readings from Last 1 Encounters:   23 113.9 kg (251 lb 3.2 oz)        Anesthesia Evaluation   Pt has had prior anesthetic.     No history of anesthetic complications       ROS/MED HX  ENT/Pulmonary:     (+) sleep apnea, uses CPAP, Moderate Persistent, asthma     Neurologic:       Cardiovascular:     (+) Dyslipidemia hypertension-----pulmonary hypertension, Previous cardiac testing   Echo: Date:  Results:  Interpretation Summary     Left ventricular systolic function is normal.  The visual ejection fraction is estimated at 55-60%.  The aortic valve is  not well visualized.  AV is moderately calcified.  No hemodynamically significant valvular aortic stenosis.  The mean AoV pressure gradient is 8mmHg.  Right ventricular systolic pressure is elevated, consistent with mild  pulmonary hypertension.  Mildly decreased right ventricular systolic function  The study was technically difficult. There is no comparison study available.    Stress Test: Date: Results:    ECG Reviewed: Date: Results:    Cath: Date: Results:      METS/Exercise Tolerance:     Hematologic:     (+) History of blood clots,     Musculoskeletal:   (+) arthritis,     GI/Hepatic: Comment: dysphagia    (+) GERD,     Renal/Genitourinary:     (+) renal disease, type: CRI,     Endo: Comment: BMI 43    (+) type II DM, Not using insulin, - not using insulin pump. Obesity,     Psychiatric/Substance Use:     (+) psychiatric history anxiety     Infectious Disease:       Malignancy:       Other:            Physical Exam    Airway        Mallampati: III   TM distance: > 3 FB   Neck ROM: full   Mouth opening: > 3 cm    Respiratory Devices and Support         Dental       (+) Minor Abnormalities - some fillings, tiny chips      Cardiovascular          Rhythm and rate: regular     Pulmonary           (+) decreased breath sounds           OUTSIDE LABS:  CBC:   Lab Results   Component Value Date    WBC 7.1 03/15/2023    WBC 7.6 03/15/2022    HGB 13.8 03/15/2023    HGB 13.6 03/15/2022    HCT 41.1 03/15/2023    HCT 40.1 03/15/2022     03/15/2023     03/15/2022     BMP:   Lab Results   Component Value Date     04/18/2023     03/15/2023    POTASSIUM 4.5 04/18/2023    POTASSIUM 4.6 03/15/2023    CHLORIDE 104 04/18/2023    CHLORIDE 106 03/15/2023    CO2 24 04/18/2023    CO2 24 03/15/2023    BUN 13.8 04/18/2023    BUN 15.7 03/15/2023    CR 1.06 (H) 04/18/2023    CR 1.15 (H) 03/15/2023     (H) 04/18/2023     (H) 03/15/2023     COAGS: No results found for: PTT, INR, FIBR  POC:   Lab Results    Component Value Date     (H) 11/12/2020    HCG Negative 02/18/2020     HEPATIC:   Lab Results   Component Value Date    ALBUMIN 4.4 04/18/2023    PROTTOTAL 7.2 04/18/2023    ALT 25 04/18/2023    AST 31 04/18/2023    ALKPHOS 99 04/18/2023    BILITOTAL 0.3 04/18/2023     OTHER:   Lab Results   Component Value Date    A1C 5.7 (H) 04/18/2023    JEFFERSON 9.9 04/18/2023    TSH 3.05 04/18/2023    T4 0.85 01/14/2019       Anesthesia Plan    ASA Status:  3      Anesthesia Type: MAC.              Consents    Anesthesia Plan(s) and associated risks, benefits, and realistic alternatives discussed. Questions answered and patient/representative(s) expressed understanding.    - Discussed:     - Discussed with:  Patient         Postoperative Care    Pain management: Multi-modal analgesia.   PONV prophylaxis: Ondansetron (or other 5HT-3)     Comments:                Joaquin Guerrero MD

## 2023-06-13 NOTE — H&P
Pre-Endoscopy History and Physical     Racquel Amin MRN# [id number]   YOB: 1965 Age: 57 year old     Date of Procedure: 6/13/2023  Primary care provider: Nancy Small  Type of Endoscopy: Esophagogastroduodenoscopy with possible biopsy, possible dilation, possible foreign body removal  Reason for Procedure: esophagitis, dysphagia  Type of Anesthesia Anticipated: Per anesthesia     HPI:    Racquel is a 57 year old female who will be undergoing the above procedure.      A history and physical has been performed. The patient's medications and allergies have been reviewed. The risks and benefits of the procedure and the sedation options and risks were discussed with the patient.  All questions were answered and informed consent was obtained.      She denies a personal or family history of anesthesia complications or bleeding disorders.     Patient Active Problem List   Diagnosis     Family history of malignant neoplasm of breast     CARDIOVASCULAR SCREENING; LDL GOAL LESS THAN 160     CKD (chronic kidney disease) stage 3, GFR 30-59 ml/min (H)     Major depression in complete remission (H)     Anxiety     Morbid obesity, unspecified obesity type (H)     ERICA (obstructive sleep apnea)     Mild intermittent asthma, unspecified whether complicated     Benign essential hypertension     History of pulmonary embolus (PE)     Carpal tunnel syndrome of right wrist     Type 2 diabetes mellitus without complication, without long-term current use of insulin (H)        Past Medical History:   Diagnosis Date     Arthritis     hands     Bilateral pulmonary embolism (H) 9/30/2019     Diabetes (H)      HTN (hypertension)      IFG (impaired fasting glucose) 1/26/2020     Mild intermittent asthma, unspecified whether complicated 1/14/2019     Morbid obesity (H)      ERICA (obstructive sleep apnea)     CPAP        Past Surgical History:   Procedure Laterality Date     ESOPHAGOSCOPY, GASTROSCOPY, DUODENOSCOPY (EGD),  COMBINED N/A 3/21/2023    Procedure: Esophagoscopy, gastroscopy, duodenoscopy (EGD), combined;  Surgeon: Boone Acevedo MD;  Location: [facility]     NO HISTORY OF SURGERY       RELEASE CARPAL TUNNEL Right 2/18/2020    Procedure: Right carpal tunnel release;  Surgeon: Henry Rogers MD;  Location: [facility]     REPAIR TENDON ANKLE Left 11/12/2020    Procedure: Peroneal tendon repair left lower extremity;  Surgeon: David Chacon DPM;  Location: [facility]       Social History     Tobacco Use     Smoking status: Never     Smokeless tobacco: Never   Vaping Use     Vaping status: Never Used   Substance Use Topics     Alcohol use: Yes     Comment: 1 drink per week or less       Family History   Problem Relation Age of Onset     Breast Cancer Mother         S/P bilateral mastectomy     Brain Tumor Mother      Dementia Mother      C.A.D. Father         CABG-age 60     Cerebrovascular Disease Maternal Grandmother      Breast Cancer Paternal Aunt         age 40     Cancer Maternal Aunt         melanoma     Diabetes No family hx of      Hypertension No family hx of      Cancer - colorectal No family hx of      Thyroid Disease No family hx of        Prior to Admission medications    Medication Sig Start Date End Date Taking? Authorizing Provider   albuterol (PROAIR HFA) 108 (90 Base) MCG/ACT inhaler INHALE 2 PUFFS BY MOUTH EVERY 6 HOURS AS NEEDED FOR WHEEZE OR FOR SHORTNESS OF BREATH 2/11/21   Nancy Small MD   albuterol (PROVENTIL) (2.5 MG/3ML) 0.083% neb solution Take 1 vial (2.5 mg) by nebulization every 6 hours as needed for shortness of breath / dyspnea or wheezing 12/20/21   Nancy Small MD   atorvastatin (LIPITOR) 20 MG tablet TAKE 1 TABLET BY MOUTH EVERY DAY 3/24/23   Nancy Small MD   buPROPion (WELLBUTRIN XL) 300 MG 24 hr tablet Take 1 tablet (300 mg) by mouth every morning 4/18/23   Nancy Small MD   citalopram (CELEXA) 40 MG tablet Take 1 tablet (40 mg) by mouth daily 4/18/23    "Nancy Small MD   glucose blood VI test strips strip by In Vitro route daily. 1/8/13   EmigdioIrene MD   lisinopril (ZESTRIL) 40 MG tablet TAKE 1 TABLET BY MOUTH EVERY DAY 3/24/23   Nancy Small MD   meclizine (ANTIVERT) 25 MG tablet Take 1 tablet (25 mg) by mouth 3 times daily as needed for dizziness 6/3/19   Nancy Small MD   metFORMIN (GLUCOPHAGE) 500 MG tablet TAKE 1 TABLET BY MOUTH EVERY DAY WITH BREAKFAST 4/18/23   Nancy Small MD   multivitamin, therapeutic (THERA-VIT) TABS tablet Take 1 tablet by mouth daily    Unknown, Entered By History   omeprazole (PRILOSEC) 40 MG DR capsule Take 1 capsule (40 mg) by mouth 2 times daily 3/21/23   Boone Acevedo MD       Allergies   Allergen Reactions     No Known Allergies         REVIEW OF SYSTEMS:   5 point ROS negative except as noted above in HPI, including Gen., Resp., CV, GI &  system review.    PHYSICAL EXAM:   [facility] 09/30/2019  Estimated body mass index is 42.78 kg/m  as calculated from the following:    Height as of 6/8/23: 1.632 m (5' 4.25\").    Weight as of 6/8/23: 113.9 kg (251 lb 3.2 oz).   GENERAL APPEARANCE: alert, and oriented  MENTAL STATUS: alert  AIRWAY EXAM: Mallampatti Class III (visualization of the soft palate and base of uvula)  RESP: lungs clear to auscultation - no rales, rhonchi or wheezes  CV: regular rates and rhythm  DIAGNOSTICS:    Not indicated    IMPRESSION   ASA Class 3 - Severe systemic disease, but not incapacitating    PLAN:   Plan for Esophagogastroduodenoscopy with possible biopsy, possible dilation, possible foreign body removal. We discussed the risks, benefits and alternatives and the patient wished to proceed.    The above has been forwarded to the consulting provider.      Signed Electronically by: Boone Acevedo MD  June 13, 2023                "

## 2023-06-13 NOTE — ANESTHESIA CARE TRANSFER NOTE
Patient: Racquel Amin    Procedure: Procedure(s):  Esophagoscopy, gastroscopy, duodenoscopy (EGD), combined       Diagnosis: Gastroesophageal reflux disease with esophagitis, unspecified whether hemorrhage [K21.00]  Diagnosis Additional Information: No value filed.    Anesthesia Type:   MAC     Note:    Oropharynx: oropharynx clear of all foreign objects and spontaneously breathing  Level of Consciousness: drowsy  Oxygen Supplementation: nasal cannula  Level of Supplemental Oxygen (L/min / FiO2): 4  Independent Airway: airway patency satisfactory and stable  Dentition: dentition unchanged  Vital Signs Stable: post-procedure vital signs reviewed and stable  Report to RN Given: handoff report given  Destination: endo suite 37.  Comments: At end of procedure, spontaneous respirations, patient alert to voice, able to follow commands. Oxygen via nasal cannula at 4 liters per minute to PACU. Oxygen tubing connected to wall O2 in PACU, SpO2, NiBP, and EKG monitors and alarms on and functioning, report on patient's clinical status given to PACU RN, RN questions answered.        Vitals:  Vitals Value Taken Time   BP     Temp     Pulse     Resp     SpO2         Electronically Signed By: PERLA Butler CRNA  June 13, 2023  3:08 PM

## 2023-06-13 NOTE — ANESTHESIA POSTPROCEDURE EVALUATION
Patient: Racquel Amin    Procedure: Procedure(s):  Esophagoscopy, gastroscopy, duodenoscopy (EGD), combined       Anesthesia Type:  MAC    Note:  Disposition: Outpatient   Postop Pain Control: Uneventful            Sign Out: Well controlled pain   PONV: No   Neuro/Psych: Uneventful            Sign Out: Acceptable/Baseline neuro status   Airway/Respiratory: Uneventful            Sign Out: Acceptable/Baseline resp. status   CV/Hemodynamics: Uneventful            Sign Out: Acceptable CV status   Other NRE: NONE   DID A NON-ROUTINE EVENT OCCUR? No           Last vitals:  Vitals Value Taken Time   /69 06/13/23 1530   Temp     Pulse 68 06/13/23 1530   Resp 20 06/13/23 1531   SpO2 100 % 06/13/23 1531   Vitals shown include unvalidated device data.    Electronically Signed By: Taye Stewart MD  June 13, 2023  3:43 PM

## 2023-09-10 ENCOUNTER — HEALTH MAINTENANCE LETTER (OUTPATIENT)
Age: 58
End: 2023-09-10

## 2023-10-23 ENCOUNTER — OFFICE VISIT (OUTPATIENT)
Dept: DERMATOLOGY | Facility: CLINIC | Age: 58
End: 2023-10-23
Attending: PEDIATRICS
Payer: COMMERCIAL

## 2023-10-23 DIAGNOSIS — D22.9 MULTIPLE BENIGN NEVI: ICD-10-CM

## 2023-10-23 DIAGNOSIS — D22.9 MULTIPLE PIGMENTED NEVI: ICD-10-CM

## 2023-10-23 DIAGNOSIS — L81.4 LENTIGINES: ICD-10-CM

## 2023-10-23 DIAGNOSIS — L57.8 ACTINIC SKIN DAMAGE: Primary | ICD-10-CM

## 2023-10-23 DIAGNOSIS — L57.0 ACTINIC KERATOSIS: ICD-10-CM

## 2023-10-23 PROCEDURE — 99203 OFFICE O/P NEW LOW 30 MIN: CPT | Mod: 25 | Performed by: STUDENT IN AN ORGANIZED HEALTH CARE EDUCATION/TRAINING PROGRAM

## 2023-10-23 PROCEDURE — 17000 DESTRUCT PREMALG LESION: CPT | Performed by: STUDENT IN AN ORGANIZED HEALTH CARE EDUCATION/TRAINING PROGRAM

## 2023-10-23 NOTE — PATIENT INSTRUCTIONS
"CRYOTHERAPY    What is it?  Use of a very cold liquid, such as liquid nitrogen, to freeze and destroy abnormal skin cells that need to be removed    What should I expect?  Tenderness and redness  A small blister that might grow and fill with dark purple blood.  More than one treatment may be needed if the lesions do not go away.    How do I care for the treated area?  Gently wash the area with your hands when bathing.  Use a thin layer of VaselineÆ to help with healing.   The area should heal within 7-10 days.  Do not use an antibiotic or NeosporinÆ ointment.   You may take acetaminophen (TylenolÆ) for pain.     Call your Doctor if you have:  Severe pain  Signs of infection (warmth, redness, cloudy yellow drainage, and or a bad smell)  Questions or concerns     SUNSCREENS  UVA and UVB PROTECTION    Sunlight consists of two types of light that can cause or worsen many skin problems:   - UVA (ultraviolet A): Less variation with seasons  All year round  All day strong  Passes through glass and clouds --this is important to remember while you drive   -  UVB (ultraviolet B):  Strongest in summer  Peak hours 10 am to 4pm  SPF rates UVB protection, SPF 30 blocks 97% of UVB rays (if applied correctly).     Things that can be caused or worsened by UV light, either by the sun or by tanning beds:  Skin cancers  Sunburns   Increased number of moles, brown spots, age spots  Wrinkles and other types of aging, thinning/weakening/increasing fragility of the skin, easier bruising, \"weather-beaten look\"  Rosacea  Certain autoimmune conditions     Sunscreens that block both UVA and UVB light contain these ingredients:  Zinc Oxide (should contain at least 4% zinc oxide)  Titanium Dioxide  Parsol or Avobenzone (additives improve stability of Avobenzone)  There are other UVA blocking ingredients but they are not as complete as these three.     Tips/Suggestions:   SPF of 30 or higher, but some literature suggests that " "even higher numbers might provide even better protection  Zinc Oxide or other UVA block such as Helioplex or Anthelios in product  Remember there is no safe UV light so there is no such thing as a safe suntan.     Apply sunscreen daily (even in winter and on cloudy days) and reapply depending on activity--every 1.5 to 2 hours if out in sun for a long time.  Apply sunscreen at least 15 to 30 minutes before sun exposure  Approximately one ounce (a shot glass) is necessary to adequately cover the entire body.  Seek shade when possible      Examples of sunscreens:  - Elta MD (you can order it online or buy it at [employer]. It has microsized Zinc oxide and/or titanium dioxide)  - Neutrogena sun block lotion for sensitive skin with SPF 30  - Oil of Olay complete defense daily UV moisturizer with SPF 30  -Cetaphil SPF 30 for normal skin or SPF 50 for dry skin  Sunblocks that only have \"physical\" ingredients (zinc oxide, titanium dioxide), no \"chemical\" sunscreen ingredients:  CeraVe Mineral   LA ROCHE-POSAY Mineral  Neutrogena Sheer Zinc Dry-Touch  Vanicream Broad Spectrum 50+  Goddess Garden  Sunscreens that are light-weight and often desirable for people who are very active or sweat a lot:   Neutrogena Sport  Neutrogena Hydroboost       "

## 2023-10-23 NOTE — LETTER
10/23/2023         RE: Racquel Amin  [address]  [place] [address]        Dear Colleague,    Thank you for referring your patient, Racquel Amin, to the [facility]. Please see a copy of my visit note below.    [facility] Dermatology Note    Encounter Date: Oct 23, 2023    Dermatology Problem List:    ______________________________________    Impression/Plan:  Abida was seen today for derm problem.    Diagnoses and all orders for this visit:    Actinic skin damage  Multiple benign nevi  Lentigines  - Reviewed the compounding benefits of incremental changes to sun protective clothing behaviors including increased frequency of sunscreen and sun protective clothing like broad brimmed hats and longsleeved UPF containing clothing  - has a cabin enjoys outdoors. Discussed need for increased sun protection going forward     Multiple pigmented nevi  -     Adult Dermatology Referral    Actinic keratosis  -     AK 1 (qty 1 w/ code [[id number]]  - R forehead       Cryotherapy procedure note: After verbal consent and discussion of risks and benefits including but no limited to dyspigmentation/scar, blister, and pain, 1 ak on forehead was(were) treated with 1-2mm freeze border for 2 cycles with liquid nitrogen. Post cryotherapy instructions were provided.     Follow-up in 1 year .       Staff Involved:  Staff Only    Terence Cruz MD   of Dermatology  Department of Dermatology  [facility]      CC:   Chief Complaint   Patient presents with     Derm Problem     Skin check        History of Present Illness:  Ms. Racquel Amin is a 58 year old female who presents as a nwe patient.    Pt presents today for concerns about spots on trunk and extremities       Labs:      Physical exam:  Vitals: LMP 09/30/2019   GEN: well developed, well-nourished, in no acute distress, in a pleasant mood.     SKIN: Chin  phototype 1  - Full skin, which includes the head/face, both arms, chest, back, abdomen,both legs, genitalia and/or groin buttocks, digits and/or nails, was examined.  - scattered brown papules on trunk and extremities   - Flat brown macules and patches in a sun exposed areas on face and extremities  - gritty pink papule R forehead   - No other lesions of concern on areas examined.     Past Medical History:   Past Medical History:   Diagnosis Date     Arthritis     hands     Bilateral pulmonary embolism (H) 09/30/2019     Diabetes (H)      HTN (hypertension)      IFG (impaired fasting glucose) 01/26/2020     Mild intermittent asthma, unspecified whether complicated 01/14/2019     Morbid obesity (H)      ERICA (obstructive sleep apnea)     CPAP     Past Surgical History:   Procedure Laterality Date     ESOPHAGOSCOPY, GASTROSCOPY, DUODENOSCOPY (EGD), COMBINED N/A 3/21/2023    Procedure: Esophagoscopy, gastroscopy, duodenoscopy (EGD), combined;  Surgeon: Boone Acevedo MD;  Location: [facility]     ESOPHAGOSCOPY, GASTROSCOPY, DUODENOSCOPY (EGD), COMBINED N/A 6/13/2023    Procedure: Esophagoscopy, gastroscopy, duodenoscopy (EGD), combined;  Surgeon: Boone Acevedo MD;  Location: [facility]     NO HISTORY OF SURGERY       RELEASE CARPAL TUNNEL Right 2/18/2020    Procedure: Right carpal tunnel release;  Surgeon: Henry Rogers MD;  Location:  OR     REPAIR TENDON ANKLE Left 11/12/2020    Procedure: Peroneal tendon repair left lower extremity;  Surgeon: David Chacon DPM;  Location:  OR       Social History:   reports that she has never smoked. She has never used smokeless tobacco. She reports current alcohol use. She reports that she does not use drugs.    Family History:  Family History   Problem Relation Age of Onset     Breast Cancer Mother         S/P bilateral mastectomy     Brain Tumor Mother      Dementia Mother      C.A.D. Father         CABG-age 60     Cerebrovascular Disease Maternal Grandmother       Breast Cancer Paternal Aunt         age 40     Cancer Maternal Aunt         melanoma     Diabetes No family hx of      Hypertension No family hx of      Cancer - colorectal No family hx of      Thyroid Disease No family hx of        Medications:  Current Outpatient Medications   Medication Sig Dispense Refill     albuterol (PROAIR HFA) 108 (90 Base) MCG/ACT inhaler INHALE 2 PUFFS BY MOUTH EVERY 6 HOURS AS NEEDED FOR WHEEZE OR FOR SHORTNESS OF BREATH 8.5 Inhaler 3     albuterol (PROVENTIL) (2.5 MG/3ML) 0.083% neb solution Take 1 vial (2.5 mg) by nebulization every 6 hours as needed for shortness of breath / dyspnea or wheezing 90 mL 3     atorvastatin (LIPITOR) 20 MG tablet TAKE 1 TABLET BY MOUTH EVERY DAY 90 tablet 3     buPROPion (WELLBUTRIN XL) 300 MG 24 hr tablet Take 1 tablet (300 mg) by mouth every morning 90 tablet 3     citalopram (CELEXA) 40 MG tablet Take 1 tablet (40 mg) by mouth daily 90 tablet 11     glucose blood VI test strips strip by In Vitro route daily. 1 Box 12     lisinopril (ZESTRIL) 40 MG tablet TAKE 1 TABLET BY MOUTH EVERY DAY 90 tablet 3     meclizine (ANTIVERT) 25 MG tablet Take 1 tablet (25 mg) by mouth 3 times daily as needed for dizziness 30 tablet 11     metFORMIN (GLUCOPHAGE) 500 MG tablet TAKE 1 TABLET BY MOUTH EVERY DAY WITH BREAKFAST 90 tablet 3     multivitamin, therapeutic (THERA-VIT) TABS tablet Take 1 tablet by mouth daily       omeprazole (PRILOSEC) 40 MG DR capsule Take 1 capsule (40 mg) by mouth 2 times daily 90 capsule 0     Allergies   Allergen Reactions     No Known Allergies                Again, thank you for allowing me to participate in the care of your patient.        Sincerely,        Terence Cruz MD

## 2023-10-23 NOTE — PROGRESS NOTES
[facility] Dermatology Note    Encounter Date: Oct 23, 2023    Dermatology Problem List:    ______________________________________    Impression/Plan:  Abida was seen today for derm problem.    Diagnoses and all orders for this visit:    Actinic skin damage  Multiple benign nevi  Lentigines  - Reviewed the compounding benefits of incremental changes to sun protective clothing behaviors including increased frequency of sunscreen and sun protective clothing like broad brimmed hats and longsleeved UPF containing clothing  - has a cabin enjoys outdoors. Discussed need for increased sun protection going forward     Multiple pigmented nevi  -     Adult Dermatology Referral    Actinic keratosis  -     AK 1 (qty 1 w/ code [[id number]]  - R forehead       Cryotherapy procedure note: After verbal consent and discussion of risks and benefits including but no limited to dyspigmentation/scar, blister, and pain, 1 ak on forehead was(were) treated with 1-2mm freeze border for 2 cycles with liquid nitrogen. Post cryotherapy instructions were provided.     Follow-up in 1 year .       Staff Involved:  Staff Only    Terence Cruz MD   of Dermatology  Department of Dermatology  [facility]      CC:   Chief Complaint   Patient presents with    Derm Problem     Skin check        History of Present Illness:  Ms. Racquel Amin is a 58 year old female who presents as a nwe patient.    Pt presents today for concerns about spots on trunk and extremities       Labs:      Physical exam:  Vitals: LMP 09/30/2019   GEN: well developed, well-nourished, in no acute distress, in a pleasant mood.     SKIN: Chin phototype 1  - Full skin, which includes the head/face, both arms, chest, back, abdomen,both legs, genitalia and/or groin buttocks, digits and/or nails, was examined.  - scattered brown papules on trunk and extremities   - Flat brown macules and patches in a sun exposed  areas on face and extremities  - gritty pink papule R forehead   - No other lesions of concern on areas examined.     Past Medical History:   Past Medical History:   Diagnosis Date    Arthritis     hands    Bilateral pulmonary embolism (H) 09/30/2019    Diabetes (H)     HTN (hypertension)     IFG (impaired fasting glucose) 01/26/2020    Mild intermittent asthma, unspecified whether complicated 01/14/2019    Morbid obesity (H)     ERICA (obstructive sleep apnea)     CPAP     Past Surgical History:   Procedure Laterality Date    ESOPHAGOSCOPY, GASTROSCOPY, DUODENOSCOPY (EGD), COMBINED N/A 3/21/2023    Procedure: Esophagoscopy, gastroscopy, duodenoscopy (EGD), combined;  Surgeon: Boone Acevedo MD;  Location:  GI    ESOPHAGOSCOPY, GASTROSCOPY, DUODENOSCOPY (EGD), COMBINED N/A 6/13/2023    Procedure: Esophagoscopy, gastroscopy, duodenoscopy (EGD), combined;  Surgeon: Boone Acevedo MD;  Location:  GI    NO HISTORY OF SURGERY      RELEASE CARPAL TUNNEL Right 2/18/2020    Procedure: Right carpal tunnel release;  Surgeon: Henry Rogers MD;  Location:  OR    REPAIR TENDON ANKLE Left 11/12/2020    Procedure: Peroneal tendon repair left lower extremity;  Surgeon: David Chacon DPM;  Location:  OR       Social History:   reports that she has never smoked. She has never used smokeless tobacco. She reports current alcohol use. She reports that she does not use drugs.    Family History:  Family History   Problem Relation Age of Onset    Breast Cancer Mother         S/P bilateral mastectomy    Brain Tumor Mother     Dementia Mother     C.A.D. Father         CABG-age 60    Cerebrovascular Disease Maternal Grandmother     Breast Cancer Paternal Aunt         age 40    Cancer Maternal Aunt         melanoma    Diabetes No family hx of     Hypertension No family hx of     Cancer - colorectal No family hx of     Thyroid Disease No family hx of        Medications:  Current Outpatient Medications   Medication Sig  Dispense Refill    albuterol (PROAIR HFA) 108 (90 Base) MCG/ACT inhaler INHALE 2 PUFFS BY MOUTH EVERY 6 HOURS AS NEEDED FOR WHEEZE OR FOR SHORTNESS OF BREATH 8.5 Inhaler 3    albuterol (PROVENTIL) (2.5 MG/3ML) 0.083% neb solution Take 1 vial (2.5 mg) by nebulization every 6 hours as needed for shortness of breath / dyspnea or wheezing 90 mL 3    atorvastatin (LIPITOR) 20 MG tablet TAKE 1 TABLET BY MOUTH EVERY DAY 90 tablet 3    buPROPion (WELLBUTRIN XL) 300 MG 24 hr tablet Take 1 tablet (300 mg) by mouth every morning 90 tablet 3    citalopram (CELEXA) 40 MG tablet Take 1 tablet (40 mg) by mouth daily 90 tablet 11    glucose blood VI test strips strip by In Vitro route daily. 1 Box 12    lisinopril (ZESTRIL) 40 MG tablet TAKE 1 TABLET BY MOUTH EVERY DAY 90 tablet 3    meclizine (ANTIVERT) 25 MG tablet Take 1 tablet (25 mg) by mouth 3 times daily as needed for dizziness 30 tablet 11    metFORMIN (GLUCOPHAGE) 500 MG tablet TAKE 1 TABLET BY MOUTH EVERY DAY WITH BREAKFAST 90 tablet 3    multivitamin, therapeutic (THERA-VIT) TABS tablet Take 1 tablet by mouth daily      omeprazole (PRILOSEC) 40 MG DR capsule Take 1 capsule (40 mg) by mouth 2 times daily 90 capsule 0     Allergies   Allergen Reactions    No Known Allergies

## 2024-01-10 DIAGNOSIS — E11.9 TYPE 2 DIABETES MELLITUS WITHOUT COMPLICATION, WITHOUT LONG-TERM CURRENT USE OF INSULIN (H): ICD-10-CM

## 2024-01-11 DIAGNOSIS — F33.9 EPISODE OF RECURRENT MAJOR DEPRESSIVE DISORDER, UNSPECIFIED DEPRESSION EPISODE SEVERITY (H): ICD-10-CM

## 2024-01-11 DIAGNOSIS — I10 BENIGN ESSENTIAL HYPERTENSION: ICD-10-CM

## 2024-01-11 RX ORDER — LISINOPRIL 40 MG/1
TABLET ORAL
Qty: 90 TABLET | Refills: 3 | OUTPATIENT
Start: 2024-01-11

## 2024-01-11 RX ORDER — ATORVASTATIN CALCIUM 20 MG/1
TABLET, FILM COATED ORAL
Qty: 90 TABLET | Refills: 3 | OUTPATIENT
Start: 2024-01-11

## 2024-01-11 RX ORDER — BUPROPION HYDROCHLORIDE 300 MG/1
300 TABLET ORAL EVERY MORNING
Qty: 90 TABLET | Refills: 3 | OUTPATIENT
Start: 2024-01-11

## 2024-01-19 ENCOUNTER — MYC MEDICAL ADVICE (OUTPATIENT)
Dept: PEDIATRICS | Facility: CLINIC | Age: 59
End: 2024-01-19
Payer: COMMERCIAL

## 2024-01-19 DIAGNOSIS — H93.13 TINNITUS, BILATERAL: Primary | ICD-10-CM

## 2024-01-28 ENCOUNTER — HEALTH MAINTENANCE LETTER (OUTPATIENT)
Age: 59
End: 2024-01-28

## 2024-02-15 ENCOUNTER — TRANSFERRED RECORDS (OUTPATIENT)
Dept: HEALTH INFORMATION MANAGEMENT | Facility: CLINIC | Age: 59
End: 2024-02-15
Payer: COMMERCIAL

## 2024-03-20 ENCOUNTER — OFFICE VISIT (OUTPATIENT)
Dept: DERMATOLOGY | Facility: CLINIC | Age: 59
End: 2024-03-20
Payer: COMMERCIAL

## 2024-03-20 DIAGNOSIS — L57.8 ACTINIC SKIN DAMAGE: Primary | ICD-10-CM

## 2024-03-20 DIAGNOSIS — L81.4 LENTIGINES: ICD-10-CM

## 2024-03-20 DIAGNOSIS — L82.1 SEBORRHEIC KERATOSES: ICD-10-CM

## 2024-03-20 PROCEDURE — 99213 OFFICE O/P EST LOW 20 MIN: CPT | Performed by: STUDENT IN AN ORGANIZED HEALTH CARE EDUCATION/TRAINING PROGRAM

## 2024-03-20 ASSESSMENT — PAIN SCALES - GENERAL: PAINLEVEL: NO PAIN (0)

## 2024-03-20 NOTE — LETTER
3/20/2024         RE: Racquel Amin  [address]  [place] [address]        Dear Colleague,    Thank you for referring your patient, Racquel Amin, to the [facility]. Please see a copy of my visit note below.    [facility] Dermatology Note    Encounter Date: Mar 20, 2024    Dermatology Problem List:  \  ______________________________________    Impression/Plan:  Abida was seen today for derm problem.    Diagnoses and all orders for this visit:    Actinic skin damage  Lentigines  - Reviewed the compounding benefits of incremental changes to sun protective clothing behaviors including increased frequency of sunscreen and sun protective clothing like broad brimmed hats and longsleeved UPF containing clothing    Seborrheic keratoses  - scalp  - declines LN2      Follow-up PRN.       Staff Involved:  Staff Only    Terence Cruz MD   of Dermatology  Department of Dermatology  [facility] [employer]      CC:   Chief Complaint   Patient presents with     Derm Problem     Bump on scalp - scab-like        History of Present Illness:  Ms. Racquel Amin is a 58 year old female who presents as a return patient.    Patient was last seen in October at which time a solitary actinic keratosis on the right forehead was frozen otherwise benign skin check was noted.  Patient presents today for a bump on scalp. Present for some time, occasionally itchy    Labs:      Physical exam:  Vitals: LMP 09/30/2019   Breastfeeding No   GEN: well developed, well-nourished, in no acute distress, in a pleasant mood.     SKIN: Chin phototype 1  - Sun-exposed skin, which includes the head/face, neck, both arms, digits, and/or nails was examined.   - Flat brown macules and patches in a sun exposed areas on face and extremities  - Stuck on brown papules on face, arms, and scalp  - No other lesions of concern on areas examined.     Past  Medical History:   Past Medical History:   Diagnosis Date     Arthritis     hands     Bilateral pulmonary embolism (H) 09/30/2019     Diabetes (H)      HTN (hypertension)      IFG (impaired fasting glucose) 01/26/2020     Mild intermittent asthma, unspecified whether complicated 01/14/2019     Morbid obesity (H)      ERICA (obstructive sleep apnea)     CPAP     Past Surgical History:   Procedure Laterality Date     ESOPHAGOSCOPY, GASTROSCOPY, DUODENOSCOPY (EGD), COMBINED N/A 3/21/2023    Procedure: Esophagoscopy, gastroscopy, duodenoscopy (EGD), combined;  Surgeon: Boone Acevedo MD;  Location:  GI     ESOPHAGOSCOPY, GASTROSCOPY, DUODENOSCOPY (EGD), COMBINED N/A 6/13/2023    Procedure: Esophagoscopy, gastroscopy, duodenoscopy (EGD), combined;  Surgeon: Boone Acevedo MD;  Location:  GI     NO HISTORY OF SURGERY       RELEASE CARPAL TUNNEL Right 2/18/2020    Procedure: Right carpal tunnel release;  Surgeon: Henry Rogers MD;  Location: [facility] OR     REPAIR TENDON ANKLE Left 11/12/2020    Procedure: Peroneal tendon repair left lower extremity;  Surgeon: David Chacon DPM;  Location: [facility] OR       Social History:   reports that she has never smoked. She has been exposed to tobacco smoke. She has never used smokeless tobacco. She reports current alcohol use. She reports that she does not use drugs.    Family History:  Family History   Problem Relation Age of Onset     Breast Cancer Mother         S/P bilateral mastectomy     Brain Tumor Mother      Dementia Mother      C.A.D. Father         CABG-age 60     Cerebrovascular Disease Maternal Grandmother      Breast Cancer Paternal Aunt         age 40     Cancer Maternal Aunt         melanoma     Diabetes No family hx of      Hypertension No family hx of      Cancer - colorectal No family hx of      Thyroid Disease No family hx of        Medications:  Current Outpatient Medications   Medication Sig Dispense Refill     albuterol (PROAIR HFA) 108 (90 Base) MCG/ACT  inhaler INHALE 2 PUFFS BY MOUTH EVERY 6 HOURS AS NEEDED FOR WHEEZE OR FOR SHORTNESS OF BREATH 8.5 Inhaler 3     albuterol (PROVENTIL) (2.5 MG/3ML) 0.083% neb solution Take 1 vial (2.5 mg) by nebulization every 6 hours as needed for shortness of breath / dyspnea or wheezing 90 mL 3     atorvastatin (LIPITOR) 20 MG tablet TAKE 1 TABLET BY MOUTH EVERY DAY 90 tablet 3     buPROPion (WELLBUTRIN XL) 300 MG 24 hr tablet Take 1 tablet (300 mg) by mouth every morning 90 tablet 3     citalopram (CELEXA) 40 MG tablet Take 1 tablet (40 mg) by mouth daily 90 tablet 11     glucose blood VI test strips strip by In Vitro route daily. 1 Box 12     lisinopril (ZESTRIL) 40 MG tablet TAKE 1 TABLET BY MOUTH EVERY DAY 90 tablet 3     meclizine (ANTIVERT) 25 MG tablet Take 1 tablet (25 mg) by mouth 3 times daily as needed for dizziness 30 tablet 11     metFORMIN (GLUCOPHAGE) 500 MG tablet TAKE 1 TABLET BY MOUTH EVERY DAY WITH BREAKFAST 90 tablet 3     multivitamin, therapeutic (THERA-VIT) TABS tablet Take 1 tablet by mouth daily       omeprazole (PRILOSEC) 40 MG DR capsule Take 1 capsule (40 mg) by mouth 2 times daily 90 capsule 0     Allergies   Allergen Reactions     No Known Allergies                Again, thank you for allowing me to participate in the care of your patient.        Sincerely,        Terence Cruz MD

## 2024-03-20 NOTE — PROGRESS NOTES
[facility] Dermatology Note    Encounter Date: Mar 20, 2024    Dermatology Problem List:  \  ______________________________________    Impression/Plan:  Abida was seen today for derm problem.    Diagnoses and all orders for this visit:    Actinic skin damage  Lentigines  - Reviewed the compounding benefits of incremental changes to sun protective clothing behaviors including increased frequency of sunscreen and sun protective clothing like broad brimmed hats and longsleeved UPF containing clothing    Seborrheic keratoses  - scalp  - declines LN2      Follow-up PRN.       Staff Involved:  Staff Only    Terence Cruz MD   of Dermatology  Department of Dermatology  [facility]      CC:   Chief Complaint   Patient presents with    Derm Problem     Bump on scalp - scab-like        History of Present Illness:  Ms. Racquel Amin is a 58 year old female who presents as a return patient.    Patient was last seen in October at which time a solitary actinic keratosis on the right forehead was frozen otherwise benign skin check was noted.  Patient presents today for a bump on scalp. Present for some time, occasionally itchy    Labs:      Physical exam:  Vitals: LMP 09/30/2019   Breastfeeding No   GEN: well developed, well-nourished, in no acute distress, in a pleasant mood.     SKIN: Chin phototype 1  - Sun-exposed skin, which includes the head/face, neck, both arms, digits, and/or nails was examined.   - Flat brown macules and patches in a sun exposed areas on face and extremities  - Stuck on brown papules on face, arms, and scalp  - No other lesions of concern on areas examined.     Past Medical History:   Past Medical History:   Diagnosis Date    Arthritis     hands    Bilateral pulmonary embolism (H) 09/30/2019    Diabetes (H)     HTN (hypertension)     IFG (impaired fasting glucose) 01/26/2020    Mild intermittent asthma, unspecified whether  complicated 01/14/2019    Morbid obesity (H)     ERICA (obstructive sleep apnea)     CPAP     Past Surgical History:   Procedure Laterality Date    ESOPHAGOSCOPY, GASTROSCOPY, DUODENOSCOPY (EGD), COMBINED N/A 3/21/2023    Procedure: Esophagoscopy, gastroscopy, duodenoscopy (EGD), combined;  Surgeon: Boone Acevedo MD;  Location:  GI    ESOPHAGOSCOPY, GASTROSCOPY, DUODENOSCOPY (EGD), COMBINED N/A 6/13/2023    Procedure: Esophagoscopy, gastroscopy, duodenoscopy (EGD), combined;  Surgeon: Boone Acevedo MD;  Location:  GI    NO HISTORY OF SURGERY      RELEASE CARPAL TUNNEL Right 2/18/2020    Procedure: Right carpal tunnel release;  Surgeon: Henry Rogers MD;  Location: [facility] OR    REPAIR TENDON ANKLE Left 11/12/2020    Procedure: Peroneal tendon repair left lower extremity;  Surgeon: David Chacon DPM;  Location: [facility] OR       Social History:   reports that she has never smoked. She has been exposed to tobacco smoke. She has never used smokeless tobacco. She reports current alcohol use. She reports that she does not use drugs.    Family History:  Family History   Problem Relation Age of Onset    Breast Cancer Mother         S/P bilateral mastectomy    Brain Tumor Mother     Dementia Mother     C.A.D. Father         CABG-age 60    Cerebrovascular Disease Maternal Grandmother     Breast Cancer Paternal Aunt         age 40    Cancer Maternal Aunt         melanoma    Diabetes No family hx of     Hypertension No family hx of     Cancer - colorectal No family hx of     Thyroid Disease No family hx of        Medications:  Current Outpatient Medications   Medication Sig Dispense Refill    albuterol (PROAIR HFA) 108 (90 Base) MCG/ACT inhaler INHALE 2 PUFFS BY MOUTH EVERY 6 HOURS AS NEEDED FOR WHEEZE OR FOR SHORTNESS OF BREATH 8.5 Inhaler 3    albuterol (PROVENTIL) (2.5 MG/3ML) 0.083% neb solution Take 1 vial (2.5 mg) by nebulization every 6 hours as needed for shortness of breath / dyspnea or wheezing 90 mL 3     atorvastatin (LIPITOR) 20 MG tablet TAKE 1 TABLET BY MOUTH EVERY DAY 90 tablet 3    buPROPion (WELLBUTRIN XL) 300 MG 24 hr tablet Take 1 tablet (300 mg) by mouth every morning 90 tablet 3    citalopram (CELEXA) 40 MG tablet Take 1 tablet (40 mg) by mouth daily 90 tablet 11    glucose blood VI test strips strip by In Vitro route daily. 1 Box 12    lisinopril (ZESTRIL) 40 MG tablet TAKE 1 TABLET BY MOUTH EVERY DAY 90 tablet 3    meclizine (ANTIVERT) 25 MG tablet Take 1 tablet (25 mg) by mouth 3 times daily as needed for dizziness 30 tablet 11    metFORMIN (GLUCOPHAGE) 500 MG tablet TAKE 1 TABLET BY MOUTH EVERY DAY WITH BREAKFAST 90 tablet 3    multivitamin, therapeutic (THERA-VIT) TABS tablet Take 1 tablet by mouth daily      omeprazole (PRILOSEC) 40 MG DR capsule Take 1 capsule (40 mg) by mouth 2 times daily 90 capsule 0     Allergies   Allergen Reactions    No Known Allergies

## 2024-04-22 SDOH — HEALTH STABILITY: PHYSICAL HEALTH: ON AVERAGE, HOW MANY MINUTES DO YOU ENGAGE IN EXERCISE AT THIS LEVEL?: 20 MIN

## 2024-04-22 SDOH — HEALTH STABILITY: PHYSICAL HEALTH: ON AVERAGE, HOW MANY DAYS PER WEEK DO YOU ENGAGE IN MODERATE TO STRENUOUS EXERCISE (LIKE A BRISK WALK)?: 2 DAYS

## 2024-04-22 ASSESSMENT — ASTHMA QUESTIONNAIRES
QUESTION_3 LAST FOUR WEEKS HOW OFTEN DID YOUR ASTHMA SYMPTOMS (WHEEZING, COUGHING, SHORTNESS OF BREATH, CHEST TIGHTNESS OR PAIN) WAKE YOU UP AT NIGHT OR EARLIER THAN USUAL IN THE MORNING: NOT AT ALL
QUESTION_5 LAST FOUR WEEKS HOW WOULD YOU RATE YOUR ASTHMA CONTROL: WELL CONTROLLED
QUESTION_2 LAST FOUR WEEKS HOW OFTEN HAVE YOU HAD SHORTNESS OF BREATH: ONCE OR TWICE A WEEK
QUESTION_4 LAST FOUR WEEKS HOW OFTEN HAVE YOU USED YOUR RESCUE INHALER OR NEBULIZER MEDICATION (SUCH AS ALBUTEROL): NOT AT ALL
ACT_TOTALSCORE: 23
ACT_TOTALSCORE: 23
QUESTION_1 LAST FOUR WEEKS HOW MUCH OF THE TIME DID YOUR ASTHMA KEEP YOU FROM GETTING AS MUCH DONE AT WORK, SCHOOL OR AT HOME: NONE OF THE TIME

## 2024-04-22 ASSESSMENT — SOCIAL DETERMINANTS OF HEALTH (SDOH): HOW OFTEN DO YOU GET TOGETHER WITH FRIENDS OR RELATIVES?: ONCE A WEEK

## 2024-04-24 ENCOUNTER — HOSPITAL ENCOUNTER (OUTPATIENT)
Dept: MAMMOGRAPHY | Facility: CLINIC | Age: 59
Discharge: HOME | End: 2024-04-24
Attending: PEDIATRICS | Admitting: PEDIATRICS
Payer: COMMERCIAL

## 2024-04-24 DIAGNOSIS — Z12.31 VISIT FOR SCREENING MAMMOGRAM: ICD-10-CM

## 2024-04-24 PROCEDURE — 77067 SCR MAMMO BI INCL CAD: CPT

## 2024-04-29 ENCOUNTER — OFFICE VISIT (OUTPATIENT)
Dept: PEDIATRICS | Facility: CLINIC | Age: 59
End: 2024-04-29
Payer: COMMERCIAL

## 2024-04-29 VITALS
HEART RATE: 98 BPM | OXYGEN SATURATION: 97 % | SYSTOLIC BLOOD PRESSURE: 128 MMHG | RESPIRATION RATE: 18 BRPM | TEMPERATURE: 98.2 F | BODY MASS INDEX: 45.07 KG/M2 | WEIGHT: 264 LBS | HEIGHT: 64 IN | DIASTOLIC BLOOD PRESSURE: 78 MMHG

## 2024-04-29 DIAGNOSIS — G47.33 OSA (OBSTRUCTIVE SLEEP APNEA): ICD-10-CM

## 2024-04-29 DIAGNOSIS — J45.20 MILD INTERMITTENT ASTHMA, UNSPECIFIED WHETHER COMPLICATED: ICD-10-CM

## 2024-04-29 DIAGNOSIS — R42 VERTIGO: ICD-10-CM

## 2024-04-29 DIAGNOSIS — Z86.711 HISTORY OF PULMONARY EMBOLUS (PE): ICD-10-CM

## 2024-04-29 DIAGNOSIS — K21.00 GASTROESOPHAGEAL REFLUX DISEASE WITH ESOPHAGITIS, UNSPECIFIED WHETHER HEMORRHAGE: ICD-10-CM

## 2024-04-29 DIAGNOSIS — I10 BENIGN ESSENTIAL HYPERTENSION: ICD-10-CM

## 2024-04-29 DIAGNOSIS — E11.9 TYPE 2 DIABETES MELLITUS WITHOUT COMPLICATION, WITHOUT LONG-TERM CURRENT USE OF INSULIN (H): ICD-10-CM

## 2024-04-29 DIAGNOSIS — F33.9 EPISODE OF RECURRENT MAJOR DEPRESSIVE DISORDER, UNSPECIFIED DEPRESSION EPISODE SEVERITY (H): ICD-10-CM

## 2024-04-29 DIAGNOSIS — F40.298 SPECIFIC PHOBIA: ICD-10-CM

## 2024-04-29 DIAGNOSIS — E66.01 MORBID OBESITY, UNSPECIFIED OBESITY TYPE (H): ICD-10-CM

## 2024-04-29 DIAGNOSIS — F33.9 RECURRENT MAJOR DEPRESSIVE DISORDER, REMISSION STATUS UNSPECIFIED (H): ICD-10-CM

## 2024-04-29 DIAGNOSIS — Z00.00 ROUTINE HISTORY AND PHYSICAL EXAMINATION OF ADULT: Primary | ICD-10-CM

## 2024-04-29 DIAGNOSIS — H93.13 TINNITUS, BILATERAL: ICD-10-CM

## 2024-04-29 DIAGNOSIS — F41.9 ANXIETY: ICD-10-CM

## 2024-04-29 DIAGNOSIS — N18.30 STAGE 3 CHRONIC KIDNEY DISEASE, UNSPECIFIED WHETHER STAGE 3A OR 3B CKD (H): ICD-10-CM

## 2024-04-29 LAB
ALBUMIN SERPL BCG-MCNC: 4.6 G/DL (ref 3.5–5.2)
ALP SERPL-CCNC: 108 U/L (ref 40–150)
ALT SERPL W P-5'-P-CCNC: 26 U/L (ref 0–50)
ANION GAP SERPL CALCULATED.3IONS-SCNC: 13 MMOL/L (ref 7–15)
AST SERPL W P-5'-P-CCNC: 25 U/L (ref 0–45)
BILIRUB SERPL-MCNC: 0.4 MG/DL
BUN SERPL-MCNC: 12.1 MG/DL (ref 6–20)
CALCIUM SERPL-MCNC: 9.8 MG/DL (ref 8.6–10)
CHLORIDE SERPL-SCNC: 103 MMOL/L (ref 98–107)
CHOLEST SERPL-MCNC: 212 MG/DL
CREAT SERPL-MCNC: 1.12 MG/DL (ref 0.51–0.95)
CREAT UR-MCNC: 179 MG/DL
DEPRECATED HCO3 PLAS-SCNC: 26 MMOL/L (ref 22–29)
EGFRCR SERPLBLD CKD-EPI 2021: 57 ML/MIN/1.73M2
ERYTHROCYTE [DISTWIDTH] IN BLOOD BY AUTOMATED COUNT: 12.9 % (ref 10–15)
FASTING STATUS PATIENT QL REPORTED: YES
GLUCOSE SERPL-MCNC: 122 MG/DL (ref 70–99)
HBA1C MFR BLD: 5.8 % (ref 0–5.6)
HCT VFR BLD AUTO: 42.3 % (ref 35–47)
HDLC SERPL-MCNC: 56 MG/DL
HGB BLD-MCNC: 13.9 G/DL (ref 11.7–15.7)
LDLC SERPL CALC-MCNC: 109 MG/DL
MCH RBC QN AUTO: 30.3 PG (ref 26.5–33)
MCHC RBC AUTO-ENTMCNC: 32.9 G/DL (ref 31.5–36.5)
MCV RBC AUTO: 92 FL (ref 78–100)
MICROALBUMIN UR-MCNC: <12 MG/L
MICROALBUMIN/CREAT UR: NORMAL MG/G{CREAT}
NONHDLC SERPL-MCNC: 156 MG/DL
PLATELET # BLD AUTO: 262 10E3/UL (ref 150–450)
POTASSIUM SERPL-SCNC: 4.7 MMOL/L (ref 3.4–5.3)
PROT SERPL-MCNC: 7.3 G/DL (ref 6.4–8.3)
RBC # BLD AUTO: 4.59 10E6/UL (ref 3.8–5.2)
SODIUM SERPL-SCNC: 142 MMOL/L (ref 135–145)
TRIGL SERPL-MCNC: 235 MG/DL
TSH SERPL DL<=0.005 MIU/L-ACNC: 3.14 UIU/ML (ref 0.3–4.2)
WBC # BLD AUTO: 7 10E3/UL (ref 4–11)

## 2024-04-29 PROCEDURE — 82043 UR ALBUMIN QUANTITATIVE: CPT | Performed by: PEDIATRICS

## 2024-04-29 PROCEDURE — 99207 PR FOOT EXAM NO CHARGE: CPT | Performed by: PEDIATRICS

## 2024-04-29 PROCEDURE — 80061 LIPID PANEL: CPT | Performed by: PEDIATRICS

## 2024-04-29 PROCEDURE — 84443 ASSAY THYROID STIM HORMONE: CPT | Performed by: PEDIATRICS

## 2024-04-29 PROCEDURE — 85027 COMPLETE CBC AUTOMATED: CPT | Performed by: PEDIATRICS

## 2024-04-29 PROCEDURE — 83036 HEMOGLOBIN GLYCOSYLATED A1C: CPT | Performed by: PEDIATRICS

## 2024-04-29 PROCEDURE — 80053 COMPREHEN METABOLIC PANEL: CPT | Performed by: PEDIATRICS

## 2024-04-29 PROCEDURE — 99396 PREV VISIT EST AGE 40-64: CPT | Performed by: PEDIATRICS

## 2024-04-29 PROCEDURE — 36415 COLL VENOUS BLD VENIPUNCTURE: CPT | Performed by: PEDIATRICS

## 2024-04-29 PROCEDURE — 99214 OFFICE O/P EST MOD 30 MIN: CPT | Mod: 25 | Performed by: PEDIATRICS

## 2024-04-29 PROCEDURE — 82570 ASSAY OF URINE CREATININE: CPT | Performed by: PEDIATRICS

## 2024-04-29 RX ORDER — ALBUTEROL SULFATE 0.83 MG/ML
2.5 SOLUTION RESPIRATORY (INHALATION) EVERY 6 HOURS PRN
Qty: 90 ML | Refills: 3 | Status: CANCELLED | OUTPATIENT
Start: 2024-04-29

## 2024-04-29 RX ORDER — ALBUTEROL SULFATE 90 UG/1
AEROSOL, METERED RESPIRATORY (INHALATION)
Qty: 8.5 G | Refills: 5 | Status: SHIPPED | OUTPATIENT
Start: 2024-04-29

## 2024-04-29 RX ORDER — BUPROPION HYDROCHLORIDE 300 MG/1
300 TABLET ORAL EVERY MORNING
Qty: 90 TABLET | Refills: 3 | Status: SHIPPED | OUTPATIENT
Start: 2024-04-29

## 2024-04-29 RX ORDER — LORAZEPAM 0.5 MG/1
TABLET ORAL
Qty: 20 TABLET | Refills: 0 | Status: SHIPPED | OUTPATIENT
Start: 2024-04-29

## 2024-04-29 RX ORDER — LISINOPRIL 40 MG/1
40 TABLET ORAL DAILY
Qty: 90 TABLET | Refills: 3 | Status: SHIPPED | OUTPATIENT
Start: 2024-04-29

## 2024-04-29 RX ORDER — CITALOPRAM HYDROBROMIDE 40 MG/1
40 TABLET ORAL DAILY
Qty: 90 TABLET | Refills: 11 | Status: SHIPPED | OUTPATIENT
Start: 2024-04-29

## 2024-04-29 RX ORDER — ATORVASTATIN CALCIUM 20 MG/1
20 TABLET, FILM COATED ORAL DAILY
Qty: 90 TABLET | Refills: 3 | Status: SHIPPED | OUTPATIENT
Start: 2024-04-29

## 2024-04-29 RX ORDER — MECLIZINE HYDROCHLORIDE 25 MG/1
25 TABLET ORAL 3 TIMES DAILY PRN
Qty: 30 TABLET | Refills: 11 | Status: SHIPPED | OUTPATIENT
Start: 2024-04-29

## 2024-04-29 ASSESSMENT — PAIN SCALES - GENERAL: PAINLEVEL: NO PAIN (0)

## 2024-04-29 ASSESSMENT — PATIENT HEALTH QUESTIONNAIRE - PHQ9
10. IF YOU CHECKED OFF ANY PROBLEMS, HOW DIFFICULT HAVE THESE PROBLEMS MADE IT FOR YOU TO DO YOUR WORK, TAKE CARE OF THINGS AT HOME, OR GET ALONG WITH OTHER PEOPLE: NOT DIFFICULT AT ALL
SUM OF ALL RESPONSES TO PHQ QUESTIONS 1-9: 7
SUM OF ALL RESPONSES TO PHQ QUESTIONS 1-9: 7

## 2024-04-29 NOTE — LETTER
My Asthma Action Plan    Name: Racuqel Amin   YOB: 1965  Date: 4/29/2024   My doctor: Nancy Small MD   My clinic: [facility]        My Rescue Medicine:   Albuterol inhaler (Proair/Ventolin/Proventil HFA)  2-4 puffs EVERY 4 HOURS as needed. Use a spacer if recommended by your provider.   My Asthma Severity:   Intermittent / Exercise Induced  Know your asthma triggers: upper respiratory infections and pollens             GREEN ZONE   Good Control  I feel good  No cough or wheeze  Can work, sleep and play without asthma symptoms       Take your asthma control medicine every day.     If exercise triggers your asthma, take your rescue medication  15 minutes before exercise or sports, and  During exercise if you have asthma symptoms  Spacer to use with inhaler: If you have a spacer, make sure to use it with your inhaler             YELLOW ZONE Getting Worse  I have ANY of these:  I do not feel good  Cough or wheeze  Chest feels tight  Wake up at night   Keep taking your Green Zone medications  Start taking your rescue medicine:  every 20 minutes for up to 1 hour. Then every 4 hours for 24-48 hours.  If you stay in the Yellow Zone for more than 12-24 hours, contact your doctor.  If you do not return to the Green Zone in 12-24 hours or you get worse, start taking your oral steroid medicine if prescribed by your provider.           RED ZONE Medical Alert - Get Help  I have ANY of these:  I feel awful  Medicine is not helping  Breathing getting harder  Trouble walking or talking  Nose opens wide to breathe       Take your rescue medicine NOW  If your provider has prescribed an oral steroid medicine, start taking it NOW  Call your doctor NOW  If you are still in the Red Zone after 20 minutes and you have not reached your doctor:  Take your rescue medicine again and  Call 911 or go to the emergency room right away    See your regular doctor within 2 weeks of an Emergency Room or  Urgent Care visit for follow-up treatment.          Annual Reminders:  Meet with Asthma Educator,  Flu Shot in the Fall, consider Pneumonia Vaccination for patients with asthma (aged 19 and older).    Pharmacy: [facility] [id number] [place] [address]    Electronically signed by Nancy Small MD   Date: 04/29/24                    Asthma Triggers  How To Control Things That Make Your Asthma Worse    Triggers are things that make your asthma worse.  Look at the list below to help you find your triggers and   what you can do about them. You can help prevent asthma flare-ups by staying away from your triggers.      Trigger                                                          What you can do   Cigarette Smoke  Tobacco smoke can make asthma worse. Do not allow smoking in your home, car or around you.  Be sure no one smokes at a child s day care or school.  If you smoke, ask your health care provider for ways to help you quit.  Ask family members to quit too.  Ask your health care provider for a referral to Quit Plan to help you quit smoking, or call 2-087-324-PLAN.     Colds, Flu, Bronchitis  These are common triggers of asthma. Wash your hands often.  Don t touch your eyes, nose or mouth.  Get a flu shot every year.     Dust Mites  These are tiny bugs that live in cloth or carpet. They are too small to see. Wash sheets and blankets in hot water every week.   Encase pillows and mattress in dust mite proof covers.  Avoid having carpet if you can. If you have carpet, vacuum weekly.   Use a dust mask and HEPA vacuum.   Pollen and Outdoor Mold  Some people are allergic to trees, grass, or weed pollen, or molds. Try to keep your windows closed.  Limit time out doors when pollen count is high.   Ask you health care provider about taking medicine during allergy season.     Animal Dander  Some people are allergic to skin flakes, urine or saliva from pets with fur or feathers. Keep pets with fur or  feathers out of your home.    If you can t keep the pet outdoors, then keep the pet out of your bedroom.  Keep the bedroom door closed.  Keep pets off cloth furniture and away from stuffed toys.     Mice, Rats, and Cockroaches  Some people are allergic to the waste from these pests.   Cover food and garbage.  Clean up spills and food crumbs.  Store grease in the refrigerator.   Keep food out of the bedroom.   Indoor Mold  This can be a trigger if your home has high moisture. Fix leaking faucets, pipes, or other sources of water.   Clean moldy surfaces.  Dehumidify basement if it is damp and smelly.   Smoke, Strong Odors, and Sprays  These can reduce air quality. Stay away from strong odors and sprays, such as perfume, powder, hair spray, paints, smoke incense, paint, cleaning products, candles and new carpet.   Exercise or Sports  Some people with asthma have this trigger. Be active!  Ask your doctor about taking medicine before sports or exercise to prevent symptoms.    Warm up for 5-10 minutes before and after sports or exercise.     Other Triggers of Asthma  Cold air:  Cover your nose and mouth with a scarf.  Sometimes laughing or crying can be a trigger.  Some medicines and food can trigger asthma.

## 2024-04-29 NOTE — PATIENT INSTRUCTIONS
Keep metformin at current dose    Add ozempic - at 0.25mg per week - take this for 2 months, then send me a MyChart and we can bump up the dose if you are doing well.    Insurance will likely make us do some forms - stay tuned    Use ativan at the dentist and before you get on a plane - make sure to have a  if you use at the dentist    Labs today    Please have [facility] send me your eye exam results when you go    Think about pap next year    Immunizations that you would qualify for:  COVID booster, Shingrix (dose 2), Prevnar 20    Sleep medicine referral placed - they will call - this is good for a year!  I think this would help your fatigue!

## 2024-04-29 NOTE — PROGRESS NOTES
Preventive Care Visit  [facility] SOFÍA Small MD, Internal Medicine - Pediatrics  Apr 29, 2024      Assessment & Plan       ICD-10-CM    1. Routine history and physical examination of adult  Z00.00 Hemoglobin A1c     Albumin Random Urine Quantitative with Creat Ratio     Comprehensive metabolic panel     Lipid panel reflex to direct LDL Fasting     TSH with free T4 reflex     CBC with platelets    UTD on breast and colon cancer screenings    Politely declines pap smear today - will consider next year    Provided list of vaccines to consider in the pharmacy      2. Benign essential hypertension  I10 Albumin Random Urine Quantitative with Creat Ratio     Comprehensive metabolic panel     Lipid panel reflex to direct LDL Fasting     lisinopril (ZESTRIL) 40 MG tablet    Well controlled, continue current medications        3. Type 2 diabetes mellitus without complication, without long-term current use of insulin (H)  E11.9 Hemoglobin A1c     Albumin Random Urine Quantitative with Creat Ratio     Comprehensive metabolic panel     Lipid panel reflex to direct LDL Fasting     TSH with free T4 reflex     FOOT EXAM     semaglutide (OZEMPIC) 2 MG/3ML pen     atorvastatin (LIPITOR) 20 MG tablet     PRIMARY CARE FOLLOW-UP SCHEDULING    Plan to add ozempic - new medication discussed today.  Plan labs today.  Repeat visit in 3-4 months        4. ERICA (obstructive sleep apnea)  G47.33 Adult Sleep Eval & Management  Referral    Lost CPAP machine and overdue for visit with sleep medicine - new referral placed      5. Mild intermittent asthma, unspecified whether complicated  J45.20 albuterol (PROAIR HFA) 108 (90 Base) MCG/ACT inhaler    Well controlled, continue current medications        6. Stage 3 chronic kidney disease, unspecified whether stage 3a or 3b CKD (H)  N18.30 Albumin Random Urine Quantitative with Creat Ratio     Comprehensive metabolic panel     Lipid panel reflex to direct LDL  "Fasting     CBC with platelets      7. History of pulmonary embolus (PE)  Z86.711 While on estrogen - now off      8. Recurrent major depressive disorder, remission status unspecified (H24)  F33.9 citalopram (CELEXA) 40 MG tablet  Well controlled, continue current medications        9. CKD (chronic kidney disease) stage 3, GFR 30-59 ml/min (H)  N18.30       10. Vertigo  R42 meclizine (ANTIVERT) 25 MG tablet          11. Episode of recurrent major depressive disorder, unspecified depression episode severity (H24)  F33.9 buPROPion (WELLBUTRIN XL) 300 MG 24 hr tablet    Well controlled, continue current medications        12. Anxiety  F41.9 citalopram (CELEXA) 40 MG tablet      13. Morbid obesity, unspecified obesity type (H)  E66.01 metFORMIN (GLUCOPHAGE) 500 MG tablet    Complicating management of diabetes, ERICA, HTN - plan to start ozempic, continue metformin      14. Gastroesophageal reflux disease with esophagitis, unspecified whether hemorrhage  K21.00 Stable on current PPI dosing - prefers to buy OTC      15. Specific phobia  F40.298 LORazepam (ATIVAN) 0.5 MG tablet    Has been avoiding dentist for years due to anxiety around this situation.  Also struggles to get on planes.  Plan ativan prn for both situations - aware that she will need       16. Tinnitus, bilateral  H93.13 Working with [facility]              BMI  Estimated body mass index is 44.85 kg/m  as calculated from the following:    Height as of this encounter: 1.634 m (5' 4.33\").    Weight as of this encounter: 119.7 kg (264 lb).   Weight management plan: Discussed healthy diet and exercise guidelines and see plan above    Counseling  Appropriate preventive services were discussed with this patient, including applicable screening as appropriate for fall prevention, nutrition, physical activity, Tobacco-use cessation, weight loss and cognition.  Checklist reviewing preventive services available has been given to the patient.  Reviewed patient's " diet, addressing concerns and/or questions.   She is at risk for lack of exercise and has been provided with information to increase physical activity for the benefit of her well-being.   The patient was instructed to see the dentist every 6 months.   She is at risk for psychosocial distress and has been provided with information to reduce risk.   The patient's PHQ-9 score is consistent with mild depression. She was provided with information regarding depression.           Subjective   Abida is a 58 year old, presenting for the following:  Physical        4/29/2024    10:36 AM   Additional Questions   Roomed by Billy LOUISE   Accompanied by Mom Nora         4/29/2024    10:36 AM   Patient Reported Additional Medications   Patient reports taking the following new medications No        Health Care Directive  Patient does not have a Health Care Directive or Living Will: Discussed advance care planning with patient; information given to patient to review.    HPI        4/22/2024   General Health   How would you rate your overall physical health? Good   Feel stress (tense, anxious, or unable to sleep) Only a little   (!) STRESS CONCERN      4/22/2024   Nutrition   Three or more servings of calcium each day? Yes   Diet: Regular (no restrictions)   How many servings of fruit and vegetables per day? (!) 2-3   How many sweetened beverages each day? (!) 2         4/22/2024   Exercise   Days per week of moderate/strenous exercise 2 days   Average minutes spent exercising at this level 20 min   (!) EXERCISE CONCERN      4/22/2024   Social Factors   Frequency of gathering with friends or relatives Once a week   Worry food won't last until get money to buy more No   Food not last or not have enough money for food? No   Do you have housing?  Yes   Are you worried about losing your housing? No   Lack of transportation? No   Unable to get utilities (heat,electricity)? No         4/22/2024   Fall Risk   Fallen 2 or more times in the past  year? No   Trouble with walking or balance? No          4/22/2024   Dental   Dentist two times every year? (!) NO         4/22/2024   TB Screening   Were you born outside of the [place]? Yes       Today's PHQ-9 Score:       4/29/2024    10:24 AM   PHQ-9 SCORE   PHQ-9 Total Score MyChart 7 (Mild depression)   PHQ-9 Total Score 7         4/22/2024   Substance Use   Alcohol more than 3/day or more than 7/wk No   Do you use any other substances recreationally? No     Social History     Tobacco Use    Smoking status: Never     Passive exposure: Past    Smokeless tobacco: Never   Vaping Use    Vaping status: Never Used   Substance Use Topics    Alcohol use: Yes     Comment: 1 drink per week or less    Drug use: No           4/24/2024   LAST FHS-7 RESULTS   1st degree relative breast or ovarian cancer Yes   Any relative bilateral breast cancer No   Any male have breast cancer No   Any ONE woman have BOTH breast AND ovarian cancer No   Any woman with breast cancer before 50yrs Yes   2 or more relatives with breast AND/OR ovarian cancer Yes   2 or more relatives with breast AND/OR bowel cancer No     Mammogram Screening - Mammogram every 1-2 years updated in Health Maintenance based on mutual decision making        4/22/2024   STI Screening   New sexual partner(s) since last STI/HIV test? No     History of abnormal Pap smear: NO - age 30-65 PAP every 5 years with negative HPV co-testing recommended        Latest Ref Rng & Units 2/1/2018     8:46 AM 2/1/2018     8:15 AM 7/7/2014    12:00 AM   PAP / HPV   PAP (Historical)   NIL  NIL    HPV 16 DNA NEG^Negative Negative      HPV 18 DNA NEG^Negative Negative      Other HR HPV NEG^Negative Negative        ASCVD Risk   The 10-year ASCVD risk score (Layla ESPINOZA, et al., 2019) is: 5.4%    Values used to calculate the score:      Age: 58 years      Sex: Female      Is Non- : No      Diabetic: Yes      Tobacco smoker: No      Systolic Blood Pressure: 128  "mmHg      Is BP treated: Yes      HDL Cholesterol: 50 mg/dL      Total Cholesterol: 174 mg/dL        Reviewed and updated as needed this visit by Provider                      Diabetes - not checking sugars, no new cardiac or neurologic symptoms.   Very frustrated by weight gain - wondering about other medication options    Asthma - controlled on prn albuterol.  Seasonal allergies causing a slight increase in symptoms    HTN - doesn't check bp at home, doing well on current medications    HL - tolerating statin well    Tinnitus/dizziness - chronic - meclizine helps a bit - working with ENT    Due for eye exam - plans to schedule    Severe anxiety with dentist and flying - interested in prn medication to help    Depression/anxiety - in a good place on current medication dosing.  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       Objective    Exam  /78   Pulse 98   Temp 98.2  F (36.8  C)   Resp 18   Ht 1.634 m (5' 4.33\")   Wt 119.7 kg (264 lb)   LMP 09/30/2019   SpO2 97%   BMI 44.85 kg/m     Estimated body mass index is 44.85 kg/m  as calculated from the following:    Height as of this encounter: 1.634 m (5' 4.33\").    Weight as of this encounter: 119.7 kg (264 lb).    Physical Exam  GENERAL: alert and no distress  EYES: Eyes grossly normal to inspection, PERRL and conjunctivae and sclerae normal  HENT: ear canals and TM's normal, nose and mouth without ulcers or lesions  NECK: no adenopathy, no asymmetry, masses, or scars  RESP: lungs clear to auscultation - no rales, rhonchi or wheezes  CV: regular rate and rhythm, normal S1 S2, no S3 or S4, no murmur, click or rub, no peripheral edema  ABDOMEN: soft, nontender, no hepatosplenomegaly, no masses and bowel sounds normal  MS: no gross musculoskeletal defects noted, no edema  SKIN: no suspicious lesions or rashes  NEURO: Normal strength and tone, mentation intact and speech " normal  PSYCH: mentation appears normal, affect normal/bright  Diabetic foot exam: normal DP and PT pulses, no trophic changes or ulcerative lesions, and normal sensory exam        Signed Electronically by: Nancy Small MD

## 2024-06-17 NOTE — TELEPHONE ENCOUNTER
1st attempt to call patient: left vmail.   
Please complete [facility]-Operative worksheet.  
Racquel Nunn is a 54 year old female who calls wanting to set up Carpal Tunnel surgery with Dr. Rogers. Informed that our surgery scheduler is out ill today. Will have her get back with her tomorrow.     Patient expecting call back: YES      Preferred contact number:  [phone] (home)    Can we leave a detailed message on this number: YES     Please contact patient.     CÉSAR Schwartz RN            
Scheduled surgery    Type of surgery: Right carpal tunnel release  Location of surgery: [facility]  Date and time of surgery: 2/18/2020 @ 215pm   Surgeon: Sue  Pre-Op Appt Date: patient will schedule  Post-Op Appt Date: 2/27/20   Packet sent out: Yes  Pre-cert/Authorization completed:  No  Date: 1/24/2020      Mitchell Demarco, Surgery Scheduler      
n/a

## 2024-08-25 ENCOUNTER — HEALTH MAINTENANCE LETTER (OUTPATIENT)
Age: 59
End: 2024-08-25

## 2024-09-18 DIAGNOSIS — E66.01 MORBID OBESITY, UNSPECIFIED OBESITY TYPE (H): ICD-10-CM

## 2024-10-24 DIAGNOSIS — F41.9 ANXIETY: ICD-10-CM

## 2024-10-24 DIAGNOSIS — F33.9 RECURRENT MAJOR DEPRESSIVE DISORDER, REMISSION STATUS UNSPECIFIED (H): ICD-10-CM

## 2024-10-24 RX ORDER — CITALOPRAM HYDROBROMIDE 40 MG/1
40 TABLET ORAL DAILY
Qty: 90 TABLET | Refills: 11 | OUTPATIENT
Start: 2024-10-24

## 2024-10-28 ENCOUNTER — MYC REFILL (OUTPATIENT)
Dept: PEDIATRICS | Facility: CLINIC | Age: 59
End: 2024-10-28
Payer: COMMERCIAL

## 2024-10-28 DIAGNOSIS — R42 VERTIGO: ICD-10-CM

## 2024-10-28 RX ORDER — MECLIZINE HYDROCHLORIDE 25 MG/1
25 TABLET ORAL 3 TIMES DAILY PRN
Qty: 30 TABLET | Refills: 11 | OUTPATIENT
Start: 2024-10-28

## 2024-12-10 ENCOUNTER — NURSE TRIAGE (OUTPATIENT)
Dept: PEDIATRICS | Facility: CLINIC | Age: 59
End: 2024-12-10
Payer: COMMERCIAL

## 2024-12-10 NOTE — TELEPHONE ENCOUNTER
Please review the MC message from pt. LOV was on 4/29/24. Looks like pt was rx'ed semaglutide 0.25 mg weekly on 4/29/24.     Sent MC message requesting clarification from pt. Will await for her response.     Malik HODGE  Clinic RN  [facility]

## 2024-12-11 NOTE — TELEPHONE ENCOUNTER
Dr. Small- See triage encounter below. Please review and advise.     Nurse Triage SBAR    Is this a 2nd Level Triage? NO    Situation: Pt calling to report concern over difficulty swallowing and a weird feeling in her throat. She is wondering if this could be an indication of worsening of hiatal hernia.     Background: GERD, was taking omeprazole previously. Has not taken it omeprazole for awhile as her symptoms improved previously. Hiatal hernia, mild intermittent asthma, morbid obesity, DM type 2, HTN, CKD stage 3.     Assessment:     -Pt states that she noticed that she was having more symptoms of GERD with a weird sensation in throat and difficulty swallowing episodes. She states the episodes occur about 1 hour after eating and primarily occur when laying down, although will occasionally occur during the day.     -She denies any chest pain, abdominal pain, nausea, vomiting, fever, difficulty breathing.     -She states that she resumed her omeprazole and it alleviates the symptoms.     -She denies any difficulty swallowing foods or drink at meals. Only occurs with the above noted episodes.     -She informed writer that she is in [facility] and will be returning on 12/12 and would like PCP to weigh in. She did not notify writer that she was out of state until protocol was completed.     Protocol Recommended Disposition:   No disposition on file.    Recommendation:     -Instructed pt to call back or be seen in ED while in [facility] if develops chest pain or shortness of breath.  -Instructed her to sit up and not lay down after eating.     Pt verbalizes understanding and of above is agreeable with plan. Pt denies any further questions or concerns at this time.        Routed to provider    Does the patient meet one of the following criteria for ADS visit consideration? 16+ years old, with an [facility] PCP     TIP  Providers, please consider if this condition is appropriate for management at one of our Acute and Diagnostic  "Services sites.     If patient is a good candidate, please use dotphrase <dot>triageresponse and select Refer to ADS to document.        Additional Information   Negative: SEVERE difficulty swallowing (e.g., drooling or spitting) and started suddenly after taking a medicine or allergic foods   Negative: Wheezing, stridor, hoarseness, or difficulty breathing   Negative: Swollen tongue and sudden onset   Negative: Sounds like a life-threatening emergency to the triager   Negative: Sore throat (throat pain with swallowing)   Negative: SEVERE difficulty swallowing (e.g., drooling or spitting, can't swallow water)   Negative: Symptoms of food or bone stuck in throat or esophagus (e.g., pain in throat or chest, FB sensation, blood-tinged saliva)   Negative: SEVERE symptoms of pill stuck in throat or esophagus (e.g., severe pain, bleeding, or difficulty swallowing liquids)   Negative: Drinking very little and dehydration suspected (e.g., no urine > 12 hours, very dry mouth, very lightheaded)   Negative: Refuses to drink anything for > 12 hours   Negative: Patient sounds very sick or weak to the triager   Negative: Fever > 100.4 F (38.0 C)   Negative: Coughing spells occur during or within 2 hours after eating/feedings   Negative: Symptoms of pill stuck in throat or esophagus (e.g., pain in throat or chest, FB sensation) and no relief after using Care Advice   Negative: Weak immune system (e.g., HIV positive, cancer chemo, splenectomy, organ transplant, chronic steroids)   Negative: Swallowing difficulty and cause unknown  (Exception: Difficulty swallowing is a chronic symptom.)   Negative: Patient wants to be seen   Negative: Swallowing difficulty is a chronic symptom (recurrent or ongoing AND present > 4 weeks)   Negative: Pill stuck in throat or esophagus   Negative: Preventing pills getting stuck in esophagus, questions about    Answer Assessment - Initial Assessment Questions  1. DESCRIPTION: \"Tell me more about this " "problem.\" \"Are you  having trouble swallowing liquids, solids, or both?\" \"Any trouble with swallowing saliva (spit)?\"      \"When eating or drinking I'm fine. Only when having that sensation.     2. SEVERITY: \"How bad is the swallowing difficulty?\"  (Scale 1-10; or mild, moderate, severe)      Mild when it occurs.   3. ONSET: \"When did the swallowing problems begin?\"       About 3 weeks ago.   4. CAUSE: \"What do you think is causing the problem?\"  (e.g., dry mouth, food or pill stuck in throat, mouth pain, sore throat, progression of disease process such as dementia or Parkinson's disease).       \"No clue.\" Possibly hernia acting up again.   5. CHRONIC or RECURRENT: \"Is this a new problem for you?\"  If No, ask: \"How long have you had this problem?\" (e.g., days, weeks, months)       Yes, when has occurred before when she had taken omeprazole previously.   6. OTHER SYMPTOMS: \"Do you have any other symptoms?\" (e.g., chest pain, difficulty breathing, mouth sores, sore throat, swollen tongue, chest pain)      No  7. PREGNANCY: \"Is there any chance you are pregnant?\" \"When was your last menstrual period?\"      No    Protocols used: Swallowing Difficulty-A-OH    Routed to PCP    Tamy HAGAN RN   Clinic RN  [facility]    "

## 2024-12-11 NOTE — TELEPHONE ENCOUNTER
RN called and relayed provider message below. Patient verbalized understanding and agreed with plan.     Avelino TEJADA RN 12/11/2024 at 1:34 PM

## 2024-12-11 NOTE — TELEPHONE ENCOUNTER
Agree with restarting omeprazole and would recommend that she take it regularly for at least the next 2 months.  Nancy Small MD

## 2024-12-11 NOTE — TELEPHONE ENCOUNTER
"Called and left voice message for patient to call [phone] and ask to speak to a nurse. Also responded to [employer] message    Upon call back please  -inquire about \"feeling in throat\" and reflux    Per chart review  OV 4/19/24 with Dr. Small  Gastroesophageal reflux disease with esophagitis, unspecified whether hemorrhage  K21.00 Stable on current PPI dosing - prefers to buy OTC     Awaiting call back at this time.    Paige Giles RN    "

## 2024-12-29 ENCOUNTER — HEALTH MAINTENANCE LETTER (OUTPATIENT)
Age: 59
End: 2024-12-29

## 2024-12-30 ENCOUNTER — MYC MEDICAL ADVICE (OUTPATIENT)
Dept: PEDIATRICS | Facility: CLINIC | Age: 59
End: 2024-12-30
Payer: COMMERCIAL

## 2024-12-31 ENCOUNTER — E-VISIT (OUTPATIENT)
Dept: PEDIATRICS | Facility: CLINIC | Age: 59
End: 2024-12-31
Payer: COMMERCIAL

## 2024-12-31 DIAGNOSIS — U07.1 INFECTION DUE TO 2019 NOVEL CORONAVIRUS: Primary | ICD-10-CM

## 2025-01-08 ENCOUNTER — E-VISIT (OUTPATIENT)
Dept: PEDIATRICS | Facility: CLINIC | Age: 60
End: 2025-01-08
Payer: COMMERCIAL

## 2025-01-08 DIAGNOSIS — J01.00 ACUTE NON-RECURRENT MAXILLARY SINUSITIS: Primary | ICD-10-CM

## 2025-01-08 DIAGNOSIS — U09.9 POST-ACUTE SEQUELAE OF SARS-COV-2 INFECTION: ICD-10-CM

## 2025-01-09 RX ORDER — AZITHROMYCIN 250 MG/1
TABLET, FILM COATED ORAL
Qty: 6 TABLET | Refills: 0 | Status: SHIPPED | OUTPATIENT
Start: 2025-01-09 | End: 2025-01-14

## 2025-01-09 RX ORDER — BENZONATATE 100 MG/1
100 CAPSULE ORAL 3 TIMES DAILY PRN
Qty: 30 CAPSULE | Refills: 2 | Status: SHIPPED | OUTPATIENT
Start: 2025-01-09

## 2025-01-20 ENCOUNTER — MYC MEDICAL ADVICE (OUTPATIENT)
Dept: PEDIATRICS | Facility: CLINIC | Age: 60
End: 2025-01-20
Payer: COMMERCIAL

## 2025-01-20 NOTE — TELEPHONE ENCOUNTER
Paperwork faxed to Dr. Reta Dubin. Left message for patient to call office back. Office phone number provided. Spoke with pt and scheduled.    Latonya Garcia on 1/20/2025 at 2:41 PM

## 2025-01-20 NOTE — TELEPHONE ENCOUNTER
Dr. Small- pt reporting her cold is getting worse  - still coughing, tired, congestion   - using her inhaler daily     Please advise if pt needs to have apt/new plan of care.   Nubia Gustafson RN       --- Mikey Small MD 1/9/25 8:54 AM CST---  Abida,     So sorry that you are still feeling so crummy.  I sent a prescription for an antibiotic and for some pills to help with your cough to your pharmacy.  Please start them when you can.        Using a nasal saline spray (available over the counter) can help with your nose symptoms.     Please keep pushing fluids to keep all your mucous thin and easy to get out.     If you start to have any trouble breathing, please come in to be seen right away!     Thinking of you all,  Nancy Small MD    Azithromycin 250 mg   Benzonatate 100 mg

## 2025-02-05 ENCOUNTER — ALLIED HEALTH/NURSE VISIT (OUTPATIENT)
Dept: PEDIATRICS | Facility: CLINIC | Age: 60
End: 2025-02-05
Payer: COMMERCIAL

## 2025-02-05 DIAGNOSIS — Z23 ENCOUNTER FOR IMMUNIZATION: Primary | ICD-10-CM

## 2025-02-05 PROCEDURE — 90673 RIV3 VACCINE NO PRESERV IM: CPT

## 2025-02-05 PROCEDURE — 90471 IMMUNIZATION ADMIN: CPT

## 2025-02-05 PROCEDURE — 99207 PR NO CHARGE NURSE ONLY: CPT

## 2025-02-05 NOTE — PROGRESS NOTES
Prior to immunization administration, verified patients identity using patient s name and date of birth. Please see Immunization Activity for additional information.     Is the patient's temperature normal (100.5 or less)? Yes 98.0    Patient MEETS CRITERIA. PROCEED with vaccine administration.      Patient instructed to remain in clinic for 15 minutes afterwards, and to report any adverse reactions.      Link to Ancillary Visit Immunization Standing Orders SmartSet     Screening performed by Joan Donaldson MA on 2/5/2025 at 2:40 PM.

## 2025-03-05 ENCOUNTER — PATIENT OUTREACH (OUTPATIENT)
Dept: CARE COORDINATION | Facility: CLINIC | Age: 60
End: 2025-03-05
Payer: COMMERCIAL

## 2025-03-06 DIAGNOSIS — Z12.11 COLON CANCER SCREENING: ICD-10-CM

## 2025-03-20 ENCOUNTER — ORDERS ONLY (AUTO-RELEASED) (OUTPATIENT)
Dept: URGENT CARE | Facility: CLINIC | Age: 60
End: 2025-03-20
Payer: COMMERCIAL

## 2025-03-20 DIAGNOSIS — Z12.11 COLON CANCER SCREENING: ICD-10-CM

## 2025-04-01 ENCOUNTER — E-VISIT (OUTPATIENT)
Dept: PEDIATRICS | Facility: CLINIC | Age: 60
End: 2025-04-01
Payer: COMMERCIAL

## 2025-04-01 DIAGNOSIS — J01.90 ACUTE NON-RECURRENT SINUSITIS, UNSPECIFIED LOCATION: ICD-10-CM

## 2025-04-01 DIAGNOSIS — J45.901 MILD ASTHMA WITH EXACERBATION, UNSPECIFIED WHETHER PERSISTENT: Primary | ICD-10-CM

## 2025-04-01 RX ORDER — PREDNISONE 10 MG/1
TABLET ORAL
Qty: 20 TABLET | Refills: 0 | Status: SHIPPED | OUTPATIENT
Start: 2025-04-01

## 2025-04-01 RX ORDER — AZITHROMYCIN 250 MG/1
TABLET, FILM COATED ORAL
Qty: 6 TABLET | Refills: 0 | Status: SHIPPED | OUTPATIENT
Start: 2025-04-01 | End: 2025-04-06

## 2025-04-08 LAB — NONINV COLON CA DNA+OCC BLD SCRN STL QL: NEGATIVE

## 2025-04-19 ENCOUNTER — HEALTH MAINTENANCE LETTER (OUTPATIENT)
Age: 60
End: 2025-04-19

## 2025-04-28 ENCOUNTER — HOSPITAL ENCOUNTER (OUTPATIENT)
Dept: MAMMOGRAPHY | Facility: CLINIC | Age: 60
Discharge: HOME | End: 2025-04-28
Attending: PEDIATRICS | Admitting: PEDIATRICS
Payer: COMMERCIAL

## 2025-04-28 DIAGNOSIS — Z12.31 VISIT FOR SCREENING MAMMOGRAM: ICD-10-CM

## 2025-04-28 PROCEDURE — 77063 BREAST TOMOSYNTHESIS BI: CPT

## 2025-06-05 ENCOUNTER — PATIENT OUTREACH (OUTPATIENT)
Dept: CARE COORDINATION | Facility: CLINIC | Age: 60
End: 2025-06-05
Payer: COMMERCIAL

## 2025-06-09 DIAGNOSIS — E66.01 MORBID OBESITY, UNSPECIFIED OBESITY TYPE (H): ICD-10-CM

## 2025-06-23 ENCOUNTER — TELEPHONE (OUTPATIENT)
Dept: PEDIATRICS | Facility: CLINIC | Age: 60
End: 2025-06-23
Payer: COMMERCIAL

## 2025-06-23 NOTE — TELEPHONE ENCOUNTER
Reason for Call:  Appointment Request    Patient requesting this type of appt:  annual pe/dizziness & headaches on and off for 2 wks/shoulder pain x 6 months- getting worse. Declined triage for dizziness and headaches    Requested provider: Nancy Small    Reason patient unable to be scheduled: Not within requested timeframe    When does patient want to be seen/preferred time: July 14 or 15    Comments: pt is currently out of town.     Could we send this information to you in [employer] or would you prefer to receive a phone call?:   Patient would prefer a phone call   Okay to leave a detailed message?: Yes at Cell number on file:    Telephone Information:   Mobile [phone]       Call taken on 6/23/2025 at 10:24 AM by Meenu Rhoades

## 2025-06-23 NOTE — TELEPHONE ENCOUNTER
Contacts       Contact Date/Time Type Contact Phone/Fax    06/23/2025 10:23 AM CDT Phone (Incoming) Abida Amin (Self) [phone] (M)    06/23/2025 12:32 PM CDT Phone (Outgoing) Abida Amin (Self) [phone] (M)    Talked with Patient           Patient declined earlier appointment for only the acute items, declined an earlier appointment a week earlier, declined appointment in [place] with male provider on days pt is available. Pt requested to be transferred to [facility]. Pt at [facility] and coming back to town for a couple days then leaving for her cabin again.    Latonya Garcia on 6/23/2025 at 12:34 PM

## 2025-06-24 DIAGNOSIS — R42 VERTIGO: ICD-10-CM

## 2025-06-25 RX ORDER — MECLIZINE HYDROCHLORIDE 25 MG/1
TABLET ORAL
Qty: 30 TABLET | Refills: 9 | Status: SHIPPED | OUTPATIENT
Start: 2025-06-25

## 2025-07-12 DIAGNOSIS — F33.9 EPISODE OF RECURRENT MAJOR DEPRESSIVE DISORDER, UNSPECIFIED DEPRESSION EPISODE SEVERITY: ICD-10-CM

## 2025-07-12 DIAGNOSIS — E11.9 TYPE 2 DIABETES MELLITUS WITHOUT COMPLICATION, WITHOUT LONG-TERM CURRENT USE OF INSULIN (H): ICD-10-CM

## 2025-07-12 DIAGNOSIS — I10 BENIGN ESSENTIAL HYPERTENSION: ICD-10-CM

## 2025-07-13 SDOH — HEALTH STABILITY: PHYSICAL HEALTH: ON AVERAGE, HOW MANY MINUTES DO YOU ENGAGE IN EXERCISE AT THIS LEVEL?: 0 MIN

## 2025-07-13 SDOH — HEALTH STABILITY: PHYSICAL HEALTH: ON AVERAGE, HOW MANY DAYS PER WEEK DO YOU ENGAGE IN MODERATE TO STRENUOUS EXERCISE (LIKE A BRISK WALK)?: 0 DAYS

## 2025-07-13 ASSESSMENT — PATIENT HEALTH QUESTIONNAIRE - PHQ9
SUM OF ALL RESPONSES TO PHQ QUESTIONS 1-9: 9
SUM OF ALL RESPONSES TO PHQ QUESTIONS 1-9: 9
10. IF YOU CHECKED OFF ANY PROBLEMS, HOW DIFFICULT HAVE THESE PROBLEMS MADE IT FOR YOU TO DO YOUR WORK, TAKE CARE OF THINGS AT HOME, OR GET ALONG WITH OTHER PEOPLE: SOMEWHAT DIFFICULT

## 2025-07-13 ASSESSMENT — ASTHMA QUESTIONNAIRES
QUESTION_4 LAST FOUR WEEKS HOW OFTEN HAVE YOU USED YOUR RESCUE INHALER OR NEBULIZER MEDICATION (SUCH AS ALBUTEROL): NOT AT ALL
QUESTION_5 LAST FOUR WEEKS HOW WOULD YOU RATE YOUR ASTHMA CONTROL: WELL CONTROLLED
ACT_TOTALSCORE: 23
QUESTION_3 LAST FOUR WEEKS HOW OFTEN DID YOUR ASTHMA SYMPTOMS (WHEEZING, COUGHING, SHORTNESS OF BREATH, CHEST TIGHTNESS OR PAIN) WAKE YOU UP AT NIGHT OR EARLIER THAN USUAL IN THE MORNING: NOT AT ALL
QUESTION_1 LAST FOUR WEEKS HOW MUCH OF THE TIME DID YOUR ASTHMA KEEP YOU FROM GETTING AS MUCH DONE AT WORK, SCHOOL OR AT HOME: NONE OF THE TIME
QUESTION_2 LAST FOUR WEEKS HOW OFTEN HAVE YOU HAD SHORTNESS OF BREATH: ONCE OR TWICE A WEEK

## 2025-07-13 ASSESSMENT — SOCIAL DETERMINANTS OF HEALTH (SDOH): HOW OFTEN DO YOU GET TOGETHER WITH FRIENDS OR RELATIVES?: ONCE A WEEK

## 2025-07-14 ENCOUNTER — OFFICE VISIT (OUTPATIENT)
Dept: INTERNAL MEDICINE | Facility: CLINIC | Age: 60
End: 2025-07-14
Payer: COMMERCIAL

## 2025-07-14 VITALS
HEART RATE: 77 BPM | HEIGHT: 65 IN | SYSTOLIC BLOOD PRESSURE: 132 MMHG | OXYGEN SATURATION: 96 % | RESPIRATION RATE: 18 BRPM | WEIGHT: 251.2 LBS | TEMPERATURE: 98.2 F | BODY MASS INDEX: 41.85 KG/M2 | DIASTOLIC BLOOD PRESSURE: 82 MMHG

## 2025-07-14 DIAGNOSIS — E11.9 TYPE 2 DIABETES MELLITUS WITHOUT COMPLICATION, WITHOUT LONG-TERM CURRENT USE OF INSULIN (H): ICD-10-CM

## 2025-07-14 DIAGNOSIS — N18.30 STAGE 3 CHRONIC KIDNEY DISEASE, UNSPECIFIED WHETHER STAGE 3A OR 3B CKD (H): ICD-10-CM

## 2025-07-14 DIAGNOSIS — R42 VERTIGO: ICD-10-CM

## 2025-07-14 DIAGNOSIS — Z00.00 ENCOUNTER FOR PREVENTIVE HEALTH EXAMINATION: Primary | ICD-10-CM

## 2025-07-14 DIAGNOSIS — H93.13 TINNITUS, BILATERAL: ICD-10-CM

## 2025-07-14 LAB
ALBUMIN SERPL BCG-MCNC: 4.3 G/DL (ref 3.5–5.2)
ALP SERPL-CCNC: 99 U/L (ref 40–150)
ALT SERPL W P-5'-P-CCNC: 26 U/L (ref 0–50)
ANION GAP SERPL CALCULATED.3IONS-SCNC: 12 MMOL/L (ref 7–15)
AST SERPL W P-5'-P-CCNC: 31 U/L (ref 0–45)
BASOPHILS # BLD AUTO: 0.1 10E3/UL (ref 0–0.2)
BASOPHILS NFR BLD AUTO: 1 %
BILIRUB SERPL-MCNC: 0.5 MG/DL
BUN SERPL-MCNC: 14 MG/DL (ref 6–20)
CALCIUM SERPL-MCNC: 10.6 MG/DL (ref 8.8–10.4)
CHLORIDE SERPL-SCNC: 101 MMOL/L (ref 98–107)
CHOLEST SERPL-MCNC: 187 MG/DL
CREAT SERPL-MCNC: 1.22 MG/DL (ref 0.51–0.95)
CREAT UR-MCNC: 44.2 MG/DL
EGFRCR SERPLBLD CKD-EPI 2021: 51 ML/MIN/1.73M2
EOSINOPHIL # BLD AUTO: 0.2 10E3/UL (ref 0–0.7)
EOSINOPHIL NFR BLD AUTO: 2 %
ERYTHROCYTE [DISTWIDTH] IN BLOOD BY AUTOMATED COUNT: 12.9 % (ref 10–15)
EST. AVERAGE GLUCOSE BLD GHB EST-MCNC: 117 MG/DL
FASTING STATUS PATIENT QL REPORTED: NO
FASTING STATUS PATIENT QL REPORTED: NO
GLUCOSE SERPL-MCNC: 91 MG/DL (ref 70–99)
HBA1C MFR BLD: 5.7 % (ref 0–5.6)
HCO3 SERPL-SCNC: 26 MMOL/L (ref 22–29)
HCT VFR BLD AUTO: 39.6 % (ref 35–47)
HDLC SERPL-MCNC: 53 MG/DL
HGB BLD-MCNC: 13.3 G/DL (ref 11.7–15.7)
IMM GRANULOCYTES # BLD: 0 10E3/UL
IMM GRANULOCYTES NFR BLD: 0 %
LDLC SERPL CALC-MCNC: 92 MG/DL
LYMPHOCYTES # BLD AUTO: 1.5 10E3/UL (ref 0.8–5.3)
LYMPHOCYTES NFR BLD AUTO: 21 %
MCH RBC QN AUTO: 29.7 PG (ref 26.5–33)
MCHC RBC AUTO-ENTMCNC: 33.6 G/DL (ref 31.5–36.5)
MCV RBC AUTO: 88 FL (ref 78–100)
MICROALBUMIN UR-MCNC: <12 MG/L
MICROALBUMIN/CREAT UR: NORMAL MG/G{CREAT}
MONOCYTES # BLD AUTO: 0.5 10E3/UL (ref 0–1.3)
MONOCYTES NFR BLD AUTO: 7 %
NEUTROPHILS # BLD AUTO: 4.8 10E3/UL (ref 1.6–8.3)
NEUTROPHILS NFR BLD AUTO: 69 %
NONHDLC SERPL-MCNC: 134 MG/DL
PLATELET # BLD AUTO: 248 10E3/UL (ref 150–450)
POTASSIUM SERPL-SCNC: 4.4 MMOL/L (ref 3.4–5.3)
PROT SERPL-MCNC: 7.2 G/DL (ref 6.4–8.3)
RBC # BLD AUTO: 4.48 10E6/UL (ref 3.8–5.2)
SODIUM SERPL-SCNC: 139 MMOL/L (ref 135–145)
TRIGL SERPL-MCNC: 210 MG/DL
TSH SERPL DL<=0.005 MIU/L-ACNC: 3.35 UIU/ML (ref 0.3–4.2)
WBC # BLD AUTO: 6.9 10E3/UL (ref 4–11)

## 2025-07-14 PROCEDURE — 83036 HEMOGLOBIN GLYCOSYLATED A1C: CPT | Performed by: NURSE PRACTITIONER

## 2025-07-14 PROCEDURE — 3044F HG A1C LEVEL LT 7.0%: CPT | Performed by: NURSE PRACTITIONER

## 2025-07-14 PROCEDURE — 82570 ASSAY OF URINE CREATININE: CPT | Performed by: NURSE PRACTITIONER

## 2025-07-14 PROCEDURE — 80061 LIPID PANEL: CPT | Performed by: NURSE PRACTITIONER

## 2025-07-14 PROCEDURE — 1126F AMNT PAIN NOTED NONE PRSNT: CPT | Performed by: NURSE PRACTITIONER

## 2025-07-14 PROCEDURE — 80053 COMPREHEN METABOLIC PANEL: CPT | Performed by: NURSE PRACTITIONER

## 2025-07-14 PROCEDURE — 99214 OFFICE O/P EST MOD 30 MIN: CPT | Mod: 25 | Performed by: NURSE PRACTITIONER

## 2025-07-14 PROCEDURE — 3075F SYST BP GE 130 - 139MM HG: CPT | Performed by: NURSE PRACTITIONER

## 2025-07-14 PROCEDURE — 3079F DIAST BP 80-89 MM HG: CPT | Performed by: NURSE PRACTITIONER

## 2025-07-14 PROCEDURE — 84443 ASSAY THYROID STIM HORMONE: CPT | Performed by: NURSE PRACTITIONER

## 2025-07-14 PROCEDURE — 82043 UR ALBUMIN QUANTITATIVE: CPT | Performed by: NURSE PRACTITIONER

## 2025-07-14 PROCEDURE — 85025 COMPLETE CBC W/AUTO DIFF WBC: CPT | Performed by: NURSE PRACTITIONER

## 2025-07-14 PROCEDURE — 36415 COLL VENOUS BLD VENIPUNCTURE: CPT | Performed by: NURSE PRACTITIONER

## 2025-07-14 PROCEDURE — 3048F LDL-C <100 MG/DL: CPT | Performed by: NURSE PRACTITIONER

## 2025-07-14 PROCEDURE — 99396 PREV VISIT EST AGE 40-64: CPT | Mod: 25 | Performed by: NURSE PRACTITIONER

## 2025-07-14 RX ORDER — BUPROPION HYDROCHLORIDE 300 MG/1
300 TABLET ORAL EVERY MORNING
Qty: 90 TABLET | Refills: 2 | Status: SHIPPED | OUTPATIENT
Start: 2025-07-14

## 2025-07-14 RX ORDER — LISINOPRIL 40 MG/1
40 TABLET ORAL DAILY
Qty: 90 TABLET | Refills: 2 | Status: SHIPPED | OUTPATIENT
Start: 2025-07-14

## 2025-07-14 RX ORDER — ATORVASTATIN CALCIUM 20 MG/1
20 TABLET, FILM COATED ORAL DAILY
Qty: 90 TABLET | Refills: 2 | Status: SHIPPED | OUTPATIENT
Start: 2025-07-14

## 2025-07-14 ASSESSMENT — PAIN SCALES - GENERAL: PAINLEVEL_OUTOF10: NO PAIN (0)

## 2025-07-14 NOTE — PROGRESS NOTES
Preventive Care Visit  [facility]  Yani Malloy CNP, Internal Medicine  Jul 14, 2025      Assessment & Plan     (Z00.00) Encounter for preventive health examination  (primary encounter diagnosis)  Comment: Abida presents today for an annual wellness visit. She has a past medical history of hypertension, diabetes mellitus type 2, CKD 3, ERICA, asthma, depression and anxiety. She reports that she gets a mammogram yearly. She declines having a pap performed today.     Plan: HEMOGLOBIN A1C, Lipid panel reflex to direct         LDL Non-fasting, Albumin Random Urine         Quantitative with Creat Ratio, Comprehensive         metabolic panel (BMP + Alb, Alk Phos, ALT, AST,        Total. Bili, TP), CBC with platelets and         differential    (E11.9) Type 2 diabetes mellitus without complication, without long-term current use of insulin (H)  Comment: On metformin 500 mg daily. Was previously on ozempic but reports that it did not work for her so she stopped taking it. Hemoglobin A1c today collected, stable.     Plan: HEMOGLOBIN A1C, Lipid panel reflex to direct         LDL Non-fasting, Comprehensive metabolic panel         (BMP + Alb, Alk Phos, ALT, AST, Total. Bili,         TP), CBC with platelets and differential, TSH         with free T4 reflex    (N18.30) Stage 3 chronic kidney disease, unspecified whether stage 3a or 3b CKD (H)  Comment: Stable, complete metabolic panel from 4/29/24 reviewed. Plan to repeat complete metabolic panel today.     Plan: Albumin Random Urine Quantitative with Creat         Ratio, Comprehensive metabolic panel (BMP +         Alb, Alk Phos, ALT, AST, Total. Bili, TP), CBC         with platelets and differential, TSH with free         T4 reflex    (R42) Vertigo  Comment: Complains of dizziness and vertigo symptoms that have been increasingly worsening over the last month to month and a half. She shares that she has experienced symptoms like this before and has been  "taking meclizine 3 times per day, which has helped. Referral placed to ENT for further evaluation. Referral placed to physical therapy for vestibular rehab. Instructions provided to perform the Epley maneuver.    Plan: Physical Therapy  Referral, Adult ENT          Referral, TSH with free T4 reflex    (H93.13) Tinnitus, bilateral  Comment: Reports persistent tinnitus. Was seen by ENT in the past. Referral placed for ENT.      Plan: Physical Therapy  Referral, Adult ENT          Referral         BMI  Estimated body mass index is 42.36 kg/m  as calculated from the following:    Height as of this encounter: 1.64 m (5' 4.57\").    Weight as of this encounter: 113.9 kg (251 lb 3.2 oz).   Weight management plan: Discussed healthy diet and exercise guidelines    Counseling  Appropriate preventive services were addressed with this patient via screening, questionnaire, or discussion as appropriate for fall prevention, nutrition, physical activity, Tobacco-use cessation, social engagement, weight loss and cognition.  Checklist reviewing preventive services available has been given to the patient.  Reviewed patient's diet, addressing concerns and/or questions.   The patient was instructed to see the dentist every 6 months.   She is at risk for psychosocial distress and has been provided with information to reduce risk.           Subjective   Abida is a 60 year old, presenting for the following:  Physical and Dizziness        7/14/2025     2:06 PM   Additional Questions   Roomed by hope r   Accompanied by self         7/14/2025     2:06 PM   Patient Reported Additional Medications   Patient reports taking the following new medications n/a        Abida presents today for an annual wellness visit. She has a past medical history of hypertension, diabetes mellitus type 2, CKD 3, ERICA, asthma, depression and anxiety. She complains of dizziness which has been occurring for approx. a month and a half. She " states that she constantly has ringing in her ears, and previously saw an ENT, but has not been seen in years. She states that is nearly dizzy all the time, and has a dull headache. At time she feels as though she is spinning and it is hard to lay down as the dizziness can gets worse and she develops a throbbing headache. She has not lost consciousness with the dizziness. She takes meclizine 3x per day which has aided in some relief.     She shares that she has not been very physically active as she is her mothers full time care taker, who has dementia. She shares that she overall has been eating well. She does not go to the dentist routinely as she had had a bad experience int he past.            Advance Care Planning    Discussed advance care planning with patient; informed AVS has link to [facility].        7/13/2025   General Health   How would you rate your overall physical health? Good   Feel stress (tense, anxious, or unable to sleep) To some extent   (!) STRESS CONCERN      7/13/2025   Nutrition   Three or more servings of calcium each day? Yes   Diet: Regular (no restrictions)   How many servings of fruit and vegetables per day? (!) 2-3   How many sweetened beverages each day? (!) 2         7/13/2025   Exercise   Days per week of moderate/strenous exercise 0 days   Average minutes spent exercising at this level 0 min   (!) EXERCISE CONCERN      7/13/2025   Social Factors   Frequency of gathering with friends or relatives Once a week   Worry food won't last until get money to buy more No   Food not last or not have enough money for food? No   Do you have housing? (Housing is defined as stable permanent housing and does not include staying outside in a car, in a tent, in an abandoned building, in an overnight shelter, or couch-surfing.) Yes   Are you worried about losing your housing? No   Lack of transportation? No   Unable to get utilities (heat,electricity)? No         7/13/2025   Fall Risk   Fallen  2 or more times in the past year? No   Trouble with walking or balance? No          7/13/2025   Dental   Dentist two times every year? (!) NO, declines.        Today's PHQ-9 Score:       7/13/2025     9:35 PM   PHQ-9 SCORE   PHQ-9 Total Score MyChart 9 (Mild depression)   PHQ-9 Total Score 9        Patient-reported         7/13/2025   Substance Use   Alcohol more than 3/day or more than 7/wk No   Do you use any other substances recreationally? No     Social History     Tobacco Use    Smoking status: Never     Passive exposure: Past    Smokeless tobacco: Never   Vaping Use    Vaping status: Never Used   Substance Use Topics    Alcohol use: Yes     Comment: 1 drink per week or less    Drug use: No           4/28/2025   LAST FHS-7 RESULTS   1st degree relative breast or ovarian cancer Yes, mom had breast cancer. Gets yearly mammograms.    Any relative bilateral breast cancer No   Any male have breast cancer No   Any ONE woman have BOTH breast AND ovarian cancer No   Any woman with breast cancer before 50yrs Yes   2 or more relatives with breast AND/OR ovarian cancer Yes   2 or more relatives with breast AND/OR bowel cancer No                7/13/2025   STI Screening   New sexual partner(s) since last STI/HIV test? No     History of abnormal Pap smear: No - age 30- 66 PAP with HPV every 5 years recommended, denies pap today.        Latest Ref Rng & Units 2/1/2018     8:46 AM 2/1/2018     8:15 AM 7/7/2014    12:00 AM   PAP / HPV   PAP (Historical)   NIL  NIL    HPV 16 DNA NEG^Negative Negative      HPV 18 DNA NEG^Negative Negative      Other HR HPV NEG^Negative Negative        ASCVD Risk   The 10-year ASCVD risk score (Layla ESPINOZA, et al., 2019) is: 7.6%    Values used to calculate the score:      Age: 60 years      Sex: Female      Is Non- : No      Diabetic: Yes      Tobacco smoker: No      Systolic Blood Pressure: 132 mmHg      Is BP treated: Yes      HDL Cholesterol: 56 mg/dL      Total  Cholesterol: 212 mg/dL         Reviewed and updated as needed this visit by Provider      Problems               Past Medical History:   Diagnosis Date    Arthritis     hands    Bilateral pulmonary embolism (H) 09/30/2019    Diabetes (H)     HTN (hypertension)     IFG (impaired fasting glucose) 01/26/2020    Mild intermittent asthma, unspecified whether complicated 01/14/2019    Morbid obesity (H)     ERICA (obstructive sleep apnea)     CPAP     Past Surgical History:   Procedure Laterality Date    ESOPHAGOSCOPY, GASTROSCOPY, DUODENOSCOPY (EGD), COMBINED N/A 3/21/2023    Procedure: Esophagoscopy, gastroscopy, duodenoscopy (EGD), combined;  Surgeon: Boone Acevedo MD;  Location:  GI    ESOPHAGOSCOPY, GASTROSCOPY, DUODENOSCOPY (EGD), COMBINED N/A 6/13/2023    Procedure: Esophagoscopy, gastroscopy, duodenoscopy (EGD), combined;  Surgeon: Boone Acevedo MD;  Location:  GI    NO HISTORY OF SURGERY      RELEASE CARPAL TUNNEL Right 2/18/2020    Procedure: Right carpal tunnel release;  Surgeon: Henry Rogers MD;  Location: [facility] OR    REPAIR TENDON ANKLE Left 11/12/2020    Procedure: Peroneal tendon repair left lower extremity;  Surgeon: David Chacon DPM;  Location: [facility] OR     OB History   No obstetric history on file.     BP Readings from Last 3 Encounters:   07/14/25 132/82   04/29/24 128/78   06/13/23 125/75    Wt Readings from Last 3 Encounters:   07/14/25 113.9 kg (251 lb 3.2 oz)   04/29/24 119.7 kg (264 lb)   06/08/23 113.9 kg (251 lb 3.2 oz)                  Patient Active Problem List   Diagnosis    Family history of malignant neoplasm of breast    CARDIOVASCULAR SCREENING; LDL GOAL LESS THAN 160    CKD (chronic kidney disease) stage 3, GFR 30-59 ml/min (H)    Major depression in complete remission (H)    Anxiety    Morbid obesity, unspecified obesity type (H)    ERICA (obstructive sleep apnea)    Mild intermittent asthma, unspecified whether complicated    Benign essential hypertension    History of  pulmonary embolus (PE)    Carpal tunnel syndrome of right wrist    Type 2 diabetes mellitus without complication, without long-term current use of insulin (H)     Past Surgical History:   Procedure Laterality Date    ESOPHAGOSCOPY, GASTROSCOPY, DUODENOSCOPY (EGD), COMBINED N/A 3/21/2023    Procedure: Esophagoscopy, gastroscopy, duodenoscopy (EGD), combined;  Surgeon: Boone Acevedo MD;  Location:  GI    ESOPHAGOSCOPY, GASTROSCOPY, DUODENOSCOPY (EGD), COMBINED N/A 6/13/2023    Procedure: Esophagoscopy, gastroscopy, duodenoscopy (EGD), combined;  Surgeon: Boone Acevedo MD;  Location:  GI    NO HISTORY OF SURGERY      RELEASE CARPAL TUNNEL Right 2/18/2020    Procedure: Right carpal tunnel release;  Surgeon: Henry Rogers MD;  Location: [facility] OR    REPAIR TENDON ANKLE Left 11/12/2020    Procedure: Peroneal tendon repair left lower extremity;  Surgeon: David Chacon DPM;  Location: [facility] OR       Social History     Tobacco Use    Smoking status: Never     Passive exposure: Past    Smokeless tobacco: Never   Substance Use Topics    Alcohol use: Yes     Comment: 1 drink per week or less     Family History   Problem Relation Age of Onset    Breast Cancer Mother         S/P bilateral mastectomy    Brain Tumor Mother     Dementia Mother     C.A.D. Father         CABG-age 60    Cerebrovascular Disease Maternal Grandmother     Breast Cancer Paternal Aunt         age 40    Cancer Maternal Aunt         melanoma    Diabetes No family hx of     Hypertension No family hx of     Cancer - colorectal No family hx of     Thyroid Disease No family hx of          Current Outpatient Medications   Medication Sig Dispense Refill    albuterol (PROAIR HFA) 108 (90 Base) MCG/ACT inhaler INHALE 2 PUFFS BY MOUTH EVERY 6 HOURS AS NEEDED FOR WHEEZE OR FOR SHORTNESS OF BREATH 8.5 g 5    albuterol (PROVENTIL) (2.5 MG/3ML) 0.083% neb solution Take 1 vial (2.5 mg) by nebulization every 6 hours as needed for shortness of breath / dyspnea  or wheezing 90 mL 3    atorvastatin (LIPITOR) 20 MG tablet TAKE 1 TABLET BY MOUTH ONCE DAILY 90 tablet 2    buPROPion (WELLBUTRIN XL) 300 MG 24 hr tablet TAKE 1 TABLET BY MOUTH EVERY MORNING 90 tablet 2    citalopram (CELEXA) 40 MG tablet Take 1 tablet (40 mg) by mouth daily. 90 tablet 11    glucose blood VI test strips strip by In Vitro route daily. (Patient taking differently: by In Vitro route as needed.) 1 Box 12    lisinopril (ZESTRIL) 40 MG tablet TAKE 1 TABLET BY MOUTH ONCE DAILY 90 tablet 2    LORazepam (ATIVAN) 0.5 MG tablet Take 1 tablet by mouth 30 minutes before flight or dentist visit, ok to repeat x 1 if needed. 20 tablet 0    meclizine (ANTIVERT) 25 MG tablet TAKE ONE TABLET BY MOUTH THREE TIMES DAILY AS NEEDED FOR DIZZINESS 30 tablet 9    metFORMIN (GLUCOPHAGE) 500 MG tablet TAKE 1 TABLET BY MOUTH ONCE DAILY WITH BREAKFAST 90 tablet 3    multivitamin, therapeutic (THERA-VIT) TABS tablet Take 1 tablet by mouth daily      omeprazole (PRILOSEC) 40 MG DR capsule Take 1 capsule (40 mg) by mouth 2 times daily 90 capsule 0     Allergies   Allergen Reactions    No Known Allergies      Recent Labs   Lab Test 07/14/25  1529 04/29/24  1145 04/18/23  1412 03/15/23  1344 03/15/22  1202 03/15/22  1202 02/11/21  1430 11/09/20  1706   A1C 5.7* 5.8* 5.7* 5.9*   < > 6.1* 5.6 5.6   LDL  --  109* 84  --   --  82 175*  --    HDL  --  56 50  --   --  52 47*  --    TRIG  --  235* 202*  --   --  97 217*  --    ALT  --  26 25 18   < > 23 22  --    CR  --  1.12* 1.06* 1.15*   < > 1.08* 0.90 0.91   GFRESTIMATED  --  57* 62 56*   < > 61 73 72   GFRESTBLACK  --   --   --   --   --   --  84 83   POTASSIUM  --  4.7 4.5 4.6   < > 4.0 4.5 4.3   TSH  --  3.14 3.05  --   --  2.46 2.48  --     < > = values in this interval not displayed.          Review of Systems  Constitutional, neuro, ENT, endocrine, pulmonary, cardiac, gastrointestinal, genitourinary, musculoskeletal, integument and psychiatric systems are negative, except as  "otherwise noted.     Objective    Exam  /82   Pulse 77   Temp 98.2  F (36.8  C) (Tympanic)   Resp 18   Ht 1.64 m (5' 4.57\")   Wt 113.9 kg (251 lb 3.2 oz)   LMP 09/30/2019   SpO2 96%   BMI 42.36 kg/m     Estimated body mass index is 42.36 kg/m  as calculated from the following:    Height as of this encounter: 1.64 m (5' 4.57\").    Weight as of this encounter: 113.9 kg (251 lb 3.2 oz).    Physical Exam  GENERAL: alert and no distress  EYES: Eyes grossly normal to inspection, PERRL and conjunctivae and sclerae normal  HENT: ear canals and TM's normal, nose and mouth without ulcers or lesions  NECK: no adenopathy, no asymmetry, masses, or scars  RESP: lungs clear to auscultation - no rales, rhonchi or wheezes  CV: regular rate and rhythm, normal S1 S2, no S3 or S4, no murmur, click or rub, no peripheral edema  ABDOMEN: soft, nontender, no hepatosplenomegaly, no masses and bowel sounds normal  MS: no gross musculoskeletal defects noted, no edema  SKIN: no suspicious lesions or rashes  NEURO: Normal strength and tone, mentation intact and speech normal  PSYCH: mentation appears normal, affect normal/bright        Yanet Hall RN, BSN - FNP Student on 7/14/2025 at 2:49 PM    Patient was seen with student who acted as my scribe and was present for learning. I personally assessed, examined and made medical decisions reflected in the documentation. Any necessary edits to the note have been made by myself     Signed Electronically by: Yani Malloy CNP    Answers submitted by the patient for this visit:  Patient Health Questionnaire (Submitted on 7/13/2025)  If you checked off any problems, how difficult have these problems made it for you to do your work, take care of things at home, or get along with other people?: Somewhat difficult  PHQ9 TOTAL SCORE: 9    "

## 2025-07-15 ENCOUNTER — PATIENT OUTREACH (OUTPATIENT)
Dept: CARE COORDINATION | Facility: CLINIC | Age: 60
End: 2025-07-15
Payer: COMMERCIAL

## 2025-07-16 ENCOUNTER — RESULTS FOLLOW-UP (OUTPATIENT)
Dept: INTERNAL MEDICINE | Facility: CLINIC | Age: 60
End: 2025-07-16
Payer: COMMERCIAL

## 2025-07-16 DIAGNOSIS — E83.52 SERUM CALCIUM ELEVATED: Primary | ICD-10-CM

## 2025-07-17 ENCOUNTER — PATIENT OUTREACH (OUTPATIENT)
Dept: CARE COORDINATION | Facility: CLINIC | Age: 60
End: 2025-07-17
Payer: COMMERCIAL

## 2025-08-07 ENCOUNTER — LAB (OUTPATIENT)
Dept: LAB | Facility: CLINIC | Age: 60
End: 2025-08-07
Payer: COMMERCIAL

## 2025-08-07 DIAGNOSIS — E83.52 SERUM CALCIUM ELEVATED: ICD-10-CM

## 2025-08-07 LAB
CA-I BLD-MCNC: 4.6 MG/DL (ref 4.4–5.2)
PTH-INTACT SERPL-MCNC: 53 PG/ML (ref 15–65)
VIT D+METAB SERPL-MCNC: 47 NG/ML (ref 20–50)

## 2025-08-08 ENCOUNTER — RESULTS FOLLOW-UP (OUTPATIENT)
Dept: INTERNAL MEDICINE | Facility: CLINIC | Age: 60
End: 2025-08-08
Payer: COMMERCIAL

## (undated) DEVICE — SU VICRYL 3-0 SH 27" UND J416H

## (undated) DEVICE — CAST BUCKET

## (undated) DEVICE — GOWN IMPERVIOUS SPECIALTY XLG/XLONG 32474

## (undated) DEVICE — SU FIBERWIRE 2-0 TAPER CUT AR-7220

## (undated) DEVICE — PACK HAND SOP32HARMO

## (undated) DEVICE — PREP CHLORAPREP 26ML TINTED ORANGE  260815

## (undated) DEVICE — BNDG ELASTIC 4"X5YDS UNSTERILE 6611-40

## (undated) DEVICE — BLADE KNIFE SURG 15 371115

## (undated) DEVICE — SUCTION CANISTER MEDIVAC LINER 3000ML W/LID 65651-530

## (undated) DEVICE — GLOVE PROTEXIS POWDER FREE 7.5 ORTHOPEDIC 2D73ET75

## (undated) DEVICE — ESU PENCIL W/HOLSTER E2350H

## (undated) DEVICE — ESU GROUND PAD ADULT W/CORD E7507

## (undated) DEVICE — SOL NACL 0.9% IRRIG 1000ML BOTTLE 2F7124

## (undated) DEVICE — GOWN XLG DISP 9545

## (undated) DEVICE — LINEN FULL SHEET 5511

## (undated) DEVICE — BNDG ELASTIC 2"X5YDS UNSTERILE 6611-20

## (undated) DEVICE — PACK LOWER EXTREMITY RIDGES

## (undated) DEVICE — DRSG GAUZE 4X4" TRAY

## (undated) DEVICE — BAG CLEAR TRASH 1.3M 39X33" P4040C

## (undated) DEVICE — PREP CHLORAPREP 26ML TINTED HI-LITE ORANGE 930815

## (undated) DEVICE — SU ETHILON 4-0 PS-2 18" BLACK 1667H

## (undated) DEVICE — CAST PADDING 2" STERILE 9062S

## (undated) DEVICE — STPL SKIN 35W 6.9MM  PXW35

## (undated) DEVICE — GLOVE PROTEXIS BLUE W/NEU-THERA 8.0  2D73EB80

## (undated) DEVICE — LINEN HALF SHEET 5512

## (undated) DEVICE — PREP POVIDONE IODINE SOLUTION 10% 4OZ

## (undated) DEVICE — BNDG KLING 4" 2236

## (undated) DEVICE — CAST PLASTER SPLINT 3X15" EXTRA FAST

## (undated) DEVICE — CAST PADDING 4" STERILE CS9044

## (undated) DEVICE — LINEN TOWEL PACK X5 5464

## (undated) DEVICE — LINEN ORTHO ACL PACK 5447

## (undated) DEVICE — SU VICRYL 3-0 TIE 12X18" J904T

## (undated) DEVICE — SU VICRYL 4-0 PS-2 18" UND J496H

## (undated) DEVICE — PAD CHUX UNDERPAD 30X36" P3036C

## (undated) DEVICE — TOURNIQUET CUFF 34" STERILE ZIM60-7070-106

## (undated) DEVICE — NDL 27GA 1.25" 305136

## (undated) RX ORDER — CELECOXIB 200 MG/1
CAPSULE ORAL
Status: DISPENSED
Start: 2020-02-18

## (undated) RX ORDER — NEOSTIGMINE METHYLSULFATE 1 MG/ML
VIAL (ML) INJECTION
Status: DISPENSED
Start: 2020-11-12

## (undated) RX ORDER — OXYCODONE HYDROCHLORIDE 5 MG/1
TABLET ORAL
Status: DISPENSED
Start: 2020-11-12

## (undated) RX ORDER — LIDOCAINE HYDROCHLORIDE 10 MG/ML
INJECTION, SOLUTION EPIDURAL; INFILTRATION; INTRACAUDAL; PERINEURAL
Status: DISPENSED
Start: 2020-11-12

## (undated) RX ORDER — FENTANYL CITRATE 50 UG/ML
INJECTION, SOLUTION INTRAMUSCULAR; INTRAVENOUS
Status: DISPENSED
Start: 2020-11-12

## (undated) RX ORDER — FENTANYL CITRATE 50 UG/ML
INJECTION, SOLUTION INTRAMUSCULAR; INTRAVENOUS
Status: DISPENSED
Start: 2020-02-18

## (undated) RX ORDER — PROPOFOL 10 MG/ML
INJECTION, EMULSION INTRAVENOUS
Status: DISPENSED
Start: 2020-11-12

## (undated) RX ORDER — ACETAMINOPHEN 325 MG/1
TABLET ORAL
Status: DISPENSED
Start: 2020-11-12

## (undated) RX ORDER — LIDOCAINE HYDROCHLORIDE 10 MG/ML
INJECTION, SOLUTION EPIDURAL; INFILTRATION; INTRACAUDAL; PERINEURAL
Status: DISPENSED
Start: 2020-02-18

## (undated) RX ORDER — PROPOFOL 10 MG/ML
INJECTION, EMULSION INTRAVENOUS
Status: DISPENSED
Start: 2020-02-18

## (undated) RX ORDER — EPHEDRINE SULFATE 50 MG/ML
INJECTION, SOLUTION INTRAMUSCULAR; INTRAVENOUS; SUBCUTANEOUS
Status: DISPENSED
Start: 2020-11-12

## (undated) RX ORDER — ONDANSETRON 2 MG/ML
INJECTION INTRAMUSCULAR; INTRAVENOUS
Status: DISPENSED
Start: 2020-02-18

## (undated) RX ORDER — CEFAZOLIN SODIUM 2 G/100ML
INJECTION, SOLUTION INTRAVENOUS
Status: DISPENSED
Start: 2020-11-12

## (undated) RX ORDER — ACETAMINOPHEN 325 MG/1
TABLET ORAL
Status: DISPENSED
Start: 2020-02-18

## (undated) RX ORDER — DEXAMETHASONE SODIUM PHOSPHATE 4 MG/ML
INJECTION, SOLUTION INTRA-ARTICULAR; INTRALESIONAL; INTRAMUSCULAR; INTRAVENOUS; SOFT TISSUE
Status: DISPENSED
Start: 2020-02-18

## (undated) RX ORDER — BUPIVACAINE HYDROCHLORIDE 2.5 MG/ML
INJECTION, SOLUTION EPIDURAL; INFILTRATION; INTRACAUDAL
Status: DISPENSED
Start: 2020-11-12

## (undated) RX ORDER — GLYCOPYRROLATE 0.2 MG/ML
INJECTION INTRAMUSCULAR; INTRAVENOUS
Status: DISPENSED
Start: 2020-02-18

## (undated) RX ORDER — ONDANSETRON 2 MG/ML
INJECTION INTRAMUSCULAR; INTRAVENOUS
Status: DISPENSED
Start: 2020-11-12

## (undated) RX ORDER — DEXAMETHASONE SODIUM PHOSPHATE 4 MG/ML
INJECTION, SOLUTION INTRA-ARTICULAR; INTRALESIONAL; INTRAMUSCULAR; INTRAVENOUS; SOFT TISSUE
Status: DISPENSED
Start: 2020-11-12

## (undated) RX ORDER — HYDROCODONE BITARTRATE AND ACETAMINOPHEN 5; 325 MG/1; MG/1
TABLET ORAL
Status: DISPENSED
Start: 2020-02-18

## (undated) RX ORDER — GLYCOPYRROLATE 0.2 MG/ML
INJECTION INTRAMUSCULAR; INTRAVENOUS
Status: DISPENSED
Start: 2020-11-12